# Patient Record
Sex: MALE | Race: WHITE | NOT HISPANIC OR LATINO | Employment: UNEMPLOYED | URBAN - NONMETROPOLITAN AREA
[De-identification: names, ages, dates, MRNs, and addresses within clinical notes are randomized per-mention and may not be internally consistent; named-entity substitution may affect disease eponyms.]

---

## 2017-04-06 ENCOUNTER — APPOINTMENT (OUTPATIENT)
Dept: CT IMAGING | Facility: HOSPITAL | Age: 27
End: 2017-04-06

## 2017-04-06 ENCOUNTER — HOSPITAL ENCOUNTER (EMERGENCY)
Facility: HOSPITAL | Age: 27
Discharge: HOME | End: 2017-04-06
Admitting: EMERGENCY MEDICINE

## 2017-04-06 VITALS
TEMPERATURE: 97.8 F | SYSTOLIC BLOOD PRESSURE: 131 MMHG | HEIGHT: 69 IN | OXYGEN SATURATION: 98 % | BODY MASS INDEX: 46.65 KG/M2 | DIASTOLIC BLOOD PRESSURE: 81 MMHG | WEIGHT: 315 LBS | RESPIRATION RATE: 18 BRPM | HEART RATE: 72 BPM

## 2017-04-06 DIAGNOSIS — N20.1 URETEROLITHIASIS: Primary | ICD-10-CM

## 2017-04-06 LAB
ALBUMIN SERPL-MCNC: 4.8 G/DL (ref 3.5–5)
ALBUMIN/GLOB SERPL: 1.8 G/DL (ref 1.5–2.5)
ALP SERPL-CCNC: 91 U/L (ref 40–129)
ALT SERPL W P-5'-P-CCNC: 222 U/L (ref 10–44)
ANION GAP SERPL CALCULATED.3IONS-SCNC: 5.5 MMOL/L (ref 3.6–11.2)
AST SERPL-CCNC: 209 U/L (ref 10–34)
BACTERIA UR QL AUTO: ABNORMAL /HPF
BASOPHILS # BLD AUTO: 0.02 10*3/MM3 (ref 0–0.3)
BASOPHILS NFR BLD AUTO: 0.1 % (ref 0–2)
BILIRUB SERPL-MCNC: 0.8 MG/DL (ref 0.2–1.8)
BILIRUB UR QL STRIP: NEGATIVE
BUN BLD-MCNC: 15 MG/DL (ref 7–21)
BUN/CREAT SERPL: 12.9 (ref 7–25)
CALCIUM SPEC-SCNC: 10.2 MG/DL (ref 7.7–10)
CHLORIDE SERPL-SCNC: 107 MMOL/L (ref 99–112)
CLARITY UR: CLEAR
CO2 SERPL-SCNC: 33.5 MMOL/L (ref 24.3–31.9)
COLOR UR: YELLOW
CREAT BLD-MCNC: 1.16 MG/DL (ref 0.43–1.29)
DEPRECATED RDW RBC AUTO: 42.9 FL (ref 37–54)
EOSINOPHIL # BLD AUTO: 0.27 10*3/MM3 (ref 0–0.7)
EOSINOPHIL NFR BLD AUTO: 1.8 % (ref 0–5)
ERYTHROCYTE [DISTWIDTH] IN BLOOD BY AUTOMATED COUNT: 13.8 % (ref 11.5–14.5)
GFR SERPL CREATININE-BSD FRML MDRD: 75 ML/MIN/1.73
GLOBULIN UR ELPH-MCNC: 2.7 GM/DL
GLUCOSE BLD-MCNC: 107 MG/DL (ref 70–110)
GLUCOSE UR STRIP-MCNC: NEGATIVE MG/DL
HCT VFR BLD AUTO: 45.9 % (ref 42–52)
HGB BLD-MCNC: 14.9 G/DL (ref 14–18)
HGB UR QL STRIP.AUTO: ABNORMAL
HYALINE CASTS UR QL AUTO: ABNORMAL /LPF
IMM GRANULOCYTES # BLD: 0.04 10*3/MM3 (ref 0–0.03)
IMM GRANULOCYTES NFR BLD: 0.3 % (ref 0–0.5)
KETONES UR QL STRIP: NEGATIVE
LEUKOCYTE ESTERASE UR QL STRIP.AUTO: ABNORMAL
LYMPHOCYTES # BLD AUTO: 4.69 10*3/MM3 (ref 1–3)
LYMPHOCYTES NFR BLD AUTO: 31.7 % (ref 21–51)
MCH RBC QN AUTO: 28.1 PG (ref 27–33)
MCHC RBC AUTO-ENTMCNC: 32.5 G/DL (ref 33–37)
MCV RBC AUTO: 86.4 FL (ref 80–94)
MONOCYTES # BLD AUTO: 0.53 10*3/MM3 (ref 0.1–0.9)
MONOCYTES NFR BLD AUTO: 3.6 % (ref 0–10)
NEUTROPHILS # BLD AUTO: 9.23 10*3/MM3 (ref 1.4–6.5)
NEUTROPHILS NFR BLD AUTO: 62.5 % (ref 30–70)
NITRITE UR QL STRIP: NEGATIVE
OSMOLALITY SERPL CALC.SUM OF ELEC: 291.9 MOSM/KG (ref 273–305)
PH UR STRIP.AUTO: 5.5 [PH] (ref 5–8)
PLATELET # BLD AUTO: 337 10*3/MM3 (ref 130–400)
PMV BLD AUTO: 10.2 FL (ref 6–10)
POTASSIUM BLD-SCNC: 4.9 MMOL/L (ref 3.5–5.3)
PROT SERPL-MCNC: 7.5 G/DL (ref 6–8)
PROT UR QL STRIP: ABNORMAL
RBC # BLD AUTO: 5.31 10*6/MM3 (ref 4.7–6.1)
RBC # UR: ABNORMAL /HPF
REF LAB TEST METHOD: ABNORMAL
SODIUM BLD-SCNC: 146 MMOL/L (ref 135–153)
SP GR UR STRIP: 1.02 (ref 1–1.03)
SQUAMOUS #/AREA URNS HPF: ABNORMAL /HPF
UROBILINOGEN UR QL STRIP: ABNORMAL
WBC NRBC COR # BLD: 14.78 10*3/MM3 (ref 4.5–12.5)
WBC UR QL AUTO: ABNORMAL /HPF

## 2017-04-06 PROCEDURE — 96374 THER/PROPH/DIAG INJ IV PUSH: CPT

## 2017-04-06 PROCEDURE — 25010000002 ONDANSETRON PER 1 MG: Performed by: NURSE PRACTITIONER

## 2017-04-06 PROCEDURE — 81001 URINALYSIS AUTO W/SCOPE: CPT | Performed by: NURSE PRACTITIONER

## 2017-04-06 PROCEDURE — 85025 COMPLETE CBC W/AUTO DIFF WBC: CPT | Performed by: NURSE PRACTITIONER

## 2017-04-06 PROCEDURE — 74176 CT ABD & PELVIS W/O CONTRAST: CPT | Performed by: RADIOLOGY

## 2017-04-06 PROCEDURE — 74176 CT ABD & PELVIS W/O CONTRAST: CPT

## 2017-04-06 PROCEDURE — 99284 EMERGENCY DEPT VISIT MOD MDM: CPT

## 2017-04-06 PROCEDURE — 96361 HYDRATE IV INFUSION ADD-ON: CPT

## 2017-04-06 PROCEDURE — 25010000002 KETOROLAC TROMETHAMINE PER 15 MG: Performed by: NURSE PRACTITIONER

## 2017-04-06 PROCEDURE — 25010000002 HYDROMORPHONE PER 4 MG: Performed by: NURSE PRACTITIONER

## 2017-04-06 PROCEDURE — 80053 COMPREHEN METABOLIC PANEL: CPT | Performed by: NURSE PRACTITIONER

## 2017-04-06 PROCEDURE — 96375 TX/PRO/DX INJ NEW DRUG ADDON: CPT

## 2017-04-06 RX ORDER — KETOROLAC TROMETHAMINE 30 MG/ML
30 INJECTION, SOLUTION INTRAMUSCULAR; INTRAVENOUS ONCE
Status: COMPLETED | OUTPATIENT
Start: 2017-04-06 | End: 2017-04-06

## 2017-04-06 RX ORDER — OXYCODONE HYDROCHLORIDE AND ACETAMINOPHEN 5; 325 MG/1; MG/1
1 TABLET ORAL EVERY 4 HOURS PRN
Qty: 12 TABLET | Refills: 0 | OUTPATIENT
Start: 2017-04-06 | End: 2018-04-04

## 2017-04-06 RX ORDER — TAMSULOSIN HYDROCHLORIDE 0.4 MG/1
1 CAPSULE ORAL DAILY
Qty: 15 CAPSULE | Refills: 0 | OUTPATIENT
Start: 2017-04-06 | End: 2018-04-04

## 2017-04-06 RX ORDER — SODIUM CHLORIDE 0.9 % (FLUSH) 0.9 %
10 SYRINGE (ML) INJECTION AS NEEDED
Status: DISCONTINUED | OUTPATIENT
Start: 2017-04-06 | End: 2017-04-06 | Stop reason: HOSPADM

## 2017-04-06 RX ORDER — ONDANSETRON 4 MG/1
4 TABLET, ORALLY DISINTEGRATING ORAL EVERY 6 HOURS PRN
Qty: 12 TABLET | Refills: 0 | Status: SHIPPED | OUTPATIENT
Start: 2017-04-06 | End: 2018-03-02 | Stop reason: SDUPTHER

## 2017-04-06 RX ORDER — ONDANSETRON 2 MG/ML
4 INJECTION INTRAMUSCULAR; INTRAVENOUS ONCE
Status: COMPLETED | OUTPATIENT
Start: 2017-04-06 | End: 2017-04-06

## 2017-04-06 RX ADMIN — SODIUM CHLORIDE 1000 ML: 9 INJECTION, SOLUTION INTRAVENOUS at 12:35

## 2017-04-06 RX ADMIN — KETOROLAC TROMETHAMINE 30 MG: 30 INJECTION, SOLUTION INTRAMUSCULAR; INTRAVENOUS at 14:46

## 2017-04-06 RX ADMIN — HYDROMORPHONE HYDROCHLORIDE 1 MG: 1 INJECTION, SOLUTION INTRAMUSCULAR; INTRAVENOUS; SUBCUTANEOUS at 12:44

## 2017-04-06 RX ADMIN — ONDANSETRON 4 MG: 2 INJECTION, SOLUTION INTRAMUSCULAR; INTRAVENOUS at 12:40

## 2017-04-06 NOTE — ED NOTES
Reassessment afater pain med continues to be #5-6 very uncomfortable.     Denea Le RN  04/06/17 9828

## 2017-04-06 NOTE — ED PROVIDER NOTES
Subjective   Patient is a 26 y.o. male presenting with flank pain.   History provided by:  Patient   used: No    Flank Pain   Pain location:  R flank  Pain quality: sharp    Pain radiates to:  Does not radiate  Pain severity:  Moderate  Onset quality:  Sudden  Timing:  Constant  Progression:  Waxing and waning  Chronicity:  New  Context: not alcohol use, not awakening from sleep, not diet changes, not eating, not previous surgeries, not recent illness, not recent sexual activity, not retching and not suspicious food intake    Relieved by:  Nothing  Worsened by:  Nothing  Ineffective treatments:  None tried  Associated symptoms: nausea    Associated symptoms: no anorexia, no belching, no chest pain, no chills, no dysuria, no fever, no flatus, no hematemesis, no melena, no sore throat and no vaginal bleeding    Risk factors: no alcohol abuse, no aspirin use, not elderly, no NSAID use and not obese        Review of Systems   Constitutional: Negative.  Negative for chills and fever.   HENT: Negative.  Negative for sore throat.    Eyes: Negative.    Respiratory: Negative.    Cardiovascular: Negative.  Negative for chest pain.   Gastrointestinal: Positive for nausea. Negative for anorexia, flatus, hematemesis and melena.   Endocrine: Negative.    Genitourinary: Positive for flank pain. Negative for dysuria and vaginal bleeding.   Skin: Negative.    Allergic/Immunologic: Negative.    Neurological: Negative.    Hematological: Negative.    Psychiatric/Behavioral: Negative.        Past Medical History:   Diagnosis Date   • Arthritis    • Calculus of kidney    • Hypertension        Allergies   Allergen Reactions   • Contrast Dye    • Lisinopril        Past Surgical History:   Procedure Laterality Date   • OTHER SURGICAL HISTORY      diagnostic esophagogastroduodenoscopy   • OTHER SURGICAL HISTORY      renal lithotripsy       Family History   Problem Relation Age of Onset   • Colon cancer Other    • Heart  disease Other    • Lymphoma Other        Social History     Social History   • Marital status:      Spouse name: N/A   • Number of children: N/A   • Years of education: N/A     Social History Main Topics   • Smoking status: Light Tobacco Smoker   • Smokeless tobacco: None      Comment: smokes 1 pack of cigarettes per day   • Alcohol use Defer   • Drug use: No   • Sexual activity: Not Asked     Other Topics Concern   • None     Social History Narrative           Objective   Physical Exam   Constitutional: He is oriented to person, place, and time. He appears well-developed and well-nourished.   HENT:   Head: Normocephalic.   Right Ear: External ear normal.   Left Ear: External ear normal.   Mouth/Throat: Oropharynx is clear and moist.   Eyes: EOM are normal. Pupils are equal, round, and reactive to light.   Neck: Normal range of motion. Neck supple.   Cardiovascular: Normal rate and regular rhythm.    Pulmonary/Chest: Effort normal and breath sounds normal.   Abdominal: Soft. Bowel sounds are normal.   Musculoskeletal: Normal range of motion.   Neurological: He is alert and oriented to person, place, and time.   Skin: Skin is warm and dry.   Psychiatric: He has a normal mood and affect. His behavior is normal.   Nursing note and vitals reviewed.      Procedures         ED Course  ED Course                  MDM    Final diagnoses:   Ureterolithiasis            Paramjit Adams, APOLINAR  04/06/17 1530       APOLINAR Multani  04/06/17 1557

## 2017-07-25 ENCOUNTER — HOSPITAL ENCOUNTER (EMERGENCY)
Facility: HOSPITAL | Age: 27
Discharge: LEFT BEFORE DISCHARGE | End: 2017-07-26

## 2017-07-25 VITALS
RESPIRATION RATE: 20 BRPM | DIASTOLIC BLOOD PRESSURE: 107 MMHG | BODY MASS INDEX: 47.74 KG/M2 | HEART RATE: 118 BPM | WEIGHT: 315 LBS | TEMPERATURE: 98 F | HEIGHT: 68 IN | OXYGEN SATURATION: 98 % | SYSTOLIC BLOOD PRESSURE: 157 MMHG

## 2017-07-25 PROCEDURE — 99211 OFF/OP EST MAY X REQ PHY/QHP: CPT

## 2017-10-03 ENCOUNTER — LAB (OUTPATIENT)
Dept: LAB | Facility: HOSPITAL | Age: 27
End: 2017-10-03
Attending: INTERNAL MEDICINE

## 2017-10-03 ENCOUNTER — TRANSCRIBE ORDERS (OUTPATIENT)
Dept: ADMINISTRATIVE | Facility: HOSPITAL | Age: 27
End: 2017-10-03

## 2017-10-03 DIAGNOSIS — M15.0 PRIMARY GENERALIZED HYPERTROPHIC OSTEOARTHROSIS: Primary | ICD-10-CM

## 2017-10-03 DIAGNOSIS — M15.0 PRIMARY GENERALIZED HYPERTROPHIC OSTEOARTHROSIS: ICD-10-CM

## 2017-10-03 LAB
CK SERPL-CCNC: 74 U/L (ref 24–204)
CRP SERPL-MCNC: 1.73 MG/DL (ref 0–0.99)
ERYTHROCYTE [SEDIMENTATION RATE] IN BLOOD: 29 MM/HR (ref 0–15)

## 2017-10-03 PROCEDURE — 86200 CCP ANTIBODY: CPT | Performed by: INTERNAL MEDICINE

## 2017-10-03 PROCEDURE — 86140 C-REACTIVE PROTEIN: CPT | Performed by: INTERNAL MEDICINE

## 2017-10-03 PROCEDURE — 82550 ASSAY OF CK (CPK): CPT | Performed by: INTERNAL MEDICINE

## 2017-10-03 PROCEDURE — 85652 RBC SED RATE AUTOMATED: CPT | Performed by: INTERNAL MEDICINE

## 2017-10-03 PROCEDURE — 36415 COLL VENOUS BLD VENIPUNCTURE: CPT

## 2017-10-05 LAB — CCP IGA+IGG SERPL IA-ACNC: 5 UNITS (ref 0–19)

## 2017-11-10 ENCOUNTER — LAB (OUTPATIENT)
Dept: LAB | Facility: HOSPITAL | Age: 27
End: 2017-11-10
Attending: INTERNAL MEDICINE

## 2017-11-10 ENCOUNTER — TRANSCRIBE ORDERS (OUTPATIENT)
Dept: ADMINISTRATIVE | Facility: HOSPITAL | Age: 27
End: 2017-11-10

## 2017-11-10 DIAGNOSIS — I10 HYPERTENSION, UNSPECIFIED TYPE: ICD-10-CM

## 2017-11-10 DIAGNOSIS — I10 HYPERTENSION, UNSPECIFIED TYPE: Primary | ICD-10-CM

## 2017-11-10 LAB
BNP SERPL-MCNC: 4 PG/ML (ref 0–100)
CREAT UR-MCNC: 232.6 MG/DL
PROT UR-MCNC: 7.5 MG/DL
PROT/CREAT UR: 32.2 MG/G CREA (ref 0–200)
TSH SERPL DL<=0.05 MIU/L-ACNC: 4.06 MIU/ML (ref 0.55–4.78)

## 2017-11-10 PROCEDURE — 84156 ASSAY OF PROTEIN URINE: CPT | Performed by: INTERNAL MEDICINE

## 2017-11-10 PROCEDURE — 36415 COLL VENOUS BLD VENIPUNCTURE: CPT

## 2017-11-10 PROCEDURE — 84443 ASSAY THYROID STIM HORMONE: CPT | Performed by: INTERNAL MEDICINE

## 2017-11-10 PROCEDURE — 83880 ASSAY OF NATRIURETIC PEPTIDE: CPT | Performed by: INTERNAL MEDICINE

## 2017-11-10 PROCEDURE — 82570 ASSAY OF URINE CREATININE: CPT | Performed by: INTERNAL MEDICINE

## 2018-02-14 ENCOUNTER — HOSPITAL ENCOUNTER (EMERGENCY)
Facility: HOSPITAL | Age: 28
Discharge: HOME | End: 2018-02-14
Attending: EMERGENCY MEDICINE | Admitting: EMERGENCY MEDICINE

## 2018-02-14 ENCOUNTER — APPOINTMENT (OUTPATIENT)
Dept: CT IMAGING | Facility: HOSPITAL | Age: 28
End: 2018-02-14

## 2018-02-14 VITALS
SYSTOLIC BLOOD PRESSURE: 122 MMHG | RESPIRATION RATE: 18 BRPM | HEIGHT: 69 IN | OXYGEN SATURATION: 98 % | WEIGHT: 315 LBS | DIASTOLIC BLOOD PRESSURE: 82 MMHG | BODY MASS INDEX: 46.65 KG/M2 | TEMPERATURE: 98.3 F | HEART RATE: 85 BPM

## 2018-02-14 DIAGNOSIS — N20.0 NEPHROLITHIASIS: Primary | ICD-10-CM

## 2018-02-14 DIAGNOSIS — N13.30 HYDRONEPHROSIS, UNSPECIFIED HYDRONEPHROSIS TYPE: ICD-10-CM

## 2018-02-14 LAB
ALBUMIN SERPL-MCNC: 4.6 G/DL (ref 3.5–5)
ALBUMIN/GLOB SERPL: 1.6 G/DL (ref 1.5–2.5)
ALP SERPL-CCNC: 84 U/L (ref 40–129)
ALT SERPL W P-5'-P-CCNC: 28 U/L (ref 10–44)
ANION GAP SERPL CALCULATED.3IONS-SCNC: 9 MMOL/L (ref 3.6–11.2)
AST SERPL-CCNC: 20 U/L (ref 10–34)
BACTERIA UR QL AUTO: ABNORMAL /HPF
BASOPHILS # BLD AUTO: 0.02 10*3/MM3 (ref 0–0.3)
BASOPHILS NFR BLD AUTO: 0.1 % (ref 0–2)
BILIRUB SERPL-MCNC: 0.4 MG/DL (ref 0.2–1.8)
BILIRUB UR QL STRIP: NEGATIVE
BUN BLD-MCNC: 11 MG/DL (ref 7–21)
BUN/CREAT SERPL: 9.6 (ref 7–25)
CALCIUM SPEC-SCNC: 9.9 MG/DL (ref 7.7–10)
CHLORIDE SERPL-SCNC: 103 MMOL/L (ref 99–112)
CLARITY UR: CLEAR
CO2 SERPL-SCNC: 27 MMOL/L (ref 24.3–31.9)
COLOR UR: YELLOW
CREAT BLD-MCNC: 1.15 MG/DL (ref 0.43–1.29)
DEPRECATED RDW RBC AUTO: 43.9 FL (ref 37–54)
EOSINOPHIL # BLD AUTO: 0.24 10*3/MM3 (ref 0–0.7)
EOSINOPHIL NFR BLD AUTO: 1.7 % (ref 0–5)
ERYTHROCYTE [DISTWIDTH] IN BLOOD BY AUTOMATED COUNT: 14.3 % (ref 11.5–14.5)
GFR SERPL CREATININE-BSD FRML MDRD: 75 ML/MIN/1.73
GLOBULIN UR ELPH-MCNC: 2.9 GM/DL
GLUCOSE BLD-MCNC: 101 MG/DL (ref 70–110)
GLUCOSE UR STRIP-MCNC: NEGATIVE MG/DL
HCT VFR BLD AUTO: 45.4 % (ref 42–52)
HGB BLD-MCNC: 14.8 G/DL (ref 14–18)
HGB UR QL STRIP.AUTO: ABNORMAL
HYALINE CASTS UR QL AUTO: ABNORMAL /LPF
IMM GRANULOCYTES # BLD: 0.07 10*3/MM3 (ref 0–0.03)
IMM GRANULOCYTES NFR BLD: 0.5 % (ref 0–0.5)
KETONES UR QL STRIP: ABNORMAL
LEUKOCYTE ESTERASE UR QL STRIP.AUTO: ABNORMAL
LIPASE SERPL-CCNC: 29 U/L (ref 13–60)
LYMPHOCYTES # BLD AUTO: 4.43 10*3/MM3 (ref 1–3)
LYMPHOCYTES NFR BLD AUTO: 32.2 % (ref 21–51)
MCH RBC QN AUTO: 27.7 PG (ref 27–33)
MCHC RBC AUTO-ENTMCNC: 32.6 G/DL (ref 33–37)
MCV RBC AUTO: 84.9 FL (ref 80–94)
MONOCYTES # BLD AUTO: 0.82 10*3/MM3 (ref 0.1–0.9)
MONOCYTES NFR BLD AUTO: 6 % (ref 0–10)
NEUTROPHILS # BLD AUTO: 8.17 10*3/MM3 (ref 1.4–6.5)
NEUTROPHILS NFR BLD AUTO: 59.5 % (ref 30–70)
NITRITE UR QL STRIP: NEGATIVE
OSMOLALITY SERPL CALC.SUM OF ELEC: 277.1 MOSM/KG (ref 273–305)
PH UR STRIP.AUTO: <=5 [PH] (ref 5–8)
PLATELET # BLD AUTO: 376 10*3/MM3 (ref 130–400)
PMV BLD AUTO: 10.3 FL (ref 6–10)
POTASSIUM BLD-SCNC: 4.2 MMOL/L (ref 3.5–5.3)
PROT SERPL-MCNC: 7.5 G/DL (ref 6–8)
PROT UR QL STRIP: ABNORMAL
RBC # BLD AUTO: 5.35 10*6/MM3 (ref 4.7–6.1)
RBC # UR: ABNORMAL /HPF
REF LAB TEST METHOD: ABNORMAL
SODIUM BLD-SCNC: 139 MMOL/L (ref 135–153)
SP GR UR STRIP: 1.02 (ref 1–1.03)
SQUAMOUS #/AREA URNS HPF: ABNORMAL /HPF
UROBILINOGEN UR QL STRIP: ABNORMAL
WBC NRBC COR # BLD: 13.75 10*3/MM3 (ref 4.5–12.5)
WBC UR QL AUTO: ABNORMAL /HPF

## 2018-02-14 PROCEDURE — 85025 COMPLETE CBC W/AUTO DIFF WBC: CPT | Performed by: PHYSICIAN ASSISTANT

## 2018-02-14 PROCEDURE — 74176 CT ABD & PELVIS W/O CONTRAST: CPT

## 2018-02-14 PROCEDURE — 80053 COMPREHEN METABOLIC PANEL: CPT | Performed by: PHYSICIAN ASSISTANT

## 2018-02-14 PROCEDURE — 96374 THER/PROPH/DIAG INJ IV PUSH: CPT

## 2018-02-14 PROCEDURE — 96361 HYDRATE IV INFUSION ADD-ON: CPT

## 2018-02-14 PROCEDURE — 25010000002 HYDROMORPHONE PER 4 MG

## 2018-02-14 PROCEDURE — 25010000002 KETOROLAC TROMETHAMINE PER 15 MG: Performed by: EMERGENCY MEDICINE

## 2018-02-14 PROCEDURE — 74176 CT ABD & PELVIS W/O CONTRAST: CPT | Performed by: RADIOLOGY

## 2018-02-14 PROCEDURE — 25010000002 ONDANSETRON PER 1 MG: Performed by: PHYSICIAN ASSISTANT

## 2018-02-14 PROCEDURE — 96375 TX/PRO/DX INJ NEW DRUG ADDON: CPT

## 2018-02-14 PROCEDURE — 83690 ASSAY OF LIPASE: CPT | Performed by: PHYSICIAN ASSISTANT

## 2018-02-14 PROCEDURE — 99283 EMERGENCY DEPT VISIT LOW MDM: CPT

## 2018-02-14 PROCEDURE — 81001 URINALYSIS AUTO W/SCOPE: CPT | Performed by: PHYSICIAN ASSISTANT

## 2018-02-14 RX ORDER — KETOROLAC TROMETHAMINE 30 MG/ML
30 INJECTION, SOLUTION INTRAMUSCULAR; INTRAVENOUS ONCE
Status: COMPLETED | OUTPATIENT
Start: 2018-02-14 | End: 2018-02-14

## 2018-02-14 RX ORDER — LOSARTAN POTASSIUM 50 MG/1
50 TABLET ORAL DAILY
COMMUNITY
End: 2018-04-11

## 2018-02-14 RX ORDER — GABAPENTIN 600 MG/1
600 TABLET ORAL
COMMUNITY

## 2018-02-14 RX ORDER — ONDANSETRON 2 MG/ML
4 INJECTION INTRAMUSCULAR; INTRAVENOUS ONCE
Status: COMPLETED | OUTPATIENT
Start: 2018-02-14 | End: 2018-02-14

## 2018-02-14 RX ORDER — OXYCODONE AND ACETAMINOPHEN 10; 325 MG/1; MG/1
1 TABLET ORAL EVERY 6 HOURS PRN
Qty: 12 TABLET | Refills: 0 | Status: SHIPPED | OUTPATIENT
Start: 2018-02-14 | End: 2018-07-11

## 2018-02-14 RX ORDER — IBUPROFEN 800 MG/1
800 TABLET ORAL EVERY 8 HOURS PRN
Qty: 15 TABLET | Refills: 0 | Status: ON HOLD | OUTPATIENT
Start: 2018-02-14 | End: 2019-11-11

## 2018-02-14 RX ORDER — QUETIAPINE FUMARATE 100 MG/1
600 TABLET, FILM COATED ORAL NIGHTLY
COMMUNITY
End: 2018-09-19

## 2018-02-14 RX ORDER — SODIUM CHLORIDE 0.9 % (FLUSH) 0.9 %
10 SYRINGE (ML) INJECTION AS NEEDED
Status: DISCONTINUED | OUTPATIENT
Start: 2018-02-14 | End: 2018-02-14 | Stop reason: HOSPADM

## 2018-02-14 RX ORDER — HYDROMORPHONE HCL 110MG/55ML
PATIENT CONTROLLED ANALGESIA SYRINGE INTRAVENOUS
Status: COMPLETED
Start: 2018-02-14 | End: 2018-02-14

## 2018-02-14 RX ORDER — TAMSULOSIN HYDROCHLORIDE 0.4 MG/1
1 CAPSULE ORAL NIGHTLY
Qty: 7 CAPSULE | Refills: 0 | OUTPATIENT
Start: 2018-02-14 | End: 2019-03-17

## 2018-02-14 RX ORDER — ONDANSETRON 4 MG/1
4 TABLET, FILM COATED ORAL EVERY 8 HOURS PRN
Qty: 15 TABLET | Refills: 0 | Status: SHIPPED | OUTPATIENT
Start: 2018-02-14 | End: 2018-09-12

## 2018-02-14 RX ADMIN — HYDROMORPHONE HYDROCHLORIDE 1 MG: 2 INJECTION INTRAMUSCULAR; INTRAVENOUS; SUBCUTANEOUS at 10:47

## 2018-02-14 RX ADMIN — ONDANSETRON 4 MG: 2 INJECTION, SOLUTION INTRAMUSCULAR; INTRAVENOUS at 10:49

## 2018-02-14 RX ADMIN — SODIUM CHLORIDE 1000 ML: 9 INJECTION, SOLUTION INTRAVENOUS at 10:51

## 2018-02-14 RX ADMIN — KETOROLAC TROMETHAMINE 30 MG: 30 INJECTION, SOLUTION INTRAMUSCULAR; INTRAVENOUS at 10:45

## 2018-02-14 NOTE — ED PROVIDER NOTES
Subjective   Patient is a 27 y.o. male presenting with flank pain.   History provided by:  Patient   used: No    Flank Pain   Pain location:  R flank  Pain quality: sharp    Pain radiates to:  Suprapubic region and groin  Pain severity:  Severe  Duration:  3 hours  Timing:  Constant  Progression:  Worsening  Chronicity:  New  Context: awakening from sleep    Context: not sick contacts    Relieved by:  Nothing  Worsened by:  Urination and movement  Ineffective treatments:  None tried  Associated symptoms: nausea    Associated symptoms: no chest pain, no dysuria, no fever, no hematuria and no vomiting    Nausea:     Severity:  Moderate    Timing:  Constant  Risk factors: obesity    Risk factors: not elderly        Review of Systems   Constitutional: Negative.  Negative for fever.   HENT: Negative.    Respiratory: Negative.    Cardiovascular: Negative.  Negative for chest pain.   Gastrointestinal: Positive for abdominal pain and nausea. Negative for vomiting.   Endocrine: Negative.    Genitourinary: Positive for flank pain and urgency. Negative for dysuria and hematuria.   Skin: Negative.    Neurological: Negative.    Psychiatric/Behavioral: Negative.    All other systems reviewed and are negative.      Past Medical History:   Diagnosis Date   • Arthritis    • Calculus of kidney    • Hypertension        Allergies   Allergen Reactions   • Lisinopril Cough   • Contrast Dye Rash       Past Surgical History:   Procedure Laterality Date   • OTHER SURGICAL HISTORY      diagnostic esophagogastroduodenoscopy   • OTHER SURGICAL HISTORY      renal lithotripsy       Family History   Problem Relation Age of Onset   • Colon cancer Other    • Heart disease Other    • Lymphoma Other        Social History     Social History   • Marital status:      Spouse name: N/A   • Number of children: N/A   • Years of education: N/A     Social History Main Topics   • Smoking status: Light Tobacco Smoker   • Smokeless  tobacco: Never Used      Comment: smokes 1 pack of cigarettes per day   • Alcohol use Defer   • Drug use: No   • Sexual activity: Defer     Other Topics Concern   • None     Social History Narrative   • None           Objective   Physical Exam   Constitutional: He is oriented to person, place, and time. He appears well-developed and well-nourished. No distress.   HENT:   Head: Normocephalic and atraumatic.   Right Ear: External ear normal.   Left Ear: External ear normal.   Nose: Nose normal.   Eyes: Conjunctivae and EOM are normal. Pupils are equal, round, and reactive to light.   Neck: Normal range of motion. Neck supple. No JVD present. No tracheal deviation present.   Cardiovascular: Normal rate, regular rhythm and normal heart sounds.    No murmur heard.  Pulmonary/Chest: Effort normal and breath sounds normal. No respiratory distress. He has no wheezes.   Abdominal: Soft. Bowel sounds are normal. There is tenderness in the suprapubic area. There is CVA tenderness.   Musculoskeletal: Normal range of motion. He exhibits no edema or deformity.   Neurological: He is alert and oriented to person, place, and time. No cranial nerve deficit.   Skin: Skin is warm and dry. No rash noted. He is not diaphoretic. No erythema. No pallor.   Psychiatric: He has a normal mood and affect. His behavior is normal. Thought content normal.   Nursing note and vitals reviewed.      Procedures         ED Course  ED Course   Value Comment By Time   CT Abdomen Pelvis Stone Protocol Reviewed. 3.5mm stone present in proximal right ureter. Hydronephrosis. No obstruction present. Erasmo Contreras PA-C 02/14 1115                  MDM  Number of Diagnoses or Management Options  Hydronephrosis, unspecified hydronephrosis type: new and requires workup  Nephrolithiasis: new and requires workup     Amount and/or Complexity of Data Reviewed  Clinical lab tests: ordered and reviewed  Tests in the radiology section of CPT®: reviewed and  ordered  Discuss the patient with other providers: yes    Risk of Complications, Morbidity, and/or Mortality  Presenting problems: moderate  Diagnostic procedures: moderate  Management options: moderate    Patient Progress  Patient progress: stable      Final diagnoses:   Nephrolithiasis   Hydronephrosis, unspecified hydronephrosis type            Erasmo Contreras PA-C  02/14/18 1149

## 2018-03-02 ENCOUNTER — OFFICE VISIT (OUTPATIENT)
Dept: UROLOGY | Facility: CLINIC | Age: 28
End: 2018-03-02

## 2018-03-02 VITALS
HEIGHT: 69 IN | HEART RATE: 85 BPM | BODY MASS INDEX: 46.65 KG/M2 | DIASTOLIC BLOOD PRESSURE: 82 MMHG | WEIGHT: 315 LBS | SYSTOLIC BLOOD PRESSURE: 122 MMHG

## 2018-03-02 DIAGNOSIS — N32.81 DETRUSOR INSTABILITY: ICD-10-CM

## 2018-03-02 DIAGNOSIS — N20.0 KIDNEY STONES: Primary | ICD-10-CM

## 2018-03-02 DIAGNOSIS — N20.1 URETERAL CALCULUS, RIGHT: ICD-10-CM

## 2018-03-02 LAB
BILIRUB BLD-MCNC: NEGATIVE MG/DL
CLARITY, POC: CLEAR
COLOR UR: YELLOW
GLUCOSE UR STRIP-MCNC: NEGATIVE MG/DL
KETONES UR QL: NEGATIVE
LEUKOCYTE EST, POC: NEGATIVE
NITRITE UR-MCNC: NEGATIVE MG/ML
PH UR: 6 [PH] (ref 5–8)
PROT UR STRIP-MCNC: NEGATIVE MG/DL
RBC # UR STRIP: NEGATIVE /UL
SP GR UR: 1.01 (ref 1–1.03)
UROBILINOGEN UR QL: NORMAL

## 2018-03-02 PROCEDURE — 51798 US URINE CAPACITY MEASURE: CPT | Performed by: UROLOGY

## 2018-03-02 PROCEDURE — 99204 OFFICE O/P NEW MOD 45 MIN: CPT | Performed by: UROLOGY

## 2018-03-02 RX ORDER — ONDANSETRON 4 MG/1
4 TABLET, ORALLY DISINTEGRATING ORAL EVERY 6 HOURS PRN
Qty: 12 TABLET | Refills: 0 | Status: SHIPPED | OUTPATIENT
Start: 2018-03-02 | End: 2018-04-17 | Stop reason: SDUPTHER

## 2018-03-02 NOTE — PROGRESS NOTES
Chief Complaint:          Chief Complaint   Patient presents with   • Nephrolithiasis     ER FOLLOW UP       HPI:   27 y.o. male.  27-year-old morbidly obese white male here with a right 3.5 mm distal stone seen in the emergency room on February 14 he should he passed a stone now is having urge and urge related incontinence.  Had a gastric sleeve and 2015.  He is gaining 30 pounds.  He's had recurrent stones since the age of 13.  He is a negative urine a negative residual he has occasional nausea and requested Zofran he's been sick for the last 2 weeks.  I'm going to initiate a lower tract investigation and I'm going to empirically start him on Toviaz for this detrusor instability and a workup based on this.Ureteral calculus-patient has been diagnosed with a ureteral calculus.  We have discussed the various parameters regarding spontaneous passage including the notion that a 2 mm stone has a high likelihood of spontaneous passage versus a larger stone being caught up in the upper areas of the urinary tract.  We also discussed the medical management of stone disease and the use of medical expulsive therapy in the form of Flomax.  This is used in an off label setting.  We discussed the indicators for intervention including  absolute indicators such as sepsis and uncontrollable severe pain as well as  the relative indicators of moderate pain that is well-controlled with various analgesia.  I also talked about nonoperative management including ambulation and increasing fluids and hot tub as being an effective adjuncts in the treatment of a ureteral stone.    Past Medical History:        Past Medical History:   Diagnosis Date   • Arthritis    • Calculus of kidney    • Hypertension          Current Meds:     Current Outpatient Prescriptions   Medication Sig Dispense Refill   • amitriptyline (ELAVIL) 50 MG tablet Take  by mouth.     • atorvastatin (LIPITOR) 20 MG tablet Take  by mouth.     • DULoxetine (CYMBALTA) 30 MG  capsule Take  by mouth.     • gabapentin (NEURONTIN) 600 MG tablet Take 600 mg by mouth 6 (Six) Times a Day.     • ibuprofen (ADVIL,MOTRIN) 800 MG tablet Take 1 tablet by mouth Every 8 (Eight) Hours As Needed for Moderate Pain . 15 tablet 0   • losartan (COZAAR) 50 MG tablet Take 50 mg by mouth Daily.     • lubiprostone (AMITIZA) 24 MCG capsule Take 1 capsule by mouth 2 (two) times a day. With food     • meloxicam (MOBIC) 15 MG tablet Take  by mouth.     • metoprolol tartrate (LOPRESSOR) 100 MG tablet Take 100 mg by mouth Every 12 (Twelve) Hours.     • ondansetron (ZOFRAN) 4 MG tablet Take 1 tablet by mouth Every 8 (Eight) Hours As Needed for Nausea. 15 tablet 0   • ondansetron ODT (ZOFRAN-ODT) 4 MG disintegrating tablet Take 1 tablet by mouth Every 6 (Six) Hours As Needed for Nausea or Vomiting. 12 tablet 0   • oxyCODONE-acetaminophen (PERCOCET)  MG per tablet Take 1 tablet by mouth Every 6 (Six) Hours As Needed for Moderate Pain . 12 tablet 0   • oxyCODONE-acetaminophen (PERCOCET) 5-325 MG per tablet Take 1 tablet by mouth Every 4 (Four) Hours As Needed for moderate pain (4-6). 15 tablet 0   • oxyCODONE-acetaminophen (PERCOCET) 5-325 MG per tablet Take 1 tablet by mouth Every 4 (Four) Hours As Needed for Severe Pain (7-10). 12 tablet 0   • polyethylene glycol (COLYTE) 240 G solution Take  by mouth.     • QUEtiapine (SEROquel) 100 MG tablet Take 100 mg by mouth Every Night.     • tamsulosin (FLOMAX) 0.4 MG capsule 24 hr capsule Take 1 capsule by mouth Daily. 30 capsule 0   • tamsulosin (FLOMAX) 0.4 MG capsule 24 hr capsule Take 1 capsule by mouth Daily. 15 capsule 0   • tamsulosin (FLOMAX) 0.4 MG capsule 24 hr capsule Take 1 capsule by mouth Every Night. 7 capsule 0     No current facility-administered medications for this visit.         Allergies:      Allergies   Allergen Reactions   • Lisinopril Cough   • Contrast Dye Rash        Past Surgical History:     Past Surgical History:   Procedure Laterality Date    • OTHER SURGICAL HISTORY      diagnostic esophagogastroduodenoscopy   • OTHER SURGICAL HISTORY      renal lithotripsy         Social History:     Social History     Social History   • Marital status:      Spouse name: N/A   • Number of children: N/A   • Years of education: N/A     Occupational History   • Not on file.     Social History Main Topics   • Smoking status: Light Tobacco Smoker   • Smokeless tobacco: Never Used      Comment: smokes 1 pack of cigarettes per day   • Alcohol use Defer   • Drug use: No   • Sexual activity: Defer     Other Topics Concern   • Not on file     Social History Narrative       Family History:     Family History   Problem Relation Age of Onset   • Colon cancer Other    • Heart disease Other    • Lymphoma Other        Review of Systems:     Review of Systems   Constitutional: Negative.    HENT: Negative.    Eyes: Negative.    Respiratory: Negative.    Cardiovascular: Negative.    Gastrointestinal: Positive for nausea.   Endocrine: Negative.    Genitourinary: Positive for frequency and urgency.   Musculoskeletal: Negative.    Allergic/Immunologic: Negative.    Neurological: Negative.    Hematological: Negative.    Psychiatric/Behavioral: Negative.        Physical Exam:     Physical Exam   Constitutional: He is oriented to person, place, and time. He appears well-developed and well-nourished.   HENT:   Head: Normocephalic and atraumatic.   Eyes: Conjunctivae and EOM are normal. Pupils are equal, round, and reactive to light.   Neck: Normal range of motion.   Cardiovascular: Normal rate, regular rhythm, normal heart sounds and intact distal pulses.    Pulmonary/Chest: Effort normal and breath sounds normal.   Abdominal: Soft. Bowel sounds are normal.   Genitourinary:   Genitourinary Comments: Super morbid obesity   Musculoskeletal: Normal range of motion.   Neurological: He is alert and oriented to person, place, and time. He has normal reflexes.   Skin: Skin is warm and dry.    Psychiatric: He has a normal mood and affect. His behavior is normal. Judgment and thought content normal.   Nursing note and vitals reviewed.      I have reviewed the following portions of the patient's history: allergies, current medications, past family history, past medical history, past social history, past surgical history, problem list and ROS and confirm it's accurate.    Procedure:       Assessment/Plan:   Ureteral calculus-patient has been diagnosed with a ureteral calculus.  We have discussed the various parameters regarding spontaneous passage including the notion that a 2 mm stone has a high likelihood of spontaneous passage versus a larger stone being caught up in the upper areas of the urinary tract.  We also discussed the medical management of stone disease and the use of medical expulsive therapy in the form of Flomax.  This is used in an off label setting.  We discussed the indicators for intervention including  absolute indicators such as sepsis and uncontrollable severe pain as well as  the relative indicators of moderate pain that is well-controlled with various analgesia.  I also talked about nonoperative management including ambulation and increasing fluids and hot tub as being an effective adjuncts in the treatment of a ureteral stone.  Going to initiate a workup including giving him Zofran for recurrent nausea.  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 Follow-up plan includes:  educational material.          This document has been electronically signed by JUNIOR VINSON MD March 2, 2018 10:19 AM

## 2018-03-23 ENCOUNTER — OFFICE VISIT (OUTPATIENT)
Dept: UROLOGY | Facility: CLINIC | Age: 28
End: 2018-03-23

## 2018-03-23 DIAGNOSIS — N20.1 URETERAL CALCULUS: Primary | ICD-10-CM

## 2018-03-23 DIAGNOSIS — N32.81 DETRUSOR INSTABILITY: ICD-10-CM

## 2018-03-23 PROCEDURE — 99213 OFFICE O/P EST LOW 20 MIN: CPT | Performed by: UROLOGY

## 2018-03-23 NOTE — PROGRESS NOTES
Chief Complaint:          Chief Complaint   Patient presents with   • Nephrolithiasis       HPI:   27 y.o. male.  27 year old super morbidly obese white male who had detrusor instability and states that the Vesicare only lasted for 3-4 days please having significant pain I believe he hasn't passed a stone because he never sought or had an x-ray x-rayed.  I'm a negative repeat CT scan and see him back based on this he has 4-5 stones at home he skin to bring when will have it analyzed.  This is a probable distal ureteral calculus of symptomatology of frequency other than flank pain    Past Medical History:        Past Medical History:   Diagnosis Date   • Arthritis    • Calculus of kidney    • Hypertension          Current Meds:     Current Outpatient Prescriptions   Medication Sig Dispense Refill   • amitriptyline (ELAVIL) 50 MG tablet Take  by mouth.     • atorvastatin (LIPITOR) 20 MG tablet Take  by mouth.     • DULoxetine (CYMBALTA) 30 MG capsule Take  by mouth.     • gabapentin (NEURONTIN) 600 MG tablet Take 600 mg by mouth 6 (Six) Times a Day.     • ibuprofen (ADVIL,MOTRIN) 800 MG tablet Take 1 tablet by mouth Every 8 (Eight) Hours As Needed for Moderate Pain . 15 tablet 0   • losartan (COZAAR) 50 MG tablet Take 50 mg by mouth Daily.     • lubiprostone (AMITIZA) 24 MCG capsule Take 1 capsule by mouth 2 (two) times a day. With food     • meloxicam (MOBIC) 15 MG tablet Take  by mouth.     • metoprolol tartrate (LOPRESSOR) 100 MG tablet Take 100 mg by mouth Every 12 (Twelve) Hours.     • ondansetron (ZOFRAN) 4 MG tablet Take 1 tablet by mouth Every 8 (Eight) Hours As Needed for Nausea. 15 tablet 0   • ondansetron ODT (ZOFRAN-ODT) 4 MG disintegrating tablet Take 1 tablet by mouth Every 6 (Six) Hours As Needed for Nausea or Vomiting. 12 tablet 0   • oxyCODONE-acetaminophen (PERCOCET)  MG per tablet Take 1 tablet by mouth Every 6 (Six) Hours As Needed for Moderate Pain . 12 tablet 0   • oxyCODONE-acetaminophen  (PERCOCET) 5-325 MG per tablet Take 1 tablet by mouth Every 4 (Four) Hours As Needed for moderate pain (4-6). 15 tablet 0   • oxyCODONE-acetaminophen (PERCOCET) 5-325 MG per tablet Take 1 tablet by mouth Every 4 (Four) Hours As Needed for Severe Pain (7-10). 12 tablet 0   • polyethylene glycol (COLYTE) 240 G solution Take  by mouth.     • QUEtiapine (SEROquel) 100 MG tablet Take 100 mg by mouth Every Night.     • tamsulosin (FLOMAX) 0.4 MG capsule 24 hr capsule Take 1 capsule by mouth Daily. 30 capsule 0   • tamsulosin (FLOMAX) 0.4 MG capsule 24 hr capsule Take 1 capsule by mouth Daily. 15 capsule 0   • tamsulosin (FLOMAX) 0.4 MG capsule 24 hr capsule Take 1 capsule by mouth Every Night. 7 capsule 0     No current facility-administered medications for this visit.         Allergies:      Allergies   Allergen Reactions   • Lisinopril Cough   • Contrast Dye Rash        Past Surgical History:     Past Surgical History:   Procedure Laterality Date   • OTHER SURGICAL HISTORY      diagnostic esophagogastroduodenoscopy   • OTHER SURGICAL HISTORY      renal lithotripsy         Social History:     Social History     Social History   • Marital status:      Spouse name: N/A   • Number of children: N/A   • Years of education: N/A     Occupational History   • Not on file.     Social History Main Topics   • Smoking status: Light Tobacco Smoker   • Smokeless tobacco: Never Used      Comment: smokes 1 pack of cigarettes per day   • Alcohol use Defer   • Drug use: No   • Sexual activity: Defer     Other Topics Concern   • Not on file     Social History Narrative   • No narrative on file       Family History:     Family History   Problem Relation Age of Onset   • Colon cancer Other    • Heart disease Other    • Lymphoma Other        Review of Systems:     Review of Systems   Constitutional: Negative.    HENT: Negative.    Eyes: Negative.    Respiratory: Negative.    Cardiovascular: Negative.    Gastrointestinal: Negative.     Endocrine: Negative.    Genitourinary: Positive for flank pain, frequency, testicular pain and urgency.   Allergic/Immunologic: Negative.    Neurological: Negative.    Hematological: Negative.    Psychiatric/Behavioral: Negative.        Physical Exam:     Physical Exam   Constitutional: He is oriented to person, place, and time. He appears well-developed and well-nourished.   HENT:   Head: Normocephalic and atraumatic.   Eyes: Conjunctivae and EOM are normal. Pupils are equal, round, and reactive to light.   Neck: Normal range of motion.   Cardiovascular: Normal rate, regular rhythm, normal heart sounds and intact distal pulses.    Pulmonary/Chest: Effort normal and breath sounds normal.   Abdominal: Soft. Bowel sounds are normal.   Musculoskeletal: Normal range of motion.   Neurological: He is alert and oriented to person, place, and time. He has normal reflexes.   Skin: Skin is warm and dry.   Psychiatric: He has a normal mood and affect. His behavior is normal. Judgment and thought content normal.   Nursing note and vitals reviewed.      I have reviewed the following portions of the patient's history: allergies, current medications, past family history, past medical history, past social history, past surgical history, problem list and ROS and confirm it's accurate.      Procedure:       Assessment/Plan:   Detrusor instability- Clearly failed anticholinergic and I'm strongly wondering whether this is a retained residual distal stone on the recommend rescreening him with a CT.  He will bring  a stone  for analysis as well           This document has been electronically signed by JUNIOR VINSON MD March 23, 2018 8:39 AM

## 2018-04-03 ENCOUNTER — HOSPITAL ENCOUNTER (OUTPATIENT)
Dept: CT IMAGING | Facility: HOSPITAL | Age: 28
Discharge: HOME | End: 2018-04-03
Attending: UROLOGY | Admitting: UROLOGY

## 2018-04-03 DIAGNOSIS — N20.1 URETERAL CALCULUS: ICD-10-CM

## 2018-04-03 PROCEDURE — 74176 CT ABD & PELVIS W/O CONTRAST: CPT | Performed by: RADIOLOGY

## 2018-04-03 PROCEDURE — 74176 CT ABD & PELVIS W/O CONTRAST: CPT

## 2018-04-06 ENCOUNTER — OFFICE VISIT (OUTPATIENT)
Dept: UROLOGY | Facility: CLINIC | Age: 28
End: 2018-04-06

## 2018-04-06 VITALS — WEIGHT: 315 LBS | HEIGHT: 69 IN | BODY MASS INDEX: 46.65 KG/M2

## 2018-04-06 DIAGNOSIS — N20.0 RENAL CALCULUS: Primary | ICD-10-CM

## 2018-04-06 PROCEDURE — 99213 OFFICE O/P EST LOW 20 MIN: CPT | Performed by: UROLOGY

## 2018-04-06 NOTE — PROGRESS NOTES
Chief Complaint:          Chief Complaint   Patient presents with   • Nephrolithiasis     review ct scan       HPI:   27 y.o. male.27-year-old super morbidly obese white male with recurrent urolithiasis.  Upper tract study is personally reviewed he has tiny punctate bilateral stones nothing of significance.  He also likes using the Toviaz for urinary frequency and I gave him samples of see him back on an as-needed basis he was instructed to push fluids and avoid cola products  Past Medical History:        Past Medical History:   Diagnosis Date   • Arthritis    • Calculus of kidney    • Hypertension          Current Meds:     Current Outpatient Prescriptions   Medication Sig Dispense Refill   • amitriptyline (ELAVIL) 50 MG tablet Take  by mouth.     • atorvastatin (LIPITOR) 20 MG tablet Take  by mouth.     • DULoxetine (CYMBALTA) 30 MG capsule Take  by mouth.     • gabapentin (NEURONTIN) 600 MG tablet Take 600 mg by mouth 6 (Six) Times a Day.     • ibuprofen (ADVIL,MOTRIN) 800 MG tablet Take 1 tablet by mouth Every 8 (Eight) Hours As Needed for Moderate Pain . 15 tablet 0   • losartan (COZAAR) 50 MG tablet Take 50 mg by mouth Daily.     • lubiprostone (AMITIZA) 24 MCG capsule Take 1 capsule by mouth 2 (two) times a day. With food     • meloxicam (MOBIC) 15 MG tablet Take  by mouth.     • metoprolol tartrate (LOPRESSOR) 100 MG tablet Take 100 mg by mouth Every 12 (Twelve) Hours.     • ondansetron (ZOFRAN) 4 MG tablet Take 1 tablet by mouth Every 8 (Eight) Hours As Needed for Nausea. 15 tablet 0   • ondansetron ODT (ZOFRAN-ODT) 4 MG disintegrating tablet Take 1 tablet by mouth Every 6 (Six) Hours As Needed for Nausea or Vomiting. 12 tablet 0   • oxyCODONE-acetaminophen (PERCOCET)  MG per tablet Take 1 tablet by mouth Every 6 (Six) Hours As Needed for Moderate Pain . 12 tablet 0   • polyethylene glycol (COLYTE) 240 G solution Take  by mouth.     • QUEtiapine (SEROquel) 100 MG tablet Take 100 mg by mouth Every  Night.     • tamsulosin (FLOMAX) 0.4 MG capsule 24 hr capsule Take 1 capsule by mouth Every Night. 7 capsule 0     No current facility-administered medications for this visit.         Allergies:      Allergies   Allergen Reactions   • Lisinopril Cough   • Contrast Dye Rash        Past Surgical History:     Past Surgical History:   Procedure Laterality Date   • OTHER SURGICAL HISTORY      diagnostic esophagogastroduodenoscopy   • OTHER SURGICAL HISTORY      renal lithotripsy         Social History:     Social History     Social History   • Marital status:      Spouse name: N/A   • Number of children: N/A   • Years of education: N/A     Occupational History   • Not on file.     Social History Main Topics   • Smoking status: Light Tobacco Smoker   • Smokeless tobacco: Never Used      Comment: smokes 1 pack of cigarettes per day   • Alcohol use Defer   • Drug use: No   • Sexual activity: Defer     Other Topics Concern   • Not on file     Social History Narrative   • No narrative on file       Family History:     Family History   Problem Relation Age of Onset   • Colon cancer Other    • Heart disease Other    • Lymphoma Other        Review of Systems:     Review of Systems   Constitutional: Negative.    HENT: Negative.    Eyes: Negative.    Respiratory: Negative.    Cardiovascular: Negative.    Gastrointestinal: Negative.    Endocrine: Negative.    Musculoskeletal: Negative.    Allergic/Immunologic: Negative.    Neurological: Negative.    Hematological: Negative.    Psychiatric/Behavioral: Negative.        Physical Exam:     Physical Exam   Constitutional: He is oriented to person, place, and time. He appears well-developed and well-nourished.   HENT:   Head: Normocephalic and atraumatic.   Eyes: Conjunctivae and EOM are normal. Pupils are equal, round, and reactive to light.   Neck: Normal range of motion.   Cardiovascular: Normal rate, regular rhythm, normal heart sounds and intact distal pulses.     Pulmonary/Chest: Effort normal and breath sounds normal.   Abdominal: Soft. Bowel sounds are normal.   Genitourinary:   Genitourinary Comments: Super morbidly obese   Musculoskeletal: Normal range of motion.   Neurological: He is alert and oriented to person, place, and time. He has normal reflexes.   Skin: Skin is warm and dry.   Psychiatric: He has a normal mood and affect. His behavior is normal. Judgment and thought content normal.   Nursing note and vitals reviewed.      I have reviewed the following portions of the patient's history: allergies, current medications, past family history, past medical history, past social history, past surgical history, problem list and ROS and confirm it's accurate.      Procedure:       Assessment/Plan:   Renal calculus-we discussed the presence of the stone we discussed the various therapeutic options available including percutaneous nephrostolithotomy, lithotripsy.  We discussed the risks of lithotripsy including the passage of stones the development of a large string of stones in the distal ureter known as Steinstrasse.  In the 3% incidence of that we will need to proceed with a ureteroscopy for obstructing fragments.  Extremely rare incidence of renal hematoma.  And the significance of this.  We discussed the absolute relative indicators for intervention including the presence of sepsis, and pain we cannot control is the primary need for urgent intervention.  We discussed placement of a stent if indicated and the management of the stent as well.     Discussed the patient's BMI with him. BMI is above normal parameters. Follow-up plan includes:  educational material.    I advised the patient of the risks in continuing to use tobacco, and I provided this patient with smoking cessation educational materials.    During this visit, I spent > 3-10 minutes counseling the patient regarding smoking cessation.        This document has been electronically signed by JUNIOR  MD ALISSON April 6, 2018 9:05 AM

## 2018-04-11 ENCOUNTER — OFFICE VISIT (OUTPATIENT)
Dept: FAMILY MEDICINE CLINIC | Facility: CLINIC | Age: 28
End: 2018-04-11

## 2018-04-11 VITALS
DIASTOLIC BLOOD PRESSURE: 80 MMHG | WEIGHT: 315 LBS | OXYGEN SATURATION: 98 % | HEART RATE: 129 BPM | BODY MASS INDEX: 46.65 KG/M2 | SYSTOLIC BLOOD PRESSURE: 138 MMHG | HEIGHT: 69 IN

## 2018-04-11 DIAGNOSIS — E55.9 VITAMIN D DEFICIENCY: ICD-10-CM

## 2018-04-11 DIAGNOSIS — M54.41 CHRONIC MIDLINE LOW BACK PAIN WITH BILATERAL SCIATICA: ICD-10-CM

## 2018-04-11 DIAGNOSIS — F41.9 ANXIETY AND DEPRESSION: ICD-10-CM

## 2018-04-11 DIAGNOSIS — Z23 IMMUNIZATION DUE: ICD-10-CM

## 2018-04-11 DIAGNOSIS — M54.42 CHRONIC MIDLINE LOW BACK PAIN WITH BILATERAL SCIATICA: ICD-10-CM

## 2018-04-11 DIAGNOSIS — R07.9 CHEST PAIN, UNSPECIFIED TYPE: Primary | ICD-10-CM

## 2018-04-11 DIAGNOSIS — R00.2 PALPITATIONS: ICD-10-CM

## 2018-04-11 DIAGNOSIS — G89.29 CHRONIC MIDLINE LOW BACK PAIN WITH BILATERAL SCIATICA: ICD-10-CM

## 2018-04-11 DIAGNOSIS — I10 ESSENTIAL HYPERTENSION: ICD-10-CM

## 2018-04-11 DIAGNOSIS — E78.5 HYPERLIPIDEMIA, UNSPECIFIED HYPERLIPIDEMIA TYPE: ICD-10-CM

## 2018-04-11 DIAGNOSIS — IMO0001 CLASS 3 OBESITY DUE TO EXCESS CALORIES WITH SERIOUS COMORBIDITY AND BODY MASS INDEX (BMI) OF 50.0 TO 59.9 IN ADULT: ICD-10-CM

## 2018-04-11 DIAGNOSIS — F32.A ANXIETY AND DEPRESSION: ICD-10-CM

## 2018-04-11 DIAGNOSIS — G47.33 OSA (OBSTRUCTIVE SLEEP APNEA): ICD-10-CM

## 2018-04-11 LAB
25(OH)D3 SERPL-MCNC: 14 NG/ML
ALBUMIN SERPL-MCNC: 4.6 G/DL (ref 3.5–5)
ALBUMIN/GLOB SERPL: 1.4 G/DL (ref 1.5–2.5)
ALP SERPL-CCNC: 83 U/L (ref 40–129)
ALT SERPL W P-5'-P-CCNC: 33 U/L (ref 10–44)
ANION GAP SERPL CALCULATED.3IONS-SCNC: 8.3 MMOL/L (ref 3.6–11.2)
AST SERPL-CCNC: 25 U/L (ref 10–34)
BASOPHILS # BLD AUTO: 0.02 10*3/MM3 (ref 0–0.3)
BASOPHILS NFR BLD AUTO: 0.2 % (ref 0–2)
BILIRUB SERPL-MCNC: 0.6 MG/DL (ref 0.2–1.8)
BUN BLD-MCNC: 16 MG/DL (ref 7–21)
BUN/CREAT SERPL: 14 (ref 7–25)
CALCIUM SPEC-SCNC: 10.7 MG/DL (ref 7.7–10)
CHLORIDE SERPL-SCNC: 102 MMOL/L (ref 99–112)
CHOLEST SERPL-MCNC: 335 MG/DL (ref 0–200)
CO2 SERPL-SCNC: 27.7 MMOL/L (ref 24.3–31.9)
CREAT BLD-MCNC: 1.14 MG/DL (ref 0.43–1.29)
DEPRECATED RDW RBC AUTO: 45 FL (ref 37–54)
EOSINOPHIL # BLD AUTO: 0.25 10*3/MM3 (ref 0–0.7)
EOSINOPHIL NFR BLD AUTO: 2 % (ref 0–5)
ERYTHROCYTE [DISTWIDTH] IN BLOOD BY AUTOMATED COUNT: 14.6 % (ref 11.5–14.5)
GFR SERPL CREATININE-BSD FRML MDRD: 76 ML/MIN/1.73
GLOBULIN UR ELPH-MCNC: 3.2 GM/DL
GLUCOSE BLD-MCNC: 102 MG/DL (ref 70–110)
HCT VFR BLD AUTO: 45.2 % (ref 42–52)
HDLC SERPL-MCNC: 41 MG/DL (ref 60–100)
HGB BLD-MCNC: 14.7 G/DL (ref 14–18)
IMM GRANULOCYTES # BLD: 0.06 10*3/MM3 (ref 0–0.03)
IMM GRANULOCYTES NFR BLD: 0.5 % (ref 0–0.5)
LDLC SERPL CALC-MCNC: 218 MG/DL (ref 0–100)
LDLC/HDLC SERPL: 5.33 {RATIO}
LYMPHOCYTES # BLD AUTO: 3.87 10*3/MM3 (ref 1–3)
LYMPHOCYTES NFR BLD AUTO: 30.4 % (ref 21–51)
MAGNESIUM SERPL-MCNC: 2.3 MG/DL (ref 1.7–2.6)
MCH RBC QN AUTO: 27.8 PG (ref 27–33)
MCHC RBC AUTO-ENTMCNC: 32.5 G/DL (ref 33–37)
MCV RBC AUTO: 85.4 FL (ref 80–94)
MONOCYTES # BLD AUTO: 0.78 10*3/MM3 (ref 0.1–0.9)
MONOCYTES NFR BLD AUTO: 6.1 % (ref 0–10)
NEUTROPHILS # BLD AUTO: 7.74 10*3/MM3 (ref 1.4–6.5)
NEUTROPHILS NFR BLD AUTO: 60.8 % (ref 30–70)
OSMOLALITY SERPL CALC.SUM OF ELEC: 277.1 MOSM/KG (ref 273–305)
PLATELET # BLD AUTO: 368 10*3/MM3 (ref 130–400)
PMV BLD AUTO: 10.2 FL (ref 6–10)
POTASSIUM BLD-SCNC: 4.3 MMOL/L (ref 3.5–5.3)
PROT SERPL-MCNC: 7.8 G/DL (ref 6–8)
RBC # BLD AUTO: 5.29 10*6/MM3 (ref 4.7–6.1)
SODIUM BLD-SCNC: 138 MMOL/L (ref 135–153)
T4 FREE SERPL-MCNC: 1.46 NG/DL (ref 0.89–1.76)
TRIGL SERPL-MCNC: 378 MG/DL (ref 0–150)
TSH SERPL DL<=0.05 MIU/L-ACNC: 3.96 MIU/ML (ref 0.55–4.78)
VIT B12 BLD-MCNC: 205 PG/ML (ref 211–911)
VLDLC SERPL-MCNC: 75.6 MG/DL
WBC NRBC COR # BLD: 12.72 10*3/MM3 (ref 4.5–12.5)

## 2018-04-11 PROCEDURE — 99214 OFFICE O/P EST MOD 30 MIN: CPT | Performed by: NURSE PRACTITIONER

## 2018-04-11 PROCEDURE — 93000 ELECTROCARDIOGRAM COMPLETE: CPT | Performed by: NURSE PRACTITIONER

## 2018-04-11 PROCEDURE — 82306 VITAMIN D 25 HYDROXY: CPT | Performed by: NURSE PRACTITIONER

## 2018-04-11 PROCEDURE — 84439 ASSAY OF FREE THYROXINE: CPT | Performed by: NURSE PRACTITIONER

## 2018-04-11 PROCEDURE — 90471 IMMUNIZATION ADMIN: CPT | Performed by: NURSE PRACTITIONER

## 2018-04-11 PROCEDURE — 83735 ASSAY OF MAGNESIUM: CPT | Performed by: NURSE PRACTITIONER

## 2018-04-11 PROCEDURE — 90715 TDAP VACCINE 7 YRS/> IM: CPT | Performed by: NURSE PRACTITIONER

## 2018-04-11 PROCEDURE — 80050 GENERAL HEALTH PANEL: CPT | Performed by: NURSE PRACTITIONER

## 2018-04-11 PROCEDURE — 80061 LIPID PANEL: CPT | Performed by: NURSE PRACTITIONER

## 2018-04-11 PROCEDURE — 82607 VITAMIN B-12: CPT | Performed by: NURSE PRACTITIONER

## 2018-04-11 PROCEDURE — 36415 COLL VENOUS BLD VENIPUNCTURE: CPT | Performed by: NURSE PRACTITIONER

## 2018-04-11 RX ORDER — LOSARTAN POTASSIUM AND HYDROCHLOROTHIAZIDE 12.5; 5 MG/1; MG/1
1 TABLET ORAL DAILY
COMMUNITY
Start: 2018-02-17 | End: 2019-01-08 | Stop reason: SDUPTHER

## 2018-04-11 RX ORDER — FLUOXETINE HYDROCHLORIDE 40 MG/1
60 CAPSULE ORAL DAILY
COMMUNITY
End: 2018-09-19 | Stop reason: SDUPTHER

## 2018-04-11 NOTE — PATIENT INSTRUCTIONS

## 2018-04-11 NOTE — PROGRESS NOTES
B12 level is low at 205.  I would suggest Vit B12 injections weekly x8 weeks if he is agreeable.   Vit D is low at 14.  I would suggest Vit D 50,000iu weekly x12 weeks if he is agreeable.   His cholesterol and triglycerides are elevated and I do suggest he be on a statin.  Atorvastatin 40mg daily if he is agreeable.   Please let him know.   Thank you
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
## 2018-04-12 ENCOUNTER — TELEPHONE (OUTPATIENT)
Dept: FAMILY MEDICINE CLINIC | Facility: CLINIC | Age: 28
End: 2018-04-12

## 2018-04-12 RX ORDER — ERGOCALCIFEROL 1.25 MG/1
50000 CAPSULE ORAL WEEKLY
Qty: 12 CAPSULE | Refills: 0 | Status: SHIPPED | OUTPATIENT
Start: 2018-04-12 | End: 2018-07-12

## 2018-04-12 RX ORDER — ATORVASTATIN CALCIUM 40 MG/1
40 TABLET, FILM COATED ORAL DAILY
Qty: 30 TABLET | Refills: 3 | Status: SHIPPED | OUTPATIENT
Start: 2018-04-12 | End: 2018-04-17 | Stop reason: SDUPTHER

## 2018-04-12 NOTE — TELEPHONE ENCOUNTER
----- Message from APOLINAR Gonzalez sent at 4/11/2018  3:30 PM EDT -----  B12 level is low at 205.  I would suggest Vit B12 injections weekly x8 weeks if he is agreeable.   Vit D is low at 14.  I would suggest Vit D 50,000iu weekly x12 weeks if he is agreeable.   His cholesterol and triglycerides are elevated and I do suggest he be on a statin.  Atorvastatin 40mg daily if he is agreeable.   Please let him know.   Thank you        Left a message to return call.      Patient returned call,agreeable to meds,sent to pharmacy.

## 2018-04-13 ENCOUNTER — CLINICAL SUPPORT (OUTPATIENT)
Dept: FAMILY MEDICINE CLINIC | Facility: CLINIC | Age: 28
End: 2018-04-13

## 2018-04-13 DIAGNOSIS — E53.8 VITAMIN B12 DEFICIENCY: Primary | ICD-10-CM

## 2018-04-13 PROCEDURE — 96372 THER/PROPH/DIAG INJ SC/IM: CPT | Performed by: NURSE PRACTITIONER

## 2018-04-13 RX ORDER — CYANOCOBALAMIN 1000 UG/ML
1000 INJECTION, SOLUTION INTRAMUSCULAR; SUBCUTANEOUS
Status: DISCONTINUED | OUTPATIENT
Start: 2018-04-13 | End: 2018-07-11

## 2018-04-13 RX ADMIN — CYANOCOBALAMIN 1000 MCG: 1000 INJECTION, SOLUTION INTRAMUSCULAR; SUBCUTANEOUS at 12:09

## 2018-04-14 ENCOUNTER — HOSPITAL ENCOUNTER (EMERGENCY)
Facility: HOSPITAL | Age: 28
Discharge: LEFT BEFORE DISCHARGE | End: 2018-04-14
Attending: EMERGENCY MEDICINE

## 2018-04-14 VITALS
DIASTOLIC BLOOD PRESSURE: 101 MMHG | HEIGHT: 69 IN | BODY MASS INDEX: 46.65 KG/M2 | TEMPERATURE: 98 F | RESPIRATION RATE: 20 BRPM | SYSTOLIC BLOOD PRESSURE: 162 MMHG | HEART RATE: 112 BPM | WEIGHT: 315 LBS | OXYGEN SATURATION: 97 %

## 2018-04-14 LAB
ALBUMIN SERPL-MCNC: 4.2 G/DL (ref 3.5–5)
ALBUMIN/GLOB SERPL: 1.4 G/DL (ref 1.5–2.5)
ALP SERPL-CCNC: 81 U/L (ref 40–129)
ALT SERPL W P-5'-P-CCNC: 41 U/L (ref 10–44)
ANION GAP SERPL CALCULATED.3IONS-SCNC: 3.6 MMOL/L (ref 3.6–11.2)
AST SERPL-CCNC: 27 U/L (ref 10–34)
BASOPHILS # BLD AUTO: 0.01 10*3/MM3 (ref 0–0.3)
BASOPHILS NFR BLD AUTO: 0.1 % (ref 0–2)
BILIRUB SERPL-MCNC: 0.5 MG/DL (ref 0.2–1.8)
BUN BLD-MCNC: 12 MG/DL (ref 7–21)
BUN/CREAT SERPL: 10.3 (ref 7–25)
CALCIUM SPEC-SCNC: 10 MG/DL (ref 7.7–10)
CHLORIDE SERPL-SCNC: 108 MMOL/L (ref 99–112)
CO2 SERPL-SCNC: 27.4 MMOL/L (ref 24.3–31.9)
CREAT BLD-MCNC: 1.17 MG/DL (ref 0.43–1.29)
DEPRECATED RDW RBC AUTO: 45.4 FL (ref 37–54)
EOSINOPHIL # BLD AUTO: 0.21 10*3/MM3 (ref 0–0.7)
EOSINOPHIL NFR BLD AUTO: 1.9 % (ref 0–5)
ERYTHROCYTE [DISTWIDTH] IN BLOOD BY AUTOMATED COUNT: 14.5 % (ref 11.5–14.5)
GFR SERPL CREATININE-BSD FRML MDRD: 74 ML/MIN/1.73
GLOBULIN UR ELPH-MCNC: 3.1 GM/DL
GLUCOSE BLD-MCNC: 106 MG/DL (ref 70–110)
HCT VFR BLD AUTO: 41.3 % (ref 42–52)
HGB BLD-MCNC: 13.3 G/DL (ref 14–18)
HOLD SPECIMEN: NORMAL
HOLD SPECIMEN: NORMAL
IMM GRANULOCYTES # BLD: 0.05 10*3/MM3 (ref 0–0.03)
IMM GRANULOCYTES NFR BLD: 0.4 % (ref 0–0.5)
LIPASE SERPL-CCNC: 34 U/L (ref 13–60)
LYMPHOCYTES # BLD AUTO: 2.91 10*3/MM3 (ref 1–3)
LYMPHOCYTES NFR BLD AUTO: 26.1 % (ref 21–51)
MCH RBC QN AUTO: 27.5 PG (ref 27–33)
MCHC RBC AUTO-ENTMCNC: 32.2 G/DL (ref 33–37)
MCV RBC AUTO: 85.5 FL (ref 80–94)
MONOCYTES # BLD AUTO: 0.8 10*3/MM3 (ref 0.1–0.9)
MONOCYTES NFR BLD AUTO: 7.2 % (ref 0–10)
NEUTROPHILS # BLD AUTO: 7.18 10*3/MM3 (ref 1.4–6.5)
NEUTROPHILS NFR BLD AUTO: 64.3 % (ref 30–70)
OSMOLALITY SERPL CALC.SUM OF ELEC: 277.7 MOSM/KG (ref 273–305)
PLATELET # BLD AUTO: 317 10*3/MM3 (ref 130–400)
PMV BLD AUTO: 10.3 FL (ref 6–10)
POTASSIUM BLD-SCNC: 4.4 MMOL/L (ref 3.5–5.3)
PROT SERPL-MCNC: 7.3 G/DL (ref 6–8)
RBC # BLD AUTO: 4.83 10*6/MM3 (ref 4.7–6.1)
SODIUM BLD-SCNC: 139 MMOL/L (ref 135–153)
WBC NRBC COR # BLD: 11.16 10*3/MM3 (ref 4.5–12.5)
WHOLE BLOOD HOLD SPECIMEN: NORMAL
WHOLE BLOOD HOLD SPECIMEN: NORMAL

## 2018-04-14 PROCEDURE — 85025 COMPLETE CBC W/AUTO DIFF WBC: CPT | Performed by: EMERGENCY MEDICINE

## 2018-04-14 PROCEDURE — 99211 OFF/OP EST MAY X REQ PHY/QHP: CPT

## 2018-04-14 PROCEDURE — 80053 COMPREHEN METABOLIC PANEL: CPT | Performed by: EMERGENCY MEDICINE

## 2018-04-14 PROCEDURE — 83690 ASSAY OF LIPASE: CPT | Performed by: EMERGENCY MEDICINE

## 2018-04-14 RX ORDER — SODIUM CHLORIDE 0.9 % (FLUSH) 0.9 %
10 SYRINGE (ML) INJECTION AS NEEDED
Status: DISCONTINUED | OUTPATIENT
Start: 2018-04-14 | End: 2018-04-14 | Stop reason: HOSPADM

## 2018-04-17 ENCOUNTER — TELEPHONE (OUTPATIENT)
Dept: CARDIOLOGY | Facility: CLINIC | Age: 28
End: 2018-04-17

## 2018-04-17 ENCOUNTER — OFFICE VISIT (OUTPATIENT)
Dept: CARDIOLOGY | Facility: CLINIC | Age: 28
End: 2018-04-17

## 2018-04-17 VITALS
DIASTOLIC BLOOD PRESSURE: 98 MMHG | SYSTOLIC BLOOD PRESSURE: 132 MMHG | BODY MASS INDEX: 46.65 KG/M2 | HEIGHT: 69 IN | OXYGEN SATURATION: 95 % | WEIGHT: 315 LBS | HEART RATE: 102 BPM

## 2018-04-17 DIAGNOSIS — E78.5 HYPERLIPIDEMIA, UNSPECIFIED HYPERLIPIDEMIA TYPE: ICD-10-CM

## 2018-04-17 DIAGNOSIS — R00.2 PALPITATIONS: ICD-10-CM

## 2018-04-17 DIAGNOSIS — IMO0001 CLASS 3 OBESITY DUE TO EXCESS CALORIES WITH SERIOUS COMORBIDITY AND BODY MASS INDEX (BMI) OF 50.0 TO 59.9 IN ADULT: ICD-10-CM

## 2018-04-17 DIAGNOSIS — I10 ESSENTIAL HYPERTENSION: Primary | ICD-10-CM

## 2018-04-17 DIAGNOSIS — R07.9 CHEST PAIN, UNSPECIFIED TYPE: ICD-10-CM

## 2018-04-17 PROCEDURE — 93000 ELECTROCARDIOGRAM COMPLETE: CPT | Performed by: INTERNAL MEDICINE

## 2018-04-17 PROCEDURE — 99204 OFFICE O/P NEW MOD 45 MIN: CPT | Performed by: INTERNAL MEDICINE

## 2018-04-17 RX ORDER — ATORVASTATIN CALCIUM 80 MG/1
80 TABLET, FILM COATED ORAL DAILY
Qty: 90 TABLET | Refills: 3 | Status: SHIPPED | OUTPATIENT
Start: 2018-04-17 | End: 2018-09-13 | Stop reason: SDDI

## 2018-04-17 RX ORDER — DILTIAZEM HYDROCHLORIDE 180 MG/1
180 CAPSULE, COATED, EXTENDED RELEASE ORAL DAILY
COMMUNITY
End: 2018-04-17

## 2018-04-17 NOTE — PROGRESS NOTES
Subjective   Chief Complaint   Patient presents with   • Chest Pain   • Dizziness   • Shortness of Breath       History of Present Illness  Patient is 27 years old white male who is morbidly obese.  Patient states that he has been having chest pain for last 1 year or so.  Chest pain is located in the left anterior chest above the left breast.  In is quite severe it is described as burning and sharp pain.  There is some numbness in the left arm.  There is no substernal pain.  Pain is around 7 out of 10.  It is not related to exertion.    Patient also complains of palpitations.  Heartbeats quite fast and gets very fast when he moves a little.  Little bit of activity makes the heart flutter and patient gets short of breath which gets worse with walking.    He also has history of hypertension for last 2 years and has tried multiple different medications.  He does not like Cardizem which she stopped it.  Blood pressure is still high.    Patient also has hyperlipidemia which was diagnosed 2 years ago.  He is taking Lipitor 40 mg daily without any major side effects.  He does complain of muscle aches.    There is no history of rheumatic fever or heart murmur.  Patient has a lot of anxiety issues.  He is taking Prozac 40 mg daily, Neurontin 600 mg Percocet 10/325 and Seroquel 100 mg.      Past Surgical History:   Procedure Laterality Date   • KIDNEY SURGERY      Stents placed and removed.    • LAPAROSCOPIC GASTRIC BANDING     • OTHER SURGICAL HISTORY      diagnostic esophagogastroduodenoscopy   • OTHER SURGICAL HISTORY      renal lithotripsy     Family History   Problem Relation Age of Onset   • Colon cancer Other    • Heart disease Other    • Lymphoma Other    • Heart disease Mother    • Hypertension Mother    • Lupus Mother    • Skin cancer Father    • Kidney disease Father      Past Medical History:   Diagnosis Date   • Anxiety    • Arthritis    • Calculus of kidney    • Hypertension    • Insomnia    • Neuropathy    •  Panic disorder    • Sciatica    • Tachycardia        Patient Active Problem List   Diagnosis   • Lipoma of right lower extremity   • Detrusor instability   • Ureteral calculus, right   • Renal calculus   • Class 3 obesity due to excess calories with serious comorbidity and body mass index (BMI) of 50.0 to 59.9 in adult   • Essential hypertension   • Anxiety and depression   • Hyperlipidemia   • Palpitations   • Chest pain   • GRUPO (obstructive sleep apnea)         Social History   Substance Use Topics   • Smoking status: Former Smoker     Types: Cigarettes   • Smokeless tobacco: Never Used      Comment: smokes 1 pack of cigarettes per day   • Alcohol use No         The following portions of the patient's history were reviewed and updated as appropriate: allergies, current medications, past family history, past medical history, past social history, past surgical history and problem list.    Allergies   Allergen Reactions   • Lisinopril Cough   • Contrast Dye Rash         Current Outpatient Prescriptions:   •  atorvastatin (LIPITOR) 40 MG tablet, Take 1 tablet by mouth Daily., Disp: 30 tablet, Rfl: 3  •  Fesoterodine Fumarate (TOVIAZ PO), Take  by mouth., Disp: , Rfl:   •  FLUoxetine (PROzac) 40 MG capsule, Take 40 mg by mouth Daily., Disp: , Rfl:   •  gabapentin (NEURONTIN) 600 MG tablet, Take 600 mg by mouth 6 (Six) Times a Day., Disp: , Rfl:   •  ibuprofen (ADVIL,MOTRIN) 800 MG tablet, Take 1 tablet by mouth Every 8 (Eight) Hours As Needed for Moderate Pain ., Disp: 15 tablet, Rfl: 0  •  losartan-hydrochlorothiazide (HYZAAR) 50-12.5 MG per tablet, , Disp: , Rfl:   •  metoprolol tartrate (LOPRESSOR) 100 MG tablet, Take 100 mg by mouth Every 12 (Twelve) Hours., Disp: , Rfl:   •  ondansetron (ZOFRAN) 4 MG tablet, Take 1 tablet by mouth Every 8 (Eight) Hours As Needed for Nausea., Disp: 15 tablet, Rfl: 0  •  QUEtiapine (SEROquel) 100 MG tablet, Take 300 mg by mouth Every Night., Disp: , Rfl:   •  tamsulosin (FLOMAX) 0.4  "MG capsule 24 hr capsule, Take 1 capsule by mouth Every Night., Disp: 7 capsule, Rfl: 0  •  vitamin D (ERGOCALCIFEROL) 55969 units capsule capsule, Take 1 capsule by mouth 1 (One) Time Per Week., Disp: 12 capsule, Rfl: 0  •  diltiaZEM CD (CARDIZEM CD) 180 MG 24 hr capsule, Take 180 mg by mouth Daily., Disp: , Rfl:   •  meloxicam (MOBIC) 15 MG tablet, Take  by mouth., Disp: , Rfl:   •  oxyCODONE-acetaminophen (PERCOCET)  MG per tablet, Take 1 tablet by mouth Every 6 (Six) Hours As Needed for Moderate Pain ., Disp: 12 tablet, Rfl: 0    Current Facility-Administered Medications:   •  cyanocobalamin injection 1,000 mcg, 1,000 mcg, Intramuscular, Q28 Days, Sherley Martha Link, APOLINAR, 1,000 mcg at 04/13/18 1209    Review of Systems   Constitution: Positive for malaise/fatigue and weight gain.   HENT: Negative.  Negative for congestion.    Eyes: Negative.    Cardiovascular: Positive for chest pain and palpitations. Negative for cyanosis, dyspnea on exertion, irregular heartbeat, leg swelling, near-syncope, orthopnea, paroxysmal nocturnal dyspnea and syncope.   Respiratory: Positive for shortness of breath. Negative for cough and sputum production.    Endocrine: Negative.    Hematologic/Lymphatic: Negative.    Skin: Negative.    Musculoskeletal: Positive for arthritis, back pain, myalgias and stiffness.   Gastrointestinal: Negative.    Neurological: Negative.  Negative for headaches.   Psychiatric/Behavioral: The patient is nervous/anxious.    All other systems reviewed and are negative.       Objective      /98 (BP Location: Left arm, Patient Position: Sitting)   Pulse 102   Ht 175.3 cm (69.02\")   Wt (!) 172 kg (378 lb 6.4 oz)   SpO2 95%   BMI 55.85 kg/m²     Physical Exam   Constitutional: He appears well-developed and well-nourished. No distress.   Morbid obesity   HENT:   Head: Normocephalic and atraumatic.   Mouth/Throat: Oropharynx is clear and moist. No oropharyngeal exudate.   Eyes: Conjunctivae and " EOM are normal. Pupils are equal, round, and reactive to light. No scleral icterus.   Neck: Normal range of motion. Neck supple. No JVD present. No tracheal deviation present. No thyromegaly present.   Cardiovascular: Normal rate, regular rhythm, normal heart sounds and intact distal pulses.  PMI is not displaced.  Exam reveals no gallop, no friction rub and no decreased pulses.    No murmur heard.  Pulses:       Carotid pulses are 3+ on the right side, and 3+ on the left side.       Radial pulses are 3+ on the right side, and 3+ on the left side.   Pulmonary/Chest: Effort normal and breath sounds normal. No respiratory distress. He has no wheezes. He has no rales. He exhibits no tenderness.   Abdominal: Soft. Bowel sounds are normal. He exhibits no distension, no abdominal bruit and no mass. There is no splenomegaly or hepatomegaly. There is no tenderness. There is no rebound and no guarding.   Musculoskeletal: Normal range of motion. He exhibits no edema, tenderness or deformity.   Lymphadenopathy:     He has no cervical adenopathy.   Neurological: He is alert. He has normal reflexes. No cranial nerve deficit. He exhibits normal muscle tone. Coordination normal.   Skin: Skin is warm and dry. No rash noted. He is not diaphoretic. No erythema.   Psychiatric: He has a normal mood and affect. His behavior is normal. Judgment and thought content normal.       Lab Review:    Lab Results   Component Value Date     04/14/2018    K 4.4 04/14/2018     04/14/2018    BUN 12 04/14/2018    CREATININE 1.17 04/14/2018    GLUCOSE 106 04/14/2018    CALCIUM 10.0 04/14/2018    ALT 41 04/14/2018    ALKPHOS 81 04/14/2018    LABIL2 1.4 (L) 04/14/2018     Lab Results   Component Value Date    CKTOTAL 74 10/03/2017     Lab Results   Component Value Date    WBC 11.16 04/14/2018    HGB 13.3 (L) 04/14/2018    HCT 41.3 (L) 04/14/2018     04/14/2018     No results found for: INR  Lab Results   Component Value Date    MG 2.3  04/11/2018     Lab Results   Component Value Date    CHOL 335 (H) 04/11/2018    TRIG 378 (H) 04/11/2018    HDL 41 (L) 04/11/2018    VLDL 75.6 04/11/2018    LDLHDL 5.33 04/11/2018     Lab Results   Component Value Date    BNP 4.0 11/10/2017         ECG 12 Lead  Date/Time: 4/17/2018 3:33 PM  Performed by: PAULETTE FAIRBANKS  Authorized by: PAULETTE FAIRBANKS   Comparison: compared with previous ECG from 4/11/2018  Similar to previous ECG  Comparison to previous ECG: Heart rate is slower now otherwise no change  Rhythm: sinus rhythm  Rate: normal  BPM: 85  Conduction: conduction normal  ST Segments: ST segments normal  T Waves: T waves normal  QRS axis: normal  Other: no other findings  Clinical impression: normal ECG            I reviewed the patient's new clinical results.  I personally viewed and interpreted the patient's EKG/lab data        Assessment:   Diagnosis Plan   1. Essential hypertension     2. Hyperlipidemia, unspecified hyperlipidemia type     3. Palpitations     4. Chest pain, unspecified type     5. Class 3 obesity due to excess calories with serious comorbidity and body mass index (BMI) of 50.0 to 59.9 in adult            Plan:  Patient complains of chest pain which is atypical and typical features.  It is probably not due to structural coronary artery disease.  It may be secondary to rapid heart beat.  Patient is morbidly obese and needs to lose weight.    Lipids are also markedly abnormal with total cholesterol is 335 with LDL of 218, HDL is 41 and triglyceride 378.  Patient needs to increase his Lipitor to 80 mg daily.  Weight loss and aggressive risk factor modification was emphasized.    Patient also continues to smoke cessation of smoking was strongly urged.    A regular treadmill stress test was scheduled.  Patient will also have echocardiogram and Holter monitor.    Patient was advised to increase Lipitor 80 mg daily.  Patient is not taking Cardizem restarted on his own because he does  not like the feeling.  He is taking only 50 twice a day.  He will increase metoprolol tartrate 100 mg in the morning and 50 mg in the evening  Losartan HCT was continued.  Patient will be reevaluated after cardiac workup is completed    Thank you for giving me the oppertunity to participate in your patient's cardiac care.    Sincerely,    TIEN Oviedo M.D. [facility]     No Follow-up on file.

## 2018-04-17 NOTE — TELEPHONE ENCOUNTER
Spoke to patients wife explained the increase in Lipitor to 80MG daily. She voiced understanding.

## 2018-04-23 ENCOUNTER — APPOINTMENT (OUTPATIENT)
Dept: CARDIOLOGY | Facility: HOSPITAL | Age: 28
End: 2018-04-23
Attending: INTERNAL MEDICINE

## 2018-04-27 ENCOUNTER — CLINICAL SUPPORT (OUTPATIENT)
Dept: FAMILY MEDICINE CLINIC | Facility: CLINIC | Age: 28
End: 2018-04-27

## 2018-04-27 DIAGNOSIS — E53.8 B12 DEFICIENCY: Primary | ICD-10-CM

## 2018-04-27 PROCEDURE — 96372 THER/PROPH/DIAG INJ SC/IM: CPT | Performed by: NURSE PRACTITIONER

## 2018-04-27 RX ORDER — CYANOCOBALAMIN 1000 UG/ML
1000 INJECTION, SOLUTION INTRAMUSCULAR; SUBCUTANEOUS
Status: DISCONTINUED | OUTPATIENT
Start: 2018-04-27 | End: 2018-07-11

## 2018-04-27 RX ADMIN — CYANOCOBALAMIN 1000 MCG: 1000 INJECTION, SOLUTION INTRAMUSCULAR; SUBCUTANEOUS at 13:41

## 2018-05-02 ENCOUNTER — HOSPITAL ENCOUNTER (OUTPATIENT)
Dept: CARDIOLOGY | Facility: HOSPITAL | Age: 28
Discharge: HOME | End: 2018-05-02
Attending: INTERNAL MEDICINE | Admitting: INTERNAL MEDICINE

## 2018-05-02 ENCOUNTER — HOSPITAL ENCOUNTER (OUTPATIENT)
Dept: RESPIRATORY THERAPY | Facility: HOSPITAL | Age: 28
Discharge: HOME | End: 2018-05-02
Attending: INTERNAL MEDICINE

## 2018-05-02 ENCOUNTER — HOSPITAL ENCOUNTER (OUTPATIENT)
Dept: CARDIOLOGY | Facility: HOSPITAL | Age: 28
Discharge: HOME | End: 2018-05-02
Attending: INTERNAL MEDICINE

## 2018-05-02 DIAGNOSIS — R07.9 CHEST PAIN, UNSPECIFIED TYPE: ICD-10-CM

## 2018-05-02 DIAGNOSIS — R00.2 PALPITATIONS: ICD-10-CM

## 2018-05-02 PROCEDURE — 93018 CV STRESS TEST I&R ONLY: CPT | Performed by: INTERNAL MEDICINE

## 2018-05-02 PROCEDURE — 93225 XTRNL ECG REC<48 HRS REC: CPT

## 2018-05-02 PROCEDURE — 93017 CV STRESS TEST TRACING ONLY: CPT

## 2018-05-02 PROCEDURE — 93226 XTRNL ECG REC<48 HR SCAN A/R: CPT

## 2018-05-02 PROCEDURE — 93306 TTE W/DOPPLER COMPLETE: CPT

## 2018-05-02 PROCEDURE — 93306 TTE W/DOPPLER COMPLETE: CPT | Performed by: INTERNAL MEDICINE

## 2018-05-03 LAB
BH CV ECHO MEAS - ACS: 2.4 CM
BH CV ECHO MEAS - AO ROOT AREA (BSA CORRECTED): 1.1
BH CV ECHO MEAS - AO ROOT AREA: 7.4 CM^2
BH CV ECHO MEAS - AO ROOT DIAM: 3.1 CM
BH CV ECHO MEAS - BSA(HAYCOCK): 3 M^2
BH CV ECHO MEAS - BSA: 2.7 M^2
BH CV ECHO MEAS - BZI_BMI: 55.8 KILOGRAMS/M^2
BH CV ECHO MEAS - BZI_METRIC_HEIGHT: 175.3 CM
BH CV ECHO MEAS - BZI_METRIC_WEIGHT: 171.5 KG
BH CV ECHO MEAS - LA DIMENSION: 4.6 CM
BH CV ECHO MEAS - LA/AO: 1.5
BH CV ECHO MEAS - LVOT AREA (M): 2.8 CM^2
BH CV ECHO MEAS - LVOT AREA: 2.8 CM^2
BH CV ECHO MEAS - LVOT DIAM: 1.9 CM
BH CV ECHO MEAS - MV A MAX VEL: 69.1 CM/SEC
BH CV ECHO MEAS - MV E MAX VEL: 86.9 CM/SEC
BH CV ECHO MEAS - MV E/A: 1.3
BH CV ECHO MEAS - PA ACC SLOPE: 1595 CM/SEC^2
BH CV ECHO MEAS - PA ACC TIME: 0.08 SEC
BH CV ECHO MEAS - PA PR(ACCEL): 44.1 MMHG
BH CV STRESS BP STAGE 1: NORMAL
BH CV STRESS BP STAGE 2: NORMAL
BH CV STRESS DURATION MIN STAGE 1: 3
BH CV STRESS DURATION MIN STAGE 2: 3
BH CV STRESS DURATION SEC STAGE 1: 0
BH CV STRESS DURATION SEC STAGE 2: 0
BH CV STRESS GRADE STAGE 1: 10
BH CV STRESS GRADE STAGE 2: 12
BH CV STRESS HR STAGE 1: 153
BH CV STRESS HR STAGE 2: 167
BH CV STRESS METS STAGE 1: 5
BH CV STRESS METS STAGE 2: 7.5
BH CV STRESS PROTOCOL 1: NORMAL
BH CV STRESS RECOVERY BP: NORMAL MMHG
BH CV STRESS RECOVERY HR: 113 BPM
BH CV STRESS SPEED STAGE 1: 1.7
BH CV STRESS SPEED STAGE 2: 2.5
BH CV STRESS STAGE 1: 1
BH CV STRESS STAGE 2: 2
MAXIMAL PREDICTED HEART RATE: 191 BPM
MAXIMAL PREDICTED HEART RATE: 191 BPM
PERCENT MAX PREDICTED HR: 87.43 %
STRESS BASELINE BP: NORMAL MMHG
STRESS BASELINE HR: 115 BPM
STRESS PERCENT HR: 103 %
STRESS POST ESTIMATED WORKLOAD: 7 METS
STRESS POST EXERCISE DUR MIN: 4 MIN
STRESS POST EXERCISE DUR SEC: 0 SEC
STRESS POST PEAK BP: NORMAL MMHG
STRESS POST PEAK HR: 167 BPM
STRESS TARGET HR: 162 BPM
STRESS TARGET HR: 162 BPM

## 2018-05-04 ENCOUNTER — CLINICAL SUPPORT (OUTPATIENT)
Dept: FAMILY MEDICINE CLINIC | Facility: CLINIC | Age: 28
End: 2018-05-04

## 2018-05-04 DIAGNOSIS — E53.8 B12 DEFICIENCY: Primary | ICD-10-CM

## 2018-05-04 PROCEDURE — 96372 THER/PROPH/DIAG INJ SC/IM: CPT | Performed by: NURSE PRACTITIONER

## 2018-05-04 RX ORDER — CYANOCOBALAMIN 1000 UG/ML
1000 INJECTION, SOLUTION INTRAMUSCULAR; SUBCUTANEOUS
Status: DISCONTINUED | OUTPATIENT
Start: 2018-05-04 | End: 2018-07-11

## 2018-05-04 RX ADMIN — CYANOCOBALAMIN 1000 MCG: 1000 INJECTION, SOLUTION INTRAMUSCULAR; SUBCUTANEOUS at 14:45

## 2018-05-07 PROCEDURE — 93227 XTRNL ECG REC<48 HR R&I: CPT | Performed by: INTERNAL MEDICINE

## 2018-05-14 ENCOUNTER — OFFICE VISIT (OUTPATIENT)
Dept: CARDIOLOGY | Facility: CLINIC | Age: 28
End: 2018-05-14

## 2018-05-14 VITALS
BODY MASS INDEX: 46.65 KG/M2 | WEIGHT: 315 LBS | DIASTOLIC BLOOD PRESSURE: 82 MMHG | RESPIRATION RATE: 24 BRPM | OXYGEN SATURATION: 92 % | SYSTOLIC BLOOD PRESSURE: 116 MMHG | HEART RATE: 98 BPM | HEIGHT: 69 IN

## 2018-05-14 DIAGNOSIS — R00.2 PALPITATIONS: Primary | ICD-10-CM

## 2018-05-14 DIAGNOSIS — I10 ESSENTIAL HYPERTENSION: ICD-10-CM

## 2018-05-14 DIAGNOSIS — IMO0001 CLASS 3 OBESITY DUE TO EXCESS CALORIES WITH SERIOUS COMORBIDITY AND BODY MASS INDEX (BMI) OF 50.0 TO 59.9 IN ADULT: ICD-10-CM

## 2018-05-14 DIAGNOSIS — E78.5 HYPERLIPIDEMIA, UNSPECIFIED HYPERLIPIDEMIA TYPE: ICD-10-CM

## 2018-05-14 PROCEDURE — 99213 OFFICE O/P EST LOW 20 MIN: CPT | Performed by: INTERNAL MEDICINE

## 2018-05-14 PROCEDURE — 93000 ELECTROCARDIOGRAM COMPLETE: CPT | Performed by: INTERNAL MEDICINE

## 2018-05-14 NOTE — PROGRESS NOTES
subjective     Chief Complaint   Patient presents with   • Results     Holter Monitor, Stress test, Echo   • Chest Pain   • Hypertension     History of Present Illness    Patient is 27 years old white male who was evaluated by me because of chest pain and palpitations.  Patient had Holter monitor and echo and stress test done.  Stress test was negative.  Patient is here for test results.  He says that he still having chest pain it is worse when he is resting and when he is doing something or physical exertion chest pain gets better.  Heartbeats quite fast.  Patient is taking metoprolol 50 mg twice a day.  Heartbeat still runs over 90 most of the times.  And goes quite frustrated.  Blood pressure is very well controlled with Hyzaar and Lopressor.  Patient also has hyperlipidemia and is taking Lipitor 80 mg daily.  No drug side effects lab work planned for next visit.    Past Surgical History:   Procedure Laterality Date   • KIDNEY SURGERY      Stents placed and removed.    • LAPAROSCOPIC GASTRIC BANDING     • OTHER SURGICAL HISTORY      diagnostic esophagogastroduodenoscopy   • OTHER SURGICAL HISTORY      renal lithotripsy     Family History   Problem Relation Age of Onset   • Colon cancer Other    • Heart disease Other    • Lymphoma Other    • Heart disease Mother    • Hypertension Mother    • Lupus Mother    • Skin cancer Father    • Kidney disease Father      Past Medical History:   Diagnosis Date   • Anxiety    • Arthritis    • Calculus of kidney    • Hypertension    • Insomnia    • Neuropathy    • Panic disorder    • Sciatica    • Tachycardia      Patient Active Problem List   Diagnosis   • Lipoma of right lower extremity   • Detrusor instability   • Ureteral calculus, right   • Renal calculus   • Class 3 obesity due to excess calories with serious comorbidity and body mass index (BMI) of 50.0 to 59.9 in adult   • Essential hypertension   • Anxiety and depression   • Hyperlipidemia   • Palpitations   • Chest  pain   • GRUPO (obstructive sleep apnea)       Social History   Substance Use Topics   • Smoking status: Former Smoker     Types: Cigarettes   • Smokeless tobacco: Never Used      Comment: smokes 1 pack of cigarettes per day   • Alcohol use No       Allergies   Allergen Reactions   • Lisinopril Cough   • Contrast Dye Rash       Current Outpatient Prescriptions on File Prior to Visit   Medication Sig   • atorvastatin (LIPITOR) 80 MG tablet Take 1 tablet by mouth Daily.   • Fesoterodine Fumarate (TOVIAZ PO) Take  by mouth.   • FLUoxetine (PROzac) 40 MG capsule Take 40 mg by mouth Daily.   • gabapentin (NEURONTIN) 600 MG tablet Take 600 mg by mouth 6 (Six) Times a Day.   • ibuprofen (ADVIL,MOTRIN) 800 MG tablet Take 1 tablet by mouth Every 8 (Eight) Hours As Needed for Moderate Pain . (Patient taking differently: Take 600 mg by mouth Every 8 (Eight) Hours As Needed for Moderate Pain .)   • losartan-hydrochlorothiazide (HYZAAR) 50-12.5 MG per tablet Take 1 tablet by mouth Daily.   • metoprolol tartrate (LOPRESSOR) 100 MG tablet Take 100 mg by mouth Every 12 (Twelve) Hours.   • ondansetron (ZOFRAN) 4 MG tablet Take 1 tablet by mouth Every 8 (Eight) Hours As Needed for Nausea.   • oxyCODONE-acetaminophen (PERCOCET)  MG per tablet Take 1 tablet by mouth Every 6 (Six) Hours As Needed for Moderate Pain .   • QUEtiapine (SEROquel) 100 MG tablet Take 400 mg by mouth Every Night.   • tamsulosin (FLOMAX) 0.4 MG capsule 24 hr capsule Take 1 capsule by mouth Every Night.   • vitamin D (ERGOCALCIFEROL) 48238 units capsule capsule Take 1 capsule by mouth 1 (One) Time Per Week.   • [DISCONTINUED] meloxicam (MOBIC) 15 MG tablet Take  by mouth.     Current Facility-Administered Medications on File Prior to Visit   Medication   • cyanocobalamin injection 1,000 mcg   • cyanocobalamin injection 1,000 mcg   • cyanocobalamin injection 1,000 mcg         The following portions of the patient's history were reviewed and updated as  "appropriate: allergies, current medications, past family history, past medical history, past social history, past surgical history and problem list.    Review of Systems   Constitution: Negative.   HENT: Negative.  Negative for congestion.    Eyes: Negative.    Cardiovascular: Positive for chest pain and palpitations. Negative for cyanosis, dyspnea on exertion, irregular heartbeat, leg swelling, near-syncope, orthopnea, paroxysmal nocturnal dyspnea and syncope.   Respiratory: Negative.  Negative for shortness of breath.    Hematologic/Lymphatic: Negative.    Musculoskeletal: Negative.    Gastrointestinal: Negative.    Neurological: Negative.  Negative for headaches.          Objective:     /82 (BP Location: Left arm, Patient Position: Sitting, Cuff Size: Large Adult)   Pulse 98   Resp 24   Ht 175.3 cm (69\")   Wt (!) 173 kg (381 lb)   SpO2 92%   BMI 56.26 kg/m²   Physical Exam   Constitutional: He appears well-developed and well-nourished. No distress.   HENT:   Head: Normocephalic and atraumatic.   Mouth/Throat: Oropharynx is clear and moist. No oropharyngeal exudate.   Eyes: Conjunctivae and EOM are normal. Pupils are equal, round, and reactive to light. No scleral icterus.   Neck: Normal range of motion. Neck supple. No JVD present. No tracheal deviation present. No thyromegaly present.   Cardiovascular: Normal rate, regular rhythm, normal heart sounds and intact distal pulses.  PMI is not displaced.  Exam reveals no gallop, no friction rub and no decreased pulses.    No murmur heard.  Pulses:       Carotid pulses are 3+ on the right side, and 3+ on the left side.       Radial pulses are 3+ on the right side, and 3+ on the left side.   Pulmonary/Chest: Effort normal and breath sounds normal. No respiratory distress. He has no wheezes. He has no rales. He exhibits no tenderness.   Abdominal: Soft. Bowel sounds are normal. He exhibits no distension, no abdominal bruit and no mass. There is no " splenomegaly or hepatomegaly. There is no tenderness. There is no rebound and no guarding.   Musculoskeletal: Normal range of motion. He exhibits no edema, tenderness or deformity.   Lymphadenopathy:     He has no cervical adenopathy.   Neurological: He is alert. He has normal reflexes. No cranial nerve deficit. He exhibits normal muscle tone. Coordination normal.   Skin: Skin is warm and dry. No rash noted. He is not diaphoretic. No erythema.   Psychiatric: He has a normal mood and affect. His behavior is normal. Judgment and thought content normal.         Lab Review  Lab Results   Component Value Date     04/14/2018    K 4.4 04/14/2018     04/14/2018    BUN 12 04/14/2018    CREATININE 1.17 04/14/2018    GLUCOSE 106 04/14/2018    CALCIUM 10.0 04/14/2018    ALT 41 04/14/2018    ALKPHOS 81 04/14/2018    LABIL2 1.4 (L) 04/14/2018     Lab Results   Component Value Date    CKTOTAL 74 10/03/2017     Lab Results   Component Value Date    WBC 11.16 04/14/2018    HGB 13.3 (L) 04/14/2018    HCT 41.3 (L) 04/14/2018     04/14/2018     No results found for: INR  Lab Results   Component Value Date    MG 2.3 04/11/2018     Lab Results   Component Value Date    TSH 3.958 04/11/2018     Lab Results   Component Value Date    BNP 4.0 11/10/2017     Lab Results   Component Value Date    CHOL 335 (H) 04/11/2018    TRIG 378 (H) 04/11/2018    HDL 41 (L) 04/11/2018    VLDL 75.6 04/11/2018    LDLHDL 5.33 04/11/2018     Lab Results   Component Value Date     (H) 04/11/2018         ECG 12 Lead  Date/Time: 5/14/2018 5:15 PM  Performed by: PAULETTE FAIRBANKS  Authorized by: PAULETTE FAIRBANKS   Comparison: compared with previous ECG from 4/17/2018  Similar to previous ECG  Rhythm: sinus rhythm  Rate: normal  BPM: 96  Conduction: conduction normal  ST Segments: ST segments normal  QRS axis: normal  Other: no other findings  Clinical impression: normal ECG  Comments: Normal EKG but heart rate is  96/m          Interpretation Summary Holter monitor    · The predominant rhythm noted during the testing period was sinus rhythm.  · Min HR: 70. Max HR: 154.  · Premature atrial contractions occured rarely.  · Sinus tachycardia 154/m at 3 PM  · No significant arrhythmia   Interpretation Summary Stress test    · Baseline ECG rhythm of sinus tachycardia noted. Normal baseline ECG noted.  · Pt walked on treadmill for 4 minutes achieving target heart rate.  · Blood pressure demonstrated a normal response to stress. Heart rate demonstrated a normal response to stress.  · No ECG evidence of myocardial ischemia.Negative clinical evidence of myocardial ischemia.  · Findings consistent with a normal ECG stress   Interpretation Summary Echocardiogram    · Poor quality echo but grossly appears to be normal  · No significant valvular heart disease although study is suboptimal  · There is no evidence of pericardial effusion.  · Normal left ventricular cavity size and wall thickness noted.  · Estimated EF appears to be in the range of 56 - 60%.              I personally viewed and interpreted the patient's LAB data         Assessment:     1. Palpitations    2. Essential hypertension    3. Hyperlipidemia, unspecified hyperlipidemia type    4. Class 3 obesity due to excess calories with serious comorbidity and body mass index (BMI) of 50.0 to 59.9 in adult          Plan:      Lipids are abnormal with cholesterol of 335 triglyceride 378 .  Patient is taking Lipitor 80 mg daily and lab work planned by his PCP next visit.  Zetia may need to be added or Lipitor may need to be switched to Crestor.  Healthy lifestyle weight loss and exercise program again emphasized.    Blood pressure is very well controlled no change in therapy    Heart rate is still fast patient was advised to increase metoprolol 150 mg daily.  He will take 100 mg the morning and 50 in the evening  Follow-up scheduled        Return in about 1 month (around  6/14/2018).

## 2018-07-05 RX ORDER — ATORVASTATIN CALCIUM 40 MG/1
TABLET, FILM COATED ORAL
Qty: 30 TABLET | Refills: 3 | Status: SHIPPED | OUTPATIENT
Start: 2018-07-05 | End: 2018-09-14 | Stop reason: SDUPTHER

## 2018-07-11 ENCOUNTER — OFFICE VISIT (OUTPATIENT)
Dept: FAMILY MEDICINE CLINIC | Facility: CLINIC | Age: 28
End: 2018-07-11

## 2018-07-11 VITALS
BODY MASS INDEX: 46.65 KG/M2 | HEART RATE: 99 BPM | DIASTOLIC BLOOD PRESSURE: 82 MMHG | SYSTOLIC BLOOD PRESSURE: 131 MMHG | WEIGHT: 315 LBS | HEIGHT: 69 IN | OXYGEN SATURATION: 95 %

## 2018-07-11 DIAGNOSIS — IMO0001 CLASS 3 OBESITY DUE TO EXCESS CALORIES WITH SERIOUS COMORBIDITY AND BODY MASS INDEX (BMI) OF 50.0 TO 59.9 IN ADULT: ICD-10-CM

## 2018-07-11 DIAGNOSIS — F32.A ANXIETY AND DEPRESSION: ICD-10-CM

## 2018-07-11 DIAGNOSIS — I10 ESSENTIAL HYPERTENSION: ICD-10-CM

## 2018-07-11 DIAGNOSIS — N52.8 OTHER MALE ERECTILE DYSFUNCTION: ICD-10-CM

## 2018-07-11 DIAGNOSIS — M51.36 DDD (DEGENERATIVE DISC DISEASE), LUMBAR: Primary | ICD-10-CM

## 2018-07-11 DIAGNOSIS — F41.9 ANXIETY AND DEPRESSION: ICD-10-CM

## 2018-07-11 DIAGNOSIS — G89.29 CHRONIC BILATERAL LOW BACK PAIN WITH LEFT-SIDED SCIATICA: ICD-10-CM

## 2018-07-11 DIAGNOSIS — E53.8 B12 DEFICIENCY: ICD-10-CM

## 2018-07-11 DIAGNOSIS — E78.5 HYPERLIPIDEMIA, UNSPECIFIED HYPERLIPIDEMIA TYPE: ICD-10-CM

## 2018-07-11 DIAGNOSIS — M54.42 CHRONIC BILATERAL LOW BACK PAIN WITH LEFT-SIDED SCIATICA: ICD-10-CM

## 2018-07-11 PROBLEM — M51.369 DDD (DEGENERATIVE DISC DISEASE), LUMBAR: Status: ACTIVE | Noted: 2018-07-11

## 2018-07-11 LAB
25(OH)D3 SERPL-MCNC: 17 NG/ML
ALBUMIN SERPL-MCNC: 4.6 G/DL (ref 3.5–5)
ALBUMIN/GLOB SERPL: 1.6 G/DL (ref 1.5–2.5)
ALP SERPL-CCNC: 91 U/L (ref 40–129)
ALT SERPL W P-5'-P-CCNC: 56 U/L (ref 10–44)
ANION GAP SERPL CALCULATED.3IONS-SCNC: 7.6 MMOL/L (ref 3.6–11.2)
AST SERPL-CCNC: 35 U/L (ref 10–34)
BASOPHILS # BLD AUTO: 0.01 10*3/MM3 (ref 0–0.3)
BASOPHILS NFR BLD AUTO: 0.1 % (ref 0–2)
BILIRUB SERPL-MCNC: 0.6 MG/DL (ref 0.2–1.8)
BUN BLD-MCNC: 9 MG/DL (ref 7–21)
BUN/CREAT SERPL: 7.4 (ref 7–25)
CALCIUM SPEC-SCNC: 10 MG/DL (ref 7.7–10)
CHLORIDE SERPL-SCNC: 103 MMOL/L (ref 99–112)
CHOLEST SERPL-MCNC: 259 MG/DL (ref 0–200)
CO2 SERPL-SCNC: 28.4 MMOL/L (ref 24.3–31.9)
CREAT BLD-MCNC: 1.21 MG/DL (ref 0.43–1.29)
DEPRECATED RDW RBC AUTO: 44 FL (ref 37–54)
EOSINOPHIL # BLD AUTO: 0.26 10*3/MM3 (ref 0–0.7)
EOSINOPHIL NFR BLD AUTO: 2.4 % (ref 0–5)
ERYTHROCYTE [DISTWIDTH] IN BLOOD BY AUTOMATED COUNT: 14.3 % (ref 11.5–14.5)
GFR SERPL CREATININE-BSD FRML MDRD: 71 ML/MIN/1.73
GLOBULIN UR ELPH-MCNC: 2.9 GM/DL
GLUCOSE BLD-MCNC: 100 MG/DL (ref 70–110)
HCT VFR BLD AUTO: 43.1 % (ref 42–52)
HDLC SERPL-MCNC: 40 MG/DL (ref 60–100)
HGB BLD-MCNC: 13.6 G/DL (ref 14–18)
IMM GRANULOCYTES # BLD: 0.04 10*3/MM3 (ref 0–0.03)
IMM GRANULOCYTES NFR BLD: 0.4 % (ref 0–0.5)
LDLC SERPL CALC-MCNC: 162 MG/DL (ref 0–100)
LDLC/HDLC SERPL: 4.05 {RATIO}
LYMPHOCYTES # BLD AUTO: 3.33 10*3/MM3 (ref 1–3)
LYMPHOCYTES NFR BLD AUTO: 30.1 % (ref 21–51)
MCH RBC QN AUTO: 27.1 PG (ref 27–33)
MCHC RBC AUTO-ENTMCNC: 31.6 G/DL (ref 33–37)
MCV RBC AUTO: 86 FL (ref 80–94)
MONOCYTES # BLD AUTO: 0.66 10*3/MM3 (ref 0.1–0.9)
MONOCYTES NFR BLD AUTO: 6 % (ref 0–10)
NEUTROPHILS # BLD AUTO: 6.75 10*3/MM3 (ref 1.4–6.5)
NEUTROPHILS NFR BLD AUTO: 61 % (ref 30–70)
OSMOLALITY SERPL CALC.SUM OF ELEC: 276.3 MOSM/KG (ref 273–305)
PLATELET # BLD AUTO: 335 10*3/MM3 (ref 130–400)
PMV BLD AUTO: 10.1 FL (ref 6–10)
POTASSIUM BLD-SCNC: 4.1 MMOL/L (ref 3.5–5.3)
PROT SERPL-MCNC: 7.5 G/DL (ref 6–8)
RBC # BLD AUTO: 5.01 10*6/MM3 (ref 4.7–6.1)
SODIUM BLD-SCNC: 139 MMOL/L (ref 135–153)
TRIGL SERPL-MCNC: 286 MG/DL (ref 0–150)
TSH SERPL DL<=0.05 MIU/L-ACNC: 5.88 MIU/ML (ref 0.55–4.78)
VLDLC SERPL-MCNC: 57.2 MG/DL
WBC NRBC COR # BLD: 11.05 10*3/MM3 (ref 4.5–12.5)

## 2018-07-11 PROCEDURE — 80061 LIPID PANEL: CPT | Performed by: NURSE PRACTITIONER

## 2018-07-11 PROCEDURE — 84402 ASSAY OF FREE TESTOSTERONE: CPT | Performed by: NURSE PRACTITIONER

## 2018-07-11 PROCEDURE — 84403 ASSAY OF TOTAL TESTOSTERONE: CPT | Performed by: NURSE PRACTITIONER

## 2018-07-11 PROCEDURE — 36415 COLL VENOUS BLD VENIPUNCTURE: CPT | Performed by: NURSE PRACTITIONER

## 2018-07-11 PROCEDURE — 80050 GENERAL HEALTH PANEL: CPT | Performed by: NURSE PRACTITIONER

## 2018-07-11 PROCEDURE — 99214 OFFICE O/P EST MOD 30 MIN: CPT | Performed by: NURSE PRACTITIONER

## 2018-07-11 PROCEDURE — 82306 VITAMIN D 25 HYDROXY: CPT | Performed by: NURSE PRACTITIONER

## 2018-07-11 PROCEDURE — 96372 THER/PROPH/DIAG INJ SC/IM: CPT | Performed by: NURSE PRACTITIONER

## 2018-07-11 RX ORDER — METOPROLOL TARTRATE 100 MG/1
100 TABLET ORAL DAILY
Qty: 30 TABLET | Refills: 2 | Status: SHIPPED | OUTPATIENT
Start: 2018-07-11 | End: 2019-01-09 | Stop reason: SDUPTHER

## 2018-07-11 RX ORDER — METOPROLOL TARTRATE 100 MG/1
100 TABLET ORAL DAILY
Qty: 30 TABLET | Refills: 2 | Status: SHIPPED | OUTPATIENT
Start: 2018-07-11 | End: 2018-07-11 | Stop reason: SDUPTHER

## 2018-07-11 RX ORDER — METOPROLOL TARTRATE 50 MG/1
50 TABLET, FILM COATED ORAL DAILY
Qty: 30 TABLET | Refills: 2 | Status: SHIPPED | OUTPATIENT
Start: 2018-07-11 | End: 2019-01-09 | Stop reason: DRUGHIGH

## 2018-07-11 RX ORDER — CYANOCOBALAMIN 1000 UG/ML
1000 INJECTION, SOLUTION INTRAMUSCULAR; SUBCUTANEOUS
Status: DISCONTINUED | OUTPATIENT
Start: 2018-07-11 | End: 2018-10-12

## 2018-07-11 RX ORDER — TRAMADOL HYDROCHLORIDE 50 MG/1
50 TABLET ORAL EVERY 8 HOURS PRN
Qty: 45 TABLET | Refills: 0 | Status: ON HOLD | OUTPATIENT
Start: 2018-07-11 | End: 2019-04-15

## 2018-07-11 RX ORDER — KETOROLAC TROMETHAMINE 30 MG/ML
60 INJECTION, SOLUTION INTRAMUSCULAR; INTRAVENOUS ONCE
Status: COMPLETED | OUTPATIENT
Start: 2018-07-11 | End: 2018-07-11

## 2018-07-11 RX ADMIN — KETOROLAC TROMETHAMINE 60 MG: 30 INJECTION, SOLUTION INTRAMUSCULAR; INTRAVENOUS at 11:24

## 2018-07-11 RX ADMIN — CYANOCOBALAMIN 1000 MCG: 1000 INJECTION, SOLUTION INTRAMUSCULAR; SUBCUTANEOUS at 11:24

## 2018-07-11 NOTE — PATIENT INSTRUCTIONS
Back Exercises  The following exercises strengthen the muscles that help to support the back. They also help to keep the lower back flexible. Doing these exercises can help to prevent back pain or lessen existing pain.  If you have back pain or discomfort, try doing these exercises 2-3 times each day or as told by your health care provider. When the pain goes away, do them once each day, but increase the number of times that you repeat the steps for each exercise (do more repetitions). If you do not have back pain or discomfort, do these exercises once each day or as told by your health care provider.  Exercises  Single Knee to Chest    Repeat these steps 3-5 times for each le. Lie on your back on a firm bed or the floor with your legs extended.  2. Bring one knee to your chest. Your other leg should stay extended and in contact with the floor.  3. Hold your knee in place by grabbing your knee or thigh.  4. Pull on your knee until you feel a gentle stretch in your lower back.  5. Hold the stretch for 10-30 seconds.  6. Slowly release and straighten your leg.    Pelvic Tilt    Repeat these steps 5-10 times:  1. Lie on your back on a firm bed or the floor with your legs extended.  2. Bend your knees so they are pointing toward the ceiling and your feet are flat on the floor.  3. Tighten your lower abdominal muscles to press your lower back against the floor. This motion will tilt your pelvis so your tailbone points up toward the ceiling instead of pointing to your feet or the floor.  4. With gentle tension and even breathing, hold this position for 5-10 seconds.    Cat-Cow    Repeat these steps until your lower back becomes more flexible:  1. Get into a hands-and-knees position on a firm surface. Keep your hands under your shoulders, and keep your knees under your hips. You may place padding under your knees for comfort.  2. Let your head hang down, and point your tailbone toward the floor so your lower back  becomes rounded like the back of a cat.  3. Hold this position for 5 seconds.  4. Slowly lift your head and point your tailbone up toward the ceiling so your back forms a sagging arch like the back of a cow.  5. Hold this position for 5 seconds.    Press-Ups    Repeat these steps 5-10 times:  1. Lie on your abdomen (face-down) on the floor.  2. Place your palms near your head, about shoulder-width apart.  3. While you keep your back as relaxed as possible and keep your hips on the floor, slowly straighten your arms to raise the top half of your body and lift your shoulders. Do not use your back muscles to raise your upper torso. You may adjust the placement of your hands to make yourself more comfortable.  4. Hold this position for 5 seconds while you keep your back relaxed.  5. Slowly return to lying flat on the floor.    Bridges    Repeat these steps 10 times:  1. Lie on your back on a firm surface.  2. Bend your knees so they are pointing toward the ceiling and your feet are flat on the floor.  3. Tighten your buttocks muscles and lift your buttocks off of the floor until your waist is at almost the same height as your knees. You should feel the muscles working in your buttocks and the back of your thighs. If you do not feel these muscles, slide your feet 1-2 inches farther away from your buttocks.  4. Hold this position for 3-5 seconds.  5. Slowly lower your hips to the starting position, and allow your buttocks muscles to relax completely.    If this exercise is too easy, try doing it with your arms crossed over your chest.  Abdominal Crunches    Repeat these steps 5-10 times:  1. Lie on your back on a firm bed or the floor with your legs extended.  2. Bend your knees so they are pointing toward the ceiling and your feet are flat on the floor.  3. Cross your arms over your chest.  4. Tip your chin slightly toward your chest without bending your neck.  5. Tighten your abdominal muscles and slowly raise your  trunk (torso) high enough to lift your shoulder blades a tiny bit off of the floor. Avoid raising your torso higher than that, because it can put too much stress on your low back and it does not help to strengthen your abdominal muscles.  6. Slowly return to your starting position.    Back Lifts  Repeat these steps 5-10 times:  1. Lie on your abdomen (face-down) with your arms at your sides, and rest your forehead on the floor.  2. Tighten the muscles in your legs and your buttocks.  3. Slowly lift your chest off of the floor while you keep your hips pressed to the floor. Keep the back of your head in line with the curve in your back. Your eyes should be looking at the floor.  4. Hold this position for 3-5 seconds.  5. Slowly return to your starting position.    Contact a health care provider if:  · Your back pain or discomfort gets much worse when you do an exercise.  · Your back pain or discomfort does not lessen within 2 hours after you exercise.  If you have any of these problems, stop doing these exercises right away. Do not do them again unless your health care provider says that you can.  Get help right away if:  · You develop sudden, severe back pain. If this happens, stop doing the exercises right away. Do not do them again unless your health care provider says that you can.  This information is not intended to replace advice given to you by your health care provider. Make sure you discuss any questions you have with your health care provider.  Document Released: 01/25/2006 Document Revised: 04/26/2017 Document Reviewed: 02/11/2016  [employer] Interactive Patient Education © 2017 [employer]  Calorie Counting for Weight Loss  Calories are units of energy. Your body needs a certain amount of calories from food to keep you going throughout the day. When you eat more calories than your body needs, your body stores the extra calories as fat. When you eat fewer calories than your body needs, your body burns fat  to get the energy it needs.  Calorie counting means keeping track of how many calories you eat and drink each day. Calorie counting can be helpful if you need to lose weight. If you make sure to eat fewer calories than your body needs, you should lose weight. Ask your health care provider what a healthy weight is for you.  For calorie counting to work, you will need to eat the right number of calories in a day in order to lose a healthy amount of weight per week. A dietitian can help you determine how many calories you need in a day and will give you suggestions on how to reach your calorie goal.  · A healthy amount of weight to lose per week is usually 1-2 lb (0.5-0.9 kg). This usually means that your daily calorie intake should be reduced by 500-750 calories.  · Eating 1,200 - 1,500 calories per day can help most women lose weight.  · Eating 1,500 - 1,800 calories per day can help most men lose weight.    What is my plan?  My goal is to have __________ calories per day.  If I have this many calories per day, I should lose around __________ pounds per week.  What do I need to know about calorie counting?  In order to meet your daily calorie goal, you will need to:  · Find out how many calories are in each food you would like to eat. Try to do this before you eat.  · Decide how much of the food you plan to eat.  · Write down what you ate and how many calories it had. Doing this is called keeping a food log.    To successfully lose weight, it is important to balance calorie counting with a healthy lifestyle that includes regular activity. Aim for 150 minutes of moderate exercise (such as walking) or 75 minutes of vigorous exercise (such as running) each week.  Where do I find calorie information?    The number of calories in a food can be found on a Nutrition Facts label. If a food does not have a Nutrition Facts label, try to look up the calories online or ask your dietitian for help.  Remember that calories are  listed per serving. If you choose to have more than one serving of a food, you will have to multiply the calories per serving by the amount of servings you plan to eat. For example, the label on a package of bread might say that a serving size is 1 slice and that there are 90 calories in a serving. If you eat 1 slice, you will have eaten 90 calories. If you eat 2 slices, you will have eaten 180 calories.  How do I keep a food log?  Immediately after each meal, record the following information in your food log:  · What you ate. Don't forget to include toppings, sauces, and other extras on the food.  · How much you ate. This can be measured in cups, ounces, or number of items.  · How many calories each food and drink had.  · The total number of calories in the meal.    Keep your food log near you, such as in a small notebook in your pocket, or use a mobile gallo or website. Some programs will calculate calories for you and show you how many calories you have left for the day to meet your goal.  What are some calorie counting tips?  · Use your calories on foods and drinks that will fill you up and not leave you hungry:  ? Some examples of foods that fill you up are nuts and nut butters, vegetables, lean proteins, and high-fiber foods like whole grains. High-fiber foods are foods with more than 5 g fiber per serving.  ? Drinks such as sodas, specialty coffee drinks, alcohol, and juices have a lot of calories, yet do not fill you up.  · Eat nutritious foods and avoid empty calories. Empty calories are calories you get from foods or beverages that do not have many vitamins or protein, such as candy, sweets, and soda. It is better to have a nutritious high-calorie food (such as an avocado) than a food with few nutrients (such as a bag of chips).  · Know how many calories are in the foods you eat most often. This will help you calculate calorie counts faster.  · Pay attention to calories in drinks. Low-calorie drinks include  "water and unsweetened drinks.  · Pay attention to nutrition labels for \"low fat\" or \"fat free\" foods. These foods sometimes have the same amount of calories or more calories than the full fat versions. They also often have added sugar, starch, or salt, to make up for flavor that was removed with the fat.  · Find a way of tracking calories that works for you. Get creative. Try different apps or programs if writing down calories does not work for you.  What are some portion control tips?  · Know how many calories are in a serving. This will help you know how many servings of a certain food you can have.  · Use a measuring cup to measure serving sizes. You could also try weighing out portions on a kitchen scale. With time, you will be able to estimate serving sizes for some foods.  · Take some time to put servings of different foods on your favorite plates, bowls, and cups so you know what a serving looks like.  · Try not to eat straight from a bag or box. Doing this can lead to overeating. Put the amount you would like to eat in a cup or on a plate to make sure you are eating the right portion.  · Use smaller plates, glasses, and bowls to prevent overeating.  · Try not to multitask (for example, watch TV or use your computer) while eating. If it is time to eat, sit down at a table and enjoy your food. This will help you to know when you are full. It will also help you to be aware of what you are eating and how much you are eating.  What are tips for following this plan?  Reading food labels  · Check the calorie count compared to the serving size. The serving size may be smaller than what you are used to eating.  · Check the source of the calories. Make sure the food you are eating is high in vitamins and protein and low in saturated and trans fats.  Shopping  · Read nutrition labels while you shop. This will help you make healthy decisions before you decide to purchase your food.  · Make a grocery list and stick to " it.  Cooking  · Try to cook your favorite foods in a healthier way. For example, try baking instead of frying.  · Use low-fat dairy products.  Meal planning  · Use more fruits and vegetables. Half of your plate should be fruits and vegetables.  · Include lean proteins like poultry and fish.  How do I count calories when eating out?  · Ask for smaller portion sizes.  · Consider sharing an entree and sides instead of getting your own entree.  · If you get your own entree, eat only half. Ask for a box at the beginning of your meal and put the rest of your entree in it so you are not tempted to eat it.  · If calories are listed on the menu, choose the lower calorie options.  · Choose dishes that include vegetables, fruits, whole grains, low-fat dairy products, and lean protein.  · Choose items that are boiled, broiled, grilled, or steamed. Stay away from items that are buttered, battered, fried, or served with cream sauce. Items labeled “crispy” are usually fried, unless stated otherwise.  · Choose water, low-fat milk, unsweetened iced tea, or other drinks without added sugar. If you want an alcoholic beverage, choose a lower calorie option such as a glass of wine or light beer.  · Ask for dressings, sauces, and syrups on the side. These are usually high in calories, so you should limit the amount you eat.  · If you want a salad, choose a garden salad and ask for grilled meats. Avoid extra toppings like cotto, cheese, or fried items. Ask for the dressing on the side, or ask for olive oil and vinegar or lemon to use as dressing.  · Estimate how many servings of a food you are given. For example, a serving of cooked rice is ½ cup or about the size of half a baseball. Knowing serving sizes will help you be aware of how much food you are eating at restaurants. The list below tells you how big or small some common portion sizes are based on everyday objects:  ? 1 oz--4 stacked dice.  ? 3 oz--1 deck of cards.  ? 1 tsp--1  die.  ? 1 Tbsp--½ a ping-pong ball.  ? 2 Tbsp--1 ping-pong ball.  ? ½ cup--½ baseball.  ? 1 cup--1 baseball.  Summary  · Calorie counting means keeping track of how many calories you eat and drink each day. If you eat fewer calories than your body needs, you should lose weight.  · A healthy amount of weight to lose per week is usually 1-2 lb (0.5-0.9 kg). This usually means reducing your daily calorie intake by 500-750 calories.  · The number of calories in a food can be found on a Nutrition Facts label. If a food does not have a Nutrition Facts label, try to look up the calories online or ask your dietitian for help.  · Use your calories on foods and drinks that will fill you up, and not on foods and drinks that will leave you hungry.  · Use smaller plates, glasses, and bowls to prevent overeating.  This information is not intended to replace advice given to you by your health care provider. Make sure you discuss any questions you have with your health care provider.  Document Released: 12/18/2006 Document Revised: 11/17/2017 Document Reviewed: 11/17/2017  [employer] Interactive Patient Education © 2018 [employer]

## 2018-07-11 NOTE — PROGRESS NOTES
Subjective   Andrew Narayan is a 27 y.o. male.     Chief Complaint: Follow-up and Hypertension    Hypertension   This is a chronic problem. The current episode started more than 1 year ago. The problem is controlled. Pertinent negatives include no palpitations or peripheral edema. Current antihypertension treatment includes beta blockers, angiotensin blockers and diuretics. The current treatment provides significant improvement. Compliance problems include diet and exercise.    Back Pain   This is a chronic problem. The current episode started more than 1 year ago. The problem occurs constantly. The problem has been rapidly worsening since onset. The pain is present in the lumbar spine. The quality of the pain is described as aching. The pain radiates to the left foot (radiates to both hips). The pain is severe. The pain is the same all the time. The symptoms are aggravated by standing. Stiffness is present in the morning. Associated symptoms include numbness and tingling. Pertinent negatives include no bladder incontinence or bowel incontinence. Risk factors include obesity. He has tried NSAIDs, analgesics and muscle relaxant for the symptoms. The treatment provided mild relief.   Erectile Dysfunction   This is a chronic problem. The current episode started more than 1 month ago. The problem is unchanged. The nature of his difficulty is achieving erection and maintaining erection. Non-physiologic factors contributing to erectile dysfunction are a decreased libido. He reports no performance anxiety. The symptoms are aggravated by medications. Past treatments include nothing. Risk factors include hypertension.   He has tried muscle relaxants, mobic, ibuprofen and gabapentin.  No relief at all from lumbar back pain.     Eugenio # [id number]  Reviewed and is consistent.  UDS 4/11/2018 reviewed and is consistent.  Patient comfort assessment guide reviewed and updated today.    As part of the patient's treatment plan they  are being prescribed a controlled substance/ substances with potential for abuse.  This patient has been made aware of the appropriate use of such medications, including potential risk of somnolence, limited ability to drive and/or work safely, and potential for overdose.  It has also been made clear these medications are for use by the patient only, without concomitant use of alcohol or other substances unless prescribed/advised by medical provider.    Patient has completed prescribing agreement detailing terms of continued prescribing of controlled substances including monitoring STEFFEN reports, urine drug screens, and pill counts.  The patient is aware STEFFEN reports are reviewed on a regular basis and scanned into the chart.    History and physical exam exhibit continued safe and appropriate use of controlled substances.    Patient's Body mass index is 56.85 kg/m². BMI is above normal parameters. Recommendations include: educational material.  Family History   Problem Relation Age of Onset   • Colon cancer Other    • Heart disease Other    • Lymphoma Other    • Heart disease Mother    • Hypertension Mother    • Lupus Mother    • Skin cancer Father    • Kidney disease Father        Social History     Social History   • Marital status:      Spouse name: N/A   • Number of children: N/A   • Years of education: N/A     Occupational History   • Not on file.     Social History Main Topics   • Smoking status: Former Smoker     Types: Cigarettes   • Smokeless tobacco: Never Used      Comment: smokes 1 pack of cigarettes per day   • Alcohol use No   • Drug use: No   • Sexual activity: Defer     Other Topics Concern   • Not on file     Social History Narrative   • No narrative on file       Past Medical History:   Diagnosis Date   • Anxiety    • Arthritis    • Calculus of kidney    • Hypertension    • Insomnia    • Neuropathy    • Panic disorder    • Sciatica    • Tachycardia        Review of Systems  "  Constitutional: Positive for fatigue.   HENT: Negative.    Respiratory: Negative.    Cardiovascular: Negative.  Negative for palpitations.   Gastrointestinal: Negative.  Negative for bowel incontinence.   Genitourinary: Positive for decreased libido. Negative for bladder incontinence.   Musculoskeletal: Positive for back pain.   Skin: Negative.    Neurological: Positive for tingling and numbness.   Psychiatric/Behavioral: Negative.        Objective   Physical Exam   Constitutional: He is oriented to person, place, and time. He appears well-developed and well-nourished.   obesity   Neck: Normal range of motion. Neck supple.   Cardiovascular: Normal rate, regular rhythm and normal heart sounds.    Pulmonary/Chest: Effort normal and breath sounds normal.   Musculoskeletal: Normal range of motion. He exhibits no edema, tenderness or deformity.   Neurological: He is alert and oriented to person, place, and time.   Skin: Skin is warm and dry.   Psychiatric: He has a normal mood and affect. His behavior is normal. Judgment and thought content normal.   Nursing note and vitals reviewed.      Procedures    Vitals: Blood pressure 131/82, pulse 99, height 175.3 cm (69\"), weight (!) 175 kg (385 lb), SpO2 95 %.    Allergies:   Allergies   Allergen Reactions   • Lisinopril Cough   • Contrast Dye Rash        During this visit the following were done:  Labs Reviewed []    Labs Ordered []    Radiology Reports Reviewed []    Radiology Ordered []    PCP Records Reviewed []    Referring Provider Records Reviewed []    ER Records Reviewed []    Hospital Records Reviewed []    History Obtained From Family []    Radiology Images Reviewed []    Other Reviewed []    Records Requested []      Assessment/Plan   Andrew was seen today for follow-up and hypertension.    Diagnoses and all orders for this visit:    DDD (degenerative disc disease), lumbar  -     MRI Lumbar Spine Without Contrast; Future  -     CBC & Differential  -     " Comprehensive Metabolic Panel  -     Lipid Panel  -     TSH  -     Vitamin D 25 Hydroxy  -     Testosterone, Free, Total  -     traMADol (ULTRAM) 50 MG tablet; Take 1 tablet by mouth Every 8 (Eight) Hours As Needed for Moderate Pain  or Severe Pain .  -     ketorolac (TORADOL) injection 60 mg; Inject 60 mg into the shoulder, thigh, or buttocks 1 (One) Time.  -     CBC Auto Differential    Class 3 obesity due to excess calories with serious comorbidity and body mass index (BMI) of 50.0 to 59.9 in adult (CMS/[facility])    Essential hypertension  -     CBC & Differential  -     Comprehensive Metabolic Panel  -     Lipid Panel  -     TSH  -     Vitamin D 25 Hydroxy  -     Testosterone, Free, Total  -     Discontinue: metoprolol tartrate (LOPRESSOR) 100 MG tablet; Take 1 tablet by mouth Daily.  -     metoprolol tartrate (LOPRESSOR) 50 MG tablet; Take 1 tablet by mouth Daily.  -     metoprolol tartrate (LOPRESSOR) 100 MG tablet; Take 1 tablet by mouth Daily. Take in addition to 50mg tablet daily  -     CBC Auto Differential    Other male erectile dysfunction  -     CBC & Differential  -     Comprehensive Metabolic Panel  -     Lipid Panel  -     TSH  -     Vitamin D 25 Hydroxy  -     Testosterone, Free, Total  -     CBC Auto Differential    Hyperlipidemia, unspecified hyperlipidemia type  -     CBC & Differential  -     Comprehensive Metabolic Panel  -     Lipid Panel  -     TSH  -     Vitamin D 25 Hydroxy  -     Testosterone, Free, Total  -     CBC Auto Differential    Anxiety and depression  -     CBC & Differential  -     Comprehensive Metabolic Panel  -     Lipid Panel  -     TSH  -     Vitamin D 25 Hydroxy  -     Testosterone, Free, Total  -     CBC Auto Differential    Chronic bilateral low back pain with left-sided sciatica  -     ketorolac (TORADOL) injection 60 mg; Inject 60 mg into the shoulder, thigh, or buttocks 1 (One) Time.    B12 deficiency  -     cyanocobalamin injection 1,000 mcg; Inject 1 mL into the shoulder,  thigh, or buttocks Every 28 (Twenty-Eight) Days.      MRI lumbar spine  Pt states he has tried PT in the past without any relief of pain.  Has had MRI two years ago.  Will attempt to get records from previous provider.   Increase metoprolol to 100mg in the AM and 50mg in PM (was discussed by Dr Oviedo and patient)  RTC 2 weeks

## 2018-07-12 ENCOUNTER — TELEPHONE (OUTPATIENT)
Dept: FAMILY MEDICINE CLINIC | Facility: CLINIC | Age: 28
End: 2018-07-12

## 2018-07-12 LAB
TESTOST FREE SERPL-MCNC: 3.5 PG/ML (ref 9.3–26.5)
TESTOST SERPL-MCNC: 101 NG/DL (ref 264–916)

## 2018-07-12 RX ORDER — ERGOCALCIFEROL 1.25 MG/1
50000 CAPSULE ORAL WEEKLY
Qty: 4 CAPSULE | Refills: 2 | Status: ON HOLD | OUTPATIENT
Start: 2018-07-12 | End: 2019-04-15

## 2018-07-12 NOTE — TELEPHONE ENCOUNTER
----- Message from APOLINAR Gonzalez sent at 7/12/2018 10:42 AM EDT -----  Vit D is low and I have sent a script to his pharmacy for weekly Vit D.   His cholesterol and triglycerides have improved.  Continue increased dose of lipitor as before.  It seems to be helping.   Please let pt know.      Left a message to return call.      Patient returned call & verbalized understanding.

## 2018-07-12 NOTE — TELEPHONE ENCOUNTER
----- Message from APOLINAR Gonzalez sent at 7/12/2018 10:42 AM EDT -----  His TSH is a little elevated.  No medication at this point.  Need to recheck at follow up appt.      Left a message to return call.      Patient returned call & verbalized understanding.

## 2018-07-12 NOTE — PROGRESS NOTES
Vit D is low and I have sent a script to his pharmacy for weekly Vit D.   His cholesterol and triglycerides have improved.  Continue increased dose of lipitor as before.  It seems to be helping.   Please let pt know.

## 2018-07-13 ENCOUNTER — TELEPHONE (OUTPATIENT)
Dept: FAMILY MEDICINE CLINIC | Facility: CLINIC | Age: 28
End: 2018-07-13

## 2018-07-13 DIAGNOSIS — E34.9 HYPOTESTOSTERONEMIA: Primary | ICD-10-CM

## 2018-07-13 NOTE — PROGRESS NOTES
He does have a low testosterone level.  Would he like to be referred to urology for possible testosterone replacement?

## 2018-07-13 NOTE — TELEPHONE ENCOUNTER
----- Message from APOLINAR Gonzalez sent at 7/13/2018  9:42 AM EDT -----  He does have a low testosterone level.  Would he like to be referred to urology for possible testosterone replacement?      Patient notified & would like to see Urology.

## 2018-07-27 ENCOUNTER — OFFICE VISIT (OUTPATIENT)
Dept: UROLOGY | Facility: CLINIC | Age: 28
End: 2018-07-27

## 2018-07-27 VITALS — WEIGHT: 315 LBS | BODY MASS INDEX: 46.65 KG/M2 | HEIGHT: 69 IN

## 2018-07-27 DIAGNOSIS — N40.0 BPH WITHOUT URINARY OBSTRUCTION: ICD-10-CM

## 2018-07-27 DIAGNOSIS — R79.89 LOW TESTOSTERONE: Primary | ICD-10-CM

## 2018-07-27 LAB
DEPRECATED RDW RBC AUTO: 45.6 FL (ref 37–54)
ERYTHROCYTE [DISTWIDTH] IN BLOOD BY AUTOMATED COUNT: 14.7 % (ref 11.5–14.5)
HCT VFR BLD AUTO: 42.5 % (ref 42–52)
HGB BLD-MCNC: 13.6 G/DL (ref 14–18)
MCH RBC QN AUTO: 27.5 PG (ref 27–33)
MCHC RBC AUTO-ENTMCNC: 32 G/DL (ref 33–37)
MCV RBC AUTO: 86 FL (ref 80–94)
PLATELET # BLD AUTO: 325 10*3/MM3 (ref 130–400)
PMV BLD AUTO: 10.8 FL (ref 6–10)
PSA SERPL-MCNC: 0.53 NG/ML (ref 0–4)
RBC # BLD AUTO: 4.94 10*6/MM3 (ref 4.7–6.1)
WBC NRBC COR # BLD: 11.77 10*3/MM3 (ref 4.5–12.5)

## 2018-07-27 PROCEDURE — 84153 ASSAY OF PSA TOTAL: CPT | Performed by: UROLOGY

## 2018-07-27 PROCEDURE — 85027 COMPLETE CBC AUTOMATED: CPT | Performed by: UROLOGY

## 2018-07-27 PROCEDURE — 99214 OFFICE O/P EST MOD 30 MIN: CPT | Performed by: UROLOGY

## 2018-07-27 RX ORDER — TESTOSTERONE CYPIONATE 200 MG/ML
INJECTION, SOLUTION INTRAMUSCULAR
Qty: 10 ML | Refills: 2 | Status: SHIPPED | OUTPATIENT
Start: 2018-07-27 | End: 2018-11-20 | Stop reason: SDUPTHER

## 2018-07-27 NOTE — PROGRESS NOTES
"Chief Complaint:          Chief Complaint   Patient presents with   • Hypogonadism       HPI:   27 y.o. male.  27-year-old super morbidly obese white male well known to me with a history of stones apparently his testosterone was 101 likely the basis of his morbid obesity.  He's had no other complaints he has a positive SHREYAS-androgen deficiency in the age male questionnaire  The patient was queried regarding the androgen deficiency in the age male questionnaire.  This is a validated questionnaire that was performed on a set of 314 [place] male physicians when it was positive it correlated directly with a 94% chance of low testosterone.  Patient indicates there is a decrease in libido or sex drive, a lack of energy, Decreased  strength and endurance, a decreased \"enjoyment of life\", sad and grumpy feelings with significant difficulty maintaining erections.  He is also been a recent deterioration regarding work performance.  Additionally, he is accompanied by his young daughter is not interested gel products.  Low TestosteroneThis pleasant male patient presents today with signs and symptoms that are consistent with low testosterone he has positive Shreyas questionnaire by history this includes both the sexual and nonsexual side effects.  Sexual side effects include inability to achieve and maintain an erection, in ability to maintain his erection and decreased interest and sexual activity.  Nonsexual symptomatology includes fatigue, difficulty completing a job, tiredness.  He has a discussion of the various forms testosterone available including parenteral, topical, and the form of a patch.  We discussed the efficacy of the gels, and the injections.  As well as the cost and benefits analysis.  We discussed the the studies a talked about heart disease and its effect on prostate cancer both of which are negligible.  He gives verbal consent to proceed with treatment.  He understands the risks and benefits of length he also " completed his attempts at fertility he understands the partial effect on spermatogenesis    Past Medical History:        Past Medical History:   Diagnosis Date   • Anxiety    • Arthritis    • Calculus of kidney    • Hypertension    • Insomnia    • Neuropathy    • Panic disorder    • Sciatica    • Tachycardia          Current Meds:     Current Outpatient Prescriptions   Medication Sig Dispense Refill   • atorvastatin (LIPITOR) 40 MG tablet TAKE ONE TABLET BY MOUTH EVERY DAY FOR CHOLESTEROL 30 tablet 3   • atorvastatin (LIPITOR) 80 MG tablet Take 1 tablet by mouth Daily. 90 tablet 3   • Fesoterodine Fumarate (TOVIAZ PO) Take  by mouth.     • FLUoxetine (PROzac) 40 MG capsule Take 60 mg by mouth Daily.     • gabapentin (NEURONTIN) 600 MG tablet Take 600 mg by mouth 6 (Six) Times a Day.     • ibuprofen (ADVIL,MOTRIN) 800 MG tablet Take 1 tablet by mouth Every 8 (Eight) Hours As Needed for Moderate Pain . (Patient taking differently: Take 600 mg by mouth Every 8 (Eight) Hours As Needed for Moderate Pain .) 15 tablet 0   • losartan-hydrochlorothiazide (HYZAAR) 50-12.5 MG per tablet Take 1 tablet by mouth Daily.     • metoprolol tartrate (LOPRESSOR) 100 MG tablet Take 1 tablet by mouth Daily. Take in addition to 50mg tablet daily 30 tablet 2   • metoprolol tartrate (LOPRESSOR) 50 MG tablet Take 1 tablet by mouth Daily. 30 tablet 2   • ondansetron (ZOFRAN) 4 MG tablet Take 1 tablet by mouth Every 8 (Eight) Hours As Needed for Nausea. 15 tablet 0   • QUEtiapine (SEROquel) 100 MG tablet Take 600 mg by mouth Every Night.     • tamsulosin (FLOMAX) 0.4 MG capsule 24 hr capsule Take 1 capsule by mouth Every Night. 7 capsule 0   • traMADol (ULTRAM) 50 MG tablet Take 1 tablet by mouth Every 8 (Eight) Hours As Needed for Moderate Pain  or Severe Pain . 45 tablet 0   • vitamin D (ERGOCALCIFEROL) 59841 units capsule capsule Take 1 capsule by mouth 1 (One) Time Per Week. 4 capsule 2     Current Facility-Administered Medications    Medication Dose Route Frequency Provider Last Rate Last Dose   • cyanocobalamin injection 1,000 mcg  1,000 mcg Intramuscular Q28 Days Sherley Link, APRN   1,000 mcg at 07/11/18 1124        Allergies:      Allergies   Allergen Reactions   • Lisinopril Cough   • Contrast Dye Rash        Past Surgical History:     Past Surgical History:   Procedure Laterality Date   • KIDNEY SURGERY      Stents placed and removed.    • LAPAROSCOPIC GASTRIC BANDING     • OTHER SURGICAL HISTORY      diagnostic esophagogastroduodenoscopy   • OTHER SURGICAL HISTORY      renal lithotripsy         Social History:     Social History     Social History   • Marital status:      Spouse name: N/A   • Number of children: N/A   • Years of education: N/A     Occupational History   • Not on file.     Social History Main Topics   • Smoking status: Former Smoker     Types: Cigarettes   • Smokeless tobacco: Never Used      Comment: smokes 1 pack of cigarettes per day   • Alcohol use No   • Drug use: No   • Sexual activity: Defer     Other Topics Concern   • Not on file     Social History Narrative   • No narrative on file       Family History:     Family History   Problem Relation Age of Onset   • Colon cancer Other    • Heart disease Other    • Lymphoma Other    • Heart disease Mother    • Hypertension Mother    • Lupus Mother    • Skin cancer Father    • Kidney disease Father        Review of Systems:     Review of Systems   Constitutional: Positive for fatigue. Negative for chills and fever.   HENT: Negative.    Eyes: Negative.    Respiratory: Negative.  Negative for cough, shortness of breath and wheezing.    Cardiovascular: Negative.  Negative for leg swelling.   Gastrointestinal: Negative.  Negative for abdominal pain, nausea and vomiting.   Endocrine: Negative.    Musculoskeletal: Positive for joint swelling. Negative for back pain.   Allergic/Immunologic: Negative.    Neurological: Negative.  Negative for dizziness and  headaches.   Hematological: Negative.    Psychiatric/Behavioral: Negative.  Negative for confusion.       Physical Exam:     Physical Exam   Constitutional: He is oriented to person, place, and time. He appears well-developed and well-nourished.   HENT:   Head: Normocephalic and atraumatic.   Eyes: Pupils are equal, round, and reactive to light. Conjunctivae and EOM are normal.   Neck: Normal range of motion.   Cardiovascular: Normal rate, regular rhythm, normal heart sounds and intact distal pulses.    Pulmonary/Chest: Effort normal and breath sounds normal.   Abdominal: Soft. Bowel sounds are normal.   Musculoskeletal: Normal range of motion.   Neurological: He is alert and oriented to person, place, and time. He has normal reflexes.   Skin: Skin is warm and dry.   Psychiatric: He has a normal mood and affect. His behavior is normal. Judgment and thought content normal.   Nursing note and vitals reviewed.      I have reviewed the following portions of the patient's history: allergies, current medications, past family history, past medical history, past social history, past surgical history, problem list and ROS and confirm it's accurate.      Procedure:       Assessment/Plan:   Low TestosteroneThis pleasant male patient presents today with signs and symptoms that are consistent with low testosterone he has positive Shreyas questionnaire by history this includes both the sexual and nonsexual side effects.  Sexual side effects include inability to achieve and maintain an erection, in ability to maintain his erection and decreased interest and sexual activity.  Nonsexual symptomatology includes fatigue, difficulty completing a job, tiredness.  He has a discussion of the various forms testosterone available including parenteral, topical, and the form of a patch.  We discussed the efficacy of the gels, and the injections.  As well as the cost and benefits analysis.  We discussed the the studies a talked about heart  disease and its effect on prostate cancer both of which are negligible.  He gives verbal consent to proceed with treatment.  He understands the risks and benefits of length he also completed his attempts at fertility he understands the partial effect on spermatogenesis     Patient's Body mass index is 56.83 kg/m². BMI is above normal parameters. Recommendations include: educational material.      I advised Andrew of the risks of continuing to use tobacco, and I provided him with tobacco cessation educational materials in the After Visit Summary.         This document has been electronically signed by JUNIOR VINSON MD July 27, 2018 9:20 AM

## 2018-08-02 ENCOUNTER — TELEPHONE (OUTPATIENT)
Dept: FAMILY MEDICINE CLINIC | Facility: CLINIC | Age: 28
End: 2018-08-02

## 2018-08-02 DIAGNOSIS — G89.29 CHRONIC BILATERAL LOW BACK PAIN WITH LEFT-SIDED SCIATICA: Primary | ICD-10-CM

## 2018-08-02 DIAGNOSIS — M54.42 CHRONIC BILATERAL LOW BACK PAIN WITH LEFT-SIDED SCIATICA: Primary | ICD-10-CM

## 2018-08-02 NOTE — TELEPHONE ENCOUNTER
----- Message from APOLINAR Gonzalez sent at 8/2/2018  2:52 PM EDT -----  He does appear to have some narrowing in his lumbar spine and possible congenital abnormality.  Would he like to be evaluated by neurosurgery?  If so, preference?      Left a message to return call.      Patient returned call & verbalized understanding,would like a Neurosurgery referral but has no preference.

## 2018-09-12 ENCOUNTER — OFFICE VISIT (OUTPATIENT)
Dept: FAMILY MEDICINE CLINIC | Facility: CLINIC | Age: 28
End: 2018-09-12

## 2018-09-12 VITALS
WEIGHT: 315 LBS | HEIGHT: 69 IN | DIASTOLIC BLOOD PRESSURE: 75 MMHG | HEART RATE: 113 BPM | OXYGEN SATURATION: 97 % | BODY MASS INDEX: 46.65 KG/M2 | SYSTOLIC BLOOD PRESSURE: 145 MMHG

## 2018-09-12 DIAGNOSIS — R11.0 NAUSEA: ICD-10-CM

## 2018-09-12 DIAGNOSIS — F32.A ANXIETY AND DEPRESSION: ICD-10-CM

## 2018-09-12 DIAGNOSIS — I10 ESSENTIAL HYPERTENSION: Primary | ICD-10-CM

## 2018-09-12 DIAGNOSIS — E78.5 HYPERLIPIDEMIA, UNSPECIFIED HYPERLIPIDEMIA TYPE: ICD-10-CM

## 2018-09-12 DIAGNOSIS — R00.2 PALPITATIONS: ICD-10-CM

## 2018-09-12 DIAGNOSIS — IMO0001 CLASS 3 OBESITY DUE TO EXCESS CALORIES WITH SERIOUS COMORBIDITY AND BODY MASS INDEX (BMI) OF 50.0 TO 59.9 IN ADULT: ICD-10-CM

## 2018-09-12 DIAGNOSIS — F41.9 ANXIETY AND DEPRESSION: ICD-10-CM

## 2018-09-12 DIAGNOSIS — G47.00 INSOMNIA, UNSPECIFIED TYPE: ICD-10-CM

## 2018-09-12 LAB
ALBUMIN SERPL-MCNC: 4.7 G/DL (ref 3.5–5)
ALBUMIN/GLOB SERPL: 1.8 G/DL (ref 1.5–2.5)
ALP SERPL-CCNC: 71 U/L (ref 40–129)
ALT SERPL W P-5'-P-CCNC: 40 U/L (ref 10–44)
ANION GAP SERPL CALCULATED.3IONS-SCNC: 9.8 MMOL/L (ref 3.6–11.2)
AST SERPL-CCNC: 21 U/L (ref 10–34)
BASOPHILS # BLD AUTO: 0.03 10*3/MM3 (ref 0–0.3)
BASOPHILS NFR BLD AUTO: 0.2 % (ref 0–2)
BILIRUB SERPL-MCNC: 0.7 MG/DL (ref 0.2–1.8)
BUN BLD-MCNC: 12 MG/DL (ref 7–21)
BUN/CREAT SERPL: 9.6 (ref 7–25)
CALCIUM SPEC-SCNC: 9.4 MG/DL (ref 7.7–10)
CHLORIDE SERPL-SCNC: 101 MMOL/L (ref 99–112)
CHOLEST SERPL-MCNC: 239 MG/DL (ref 0–200)
CO2 SERPL-SCNC: 29.2 MMOL/L (ref 24.3–31.9)
CREAT BLD-MCNC: 1.25 MG/DL (ref 0.43–1.29)
DEPRECATED RDW RBC AUTO: 47.3 FL (ref 37–54)
EOSINOPHIL # BLD AUTO: 0.34 10*3/MM3 (ref 0–0.7)
EOSINOPHIL NFR BLD AUTO: 2.2 % (ref 0–5)
ERYTHROCYTE [DISTWIDTH] IN BLOOD BY AUTOMATED COUNT: 15.1 % (ref 11.5–14.5)
GFR SERPL CREATININE-BSD FRML MDRD: 68 ML/MIN/1.73
GLOBULIN UR ELPH-MCNC: 2.6 GM/DL
GLUCOSE BLD-MCNC: 115 MG/DL (ref 70–110)
HCT VFR BLD AUTO: 45.6 % (ref 42–52)
HDLC SERPL-MCNC: 33 MG/DL (ref 60–100)
HGB BLD-MCNC: 14.5 G/DL (ref 14–18)
IMM GRANULOCYTES # BLD: 0.12 10*3/MM3 (ref 0–0.03)
IMM GRANULOCYTES NFR BLD: 0.8 % (ref 0–0.5)
LDLC SERPL CALC-MCNC: 161 MG/DL (ref 0–100)
LDLC/HDLC SERPL: 4.88 {RATIO}
LYMPHOCYTES # BLD AUTO: 4.07 10*3/MM3 (ref 1–3)
LYMPHOCYTES NFR BLD AUTO: 25.8 % (ref 21–51)
MCH RBC QN AUTO: 27.4 PG (ref 27–33)
MCHC RBC AUTO-ENTMCNC: 31.8 G/DL (ref 33–37)
MCV RBC AUTO: 86 FL (ref 80–94)
MONOCYTES # BLD AUTO: 0.95 10*3/MM3 (ref 0.1–0.9)
MONOCYTES NFR BLD AUTO: 6 % (ref 0–10)
NEUTROPHILS # BLD AUTO: 10.28 10*3/MM3 (ref 1.4–6.5)
NEUTROPHILS NFR BLD AUTO: 65 % (ref 30–70)
OSMOLALITY SERPL CALC.SUM OF ELEC: 280.1 MOSM/KG (ref 273–305)
PLATELET # BLD AUTO: 404 10*3/MM3 (ref 130–400)
PMV BLD AUTO: 10.3 FL (ref 6–10)
POTASSIUM BLD-SCNC: 3.9 MMOL/L (ref 3.5–5.3)
PROT SERPL-MCNC: 7.3 G/DL (ref 6–8)
RBC # BLD AUTO: 5.3 10*6/MM3 (ref 4.7–6.1)
SODIUM BLD-SCNC: 140 MMOL/L (ref 135–153)
T4 FREE SERPL-MCNC: 1.19 NG/DL (ref 0.89–1.76)
TRIGL SERPL-MCNC: 225 MG/DL (ref 0–150)
TSH SERPL DL<=0.05 MIU/L-ACNC: 4.71 MIU/ML (ref 0.55–4.78)
VLDLC SERPL-MCNC: 45 MG/DL
WBC NRBC COR # BLD: 15.79 10*3/MM3 (ref 4.5–12.5)

## 2018-09-12 PROCEDURE — 36415 COLL VENOUS BLD VENIPUNCTURE: CPT | Performed by: NURSE PRACTITIONER

## 2018-09-12 PROCEDURE — 84439 ASSAY OF FREE THYROXINE: CPT | Performed by: NURSE PRACTITIONER

## 2018-09-12 PROCEDURE — 99214 OFFICE O/P EST MOD 30 MIN: CPT | Performed by: NURSE PRACTITIONER

## 2018-09-12 PROCEDURE — 80050 GENERAL HEALTH PANEL: CPT | Performed by: NURSE PRACTITIONER

## 2018-09-12 PROCEDURE — 80061 LIPID PANEL: CPT | Performed by: NURSE PRACTITIONER

## 2018-09-12 RX ORDER — ONDANSETRON 4 MG/1
4 TABLET, FILM COATED ORAL EVERY 8 HOURS PRN
Qty: 30 TABLET | Refills: 0 | Status: ON HOLD | OUTPATIENT
Start: 2018-09-12 | End: 2019-04-15

## 2018-09-12 NOTE — PROGRESS NOTES
Subjective   Andrew Narayan is a 27 y.o. male.     Chief Complaint: Follow-up and Hypertension    Hyperlipidemia   This is a chronic problem. The problem is uncontrolled. Exacerbating diseases include obesity. Factors aggravating his hyperlipidemia include beta blockers, fatty foods and thiazides. Current antihyperlipidemic treatment includes statins. The current treatment provides moderate improvement of lipids. Compliance problems include adherence to diet and adherence to exercise (Dr Oviedo has suggested he take 80mg of lipitor; patient is only willing to take 40mg).  Risk factors for coronary artery disease include dyslipidemia, hypertension, male sex, obesity and a sedentary lifestyle.   Anxiety   Presents for follow-up visit. Symptoms include depressed mood, excessive worry, insomnia, irritability and nervous/anxious behavior. Patient reports no suicidal ideas. Symptoms occur most days. The severity of symptoms is causing significant distress. The quality of sleep is fair. Nighttime awakenings: one to two.          Hypertension   This is a chronic problem. The current episode started more than 1 year ago. The problem is controlled. Pertinent negatives include no palpitations or peripheral edema. Current antihypertension treatment includes beta blockers, angiotensin blockers and diuretics. The current treatment provides significant improvement. Compliance problems include diet and exercise.    Back Pain   This is a chronic problem. The current episode started more than 1 year ago. The problem occurs constantly. The problem has been rapidly worsening since onset. The pain is present in the lumbar spine. The quality of the pain is described as aching. The pain radiates to the left foot (radiates to both hips). The pain is severe. The pain is the same all the time. The symptoms are aggravated by standing. Stiffness is present in the morning. Associated symptoms include numbness and tingling. Pertinent  negatives include no bladder incontinence or bowel incontinence. Risk factors include obesity. He has tried NSAIDs, analgesics and muscle relaxant for the symptoms. The treatment provided mild relief.   Family History   Problem Relation Age of Onset   • Colon cancer Other    • Heart disease Other    • Lymphoma Other    • Heart disease Mother    • Hypertension Mother    • Lupus Mother    • Skin cancer Father    • Kidney disease Father        Social History     Social History   • Marital status:      Spouse name: N/A   • Number of children: N/A   • Years of education: N/A     Occupational History   • Not on file.     Social History Main Topics   • Smoking status: Former Smoker     Types: Cigarettes   • Smokeless tobacco: Never Used      Comment: smokes 1 pack of cigarettes per day   • Alcohol use No   • Drug use: No   • Sexual activity: Defer     Other Topics Concern   • Not on file     Social History Narrative   • No narrative on file       Past Medical History:   Diagnosis Date   • Anxiety    • Arthritis    • Calculus of kidney    • Headache    • Hyperlipidemia    • Hypertension    • Insomnia    • Neuropathy    • Panic disorder    • Sciatica    • Tachycardia        Review of Systems   Constitutional: Positive for irritability.   HENT: Negative.    Respiratory: Negative.    Cardiovascular: Negative.    Gastrointestinal: Negative.    Musculoskeletal: Negative.    Skin: Negative.    Neurological: Negative.    Psychiatric/Behavioral: Negative for suicidal ideas. The patient is nervous/anxious and has insomnia.        Objective   Physical Exam   Constitutional: He is oriented to person, place, and time. He appears well-developed and well-nourished.   obesity   Neck: Normal range of motion. Neck supple.   Cardiovascular: Normal rate, regular rhythm and normal heart sounds.    Pulmonary/Chest: Effort normal and breath sounds normal.   Neurological: He is alert and oriented to person, place, and time.   Skin: Skin is  "warm and dry.   Psychiatric: He has a normal mood and affect. His behavior is normal. Judgment and thought content normal.   Nursing note and vitals reviewed.      Procedures    Vitals: Blood pressure 145/75, pulse 113, height 175.3 cm (69\"), weight (!) 177 kg (390 lb), SpO2 97 %.    Allergies:   Allergies   Allergen Reactions   • Lisinopril Cough   • Contrast Dye Rash        During this visit the following were done:  Labs Reviewed []    Labs Ordered []    Radiology Reports Reviewed []    Radiology Ordered []    PCP Records Reviewed []    Referring Provider Records Reviewed []    ER Records Reviewed []    Hospital Records Reviewed []    History Obtained From Family []    Radiology Images Reviewed []    Other Reviewed []    Records Requested []      Assessment/Plan   Andrew was seen today for follow-up and hypertension.    Diagnoses and all orders for this visit:    Essential hypertension  -     CBC & Differential  -     Comprehensive Metabolic Panel  -     TSH  -     T4, Free  -     Lipid Panel  -     CBC Auto Differential  -     Osmolality, Calculated; Future  -     Osmolality, Calculated    Hyperlipidemia, unspecified hyperlipidemia type  -     CBC & Differential  -     Comprehensive Metabolic Panel  -     TSH  -     T4, Free  -     Lipid Panel  -     CBC Auto Differential  -     Osmolality, Calculated; Future  -     Osmolality, Calculated    Class 3 obesity due to excess calories with serious comorbidity and body mass index (BMI) of 50.0 to 59.9 in adult (CMS/[facility])  -     CBC & Differential  -     Comprehensive Metabolic Panel  -     TSH  -     T4, Free  -     Lipid Panel  -     CBC Auto Differential  -     Osmolality, Calculated; Future  -     Osmolality, Calculated    Anxiety and depression  -     CBC & Differential  -     Comprehensive Metabolic Panel  -     TSH  -     T4, Free  -     Lipid Panel  -     CBC Auto Differential  -     Ambulatory Referral to Psychiatry  -     Osmolality, Calculated; Future  -    "  Osmolality, Calculated    Insomnia, unspecified type  -     CBC & Differential  -     Comprehensive Metabolic Panel  -     TSH  -     T4, Free  -     Lipid Panel  -     CBC Auto Differential  -     Ambulatory Referral to Psychiatry  -     Osmolality, Calculated; Future  -     Osmolality, Calculated    Palpitations    Nausea  -     ondansetron (ZOFRAN) 4 MG tablet; Take 1 tablet by mouth Every 8 (Eight) Hours As Needed for Nausea or Vomiting.

## 2018-09-13 ENCOUNTER — OFFICE VISIT (OUTPATIENT)
Dept: NEUROSURGERY | Facility: CLINIC | Age: 28
End: 2018-09-13

## 2018-09-13 VITALS
RESPIRATION RATE: 18 BRPM | HEIGHT: 69 IN | WEIGHT: 315 LBS | TEMPERATURE: 98.1 F | BODY MASS INDEX: 46.65 KG/M2 | HEART RATE: 86 BPM | DIASTOLIC BLOOD PRESSURE: 80 MMHG | OXYGEN SATURATION: 96 % | SYSTOLIC BLOOD PRESSURE: 132 MMHG

## 2018-09-13 DIAGNOSIS — M48.061 SPINAL STENOSIS OF LUMBAR REGION WITHOUT NEUROGENIC CLAUDICATION: ICD-10-CM

## 2018-09-13 DIAGNOSIS — M51.36 DEGENERATIVE DISC DISEASE, LUMBAR: Primary | ICD-10-CM

## 2018-09-13 PROCEDURE — 99243 OFF/OP CNSLTJ NEW/EST LOW 30: CPT | Performed by: NEUROLOGICAL SURGERY

## 2018-09-13 NOTE — PROGRESS NOTES
"Andrew Narayan  1990  [id number]      Chief Complaint   Patient presents with   • Back Pain   • Leg Pain       HISTORY OF PRESENT ILLNESS:  This is a 27-year-old male referred with a chief complaint of chronic back and bilateral leg pain.  He is been to pain management, has been to physical therapy on a variety of medicines to no avail.  The pain is located lumbosacral area rating to his hips.  The symptoms that he has are not those usually associated with claudication.  He is morbidly obese and has been treated with a post lap band\" which has not been successful.  Lumbar MRI data set has been obtained and is referred for neurosurgical consultation.     Past Medical History:   Diagnosis Date   • Anxiety    • Arthritis    • Calculus of kidney    • Headache    • Hyperlipidemia    • Hypertension    • Insomnia    • Neuropathy    • Panic disorder    • Sciatica    • Tachycardia        Past Surgical History:   Procedure Laterality Date   • KIDNEY SURGERY      Stents placed and removed.    • LAPAROSCOPIC GASTRIC BANDING     • OTHER SURGICAL HISTORY      diagnostic esophagogastroduodenoscopy   • OTHER SURGICAL HISTORY      renal lithotripsy       Family History   Problem Relation Age of Onset   • Colon cancer Other    • Heart disease Other    • Lymphoma Other    • Heart disease Mother    • Hypertension Mother    • Lupus Mother    • Skin cancer Father    • Kidney disease Father        Social History     Social History   • Marital status:      Spouse name: N/A   • Number of children: N/A   • Years of education: N/A     Occupational History   • Not on file.     Social History Main Topics   • Smoking status: Former Smoker     Types: Cigarettes   • Smokeless tobacco: Never Used      Comment: smokes 1 pack of cigarettes per day   • Alcohol use No   • Drug use: No   • Sexual activity: Defer     Other Topics Concern   • Not on file     Social History Narrative   • No narrative on file       Allergies   Allergen " Reactions   • Lisinopril Cough   • Contrast Dye Rash         Current Outpatient Prescriptions:   •  atorvastatin (LIPITOR) 40 MG tablet, TAKE ONE TABLET BY MOUTH EVERY DAY FOR CHOLESTEROL, Disp: 30 tablet, Rfl: 3  •  atorvastatin (LIPITOR) 80 MG tablet, Take 1 tablet by mouth Daily., Disp: 90 tablet, Rfl: 3  •  gabapentin (NEURONTIN) 600 MG tablet, Take 600 mg by mouth 6 (Six) Times a Day., Disp: , Rfl:   •  ibuprofen (ADVIL,MOTRIN) 800 MG tablet, Take 1 tablet by mouth Every 8 (Eight) Hours As Needed for Moderate Pain . (Patient taking differently: Take 600 mg by mouth Every 8 (Eight) Hours As Needed for Moderate Pain .), Disp: 15 tablet, Rfl: 0  •  losartan-hydrochlorothiazide (HYZAAR) 50-12.5 MG per tablet, Take 1 tablet by mouth Daily., Disp: , Rfl:   •  metoprolol tartrate (LOPRESSOR) 50 MG tablet, Take 1 tablet by mouth Daily., Disp: 30 tablet, Rfl: 2  •  QUEtiapine (SEROquel) 100 MG tablet, Take 600 mg by mouth Every Night., Disp: , Rfl:   •  Syringe, Disposable, 3 ML misc, Use 3 ml syringe with a 25 gauge  5/8 inch needle, Disp: 24 each, Rfl: 6  •  tamsulosin (FLOMAX) 0.4 MG capsule 24 hr capsule, Take 1 capsule by mouth Every Night., Disp: 7 capsule, Rfl: 0  •  Testosterone Cypionate (DEPO-TESTOSTERONE) 200 MG/ML injection, He is to use 1/2 cc every Monday and Thursday SQ, Disp: 10 mL, Rfl: 2  •  traMADol (ULTRAM) 50 MG tablet, Take 1 tablet by mouth Every 8 (Eight) Hours As Needed for Moderate Pain  or Severe Pain ., Disp: 45 tablet, Rfl: 0  •  vitamin D (ERGOCALCIFEROL) 64754 units capsule capsule, Take 1 capsule by mouth 1 (One) Time Per Week., Disp: 4 capsule, Rfl: 2  •  Fesoterodine Fumarate (TOVIAZ PO), Take  by mouth., Disp: , Rfl:   •  FLUoxetine (PROzac) 40 MG capsule, Take 60 mg by mouth Daily., Disp: , Rfl:   •  metoprolol tartrate (LOPRESSOR) 100 MG tablet, Take 1 tablet by mouth Daily. Take in addition to 50mg tablet daily, Disp: 30 tablet, Rfl: 2  •  ondansetron (ZOFRAN) 4 MG tablet, Take 1  "tablet by mouth Every 8 (Eight) Hours As Needed for Nausea or Vomiting., Disp: 30 tablet, Rfl: 0    Current Facility-Administered Medications:   •  cyanocobalamin injection 1,000 mcg, 1,000 mcg, Intramuscular, Q28 Days, Sherley Link APRN, 1,000 mcg at 07/11/18 1124    Review of Systems   Constitutional: Positive for diaphoresis and unexpected weight change.   Respiratory: Positive for apnea.    Cardiovascular: Positive for palpitations and leg swelling.   Musculoskeletal: Positive for arthralgias, back pain and myalgias.   Neurological: Positive for weakness, light-headedness and numbness.   Psychiatric/Behavioral: The patient is nervous/anxious.    All other systems reviewed and are negative.      Vitals:    09/13/18 0813   BP: 132/80   BP Location: Left arm   Patient Position: Sitting   Pulse: 86   Resp: 18   Temp: 98.1 °F (36.7 °C)   SpO2: 96%   Weight: (!) 177 kg (391 lb)   Height: 175.3 cm (69\")       Neurological Examination: Mental status/speech: The patient is alert and oriented.  Speech is clear without aphysia or dysarthria.  No overt cognitive deficits.    Cranial nerve examination:    Olfaction: Smell is intact.  Vision: Vision is intact without visual field abnormalities.  Funduscopic examination is normal.  No pupillary irregularity.  Ocular motor examination: The extraocular muscles are intact.  There is no diplopia.  The pupil is round and reactive to both light and accommodation.  There is no nystagmus.  Facial movement/sensation: There is no facial weakness.  Sensation is intact in the first, second, and third divisions of the trigeminal nerve.  The corneal reflex is intact.  Auditory: Hearing is intact to finger rub bilaterally.  Cranial nerves IX, X, XI, XII: Phonation is normal.  No dysphagia.  Tongue is protruded in the midline without atrophy.  The gag reflex is intact.  Shoulder shrug is normal.    Musculoligamentous ligamentous examination: He is morbidly obese with limitation " range of motion.  He has marked edema in both lower extremities.  He is hyporeflexic.  Straight leg raising, [place] and flip test are negative, however.  Furthermore there is no evidence of weakness sensory loss or reflex asymmetry.            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      Assessment:  Chronic back pain secondary to obesity          Recommendations:  Neurosurgical intervention is not indicated.  I would recommend referral to Dr. Ezequiel Corrales in [place] for pain management.  In addition I would recommend referral for bariatric surgery.  I believe that a sleeve will be efficacious.  Otherwise there is little to offer him.  Obviously weight reduction will be critical in the context of hypertension and sleep apnea.        I greatly appreciate the opportunity to see and evaluate this individual.  If you have questions or concerns regarding issues that I may have overlooked please call me at any time: [phone].  Reji Jay M.D.  [facility]  [address] [place] [address].  [facility]  [id number]

## 2018-09-13 NOTE — PROGRESS NOTES
Cholesterol is a little better but still continues to be really elevated.  Dr. Oviedo had recommended 80mg of lipitor.  I agree seeing that his numbers are so elevated.   Please let pt know.

## 2018-09-14 ENCOUNTER — TELEPHONE (OUTPATIENT)
Dept: FAMILY MEDICINE CLINIC | Facility: CLINIC | Age: 28
End: 2018-09-14

## 2018-09-14 RX ORDER — ATORVASTATIN CALCIUM 80 MG/1
80 TABLET, FILM COATED ORAL DAILY
Qty: 30 TABLET | Refills: 3 | Status: ON HOLD | OUTPATIENT
Start: 2018-09-14 | End: 2019-04-15

## 2018-09-14 NOTE — TELEPHONE ENCOUNTER
----- Message from APOLINAR Gonzalez sent at 9/13/2018 10:16 AM EDT -----  Cholesterol is a little better but still continues to be really elevated.  Dr. Oviedo had recommended 80mg of lipitor.  I agree seeing that his numbers are so elevated.   Please let pt know.

## 2018-09-14 NOTE — TELEPHONE ENCOUNTER
SPOKE WITH PATIENT, HE IS AGREEABLE TO INCREASE LIPITOR TO 80MG. PLEASE CALL TO [facility]

## 2018-09-19 ENCOUNTER — OFFICE VISIT (OUTPATIENT)
Dept: PSYCHIATRY | Facility: CLINIC | Age: 28
End: 2018-09-19

## 2018-09-19 ENCOUNTER — PRIOR AUTHORIZATION (OUTPATIENT)
Dept: PSYCHIATRY | Facility: CLINIC | Age: 28
End: 2018-09-19

## 2018-09-19 ENCOUNTER — TELEPHONE (OUTPATIENT)
Dept: PSYCHIATRY | Facility: CLINIC | Age: 28
End: 2018-09-19

## 2018-09-19 VITALS
WEIGHT: 315 LBS | HEART RATE: 96 BPM | SYSTOLIC BLOOD PRESSURE: 133 MMHG | BODY MASS INDEX: 46.65 KG/M2 | HEIGHT: 69 IN | DIASTOLIC BLOOD PRESSURE: 74 MMHG

## 2018-09-19 DIAGNOSIS — F42.2 MIXED OBSESSIONAL THOUGHTS AND ACTS: ICD-10-CM

## 2018-09-19 DIAGNOSIS — F41.1 GENERALIZED ANXIETY DISORDER: ICD-10-CM

## 2018-09-19 DIAGNOSIS — F43.10 POST TRAUMATIC STRESS DISORDER (PTSD): Primary | ICD-10-CM

## 2018-09-19 DIAGNOSIS — Z79.899 MEDICATION MANAGEMENT: ICD-10-CM

## 2018-09-19 PROCEDURE — 90792 PSYCH DIAG EVAL W/MED SRVCS: CPT | Performed by: NURSE PRACTITIONER

## 2018-09-19 RX ORDER — PRAZOSIN HYDROCHLORIDE 1 MG/1
1 CAPSULE ORAL NIGHTLY
Qty: 30 CAPSULE | Refills: 0 | Status: SHIPPED | OUTPATIENT
Start: 2018-09-19 | End: 2018-10-09 | Stop reason: SDUPTHER

## 2018-09-19 RX ORDER — QUETIAPINE FUMARATE 300 MG/1
300 TABLET, FILM COATED ORAL NIGHTLY
Qty: 30 TABLET | Refills: 0 | Status: SHIPPED | OUTPATIENT
Start: 2018-09-19 | End: 2018-10-09 | Stop reason: SDUPTHER

## 2018-09-19 RX ORDER — LURASIDONE HYDROCHLORIDE 20 MG/1
20 TABLET, FILM COATED ORAL DAILY
Qty: 30 TABLET | Refills: 0 | Status: SHIPPED | OUTPATIENT
Start: 2018-09-19 | End: 2018-10-09 | Stop reason: SDUPTHER

## 2018-09-19 RX ORDER — FLUOXETINE HYDROCHLORIDE 40 MG/1
80 CAPSULE ORAL DAILY
Qty: 60 CAPSULE | Refills: 0 | Status: SHIPPED | OUTPATIENT
Start: 2018-09-19 | End: 2018-10-09 | Stop reason: SDUPTHER
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
## 2018-09-20 ENCOUNTER — TELEPHONE (OUTPATIENT)
Dept: PSYCHIATRY | Facility: CLINIC | Age: 28
End: 2018-09-20

## 2018-10-09 ENCOUNTER — OFFICE VISIT (OUTPATIENT)
Dept: PSYCHIATRY | Facility: CLINIC | Age: 28
End: 2018-10-09

## 2018-10-09 VITALS
SYSTOLIC BLOOD PRESSURE: 150 MMHG | BODY MASS INDEX: 46.65 KG/M2 | WEIGHT: 315 LBS | DIASTOLIC BLOOD PRESSURE: 90 MMHG | HEIGHT: 69 IN | HEART RATE: 99 BPM

## 2018-10-09 DIAGNOSIS — F43.10 POST TRAUMATIC STRESS DISORDER (PTSD): Primary | ICD-10-CM

## 2018-10-09 DIAGNOSIS — F42.2 MIXED OBSESSIONAL THOUGHTS AND ACTS: ICD-10-CM

## 2018-10-09 DIAGNOSIS — F41.1 GENERALIZED ANXIETY DISORDER: ICD-10-CM

## 2018-10-09 DIAGNOSIS — Z79.899 MEDICATION MANAGEMENT: ICD-10-CM

## 2018-10-09 PROCEDURE — 99214 OFFICE O/P EST MOD 30 MIN: CPT | Performed by: NURSE PRACTITIONER

## 2018-10-09 RX ORDER — LURASIDONE HYDROCHLORIDE 20 MG/1
20 TABLET, FILM COATED ORAL DAILY
Qty: 30 TABLET | Refills: 0 | Status: SHIPPED | OUTPATIENT
Start: 2018-10-09 | End: 2018-10-31 | Stop reason: SDUPTHER

## 2018-10-09 RX ORDER — QUETIAPINE FUMARATE 300 MG/1
150 TABLET, FILM COATED ORAL NIGHTLY
Qty: 15 TABLET | Refills: 0 | Status: SHIPPED | OUTPATIENT
Start: 2018-10-09 | End: 2018-10-31 | Stop reason: SDUPTHER

## 2018-10-09 RX ORDER — FLUOXETINE HYDROCHLORIDE 40 MG/1
80 CAPSULE ORAL DAILY
Qty: 60 CAPSULE | Refills: 0 | Status: SHIPPED | OUTPATIENT
Start: 2018-10-09 | End: 2018-10-31 | Stop reason: SDUPTHER

## 2018-10-09 RX ORDER — PRAZOSIN HYDROCHLORIDE 2 MG/1
2 CAPSULE ORAL NIGHTLY
Qty: 30 CAPSULE | Refills: 0 | Status: SHIPPED | OUTPATIENT
Start: 2018-10-09 | End: 2018-10-31 | Stop reason: SDUPTHER

## 2018-10-09 RX ORDER — GUANFACINE 1 MG/1
TABLET ORAL
Qty: 60 TABLET | Refills: 0 | Status: SHIPPED | OUTPATIENT
Start: 2018-10-09 | End: 2018-10-31

## 2018-10-09 NOTE — PROGRESS NOTES
Subjective   Andrew Narayan is a 27 y.o. male is here today for medication management follow-up.    Chief Complaint:  Recheck on anxiety and hallucinations    History of Present Illness:  Patient presents safe [facility]  He states that he has nearly had a panic attack, waiting room.  He does not know why he just cannot stand being out in public and around other people.  He rates his anxiety a TN at a 10 with 10 being worse.  He states depression is approximately a 5.  He denies any suicidal thoughts, homicidal thoughts,.  He states that the medication has helped with hearing the noises.  He is paranoid though and feels like when he looks at the holes in the wall where pictures have been hung that they move around and so he has to care for them up.  He denies any negative side effects to the medication.  He is sleeping well at least 8 hours a night without difficulty.Body mass index is 56.91 kg/m².  He has noticed a decrease in appetite and he has lost 10 pounds since last office visit.  He has come down on the Seroquel 300 mg and has tolerated this without affecting any sleep.  States his main problem is just anxiety in the mind racing all the time and cannot relax.    The following portions of the patient's history were reviewed and updated as appropriate: allergies, current medications, past family history, past medical history, past social history, past surgical history and problem list.    Review of Systems   Constitutional: Positive for appetite change. Negative for activity change and fatigue.   HENT: Negative.    Eyes: Negative for visual disturbance.   Respiratory: Negative.    Cardiovascular: Negative.    Gastrointestinal: Negative for nausea.   Endocrine: Negative.    Genitourinary: Negative.    Musculoskeletal: Positive for arthralgias.   Skin: Negative.    Allergic/Immunologic: Negative.    Neurological: Negative for dizziness, seizures and  "headaches.   Hematological: Negative.    Psychiatric/Behavioral: Positive for decreased concentration. Negative for agitation, behavioral problems, confusion, dysphoric mood, hallucinations, self-injury, sleep disturbance and suicidal ideas. The patient is nervous/anxious. The patient is not hyperactive.        Objective   Physical Exam   Constitutional: He is oriented to person, place, and time. He appears well-developed and well-nourished.   Neurological: He is alert and oriented to person, place, and time.   Psychiatric: His speech is normal and behavior is normal. Thought content normal. His mood appears anxious. Cognition and memory are normal. He expresses impulsivity.   Vitals reviewed.    Blood pressure 150/90, pulse 99, height 175.3 cm (69\"), weight (!) 175 kg (385 lb 6.4 oz).    Medication List:   Current Outpatient Prescriptions   Medication Sig Dispense Refill   • atorvastatin (LIPITOR) 80 MG tablet Take 1 tablet by mouth Daily. for cholesterol 30 tablet 3   • Fesoterodine Fumarate (TOVIAZ PO) Take  by mouth.     • FLUoxetine (PROzac) 40 MG capsule Take 2 capsules by mouth Daily. 60 capsule 0   • gabapentin (NEURONTIN) 600 MG tablet Take 600 mg by mouth 6 (Six) Times a Day.     • guanFACINE (TENEX) 1 MG tablet Take 1 tablet daily for 7 days then increase to BID if tolerated 60 tablet 0   • ibuprofen (ADVIL,MOTRIN) 800 MG tablet Take 1 tablet by mouth Every 8 (Eight) Hours As Needed for Moderate Pain . (Patient taking differently: Take 600 mg by mouth Every 8 (Eight) Hours As Needed for Moderate Pain .) 15 tablet 0   • losartan-hydrochlorothiazide (HYZAAR) 50-12.5 MG per tablet Take 1 tablet by mouth Daily.     • Lurasidone HCl (LATUDA) 20 MG tablet tablet Take 1 tablet by mouth Daily. 30 tablet 0   • metoprolol tartrate (LOPRESSOR) 100 MG tablet Take 1 tablet by mouth Daily. Take in addition to 50mg tablet daily 30 tablet 2   • metoprolol tartrate (LOPRESSOR) 50 MG tablet Take 1 tablet by mouth Daily. 30 " tablet 2   • ondansetron (ZOFRAN) 4 MG tablet Take 1 tablet by mouth Every 8 (Eight) Hours As Needed for Nausea or Vomiting. 30 tablet 0   • prazosin (MINIPRESS) 2 MG capsule Take 1 capsule by mouth Every Night. 30 capsule 0   • QUEtiapine (SEROquel) 300 MG tablet Take 0.5 tablets by mouth Every Night. 15 tablet 0   • Syringe, Disposable, 3 ML misc Use 3 ml syringe with a 25 gauge  5/8 inch needle 24 each 6   • tamsulosin (FLOMAX) 0.4 MG capsule 24 hr capsule Take 1 capsule by mouth Every Night. 7 capsule 0   • Testosterone Cypionate (DEPO-TESTOSTERONE) 200 MG/ML injection He is to use 1/2 cc every Monday and Thursday SQ 10 mL 2   • traMADol (ULTRAM) 50 MG tablet Take 1 tablet by mouth Every 8 (Eight) Hours As Needed for Moderate Pain  or Severe Pain . 45 tablet 0   • vitamin D (ERGOCALCIFEROL) 21892 units capsule capsule Take 1 capsule by mouth 1 (One) Time Per Week. 4 capsule 2     Current Facility-Administered Medications   Medication Dose Route Frequency Provider Last Rate Last Dose   • cyanocobalamin injection 1,000 mcg  1,000 mcg Intramuscular Q28 Days hSerley Link APRN   1,000 mcg at 07/11/18 1124       Mental Status Exam:   Hygiene:   good  Cooperation:  Cooperative  Eye Contact:  Good  Psychomotor Behavior:  Restless  Affect:  Full range  Hopelessness: Denies  Speech:  Normal  Thought Process:  Goal directed  Thought Content:  Normal  Suicidal:  None  Homicidal:  None  Hallucinations:  Auditory  Delusion:  None  Memory:  Intact  Orientation:  Person, Place, Time and Situation  Reliability:  fair  Insight:  Fair  Judgement:  Fair  Impulse Control:  Poor  Physical/Medical Issues:  Yes obesity, HTN, pain    Assessment/Plan   Problems Addressed this Visit     None      Visit Diagnoses     Post traumatic stress disorder (PTSD)    -  Primary    Relevant Medications    FLUoxetine (PROzac) 40 MG capsule    Lurasidone HCl (LATUDA) 20 MG tablet tablet    prazosin (MINIPRESS) 2 MG capsule    QUEtiapine  (SEROquel) 300 MG tablet    Mixed obsessional thoughts and acts        Relevant Medications    Lurasidone HCl (LATUDA) 20 MG tablet tablet    QUEtiapine (SEROquel) 300 MG tablet    Generalized anxiety disorder        Relevant Medications    FLUoxetine (PROzac) 40 MG capsule    Lurasidone HCl (LATUDA) 20 MG tablet tablet    QUEtiapine (SEROquel) 300 MG tablet    Medication management            Functionality: pt having significant impairment in important areas of daily functioning.  Prognosis: Guarded dependent on medication/follow up and treatment plan compliance.    CPT testing shows high probability of ADHD.  Results discussed with patient  Discussed medication options.  At this time I'm going to add Tenex for the ADHD.  Lengthy discussion with patient.  I'm trying to avoid Wellbutrin, stimulants and Strattera due to his elevated blood pressure and heart rate at times.  He is going to keep the blood pressure log and continue follow-up with his primary care and his cardiologist.  I will continue him on the same dose of Latuda and continue the Seroquel taper.  Taking the Seroquel down to 150 today.  He is to continue the Prozac.  Reviewed the risks, benefits, and side effects of the medications; patient acknowledged and verbally consented.  Patient is agreeable to call the [facility].  Patient is aware to call 911 or go to the nearest ER should begin having SI/HI.  Turn to clinic in 3 weeks for recheck.

## 2018-10-12 ENCOUNTER — OFFICE VISIT (OUTPATIENT)
Dept: FAMILY MEDICINE CLINIC | Facility: CLINIC | Age: 28
End: 2018-10-12

## 2018-10-12 VITALS
SYSTOLIC BLOOD PRESSURE: 131 MMHG | HEIGHT: 69 IN | DIASTOLIC BLOOD PRESSURE: 70 MMHG | WEIGHT: 315 LBS | OXYGEN SATURATION: 97 % | BODY MASS INDEX: 46.65 KG/M2 | HEART RATE: 86 BPM

## 2018-10-12 DIAGNOSIS — Z23 IMMUNIZATION DUE: ICD-10-CM

## 2018-10-12 DIAGNOSIS — M51.36 DDD (DEGENERATIVE DISC DISEASE), LUMBAR: ICD-10-CM

## 2018-10-12 DIAGNOSIS — I10 ESSENTIAL HYPERTENSION: ICD-10-CM

## 2018-10-12 DIAGNOSIS — M54.42 CHRONIC BILATERAL LOW BACK PAIN WITH LEFT-SIDED SCIATICA: Primary | ICD-10-CM

## 2018-10-12 DIAGNOSIS — G89.29 CHRONIC BILATERAL LOW BACK PAIN WITH LEFT-SIDED SCIATICA: Primary | ICD-10-CM

## 2018-10-12 PROCEDURE — 90471 IMMUNIZATION ADMIN: CPT | Performed by: NURSE PRACTITIONER

## 2018-10-12 PROCEDURE — 90674 CCIIV4 VAC NO PRSV 0.5 ML IM: CPT | Performed by: NURSE PRACTITIONER

## 2018-10-12 PROCEDURE — 99214 OFFICE O/P EST MOD 30 MIN: CPT | Performed by: NURSE PRACTITIONER

## 2018-10-12 NOTE — PROGRESS NOTES
Subjective   Andrew Narayan is a 27 y.o. male.     Chief Complaint: Follow-up and Hypertension    History of Present Illness   Hypertension   This is a chronic problem. The current episode started more than 1 year ago. The problem is controlled. Pertinent negatives include no palpitations or peripheral edema. Current antihypertension treatment includes beta blockers, angiotensin blockers and diuretics. The current treatment provides significant improvement. Compliance problems include diet and exercise.    Back Pain   This is a chronic problem. The current episode started more than 1 year ago. The problem occurs constantly. The problem has been rapidly worsening since onset. The pain is present in the lumbar spine. The quality of the pain is described as aching. The pain radiates to the left foot (radiates to both hips). The pain is severe. The pain is the same all the time. The symptoms are aggravated by standing. Stiffness is present in the morning. Associated symptoms include numbness and tingling. Pertinent negatives include no bladder incontinence or bowel incontinence. Risk factors include obesity. He has tried NSAIDs, analgesics and muscle relaxant for the symptoms. The treatment provided mild relief.   Pt followed up with Dr Bernal, neurosurgeon, and he has suggested pt follow with Dr. Antoni Nieves, pain management.  Will refer pt to him today.     Family History   Problem Relation Age of Onset   • Colon cancer Other    • Heart disease Other    • Lymphoma Other    • Heart disease Mother    • Hypertension Mother    • Lupus Mother    • Skin cancer Father    • Kidney disease Father        Social History     Social History   • Marital status:      Spouse name: N/A   • Number of children: N/A   • Years of education: N/A     Occupational History   • Not on file.     Social History Main Topics   • Smoking status: Former Smoker     Types: Cigarettes   • Smokeless tobacco: Never Used      Comment: smokes 1  "pack of cigarettes per day   • Alcohol use No   • Drug use: No   • Sexual activity: Defer     Other Topics Concern   • Not on file     Social History Narrative   • No narrative on file       Past Medical History:   Diagnosis Date   • Anxiety    • Arthritis    • Calculus of kidney    • Headache    • Hyperlipidemia    • Hypertension    • Insomnia    • Neuropathy    • Panic disorder    • Sciatica    • Tachycardia        Review of Systems   Constitutional: Negative.    HENT: Negative.    Respiratory: Negative.    Cardiovascular: Negative.    Gastrointestinal: Negative.    Musculoskeletal: Positive for back pain.   Skin: Negative.    Neurological: Negative.    Psychiatric/Behavioral: Negative.        Objective   Physical Exam   Constitutional: He is oriented to person, place, and time. He appears well-developed and well-nourished.   Morbid obesity   Neck: Normal range of motion. Neck supple.   Cardiovascular: Normal rate, regular rhythm and normal heart sounds.    Pulmonary/Chest: Effort normal and breath sounds normal.   Neurological: He is alert and oriented to person, place, and time.   Skin: Skin is warm and dry.   Psychiatric: He has a normal mood and affect. His behavior is normal. Judgment and thought content normal.   Nursing note and vitals reviewed.      Procedures    Vitals: Blood pressure 131/70, pulse 86, height 175.3 cm (69\"), weight (!) 177 kg (390 lb), SpO2 97 %.    Allergies:   Allergies   Allergen Reactions   • Lisinopril Cough   • Contrast Dye Rash        During this visit the following were done:  Labs Reviewed []    Labs Ordered []    Radiology Reports Reviewed []    Radiology Ordered []    PCP Records Reviewed []    Referring Provider Records Reviewed []    ER Records Reviewed []    Hospital Records Reviewed []    History Obtained From Family []    Radiology Images Reviewed []    Other Reviewed []    Records Requested []      Assessment/Plan   Andrew was seen today for follow-up and " hypertension.    Diagnoses and all orders for this visit:    Chronic bilateral low back pain with left-sided sciatica  -     Ambulatory Referral to Pain Management    DDD (degenerative disc disease), lumbar  -     Ambulatory Referral to Pain Management    Immunization due    Essential hypertension    Other orders  -     Cancel: Fluarix/Fluzone/Afluria/FluLaval ([id number])  -     Flucelvax Quad=>4Years ([id number])    Continue medications and plan of care

## 2018-10-18 ENCOUNTER — OFFICE VISIT (OUTPATIENT)
Dept: CARDIOLOGY | Facility: CLINIC | Age: 28
End: 2018-10-18

## 2018-10-18 VITALS
OXYGEN SATURATION: 98 % | HEIGHT: 69 IN | DIASTOLIC BLOOD PRESSURE: 72 MMHG | WEIGHT: 315 LBS | SYSTOLIC BLOOD PRESSURE: 130 MMHG | HEART RATE: 74 BPM | BODY MASS INDEX: 46.65 KG/M2

## 2018-10-18 DIAGNOSIS — R00.2 PALPITATIONS: Primary | ICD-10-CM

## 2018-10-18 DIAGNOSIS — I10 ESSENTIAL HYPERTENSION: ICD-10-CM

## 2018-10-18 DIAGNOSIS — E78.5 HYPERLIPIDEMIA, UNSPECIFIED HYPERLIPIDEMIA TYPE: ICD-10-CM

## 2018-10-18 DIAGNOSIS — E66.01 CLASS 3 SEVERE OBESITY DUE TO EXCESS CALORIES WITHOUT SERIOUS COMORBIDITY WITH BODY MASS INDEX (BMI) OF 50.0 TO 59.9 IN ADULT (HCC): ICD-10-CM

## 2018-10-18 PROBLEM — E66.813 CLASS 3 SEVERE OBESITY DUE TO EXCESS CALORIES WITHOUT SERIOUS COMORBIDITY WITH BODY MASS INDEX (BMI) OF 50.0 TO 59.9 IN ADULT: Status: ACTIVE | Noted: 2018-10-18

## 2018-10-18 PROCEDURE — 99214 OFFICE O/P EST MOD 30 MIN: CPT | Performed by: INTERNAL MEDICINE

## 2018-10-18 RX ORDER — FUROSEMIDE 20 MG/1
20 TABLET ORAL DAILY
Qty: 90 TABLET | Refills: 0 | Status: SHIPPED | OUTPATIENT
Start: 2018-10-18 | End: 2019-07-24

## 2018-10-18 RX ORDER — EZETIMIBE 10 MG/1
10 TABLET ORAL DAILY
Qty: 30 TABLET | Refills: 11 | Status: SHIPPED | OUTPATIENT
Start: 2018-10-18 | End: 2019-07-24

## 2018-10-18 NOTE — PROGRESS NOTES
subjective     Chief Complaint   Patient presents with   • Hypertension   • Follow-up     History of Present Illness  Patient is 27 years old white male who has history of hypertension and tachycardia.  Patient is taking Lopressor 100 mg in the morning and 50 mg in the evening.  Heart rate is very well controlled.  Blood pressure is also controlled with the Hyzaar and metoprolol.  Patient has lower extremity edema 2+.  Patient wants some diuretic therapy.  It was explained to him that his edema is noncardiac and is probably related to obesity.  Patient is planning to have gastric sleeve surgery.  He also has a sleep apnea and is taking BiPAP.    Past Surgical History:   Procedure Laterality Date   • KIDNEY SURGERY      Stents placed and removed.    • LAPAROSCOPIC GASTRIC BANDING     • OTHER SURGICAL HISTORY      diagnostic esophagogastroduodenoscopy   • OTHER SURGICAL HISTORY      renal lithotripsy     Family History   Problem Relation Age of Onset   • Colon cancer Other    • Heart disease Other    • Lymphoma Other    • Heart disease Mother    • Hypertension Mother    • Lupus Mother    • Skin cancer Father    • Kidney disease Father      Past Medical History:   Diagnosis Date   • Anxiety    • Arthritis    • Calculus of kidney    • Headache    • Hyperlipidemia    • Hypertension    • Insomnia    • Neuropathy    • Panic disorder    • Sciatica    • Tachycardia      Patient Active Problem List   Diagnosis   • Lipoma of right lower extremity   • Detrusor instability   • Ureteral calculus, right   • Renal calculus   • Essential hypertension   • Anxiety and depression   • Hyperlipidemia   • Palpitations   • Chest pain   • GRUPO (obstructive sleep apnea)   • DDD (degenerative disc disease), lumbar   • Chronic bilateral low back pain with left-sided sciatica   • B12 deficiency   • Low testosterone   • Class 3 severe obesity due to excess calories without serious comorbidity with body mass index (BMI) of 50.0 to 59.9 in adult  (CMS/[facility])       Social History   Substance Use Topics   • Smoking status: Former Smoker     Types: Cigarettes   • Smokeless tobacco: Never Used      Comment: smokes 1 pack of cigarettes per day   • Alcohol use No       Allergies   Allergen Reactions   • Lisinopril Cough   • Contrast Dye Rash       Current Outpatient Prescriptions on File Prior to Visit   Medication Sig   • atorvastatin (LIPITOR) 80 MG tablet Take 1 tablet by mouth Daily. for cholesterol   • Fesoterodine Fumarate (TOVIAZ PO) Take  by mouth.   • FLUoxetine (PROzac) 40 MG capsule Take 2 capsules by mouth Daily.   • gabapentin (NEURONTIN) 600 MG tablet Take 600 mg by mouth 6 (Six) Times a Day.   • guanFACINE (TENEX) 1 MG tablet Take 1 tablet daily for 7 days then increase to BID if tolerated   • ibuprofen (ADVIL,MOTRIN) 800 MG tablet Take 1 tablet by mouth Every 8 (Eight) Hours As Needed for Moderate Pain . (Patient taking differently: Take 600 mg by mouth Every 8 (Eight) Hours As Needed for Moderate Pain .)   • losartan-hydrochlorothiazide (HYZAAR) 50-12.5 MG per tablet Take 1 tablet by mouth Daily.   • Lurasidone HCl (LATUDA) 20 MG tablet tablet Take 1 tablet by mouth Daily.   • metoprolol tartrate (LOPRESSOR) 100 MG tablet Take 1 tablet by mouth Daily. Take in addition to 50mg tablet daily   • metoprolol tartrate (LOPRESSOR) 50 MG tablet Take 1 tablet by mouth Daily.   • ondansetron (ZOFRAN) 4 MG tablet Take 1 tablet by mouth Every 8 (Eight) Hours As Needed for Nausea or Vomiting.   • prazosin (MINIPRESS) 2 MG capsule Take 1 capsule by mouth Every Night.   • QUEtiapine (SEROquel) 300 MG tablet Take 0.5 tablets by mouth Every Night.   • Syringe, Disposable, 3 ML misc Use 3 ml syringe with a 25 gauge  5/8 inch needle   • tamsulosin (FLOMAX) 0.4 MG capsule 24 hr capsule Take 1 capsule by mouth Every Night.   • Testosterone Cypionate (DEPO-TESTOSTERONE) 200 MG/ML injection He is to use 1/2 cc every Monday and Thursday SQ   • traMADol (ULTRAM) 50 MG  "tablet Take 1 tablet by mouth Every 8 (Eight) Hours As Needed for Moderate Pain  or Severe Pain .   • vitamin D (ERGOCALCIFEROL) 34551 units capsule capsule Take 1 capsule by mouth 1 (One) Time Per Week.     No current facility-administered medications on file prior to visit.          The following portions of the patient's history were reviewed and updated as appropriate: allergies, current medications, past family history, past medical history, past social history, past surgical history and problem list.    Review of Systems   Constitution: Positive for weakness, malaise/fatigue and night sweats.   HENT: Negative.  Negative for congestion.    Eyes: Negative.    Cardiovascular: Positive for dyspnea on exertion and leg swelling. Negative for chest pain, cyanosis, irregular heartbeat, near-syncope, orthopnea, palpitations, paroxysmal nocturnal dyspnea and syncope.   Respiratory: Positive for shortness of breath.    Hematologic/Lymphatic: Negative.    Musculoskeletal: Negative.    Gastrointestinal: Negative.    Neurological: Negative for headaches.          Objective:     /72 (BP Location: Left arm, Patient Position: Sitting)   Pulse 74   Ht 175.3 cm (69\")   Wt (!) 174 kg (383 lb)   SpO2 98%   BMI 56.56 kg/m²   Physical Exam   Constitutional: He appears well-developed and well-nourished. No distress.   HENT:   Head: Normocephalic and atraumatic.   Mouth/Throat: Oropharynx is clear and moist. No oropharyngeal exudate.   Eyes: Pupils are equal, round, and reactive to light. Conjunctivae and EOM are normal. No scleral icterus.   Neck: Normal range of motion. Neck supple. No JVD present. No tracheal deviation present. No thyromegaly present.   Cardiovascular: Normal rate, regular rhythm, normal heart sounds and intact distal pulses.  PMI is not displaced.  Exam reveals no gallop, no friction rub and no decreased pulses.    No murmur heard.  Pulses:       Carotid pulses are 3+ on the right side, and 3+ on the " left side.       Radial pulses are 3+ on the right side, and 3+ on the left side.   Pulmonary/Chest: Effort normal and breath sounds normal. No respiratory distress. He has no wheezes. He has no rales. He exhibits no tenderness.   Abdominal: Soft. Bowel sounds are normal. He exhibits no distension, no abdominal bruit and no mass. There is no splenomegaly or hepatomegaly. There is no tenderness. There is no rebound and no guarding.   Musculoskeletal: Normal range of motion. He exhibits no edema, tenderness or deformity.   Lymphadenopathy:     He has no cervical adenopathy.   Neurological: He is alert. He has normal reflexes. No cranial nerve deficit. He exhibits normal muscle tone. Coordination normal.   Skin: Skin is warm and dry. No rash noted. He is not diaphoretic. No erythema.   Psychiatric: He has a normal mood and affect. His behavior is normal. Judgment and thought content normal.         Lab Review  Lab Results   Component Value Date     09/12/2018    K 3.9 09/12/2018     09/12/2018    BUN 12 09/12/2018    CREATININE 1.25 09/12/2018    GLUCOSE 115 (H) 09/12/2018    CALCIUM 9.4 09/12/2018    ALT 40 09/12/2018    ALKPHOS 71 09/12/2018    LABIL2 1.5 04/08/2016     Lab Results   Component Value Date    CKTOTAL 74 10/03/2017     Lab Results   Component Value Date    WBC 15.79 (H) 09/12/2018    HGB 14.5 09/12/2018    HCT 45.6 09/12/2018     (H) 09/12/2018     No results found for: INR  Lab Results   Component Value Date    MG 2.3 04/11/2018     Lab Results   Component Value Date    PSA 0.530 07/27/2018    TSH 4.707 09/12/2018     Lab Results   Component Value Date    BNP 4.0 11/10/2017     Lab Results   Component Value Date    CHOL 239 (H) 09/12/2018    TRIG 225 (H) 09/12/2018    HDL 33 (L) 09/12/2018    VLDL 45 09/12/2018    LDLHDL 4.88 09/12/2018     Lab Results   Component Value Date     (H) 09/12/2018     (H) 07/11/2018       Procedures  Stress test was negative for significant  exercise-induced myocardial ischemia   Echocardiogram showed normal LV ejection fraction  Holter monitor did not show any significant arrhythmia except for sinus tachycardia      I personally viewed and interpreted the patient's LAB data         Assessment:     1. Palpitations    2. Hyperlipidemia, unspecified hyperlipidemia type    3. Essential hypertension    4. Class 3 severe obesity due to excess calories without serious comorbidity with body mass index (BMI) of 50.0 to 59.9 in adult (CMS/[facility])          Plan:      Patient has palpitations and sinus tachycardia.  He is taking Lopressor) 50 mg daily with that her heart rate is better is around 74/m.  He will continue current medications.  Blood pressure is very well controlled he will continue Hyzaar and metoprolol.  Weight loss was emphasized.  Patient planning to have gastric sleeve surgery.  Healthy lifestyle was emphasized.  Patient has mild lower extremity edema which is probably related to his obesity and hypoventilation syndrome patient is using BiPAP now.  He wishes to try some diuretic therapy which was discouraged however he was given Lasix 20 mg daily to take only on when necessary basis.  Side effects including renal failure explained.    Hyperlipidemia is controlled with Lipitor.  Follow-up scheduled        No Follow-up on file.

## 2018-10-31 ENCOUNTER — TELEPHONE (OUTPATIENT)
Dept: PSYCHIATRY | Facility: CLINIC | Age: 28
End: 2018-10-31

## 2018-10-31 ENCOUNTER — OFFICE VISIT (OUTPATIENT)
Dept: PSYCHIATRY | Facility: CLINIC | Age: 28
End: 2018-10-31

## 2018-10-31 VITALS
HEIGHT: 69 IN | BODY MASS INDEX: 46.65 KG/M2 | HEART RATE: 67 BPM | WEIGHT: 315 LBS | SYSTOLIC BLOOD PRESSURE: 133 MMHG | DIASTOLIC BLOOD PRESSURE: 80 MMHG

## 2018-10-31 DIAGNOSIS — F42.2 MIXED OBSESSIONAL THOUGHTS AND ACTS: ICD-10-CM

## 2018-10-31 DIAGNOSIS — F41.1 GENERALIZED ANXIETY DISORDER: ICD-10-CM

## 2018-10-31 DIAGNOSIS — F90.2 ATTENTION DEFICIT HYPERACTIVITY DISORDER, COMBINED TYPE: ICD-10-CM

## 2018-10-31 DIAGNOSIS — F43.10 POST TRAUMATIC STRESS DISORDER (PTSD): Primary | ICD-10-CM

## 2018-10-31 PROCEDURE — 99214 OFFICE O/P EST MOD 30 MIN: CPT | Performed by: NURSE PRACTITIONER

## 2018-10-31 RX ORDER — PRAZOSIN HYDROCHLORIDE 1 MG/1
CAPSULE ORAL
Qty: 90 CAPSULE | Refills: 0 | Status: SHIPPED | OUTPATIENT
Start: 2018-10-31 | End: 2018-11-20 | Stop reason: SDUPTHER

## 2018-10-31 RX ORDER — METHYLPHENIDATE HYDROCHLORIDE 18 MG/1
18 TABLET, EXTENDED RELEASE ORAL EVERY MORNING
Qty: 30 TABLET | Refills: 0 | Status: SHIPPED | OUTPATIENT
Start: 2018-10-31 | End: 2018-10-31

## 2018-10-31 RX ORDER — FLUOXETINE HYDROCHLORIDE 40 MG/1
80 CAPSULE ORAL DAILY
Qty: 60 CAPSULE | Refills: 0 | Status: SHIPPED | OUTPATIENT
Start: 2018-10-31 | End: 2018-11-20 | Stop reason: SDUPTHER

## 2018-10-31 RX ORDER — METHYLPHENIDATE HYDROCHLORIDE 10 MG/1
10 TABLET ORAL DAILY
Qty: 30 TABLET | Refills: 0 | Status: SHIPPED | OUTPATIENT
Start: 2018-10-31 | End: 2018-11-20 | Stop reason: SDUPTHER

## 2018-10-31 RX ORDER — LURASIDONE HYDROCHLORIDE 20 MG/1
20 TABLET, FILM COATED ORAL DAILY
Qty: 30 TABLET | Refills: 0 | Status: SHIPPED | OUTPATIENT
Start: 2018-10-31 | End: 2018-11-20 | Stop reason: SDUPTHER

## 2018-10-31 RX ORDER — QUETIAPINE FUMARATE 100 MG/1
100 TABLET, FILM COATED ORAL NIGHTLY
Qty: 30 TABLET | Refills: 0 | Status: SHIPPED | OUTPATIENT
Start: 2018-10-31 | End: 2019-01-09 | Stop reason: SDUPTHER

## 2018-10-31 NOTE — PROGRESS NOTES
Subjective   Andrew Narayan is a 27 y.o. male is here today for medication management follow-up.    Chief Complaint:  Recheck on anxiety and hallucinations    History of Present Illness:   Patient states that he is still having anxiety at a 10 out of 10 with 10 being worst.  He states he is still paranoid.  He is getting approximately 5-6 hours of sleep at night.  He is still having nightmares the prazosin has helped but has not stopped them.  He currently denies any depression.  Denies any suicidal thoughts, homicidal thoughts, or any AV hallucinations.  He states his main problem is irritability he just states he stays irritable and cannot focus or concentrate and this leads to anger and mood swings and anxiety due to this.Body mass index is 55.79 kg/m².  He shows a weight loss of 6 pounds since last office visit.  He has no new medical stressors.  His blood pressure is currently stable as well as his heart rate.  He states he does not really know if the Prozac is working.  He states he continually has to do something to occupy his mind and he sat out in the waiting room and counted all of the ceiling tiles and chairs and air vents.      The following portions of the patient's history were reviewed and updated as appropriate: allergies, current medications, past family history, past medical history, past social history, past surgical history and problem list.    Review of Systems   Constitutional: Positive for appetite change. Negative for activity change and fatigue.   HENT: Negative.    Eyes: Negative for visual disturbance.   Respiratory: Negative.    Cardiovascular: Negative.    Gastrointestinal: Negative for nausea.   Endocrine: Negative.    Genitourinary: Negative.    Musculoskeletal: Positive for arthralgias.   Skin: Negative.    Allergic/Immunologic: Negative.    Neurological: Negative for dizziness, seizures and headaches.   Hematological: Negative.    Psychiatric/Behavioral: Positive for  "decreased concentration. Negative for agitation, behavioral problems, confusion, dysphoric mood, hallucinations, self-injury, sleep disturbance and suicidal ideas. The patient is nervous/anxious. The patient is not hyperactive.        Objective   Physical Exam   Constitutional: He is oriented to person, place, and time. He appears well-developed and well-nourished.   Neurological: He is alert and oriented to person, place, and time.   Psychiatric: His speech is normal and behavior is normal. Thought content normal. His mood appears anxious. Cognition and memory are normal. He expresses impulsivity.   Vitals reviewed.    Blood pressure 133/80, pulse 67, height 175.3 cm (69\"), weight (!) 171 kg (377 lb 12.8 oz).    Medication List:   Current Outpatient Prescriptions   Medication Sig Dispense Refill   • atorvastatin (LIPITOR) 80 MG tablet Take 1 tablet by mouth Daily. for cholesterol 30 tablet 3   • ezetimibe (ZETIA) 10 MG tablet Take 1 tablet by mouth Daily. 30 tablet 11   • Fesoterodine Fumarate (TOVIAZ PO) Take  by mouth.     • FLUoxetine (PROzac) 40 MG capsule Take 2 capsules by mouth Daily. 60 capsule 0   • furosemide (LASIX) 20 MG tablet Take 1 tablet by mouth Daily. 90 tablet 0   • gabapentin (NEURONTIN) 600 MG tablet Take 600 mg by mouth 6 (Six) Times a Day.     • ibuprofen (ADVIL,MOTRIN) 800 MG tablet Take 1 tablet by mouth Every 8 (Eight) Hours As Needed for Moderate Pain . (Patient taking differently: Take 600 mg by mouth Every 8 (Eight) Hours As Needed for Moderate Pain .) 15 tablet 0   • losartan-hydrochlorothiazide (HYZAAR) 50-12.5 MG per tablet Take 1 tablet by mouth Daily.     • Lurasidone HCl (LATUDA) 20 MG tablet tablet Take 1 tablet by mouth Daily. 30 tablet 0   • Methylphenidate HCl ER (CONCERTA) 18 MG CR tablet Take 1 tablet by mouth Every Morning. 30 tablet 0   • metoprolol tartrate (LOPRESSOR) 100 MG tablet Take 1 tablet by mouth Daily. Take in addition to 50mg tablet daily 30 tablet 2   • " metoprolol tartrate (LOPRESSOR) 50 MG tablet Take 1 tablet by mouth Daily. 30 tablet 2   • ondansetron (ZOFRAN) 4 MG tablet Take 1 tablet by mouth Every 8 (Eight) Hours As Needed for Nausea or Vomiting. 30 tablet 0   • prazosin (MINIPRESS) 1 MG capsule Take 3 tablets at HS 90 capsule 0   • QUEtiapine (SEROquel) 100 MG tablet Take 1 tablet by mouth Every Night. 30 tablet 0   • Syringe, Disposable, 3 ML misc Use 3 ml syringe with a 25 gauge  5/8 inch needle 24 each 6   • tamsulosin (FLOMAX) 0.4 MG capsule 24 hr capsule Take 1 capsule by mouth Every Night. 7 capsule 0   • Testosterone Cypionate (DEPO-TESTOSTERONE) 200 MG/ML injection He is to use 1/2 cc every Monday and Thursday SQ 10 mL 2   • traMADol (ULTRAM) 50 MG tablet Take 1 tablet by mouth Every 8 (Eight) Hours As Needed for Moderate Pain  or Severe Pain . 45 tablet 0   • vitamin D (ERGOCALCIFEROL) 48120 units capsule capsule Take 1 capsule by mouth 1 (One) Time Per Week. 4 capsule 2     No current facility-administered medications for this visit.        Mental Status Exam:   Hygiene:   good  Cooperation:  Cooperative  Eye Contact:  Good  Psychomotor Behavior:  Restless  Affect:  Full range  Hopelessness: Denies  Speech:  Normal  Thought Process:  Goal directed  Thought Content:  Normal  Suicidal:  None  Homicidal:  None  Hallucinations:  Auditory  Delusion:  None  Memory:  Intact  Orientation:  Person, Place, Time and Situation  Reliability:  fair  Insight:  Fair  Judgement:  Fair  Impulse Control:  Poor  Physical/Medical Issues:  Yes obesity, HTN, pain    Assessment/Plan   Problems Addressed this Visit     None      Visit Diagnoses     Post traumatic stress disorder (PTSD)    -  Primary    Relevant Medications    FLUoxetine (PROzac) 40 MG capsule    Lurasidone HCl (LATUDA) 20 MG tablet tablet    prazosin (MINIPRESS) 1 MG capsule    QUEtiapine (SEROquel) 100 MG tablet    Methylphenidate HCl ER (CONCERTA) 18 MG CR tablet    Mixed obsessional thoughts and acts         Relevant Medications    Lurasidone HCl (LATUDA) 20 MG tablet tablet    QUEtiapine (SEROquel) 100 MG tablet    Generalized anxiety disorder        Relevant Medications    FLUoxetine (PROzac) 40 MG capsule    Lurasidone HCl (LATUDA) 20 MG tablet tablet    QUEtiapine (SEROquel) 100 MG tablet    Methylphenidate HCl ER (CONCERTA) 18 MG CR tablet    Attention deficit hyperactivity disorder, combined type        Relevant Medications    FLUoxetine (PROzac) 40 MG capsule    Lurasidone HCl (LATUDA) 20 MG tablet tablet    QUEtiapine (SEROquel) 100 MG tablet    Methylphenidate HCl ER (CONCERTA) 18 MG CR tablet      Eugenio shows neurontin and occasional hydrocodone.   Functionality: pt having significant impairment in important areas of daily functioning.  Prognosis: Guarded dependent on medication/follow up and treatment plan compliance.    CPT testing shows high probability of ADHD.  Results discussed with patient  Discussed medication options..  Lengthy discussion with patient.  Not going to continue the Tenex.  I have taken the Seroquel down to 100 mg.  I'm keeping the Latuda and the Prozac at current doses.  I'm increasing the prazosin to 3 mg at at bedtime.  I'm going to go ahead and initiate Concerta at 18 mg.  I have discussed this at length with patient and he is going to monitor his blood pressure and heart rate closely..  Narcotic agreement was signed and reviewed..  And picking that the uncontrolled ADHD is contributing to his obsessional thoughts so I'm going to see if actually treating the ADHD improves his mood as well as the OCD.  Discussed with the patient that he needs to avoid caffeine, monster drinks, decongestants while on this medication.  He is also aware that it is a narcotic and should be kept in a secure place.  Reviewed the risks, benefits, and side effects of the medications; patient acknowledged and verbally consented.  Patient is agreeable to call the [facility].  Patient is aware to call  [phone] or go to the nearest ER should begin having SI/HI.  Turn to clinic in 3 weeks for recheck.

## 2018-11-07 ENCOUNTER — TELEPHONE (OUTPATIENT)
Dept: PSYCHIATRY | Facility: CLINIC | Age: 28
End: 2018-11-07

## 2018-11-07 NOTE — TELEPHONE ENCOUNTER
Called Patient back and made him aware  , I told him if the side effects continued to give us a call back and we would schedule him to come in for an med adjustment .    Patient states he just wanted to make you aware that 2-3 hours after taking it the effects wear off and it doesn't feel like he has taking it at all

## 2018-11-07 NOTE — TELEPHONE ENCOUNTER
Called the office this morning stating that the Ritalin is making him tired and he feels like it only works for 2-3 hours . Please Advise

## 2018-11-07 NOTE — TELEPHONE ENCOUNTER
It may make him tired at first especially since it is slowing everything down.  Tell him to give the medication a little longer to see if the side effect subsides as he gets used to it

## 2018-11-20 ENCOUNTER — OFFICE VISIT (OUTPATIENT)
Dept: UROLOGY | Facility: CLINIC | Age: 28
End: 2018-11-20

## 2018-11-20 ENCOUNTER — OFFICE VISIT (OUTPATIENT)
Dept: PSYCHIATRY | Facility: CLINIC | Age: 28
End: 2018-11-20

## 2018-11-20 VITALS — BODY MASS INDEX: 46.65 KG/M2 | HEIGHT: 69 IN | WEIGHT: 315 LBS

## 2018-11-20 VITALS
SYSTOLIC BLOOD PRESSURE: 121 MMHG | DIASTOLIC BLOOD PRESSURE: 77 MMHG | HEIGHT: 69 IN | HEART RATE: 68 BPM | BODY MASS INDEX: 46.65 KG/M2 | WEIGHT: 315 LBS

## 2018-11-20 DIAGNOSIS — F22 PARANOID DISORDER (HCC): ICD-10-CM

## 2018-11-20 DIAGNOSIS — F41.1 GENERALIZED ANXIETY DISORDER: ICD-10-CM

## 2018-11-20 DIAGNOSIS — Z79.899 MEDICATION MANAGEMENT: ICD-10-CM

## 2018-11-20 DIAGNOSIS — F43.10 POST TRAUMATIC STRESS DISORDER (PTSD): Primary | ICD-10-CM

## 2018-11-20 DIAGNOSIS — R53.82 CHRONIC FATIGUE: Primary | ICD-10-CM

## 2018-11-20 DIAGNOSIS — N40.0 BPH WITHOUT URINARY OBSTRUCTION: ICD-10-CM

## 2018-11-20 DIAGNOSIS — F90.2 ATTENTION DEFICIT HYPERACTIVITY DISORDER, COMBINED TYPE: ICD-10-CM

## 2018-11-20 DIAGNOSIS — F42.2 MIXED OBSESSIONAL THOUGHTS AND ACTS: ICD-10-CM

## 2018-11-20 DIAGNOSIS — R79.89 LOW TESTOSTERONE: ICD-10-CM

## 2018-11-20 LAB
DEPRECATED RDW RBC AUTO: 43.7 FL (ref 37–54)
ERYTHROCYTE [DISTWIDTH] IN BLOOD BY AUTOMATED COUNT: 14.9 % (ref 11.5–14.5)
ESTRADIOL SERPL HS-MCNC: 18.8 PG/ML
HCT VFR BLD AUTO: 48 % (ref 42–52)
HGB BLD-MCNC: 15.1 G/DL (ref 14–18)
MCH RBC QN AUTO: 25.6 PG (ref 27–33)
MCHC RBC AUTO-ENTMCNC: 31.5 G/DL (ref 33–37)
MCV RBC AUTO: 81.4 FL (ref 80–94)
PLATELET # BLD AUTO: 346 10*3/MM3 (ref 130–400)
PMV BLD AUTO: 10.5 FL (ref 6–10)
PSA SERPL-MCNC: 0.34 NG/ML (ref 0–4)
RBC # BLD AUTO: 5.9 10*6/MM3 (ref 4.7–6.1)
TESTOST SERPL-MCNC: 86.96 NG/DL (ref 123.06–813.86)
WBC NRBC COR # BLD: 13.39 10*3/MM3 (ref 4.5–12.5)

## 2018-11-20 PROCEDURE — 82670 ASSAY OF TOTAL ESTRADIOL: CPT | Performed by: UROLOGY

## 2018-11-20 PROCEDURE — 85027 COMPLETE CBC AUTOMATED: CPT | Performed by: UROLOGY

## 2018-11-20 PROCEDURE — 99213 OFFICE O/P EST LOW 20 MIN: CPT | Performed by: UROLOGY

## 2018-11-20 PROCEDURE — 84153 ASSAY OF PSA TOTAL: CPT | Performed by: UROLOGY

## 2018-11-20 PROCEDURE — 99214 OFFICE O/P EST MOD 30 MIN: CPT | Performed by: NURSE PRACTITIONER

## 2018-11-20 PROCEDURE — 84403 ASSAY OF TOTAL TESTOSTERONE: CPT | Performed by: UROLOGY

## 2018-11-20 RX ORDER — LURASIDONE HYDROCHLORIDE 40 MG/1
40 TABLET, FILM COATED ORAL DAILY
Qty: 30 TABLET | Refills: 0 | Status: SHIPPED | OUTPATIENT
Start: 2018-11-20 | End: 2019-01-09 | Stop reason: SDUPTHER

## 2018-11-20 RX ORDER — PRAZOSIN HYDROCHLORIDE 1 MG/1
CAPSULE ORAL
Qty: 90 CAPSULE | Refills: 0 | Status: SHIPPED | OUTPATIENT
Start: 2018-11-20 | End: 2019-01-09 | Stop reason: SDUPTHER

## 2018-11-20 RX ORDER — TESTOSTERONE CYPIONATE 200 MG/ML
INJECTION, SOLUTION INTRAMUSCULAR
Qty: 10 ML | Refills: 2 | Status: SHIPPED | OUTPATIENT
Start: 2018-11-20 | End: 2019-09-20

## 2018-11-20 RX ORDER — FLUOXETINE HYDROCHLORIDE 40 MG/1
80 CAPSULE ORAL DAILY
Qty: 60 CAPSULE | Refills: 0 | Status: SHIPPED | OUTPATIENT
Start: 2018-11-20 | End: 2019-01-09 | Stop reason: SDUPTHER

## 2018-11-20 RX ORDER — METHYLPHENIDATE HYDROCHLORIDE 10 MG/1
10 TABLET ORAL 2 TIMES DAILY
Qty: 46 TABLET | Refills: 0 | Status: SHIPPED | OUTPATIENT
Start: 2018-11-20 | End: 2019-01-09 | Stop reason: SDUPTHER

## 2018-11-20 NOTE — PROGRESS NOTES
"Chief Complaint:          Chief Complaint   Patient presents with   • Hypogonadism       HPI:   28 y.o. male.  -year-old super obese white male is one from 390 pounds to 357 pounds is very desirous of being on testosterone he does great.  His mood is also stabilized as a consequence of his replacement. Patient returns today for follow-up.  He is been on testosterone replacement therapy.  He reports a dramatic improvement in his Shreyas questionnaire: -SHREYAS-androgen deficiency in the age male questionnaire  The patient was queried regarding the androgen deficiency in the age male questionnaire.  This is a validated questionnaire that was performed on a set of 314 [place] male physicians when it was positive it correlated directly with a 94% chance of low testosterone.  Patient indicates there is a decrease in libido or sex drive, a lack of energy, Decreased  strength and endurance, a decreased \"enjoyment of life\", sad and grumpy feelings with significant difficulty maintaining erections.  He is also been a recent deterioration regarding work performance.  He reports weight loss.  He has good facility and the use of subcutaneous and intramuscular injections as well as comfort level and using the medication in a sterile fashion.  He understands he should use only the prescribed dose.  He's here for appropriate lab monitoring regarding this.  He understands this is a controlled substance and therefore must be watched closely will not be refilled and the medical loss or miss calculation of the dose.  He is very happy with the treatment and therefore wants to continue it.    Past Medical History:        Past Medical History:   Diagnosis Date   • Anxiety    • Arthritis    • Calculus of kidney    • Headache    • Hyperlipidemia    • Hypertension    • Insomnia    • Neuropathy    • Panic disorder    • Sciatica    • Tachycardia          Current Meds:     Current Outpatient Medications   Medication Sig Dispense Refill   • " atorvastatin (LIPITOR) 80 MG tablet Take 1 tablet by mouth Daily. for cholesterol 30 tablet 3   • ezetimibe (ZETIA) 10 MG tablet Take 1 tablet by mouth Daily. 30 tablet 11   • Fesoterodine Fumarate (TOVIAZ PO) Take  by mouth.     • FLUoxetine (PROzac) 40 MG capsule Take 2 capsules by mouth Daily. 60 capsule 0   • furosemide (LASIX) 20 MG tablet Take 1 tablet by mouth Daily. 90 tablet 0   • gabapentin (NEURONTIN) 600 MG tablet Take 600 mg by mouth 6 (Six) Times a Day.     • ibuprofen (ADVIL,MOTRIN) 800 MG tablet Take 1 tablet by mouth Every 8 (Eight) Hours As Needed for Moderate Pain . (Patient taking differently: Take 600 mg by mouth Every 8 (Eight) Hours As Needed for Moderate Pain .) 15 tablet 0   • losartan-hydrochlorothiazide (HYZAAR) 50-12.5 MG per tablet Take 1 tablet by mouth Daily.     • Lurasidone HCl (LATUDA) 40 MG tablet tablet Take 1 tablet by mouth Daily. 30 tablet 0   • methylphenidate (RITALIN) 10 MG tablet Take 1 tablet by mouth 2 (Two) Times a Day. 46 tablet 0   • metoprolol tartrate (LOPRESSOR) 100 MG tablet Take 1 tablet by mouth Daily. Take in addition to 50mg tablet daily 30 tablet 2   • metoprolol tartrate (LOPRESSOR) 50 MG tablet Take 1 tablet by mouth Daily. 30 tablet 2   • ondansetron (ZOFRAN) 4 MG tablet Take 1 tablet by mouth Every 8 (Eight) Hours As Needed for Nausea or Vomiting. 30 tablet 0   • prazosin (MINIPRESS) 1 MG capsule Take 3 tablets at HS 90 capsule 0   • QUEtiapine (SEROquel) 100 MG tablet Take 1 tablet by mouth Every Night. 30 tablet 0   • Syringe, Disposable, 3 ML misc Use 3 ml syringe with a 25 gauge  5/8 inch needle 24 each 6   • tamsulosin (FLOMAX) 0.4 MG capsule 24 hr capsule Take 1 capsule by mouth Every Night. 7 capsule 0   • Testosterone Cypionate (DEPO-TESTOSTERONE) 200 MG/ML injection He is to use 1/2 cc every Monday and Thursday SQ 10 mL 2   • traMADol (ULTRAM) 50 MG tablet Take 1 tablet by mouth Every 8 (Eight) Hours As Needed for Moderate Pain  or Severe Pain .  45 tablet 0   • vitamin D (ERGOCALCIFEROL) 69742 units capsule capsule Take 1 capsule by mouth 1 (One) Time Per Week. 4 capsule 2     No current facility-administered medications for this visit.         Allergies:      Allergies   Allergen Reactions   • Lisinopril Cough   • Contrast Dye Rash        Past Surgical History:     Past Surgical History:   Procedure Laterality Date   • KIDNEY SURGERY      Stents placed and removed.    • LAPAROSCOPIC GASTRIC BANDING     • OTHER SURGICAL HISTORY      diagnostic esophagogastroduodenoscopy   • OTHER SURGICAL HISTORY      renal lithotripsy         Social History:     Social History     Socioeconomic History   • Marital status:      Spouse name: Not on file   • Number of children: Not on file   • Years of education: Not on file   • Highest education level: Not on file   Social Needs   • Financial resource strain: Not on file   • Food insecurity - worry: Not on file   • Food insecurity - inability: Not on file   • Transportation needs - medical: Not on file   • Transportation needs - non-medical: Not on file   Occupational History   • Not on file   Tobacco Use   • Smoking status: Former Smoker     Types: Cigarettes   • Smokeless tobacco: Never Used   • Tobacco comment: smokes 1 pack of cigarettes per day   Substance and Sexual Activity   • Alcohol use: No   • Drug use: No   • Sexual activity: Defer   Other Topics Concern   • Not on file   Social History Narrative   • Not on file       Family History:     Family History   Problem Relation Age of Onset   • Colon cancer Other    • Heart disease Other    • Lymphoma Other    • Heart disease Mother    • Hypertension Mother    • Lupus Mother    • Skin cancer Father    • Kidney disease Father        Review of Systems:     Review of Systems   Constitutional: Negative.  Negative for chills, fatigue and fever.   HENT: Negative.    Eyes: Negative.    Respiratory: Negative.  Negative for cough, shortness of breath and wheezing.     Cardiovascular: Negative.  Negative for leg swelling.   Gastrointestinal: Negative.  Negative for abdominal pain, nausea and vomiting.   Endocrine: Negative.    Musculoskeletal: Negative.  Negative for back pain and joint swelling.   Allergic/Immunologic: Negative.    Neurological: Negative.  Negative for dizziness and headaches.   Hematological: Negative.    Psychiatric/Behavioral: Negative.  Negative for confusion.       Physical Exam:     Physical Exam   Constitutional: He is oriented to person, place, and time. He appears well-developed and well-nourished.   HENT:   Head: Normocephalic and atraumatic.   Eyes: Conjunctivae and EOM are normal. Pupils are equal, round, and reactive to light.   Neck: Normal range of motion.   Cardiovascular: Normal rate, regular rhythm, normal heart sounds and intact distal pulses.   Pulmonary/Chest: Effort normal and breath sounds normal.   Abdominal: Soft. Bowel sounds are normal.   Musculoskeletal: Normal range of motion.   Neurological: He is alert and oriented to person, place, and time. He has normal reflexes.   Skin: Skin is warm and dry.   Psychiatric: He has a normal mood and affect. His behavior is normal. Judgment and thought content normal.   Nursing note and vitals reviewed.      I have reviewed the following portions of the patient's history: allergies, current medications, past family history, past medical history, past social history, past surgical history, problem list and ROS and confirm it's accurate.      Procedure:       Assessment/Plan:   -Low testosterone: patient is here for follow-up.  Since beginning the medication he's been very pleased.  He reports a dramatic improvement in his erections, ability to achieve and maintain an erection, improvement in libido, increase in frequency of morning erections, a noticeable weight loss consistent with the treatment.  No development of breast problems or abnormalities.  He's going to have appropriate safety  laboratory parameters checked.   He understands that the new data implicates testosterone with the development of prostate cancer and this is all but been disproven and the medical literature as well as the risks of cardiovascular disease which is actually also been disproven.  He understands that while he is a candidate for topical therapy if he is in contact with children this is not an option because it's been shown to accentuate genitalia development at an early age that this frequently irreversible.  He also understands this is a controlled substance and as such will not be prescribed without appropriate follow-up and appropriate laboratory investigation.  He understands effects on spermatogenesis including the fact that this is not always completely reversible and not always completely limited his ability to father a child.  He has demonstrated facility in the technique of both intramuscular and subcutaneous injection.  And has been taught sterility one drawing up the medication.     Patient's Body mass index is 56.53 kg/m². BMI is above normal parameters. Recommendations include: educational material.          This document has been electronically signed by JUNIOR VINSON MD November 20, 2018 3:42 PM

## 2018-11-20 NOTE — PROGRESS NOTES
"      Subjective   Andrew Narayan is a 28 y.o. male is here today for medication management follow-up.    Chief Complaint:  Recheck on anxiety and hallucinations and ADHD    History of Present Illness: Pt presents to [facility] for follow up. He states the ritalin did help \"slow his mind and clear his thinking\".  States it only lasts aprox 3 hours.  During this time it did decrease his irritability.  Still having daily flashbacks to previous trauma.  Different things trigger him such as smells or hearing a certain song.  Nightmares are much improved.  Denies depression.   Denies Suicidal thoughts, homicidal thoughts.  He states he is still very paranoid.  He is always very hypervigilant and will not let anyone sit behind him or even close to him.  He still thinks he hears \"knocking sounds\" and certain noises like doorknobs turning.  He is still paranoid when he looks at a nail hole in the wall he thinks it is moving at times.  He denies any voice auditory hallucinations or any visual hallucinations.  He states the noise that used to be in his ears at all times more like \"white noise\" is much better on the Latuda however he is still very paranoid.Body mass index is 52.72 kg/m².  Patient has been doing a low carb diet and has lost 20 pounds since last office visit.  He is very pleased about this.  He is not having any negative side effects to the medication.  He has not had any new medical stressors.  He states the Ritalin has not affected his sleep.  He is still only sleeping approximately 5 hours a night.  It is stable.  There are no anger outbursts.  He still states his anxiety is high.  It does improve somewhat on the Ritalin however depending upon the situation can get high at times.  He is still having anxiety when he has to go in a public place and still is avoiding public places.          The following portions of the patient's history were reviewed and updated as appropriate: allergies, " "current medications, past family history, past medical history, past social history, past surgical history and problem list.    Review of Systems   Constitutional: Positive for appetite change. Negative for activity change and fatigue.   HENT: Negative.    Eyes: Negative for visual disturbance.   Respiratory: Negative.    Cardiovascular: Negative.    Gastrointestinal: Negative for nausea.   Endocrine: Negative.    Genitourinary: Negative.    Musculoskeletal: Positive for arthralgias.   Skin: Negative.    Allergic/Immunologic: Negative.    Neurological: Negative for dizziness, seizures and headaches.   Hematological: Negative.    Psychiatric/Behavioral: Positive for decreased concentration. Negative for agitation, behavioral problems, confusion, dysphoric mood, hallucinations, self-injury, sleep disturbance and suicidal ideas. The patient is nervous/anxious. The patient is not hyperactive.        Objective   Physical Exam   Constitutional: He is oriented to person, place, and time. He appears well-developed and well-nourished.   Neurological: He is alert and oriented to person, place, and time.   Psychiatric: He has a normal mood and affect. His speech is normal and behavior is normal. Thought content normal. His mood appears not anxious. Cognition and memory are normal. He expresses impulsivity.   Engaging in conversation   Vitals reviewed.    Blood pressure 121/77, pulse 68, height 175.3 cm (69\"), weight (!) 162 kg (357 lb).    Medication List:   Current Outpatient Medications   Medication Sig Dispense Refill   • atorvastatin (LIPITOR) 80 MG tablet Take 1 tablet by mouth Daily. for cholesterol 30 tablet 3   • ezetimibe (ZETIA) 10 MG tablet Take 1 tablet by mouth Daily. 30 tablet 11   • Fesoterodine Fumarate (TOVIAZ PO) Take  by mouth.     • FLUoxetine (PROzac) 40 MG capsule Take 2 capsules by mouth Daily. 60 capsule 0   • furosemide (LASIX) 20 MG tablet Take 1 tablet by mouth Daily. 90 tablet 0   • gabapentin " (NEURONTIN) 600 MG tablet Take 600 mg by mouth 6 (Six) Times a Day.     • ibuprofen (ADVIL,MOTRIN) 800 MG tablet Take 1 tablet by mouth Every 8 (Eight) Hours As Needed for Moderate Pain . (Patient taking differently: Take 600 mg by mouth Every 8 (Eight) Hours As Needed for Moderate Pain .) 15 tablet 0   • losartan-hydrochlorothiazide (HYZAAR) 50-12.5 MG per tablet Take 1 tablet by mouth Daily.     • Lurasidone HCl (LATUDA) 40 MG tablet tablet Take 1 tablet by mouth Daily. 30 tablet 0   • methylphenidate (RITALIN) 10 MG tablet Take 1 tablet by mouth 2 (Two) Times a Day. 46 tablet 0   • metoprolol tartrate (LOPRESSOR) 100 MG tablet Take 1 tablet by mouth Daily. Take in addition to 50mg tablet daily 30 tablet 2   • metoprolol tartrate (LOPRESSOR) 50 MG tablet Take 1 tablet by mouth Daily. 30 tablet 2   • ondansetron (ZOFRAN) 4 MG tablet Take 1 tablet by mouth Every 8 (Eight) Hours As Needed for Nausea or Vomiting. 30 tablet 0   • prazosin (MINIPRESS) 1 MG capsule Take 3 tablets at HS 90 capsule 0   • QUEtiapine (SEROquel) 100 MG tablet Take 1 tablet by mouth Every Night. 30 tablet 0   • Syringe, Disposable, 3 ML misc Use 3 ml syringe with a 25 gauge  5/8 inch needle 24 each 6   • tamsulosin (FLOMAX) 0.4 MG capsule 24 hr capsule Take 1 capsule by mouth Every Night. 7 capsule 0   • Testosterone Cypionate (DEPO-TESTOSTERONE) 200 MG/ML injection He is to use 1/2 cc every Monday and Thursday SQ 10 mL 2   • traMADol (ULTRAM) 50 MG tablet Take 1 tablet by mouth Every 8 (Eight) Hours As Needed for Moderate Pain  or Severe Pain . 45 tablet 0   • vitamin D (ERGOCALCIFEROL) 42704 units capsule capsule Take 1 capsule by mouth 1 (One) Time Per Week. 4 capsule 2     No current facility-administered medications for this visit.        Mental Status Exam:   Hygiene:   good  Cooperation:  Cooperative  Eye Contact:  Good  Psychomotor Behavior:  Restless  Affect:  Full range  Hopelessness: Denies  Speech:  Normal  Thought Process:  Goal  directed  Thought Content:  Normal  Suicidal:  None  Homicidal:  None  Hallucinations:  Auditory  Delusion:  None  Memory:  Intact  Orientation:  Person, Place, Time and Situation  Reliability:  fair  Insight:  Fair  Judgement:  Fair  Impulse Control:  Poor  Physical/Medical Issues:  Yes obesity, HTN, pain    Assessment/Plan   Problems Addressed this Visit     None      Visit Diagnoses     Post traumatic stress disorder (PTSD)    -  Primary    Relevant Medications    FLUoxetine (PROzac) 40 MG capsule    Lurasidone HCl (LATUDA) 40 MG tablet tablet    prazosin (MINIPRESS) 1 MG capsule    methylphenidate (RITALIN) 10 MG tablet    Mixed obsessional thoughts and acts        Relevant Medications    Lurasidone HCl (LATUDA) 40 MG tablet tablet    Generalized anxiety disorder        Relevant Medications    FLUoxetine (PROzac) 40 MG capsule    Lurasidone HCl (LATUDA) 40 MG tablet tablet    methylphenidate (RITALIN) 10 MG tablet    Attention deficit hyperactivity disorder, combined type        Relevant Medications    FLUoxetine (PROzac) 40 MG capsule    Lurasidone HCl (LATUDA) 40 MG tablet tablet    methylphenidate (RITALIN) 10 MG tablet    Medication management        Paranoid disorder (CMS/HCC)        Relevant Medications    FLUoxetine (PROzac) 40 MG capsule    Lurasidone HCl (LATUDA) 40 MG tablet tablet    methylphenidate (RITALIN) 10 MG tablet      Eugenio shows neurontin and occasional hydrocodone.   Functionality: pt having significant impairment in important areas of daily functioning.  Prognosis: Guarded dependent on medication/follow up and treatment plan compliance.  UDS obtained today      Discussed medication options..  Lengthy discussion with patient.     I am increasing the Ritalin to BID dosing.  Increasing the latuda to 40mg.  Pt is to continue the seroquel at 150mg.  He states he has enough medication and does not need a prescription for the seroquel.  I am setting him up with a therapist for therapy regarding  the PTSD.  Genetic testing performed today to see how the patient is metabolizing the prozac.  Reviewed the risks, benefits, and side effects of the medications; patient acknowledged and verbally consented.  Patient is agreeable to call the [facility].  Patient is aware to call 911 or go to the nearest ER should begin having SI/HI.  Turn to clinic in 3 weeks for recheck.

## 2018-11-21 DIAGNOSIS — R79.89 LOW TESTOSTERONE: ICD-10-CM

## 2018-11-26 RX ORDER — TESTOSTERONE CYPIONATE 200 MG/ML
INJECTION, SOLUTION INTRAMUSCULAR
Qty: 10 ML | Refills: 0 | OUTPATIENT
Start: 2018-11-26

## 2018-11-29 ENCOUNTER — TELEPHONE (OUTPATIENT)
Dept: UROLOGY | Facility: CLINIC | Age: 28
End: 2018-11-29

## 2018-11-29 NOTE — TELEPHONE ENCOUNTER
The patient called and said he was concerned because his Mychart said that his prescription for Testosterone had been denied.  However he was able to pick it up with no problems. I explained to him that we sometimes receive workque refill requests and we have to deny the Testosterone ones because Testosterone is a Controlled Substance and can not be sent electronically we have to deny the request and call it in our give a physical prescription. He understood.

## 2018-12-05 ENCOUNTER — OFFICE VISIT (OUTPATIENT)
Dept: PSYCHIATRY | Facility: CLINIC | Age: 28
End: 2018-12-05

## 2018-12-05 DIAGNOSIS — F41.1 GENERALIZED ANXIETY DISORDER: ICD-10-CM

## 2018-12-05 DIAGNOSIS — F42.2 MIXED OBSESSIONAL THOUGHTS AND ACTS: ICD-10-CM

## 2018-12-05 DIAGNOSIS — F43.10 POST TRAUMATIC STRESS DISORDER (PTSD): Primary | ICD-10-CM

## 2018-12-05 DIAGNOSIS — F90.2 ATTENTION DEFICIT HYPERACTIVITY DISORDER, COMBINED TYPE: ICD-10-CM

## 2018-12-05 PROCEDURE — 90791 PSYCH DIAGNOSTIC EVALUATION: CPT | Performed by: SOCIAL WORKER
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
## 2018-12-10 DIAGNOSIS — I10 ESSENTIAL HYPERTENSION: ICD-10-CM

## 2018-12-10 RX ORDER — METOPROLOL TARTRATE 100 MG/1
TABLET ORAL
Qty: 30 TABLET | Refills: 2 | Status: SHIPPED | OUTPATIENT
Start: 2018-12-10 | End: 2019-07-24

## 2019-01-08 ENCOUNTER — TELEPHONE (OUTPATIENT)
Dept: FAMILY MEDICINE CLINIC | Facility: CLINIC | Age: 29
End: 2019-01-08

## 2019-01-08 RX ORDER — LOSARTAN POTASSIUM AND HYDROCHLOROTHIAZIDE 12.5; 5 MG/1; MG/1
1 TABLET ORAL DAILY
Qty: 30 TABLET | Refills: 5 | Status: SHIPPED | OUTPATIENT
Start: 2019-01-08 | End: 2019-07-24 | Stop reason: SDUPTHER

## 2019-01-08 NOTE — TELEPHONE ENCOUNTER
I refilled the losartan.  Valsartan is the one that was recalled.  Does he take 100mg + 50mg of metoprolol?

## 2019-01-08 NOTE — TELEPHONE ENCOUNTER
Patient called for refills on his Metoprolol & his Losartan (is this the Losartan that was recalled).

## 2019-01-09 ENCOUNTER — OFFICE VISIT (OUTPATIENT)
Dept: PSYCHIATRY | Facility: CLINIC | Age: 29
End: 2019-01-09

## 2019-01-09 VITALS
SYSTOLIC BLOOD PRESSURE: 118 MMHG | HEIGHT: 69 IN | WEIGHT: 315 LBS | BODY MASS INDEX: 46.65 KG/M2 | HEART RATE: 69 BPM | DIASTOLIC BLOOD PRESSURE: 80 MMHG

## 2019-01-09 DIAGNOSIS — F22 PARANOID DISORDER (HCC): ICD-10-CM

## 2019-01-09 DIAGNOSIS — F41.1 GENERALIZED ANXIETY DISORDER: ICD-10-CM

## 2019-01-09 DIAGNOSIS — F43.10 POST TRAUMATIC STRESS DISORDER (PTSD): Primary | ICD-10-CM

## 2019-01-09 DIAGNOSIS — F42.2 MIXED OBSESSIONAL THOUGHTS AND ACTS: ICD-10-CM

## 2019-01-09 DIAGNOSIS — F90.2 ATTENTION DEFICIT HYPERACTIVITY DISORDER, COMBINED TYPE: ICD-10-CM

## 2019-01-09 DIAGNOSIS — I10 ESSENTIAL HYPERTENSION: ICD-10-CM

## 2019-01-09 PROCEDURE — 99214 OFFICE O/P EST MOD 30 MIN: CPT | Performed by: NURSE PRACTITIONER

## 2019-01-09 RX ORDER — QUETIAPINE FUMARATE 100 MG/1
100 TABLET, FILM COATED ORAL NIGHTLY
Qty: 30 TABLET | Refills: 0 | Status: SHIPPED | OUTPATIENT
Start: 2019-01-09 | End: 2019-03-06 | Stop reason: SDUPTHER

## 2019-01-09 RX ORDER — FLUOXETINE HYDROCHLORIDE 40 MG/1
80 CAPSULE ORAL DAILY
Qty: 60 CAPSULE | Refills: 0 | Status: SHIPPED | OUTPATIENT
Start: 2019-01-09 | End: 2019-02-06 | Stop reason: SDUPTHER

## 2019-01-09 RX ORDER — HYDROXYZINE HYDROCHLORIDE 25 MG/1
25 TABLET, FILM COATED ORAL 3 TIMES DAILY PRN
Qty: 90 TABLET | Refills: 0 | Status: SHIPPED | OUTPATIENT
Start: 2019-01-09 | End: 2019-02-06 | Stop reason: SDUPTHER

## 2019-01-09 RX ORDER — METHYLPHENIDATE HYDROCHLORIDE 10 MG/1
10 TABLET ORAL 2 TIMES DAILY
Qty: 46 TABLET | Refills: 0 | Status: SHIPPED | OUTPATIENT
Start: 2019-01-09 | End: 2019-02-06 | Stop reason: SDUPTHER

## 2019-01-09 RX ORDER — LURASIDONE HYDROCHLORIDE 60 MG/1
60 TABLET, FILM COATED ORAL DAILY
Qty: 30 TABLET | Refills: 0 | Status: SHIPPED | OUTPATIENT
Start: 2019-01-09 | End: 2019-02-06 | Stop reason: SDUPTHER

## 2019-01-09 RX ORDER — PRAZOSIN HYDROCHLORIDE 1 MG/1
4 CAPSULE ORAL NIGHTLY
Qty: 120 CAPSULE | Refills: 0 | Status: SHIPPED | OUTPATIENT
Start: 2019-01-09 | End: 2019-02-06 | Stop reason: SDUPTHER

## 2019-01-09 RX ORDER — METOPROLOL TARTRATE 100 MG/1
100 TABLET ORAL DAILY
Qty: 30 TABLET | Refills: 5 | Status: SHIPPED | OUTPATIENT
Start: 2019-01-09 | End: 2019-07-24 | Stop reason: SDUPTHER

## 2019-01-09 NOTE — TELEPHONE ENCOUNTER
I refilled the losartan.  Valsartan is the one that was recalled.  Does he take 100mg + 50mg of metoprolol?    Spoke with patient,he only takes the 100mg metoprolol.

## 2019-02-06 ENCOUNTER — OFFICE VISIT (OUTPATIENT)
Dept: PSYCHIATRY | Facility: CLINIC | Age: 29
End: 2019-02-06

## 2019-02-06 VITALS
DIASTOLIC BLOOD PRESSURE: 75 MMHG | HEIGHT: 69 IN | SYSTOLIC BLOOD PRESSURE: 130 MMHG | HEART RATE: 66 BPM | BODY MASS INDEX: 46.65 KG/M2 | WEIGHT: 315 LBS

## 2019-02-06 DIAGNOSIS — F22 PARANOID DISORDER (HCC): ICD-10-CM

## 2019-02-06 DIAGNOSIS — F42.2 MIXED OBSESSIONAL THOUGHTS AND ACTS: ICD-10-CM

## 2019-02-06 DIAGNOSIS — F90.2 ATTENTION DEFICIT HYPERACTIVITY DISORDER, COMBINED TYPE: ICD-10-CM

## 2019-02-06 DIAGNOSIS — F43.10 POST TRAUMATIC STRESS DISORDER (PTSD): Primary | ICD-10-CM

## 2019-02-06 DIAGNOSIS — F41.1 GENERALIZED ANXIETY DISORDER: ICD-10-CM

## 2019-02-06 PROCEDURE — 99214 OFFICE O/P EST MOD 30 MIN: CPT | Performed by: NURSE PRACTITIONER

## 2019-02-06 RX ORDER — FLUOXETINE HYDROCHLORIDE 40 MG/1
80 CAPSULE ORAL DAILY
Qty: 60 CAPSULE | Refills: 0 | Status: SHIPPED | OUTPATIENT
Start: 2019-02-06 | End: 2019-03-06 | Stop reason: SDUPTHER

## 2019-02-06 RX ORDER — METHYLPHENIDATE HYDROCHLORIDE 10 MG/1
15 TABLET ORAL 2 TIMES DAILY
Qty: 90 TABLET | Refills: 0 | Status: SHIPPED | OUTPATIENT
Start: 2019-02-06 | End: 2019-03-06 | Stop reason: SDUPTHER

## 2019-02-06 RX ORDER — HYDROXYZINE HYDROCHLORIDE 25 MG/1
25 TABLET, FILM COATED ORAL 3 TIMES DAILY PRN
Qty: 90 TABLET | Refills: 0 | Status: SHIPPED | OUTPATIENT
Start: 2019-02-06 | End: 2019-03-06

## 2019-02-06 RX ORDER — PRAZOSIN HYDROCHLORIDE 5 MG/1
5 CAPSULE ORAL NIGHTLY
Qty: 30 CAPSULE | Refills: 0 | Status: SHIPPED | OUTPATIENT
Start: 2019-02-06 | End: 2019-03-06 | Stop reason: SDUPTHER

## 2019-02-06 RX ORDER — LURASIDONE HYDROCHLORIDE 60 MG/1
60 TABLET, FILM COATED ORAL DAILY
Qty: 30 TABLET | Refills: 0 | Status: SHIPPED | OUTPATIENT
Start: 2019-02-06 | End: 2019-03-06 | Stop reason: SDUPTHER

## 2019-02-06 NOTE — PROGRESS NOTES
Subjective   Andrew Narayan is a 28 y.o. male is here today for medication management follow-up.Presents by himself    Chief Complaint:  Recheck on anxiety and hallucinations and ADHD    History of Present Illness: Pt presents for follow up at the [place] [facility].  He states he can tell a slight improvement in his overall mood.  States he still has problems focusing and concentrating in the morning especially.  The afternoon dose of ritalin does help with this.  The seroquel helps with the mind racing at night.  Still obsesses on things and counts things all the time.  Still paranoid.  Pt currently rates depression a 4/10 with 10 being worse.  Rates anxiety a 5/10.  Denies any suicidal thoughts, homicidal thoughts, or any a/V hallucinations. Body mass index is 51.01 kg/m². continuing to do low carb diet and is now working out at the gym weight lifting. Sleep is on and off.  The prazosin was initially working but now he is having nightmares again.  No panic attacks.  No negative side effects to the meds.  No tremor.  Mood is stable.  No anger outbursts.           The following portions of the patient's history were reviewed and updated as appropriate: allergies, current medications, past family history, past medical history, past social history, past surgical history and problem list.    Review of Systems   Constitutional: Positive for appetite change. Negative for activity change and fatigue.   HENT: Negative.    Eyes: Negative for visual disturbance.   Respiratory: Negative.    Cardiovascular: Negative.    Gastrointestinal: Negative for nausea.   Endocrine: Negative.    Genitourinary: Negative.    Musculoskeletal: Positive for arthralgias.   Skin: Negative.    Allergic/Immunologic: Negative.    Neurological: Negative for dizziness, seizures and headaches.   Hematological: Negative.    Psychiatric/Behavioral: Positive for decreased concentration. Negative for agitation, behavioral problems,  "confusion, dysphoric mood, hallucinations, self-injury, sleep disturbance and suicidal ideas. The patient is nervous/anxious. The patient is not hyperactive.        Objective   Physical Exam   Constitutional: He is oriented to person, place, and time. He appears well-developed and well-nourished.   Neurological: He is alert and oriented to person, place, and time.   Psychiatric: He has a normal mood and affect. His speech is normal and behavior is normal. Thought content normal. His mood appears not anxious. Cognition and memory are normal. He expresses impulsivity.   Engaging in conversation   Vitals reviewed.    Blood pressure 130/75, pulse 66, height 175.3 cm (69\"), weight (!) 157 kg (345 lb 6.4 oz).    Medication List:   Current Outpatient Medications   Medication Sig Dispense Refill   • atorvastatin (LIPITOR) 80 MG tablet Take 1 tablet by mouth Daily. for cholesterol 30 tablet 3   • ezetimibe (ZETIA) 10 MG tablet Take 1 tablet by mouth Daily. 30 tablet 11   • Fesoterodine Fumarate (TOVIAZ PO) Take  by mouth.     • FLUoxetine (PROzac) 40 MG capsule Take 2 capsules by mouth Daily. 60 capsule 0   • furosemide (LASIX) 20 MG tablet Take 1 tablet by mouth Daily. 90 tablet 0   • gabapentin (NEURONTIN) 600 MG tablet Take 600 mg by mouth 6 (Six) Times a Day.     • hydrOXYzine (ATARAX) 25 MG tablet Take 1 tablet by mouth 3 (Three) Times a Day As Needed for Anxiety. 90 tablet 0   • ibuprofen (ADVIL,MOTRIN) 800 MG tablet Take 1 tablet by mouth Every 8 (Eight) Hours As Needed for Moderate Pain . (Patient taking differently: Take 600 mg by mouth Every 8 (Eight) Hours As Needed for Moderate Pain .) 15 tablet 0   • losartan-hydrochlorothiazide (HYZAAR) 50-12.5 MG per tablet Take 1 tablet by mouth Daily. 30 tablet 5   • lurasidone HCl (LATUDA) 60 MG tablet tablet Take 1 tablet by mouth Daily. 30 tablet 0   • methylphenidate (RITALIN) 10 MG tablet Take 1.5 tablets by mouth 2 (Two) Times a Day. 90 tablet 0   • metoprolol tartrate " (LOPRESSOR) 100 MG tablet TAKE ONE TABLET BY MOUTH EVERY DAY FOR BLOOD PRESSURE 30 tablet 2   • metoprolol tartrate (LOPRESSOR) 100 MG tablet Take 1 tablet by mouth Daily. Take in addition to 50mg tablet daily 30 tablet 5   • ondansetron (ZOFRAN) 4 MG tablet Take 1 tablet by mouth Every 8 (Eight) Hours As Needed for Nausea or Vomiting. 30 tablet 0   • prazosin (MINIPRESS) 5 MG capsule Take 1 capsule by mouth Every Night. 30 capsule 0   • QUEtiapine (SEROquel) 100 MG tablet Take 1 tablet by mouth Every Night. 30 tablet 0   • Syringe, Disposable, 3 ML misc Use 3 ml syringe with a 25 gauge  5/8 inch needle 24 each 6   • tamsulosin (FLOMAX) 0.4 MG capsule 24 hr capsule Take 1 capsule by mouth Every Night. 7 capsule 0   • Testosterone Cypionate (DEPO-TESTOSTERONE) 200 MG/ML injection He is to use 1/2 cc every Monday and Thursday SQ 10 mL 2   • traMADol (ULTRAM) 50 MG tablet Take 1 tablet by mouth Every 8 (Eight) Hours As Needed for Moderate Pain  or Severe Pain . 45 tablet 0   • vitamin D (ERGOCALCIFEROL) 59396 units capsule capsule Take 1 capsule by mouth 1 (One) Time Per Week. 4 capsule 2     No current facility-administered medications for this visit.        Mental Status Exam:   Hygiene:   good  Cooperation:  Cooperative  Eye Contact:  Good  Psychomotor Behavior:  Restless  Affect:  Full range  Hopelessness: Denies  Speech:  Normal  Thought Process:  Goal directed  Thought Content:  Normal  Suicidal:  None  Homicidal:  None  Hallucinations:  Auditory  Delusion:  None  Memory:  Intact  Orientation:  Person, Place, Time and Situation  Reliability:  fair  Insight:  Fair  Judgement:  Fair  Impulse Control:  Poor  Physical/Medical Issues:  Yes obesity, HTN, pain    Assessment/Plan   Problems Addressed this Visit     None      Visit Diagnoses     Post traumatic stress disorder (PTSD)    -  Primary    Relevant Medications    prazosin (MINIPRESS) 5 MG capsule    methylphenidate (RITALIN) 10 MG tablet    lurasidone HCl  (LATUDA) 60 MG tablet tablet    hydrOXYzine (ATARAX) 25 MG tablet    FLUoxetine (PROzac) 40 MG capsule    Mixed obsessional thoughts and acts        Relevant Medications    lurasidone HCl (LATUDA) 60 MG tablet tablet    Generalized anxiety disorder        Relevant Medications    methylphenidate (RITALIN) 10 MG tablet    lurasidone HCl (LATUDA) 60 MG tablet tablet    hydrOXYzine (ATARAX) 25 MG tablet    FLUoxetine (PROzac) 40 MG capsule    Attention deficit hyperactivity disorder, combined type        Relevant Medications    methylphenidate (RITALIN) 10 MG tablet    lurasidone HCl (LATUDA) 60 MG tablet tablet    hydrOXYzine (ATARAX) 25 MG tablet    FLUoxetine (PROzac) 40 MG capsule    Paranoid disorder (CMS/HCC)        Relevant Medications    methylphenidate (RITALIN) 10 MG tablet    lurasidone HCl (LATUDA) 60 MG tablet tablet    hydrOXYzine (ATARAX) 25 MG tablet    FLUoxetine (PROzac) 40 MG capsule      Eugenio shows neurontin and occasional hydrocodone.   Functionality: pt having significant impairment in important areas of daily functioning.  Prognosis: Guarded dependent on medication/follow up and treatment plan compliance.  UDS results reviewed and appropriate.Eugenio reviewed. Discussed medication options..I am increasing th prazosin to 5 mg.  Increasing the ritalin to 15mg BID.  Keeping the latuda, prozac, and atarax at present dosage.   Reviewed the risks, benefits, and side effects of the medications; patient acknowledged and verbally consented.  Patient is agreeable to call the [facility]. He is anxious to get back into therapy. Continuing efforts to promote the therapeutic alliance, address the patient's issues, and strengthen self awareness, insights, and coping skills. Patient is aware to call 911 or go to the nearest ER should begin having SI/HI.  Turn to clinic in 4 weeks for recheck.

## 2019-03-04 ENCOUNTER — OFFICE VISIT (OUTPATIENT)
Dept: PSYCHIATRY | Facility: CLINIC | Age: 29
End: 2019-03-04

## 2019-03-04 DIAGNOSIS — F43.10 POST TRAUMATIC STRESS DISORDER (PTSD): Primary | ICD-10-CM

## 2019-03-04 DIAGNOSIS — F42.2 MIXED OBSESSIONAL THOUGHTS AND ACTS: ICD-10-CM

## 2019-03-04 DIAGNOSIS — F90.2 ATTENTION DEFICIT HYPERACTIVITY DISORDER, COMBINED TYPE: ICD-10-CM

## 2019-03-04 DIAGNOSIS — F22 PARANOID DISORDER (HCC): ICD-10-CM

## 2019-03-04 DIAGNOSIS — F41.1 GENERALIZED ANXIETY DISORDER: ICD-10-CM

## 2019-03-04 PROCEDURE — 90791 PSYCH DIAGNOSTIC EVALUATION: CPT | Performed by: SOCIAL WORKER

## 2019-03-04 NOTE — PROGRESS NOTES
"    PROGRESS NOTE  Data:  Andrew Narayan came in 3/4/2019 for his regularly scheduled therapy session, with Kassi Kapoor, LCSWLCAD from 3:14 pm to 415.  Pt. Reports depression has improved in some ways and worsened in others.He is sleeping 3 to 4 hours of sleep a night with several getting up and down.  He reports that he is panicking and feels paranoid- feels like someone is out to get him.  He believes something bad will happen all the time with \"tingling in his gut\", feels tightness in his throat and when he thinks about it is when he start hearing \"someone running up beside him\". He shares that he started cutting himself age 9 and stopped about 15 when he started to pull the hair out on his beard which he continues to do.  He also shows of rough patches of skin where he picks skin on his hands and bites his nails to the quick.  He reports that the nightmares are bad.  He reports that he has stopped the keto diet because it is bad for his kidneys.      He plans to discuss wanting a letter stating that his condition will continue for 12 months so that he can try for disability- but he is hoping that his mental health condition will improve and then he can then work on his physical health.      Clinical Maneuvering/Intervention:  Assisted patient in processing above session content; acknowledged and normalized patient’s thoughts, feelings, and concerns. Allowed patient to freely discuss issues without interruption or judgment. Provided safe, confidential environment to facilitate the development of positive therapeutic relationship and encourage open, honest communication. Discussed sensitive yoga as part of a mindfulness activity.  Educated about CBT but it is questionable if he was kennedy to comprehend this.  Continues to watch horrific snuff videos and he was encouraged to decrease watching those.  He was encouraged to exercise as he has been directed by his doctor.  Encouraged pt the importance of " keeping all appointments and taking medications as prescribed if prescribed  and calling with any questions or concerns.    Assisted patient in identifying risk factors which would indicate the need for higher level of care including thoughts to harm self or others and/or self-harming behavior and encouraged patient to contact this office, call 911, or present to the nearest emergency room should any of these events occur. Discussed crisis intervention services and means to access.  Patient adamantly and convincingly denies current suicidal or homicidal ideation or perceptual disturbance.    Assessment     Diagnoses and all orders for this visit:    Post traumatic stress disorder (PTSD)    Generalized anxiety disorder    Attention deficit hyperactivity disorder, combined type    Paranoid disorder (CMS/HCC)    Mixed obsessional thoughts and acts        Patient presents for session on time, clean and casually dressed with depressed/anxious mood and congruent affect. No evidence of intoxication, withdrawal, or perceptual disturbance. Association’s intact, abstraction intact. Thought process is linear and logical. Speech is clear and coherent. Patient is oriented to person, place, and time. Attention and concentration fair. Insight and judgment fair. Patient reports no current suicidal or homicidal ideation. Patient appears cooperative and agreeable to treatment and appears to begin to develop rapport. Patient does not appear to be malingering.          Mental Status Exam  Hygiene:  fair  Dress:  casual  Attitude:  Guarded  Motor Activity:  Restless  Speech:  Normal  Mood:  anxious and depressed  Affect:  depressed and anxious  Thought Processes:  Linear  Thought Content:  paranoid ideation  Suicidal Thoughts:  denies  Homicidal Thoughts:  denies  Crisis Safety Plan: yes, to come to the emergency room.  Hallucinations:  has        Patient's Support Network Includes:  wife and extended family    Progress toward goal: Not  at goal    Functional Status: Severe impairment    Prognosis: Guarded with Ongoing Treatment         No Follow-up on file.    Patient will have at least monthly outpatient psychotherapy sessions and pharmacotherapy as scheduled.Patient will adhere to medication regimen as prescribed and report any side effects. Patient will contact this office, call 911 or present to the nearest emergency room should suicidal or homicidal ideations occur. Provide Cognitive Behavioral Therapy and Solution Focused Therapy to improve functioning, maintain stability, and avoid decompensation and the need for higher level of care.     Kassi Kapoor LCSW [facility], [unfilled] There are other unrelated non-urgent complaints, but due to the busy schedule and the amount of time I've already spent with him, time does not permit me to address these routine issues at today's visit. I've requested another appointment to review these additional issues.

## 2019-03-06 ENCOUNTER — OFFICE VISIT (OUTPATIENT)
Dept: PSYCHIATRY | Facility: CLINIC | Age: 29
End: 2019-03-06

## 2019-03-06 VITALS
HEART RATE: 84 BPM | BODY MASS INDEX: 46.65 KG/M2 | SYSTOLIC BLOOD PRESSURE: 153 MMHG | WEIGHT: 315 LBS | HEIGHT: 69 IN | DIASTOLIC BLOOD PRESSURE: 85 MMHG

## 2019-03-06 DIAGNOSIS — F90.2 ATTENTION DEFICIT HYPERACTIVITY DISORDER, COMBINED TYPE: ICD-10-CM

## 2019-03-06 DIAGNOSIS — F42.2 MIXED OBSESSIONAL THOUGHTS AND ACTS: ICD-10-CM

## 2019-03-06 DIAGNOSIS — F43.10 POST TRAUMATIC STRESS DISORDER (PTSD): Primary | ICD-10-CM

## 2019-03-06 DIAGNOSIS — F41.1 GENERALIZED ANXIETY DISORDER: ICD-10-CM

## 2019-03-06 DIAGNOSIS — F22 PARANOID DISORDER (HCC): ICD-10-CM

## 2019-03-06 PROCEDURE — 99214 OFFICE O/P EST MOD 30 MIN: CPT | Performed by: NURSE PRACTITIONER

## 2019-03-06 RX ORDER — FLUOXETINE HYDROCHLORIDE 40 MG/1
80 CAPSULE ORAL DAILY
Qty: 60 CAPSULE | Refills: 0 | Status: SHIPPED | OUTPATIENT
Start: 2019-03-06 | End: 2019-07-24

## 2019-03-06 RX ORDER — METHYLPHENIDATE HYDROCHLORIDE 10 MG/1
15 TABLET ORAL 2 TIMES DAILY
Qty: 90 TABLET | Refills: 0 | Status: SHIPPED | OUTPATIENT
Start: 2019-03-06 | End: 2019-06-27 | Stop reason: SDUPTHER

## 2019-03-06 RX ORDER — QUETIAPINE FUMARATE 100 MG/1
100 TABLET, FILM COATED ORAL NIGHTLY
Qty: 30 TABLET | Refills: 0 | Status: SHIPPED | OUTPATIENT
Start: 2019-03-06 | End: 2019-06-18 | Stop reason: SDUPTHER

## 2019-03-06 RX ORDER — LURASIDONE HYDROCHLORIDE 20 MG/1
20 TABLET, FILM COATED ORAL DAILY
Qty: 30 TABLET | Refills: 0 | Status: SHIPPED | OUTPATIENT
Start: 2019-03-06 | End: 2019-07-24

## 2019-03-06 RX ORDER — DOXEPIN HYDROCHLORIDE 10 MG/1
10 CAPSULE ORAL 3 TIMES DAILY
Qty: 90 CAPSULE | Refills: 0 | Status: SHIPPED | OUTPATIENT
Start: 2019-03-06 | End: 2019-07-24

## 2019-03-06 RX ORDER — PRAZOSIN HYDROCHLORIDE 5 MG/1
10 CAPSULE ORAL NIGHTLY
Qty: 60 CAPSULE | Refills: 0 | Status: SHIPPED | OUTPATIENT
Start: 2019-03-06 | End: 2019-07-24

## 2019-03-06 NOTE — PROGRESS NOTES
Subjective   Anderw Narayan is a 28 y.o. male is here today for medication management follow-up.Presents by himself    Chief Complaint:  Recheck on anxiety and hallucinations and ADHD    History of Present Illness: Pt presents for follow up at the [facility]. Mood has been depressed.  Rates anxiety a 10/10.  Denies any suicidal thoughts, homicidal thoughts, or any a/V hallucinations.  States he has a feeling of restlessness in that he always has to be moving.  Body mass index is 52.05 kg/m². Has gained weight since last visit.  States he has been more down.  Wanting to lay in bed more.  Decreased motivation.  No anger outbursts.  No certain trigger has brought this on.  No acute stressor. Sleeping at least 6 hours a night but continuing to have nightmares.  Will have dreams of his perpetrator. No other negative side effects to the meds.  No medical stressors.  He has good support at home through his wife.            The following portions of the patient's history were reviewed and updated as appropriate: allergies, current medications, past family history, past medical history, past social history, past surgical history and problem list.    Review of Systems   Constitutional: Positive for appetite change. Negative for activity change and fatigue.   HENT: Negative.    Eyes: Negative for visual disturbance.   Respiratory: Negative.    Cardiovascular: Negative.    Gastrointestinal: Negative for nausea.   Endocrine: Negative.    Genitourinary: Negative.    Musculoskeletal: Positive for arthralgias.   Skin: Negative.    Allergic/Immunologic: Negative.    Neurological: Negative for dizziness, seizures and headaches.   Hematological: Negative.    Psychiatric/Behavioral: Positive for decreased concentration. Negative for agitation, behavioral problems, confusion, dysphoric mood, hallucinations, self-injury, sleep disturbance and suicidal ideas. The patient is nervous/anxious. The patient is not  "hyperactive.        Objective   Physical Exam   Constitutional: He is oriented to person, place, and time. He appears well-developed and well-nourished.   Neurological: He is alert and oriented to person, place, and time.   Psychiatric: He has a normal mood and affect. His speech is normal and behavior is normal. Thought content normal. His mood appears not anxious. Cognition and memory are normal. He expresses impulsivity.   Engaging in conversation   Vitals reviewed.    Blood pressure 153/85, pulse 84, height 175.3 cm (69.02\"), weight (!) 160 kg (352 lb 9.6 oz).    Medication List:   Current Outpatient Medications   Medication Sig Dispense Refill   • atorvastatin (LIPITOR) 80 MG tablet Take 1 tablet by mouth Daily. for cholesterol 30 tablet 3   • doxepin (SINEquan) 10 MG capsule Take 1 capsule by mouth 3 (Three) Times a Day. 90 capsule 0   • ezetimibe (ZETIA) 10 MG tablet Take 1 tablet by mouth Daily. 30 tablet 11   • Fesoterodine Fumarate (TOVIAZ PO) Take  by mouth.     • FLUoxetine (PROzac) 40 MG capsule Take 2 capsules by mouth Daily. 60 capsule 0   • furosemide (LASIX) 20 MG tablet Take 1 tablet by mouth Daily. 90 tablet 0   • gabapentin (NEURONTIN) 600 MG tablet Take 600 mg by mouth 6 (Six) Times a Day.     • ibuprofen (ADVIL,MOTRIN) 800 MG tablet Take 1 tablet by mouth Every 8 (Eight) Hours As Needed for Moderate Pain . (Patient taking differently: Take 600 mg by mouth Every 8 (Eight) Hours As Needed for Moderate Pain .) 15 tablet 0   • losartan-hydrochlorothiazide (HYZAAR) 50-12.5 MG per tablet Take 1 tablet by mouth Daily. 30 tablet 5   • lurasidone HCl (LATUDA) 20 MG tablet tablet Take 1 tablet by mouth Daily. 30 tablet 0   • methylphenidate (RITALIN) 10 MG tablet Take 1.5 tablets by mouth 2 (Two) Times a Day. 90 tablet 0   • metoprolol tartrate (LOPRESSOR) 100 MG tablet TAKE ONE TABLET BY MOUTH EVERY DAY FOR BLOOD PRESSURE 30 tablet 2   • metoprolol tartrate (LOPRESSOR) 100 MG tablet Take 1 tablet by " mouth Daily. Take in addition to 50mg tablet daily 30 tablet 5   • ondansetron (ZOFRAN) 4 MG tablet Take 1 tablet by mouth Every 8 (Eight) Hours As Needed for Nausea or Vomiting. 30 tablet 0   • prazosin (MINIPRESS) 5 MG capsule Take 2 capsules by mouth Every Night. 60 capsule 0   • QUEtiapine (SEROquel) 100 MG tablet Take 1 tablet by mouth Every Night. 30 tablet 0   • Syringe, Disposable, 3 ML misc Use 3 ml syringe with a 25 gauge  5/8 inch needle 24 each 6   • tamsulosin (FLOMAX) 0.4 MG capsule 24 hr capsule Take 1 capsule by mouth Every Night. 7 capsule 0   • Testosterone Cypionate (DEPO-TESTOSTERONE) 200 MG/ML injection He is to use 1/2 cc every Monday and Thursday SQ 10 mL 2   • traMADol (ULTRAM) 50 MG tablet Take 1 tablet by mouth Every 8 (Eight) Hours As Needed for Moderate Pain  or Severe Pain . 45 tablet 0   • vitamin D (ERGOCALCIFEROL) 54026 units capsule capsule Take 1 capsule by mouth 1 (One) Time Per Week. 4 capsule 2     No current facility-administered medications for this visit.        Mental Status Exam:   Hygiene:   good  Cooperation:  Cooperative  Eye Contact:  Good  Psychomotor Behavior:  Restless  Affect:  Full range  Hopelessness: Denies  Speech:  Normal  Thought Process:  Goal directed  Thought Content:  Normal  Suicidal:  None  Homicidal:  None  Hallucinations:  Auditory  Delusion:  None  Memory:  Intact  Orientation:  Person, Place, Time and Situation  Reliability:  fair  Insight:  Fair  Judgement:  Fair  Impulse Control:  Poor  Physical/Medical Issues:  Yes obesity, HTN, pain    Assessment/Plan   Problems Addressed this Visit     None      Visit Diagnoses     Post traumatic stress disorder (PTSD)    -  Primary    Relevant Medications    lurasidone HCl (LATUDA) 20 MG tablet tablet    doxepin (SINEquan) 10 MG capsule    prazosin (MINIPRESS) 5 MG capsule    QUEtiapine (SEROquel) 100 MG tablet    methylphenidate (RITALIN) 10 MG tablet    FLUoxetine (PROzac) 40 MG capsule    Generalized anxiety  disorder        Relevant Medications    lurasidone HCl (LATUDA) 20 MG tablet tablet    doxepin (SINEquan) 10 MG capsule    QUEtiapine (SEROquel) 100 MG tablet    methylphenidate (RITALIN) 10 MG tablet    FLUoxetine (PROzac) 40 MG capsule    Attention deficit hyperactivity disorder, combined type        Relevant Medications    lurasidone HCl (LATUDA) 20 MG tablet tablet    doxepin (SINEquan) 10 MG capsule    QUEtiapine (SEROquel) 100 MG tablet    methylphenidate (RITALIN) 10 MG tablet    FLUoxetine (PROzac) 40 MG capsule    Paranoid disorder (CMS/HCC)        Relevant Medications    lurasidone HCl (LATUDA) 20 MG tablet tablet    doxepin (SINEquan) 10 MG capsule    QUEtiapine (SEROquel) 100 MG tablet    methylphenidate (RITALIN) 10 MG tablet    FLUoxetine (PROzac) 40 MG capsule    Mixed obsessional thoughts and acts        Relevant Medications    lurasidone HCl (LATUDA) 20 MG tablet tablet    QUEtiapine (SEROquel) 100 MG tablet      Eugenio shows neurontin and occasional hydrocodone.   Functionality: pt having significant impairment in important areas of daily functioning.  Prognosis: Guarded dependent on medication/follow up and treatment plan compliance.  UDS results reviewed and appropriate.Eugenio reviewed.  I am decreasing his latuda as I feel he may be experiencing some akathisia. I am increasing his prazosin.  I have also added doxepin for the anxiety. I am increasing his prazosin to 10mg total at HS.  Reviewed the risks, benefits, and side effects of the medications; patient acknowledged and verbally consented.  Patient is agreeable to call the [facility]. Continuing efforts to promote the therapeutic alliance, address the patient's issues, and strengthen self awareness, insights, and coping skills. He has restarted with therapist.  Patient is aware to call 911 or go to the nearest ER should begin having SI/HI.  Turn to clinic in 2 weeks for recheck.

## 2019-03-17 ENCOUNTER — APPOINTMENT (OUTPATIENT)
Dept: CT IMAGING | Facility: HOSPITAL | Age: 29
End: 2019-03-17

## 2019-03-17 ENCOUNTER — HOSPITAL ENCOUNTER (EMERGENCY)
Facility: HOSPITAL | Age: 29
Discharge: HOME | End: 2019-03-17
Admitting: EMERGENCY MEDICINE

## 2019-03-17 VITALS
DIASTOLIC BLOOD PRESSURE: 69 MMHG | WEIGHT: 315 LBS | RESPIRATION RATE: 18 BRPM | HEART RATE: 72 BPM | OXYGEN SATURATION: 98 % | SYSTOLIC BLOOD PRESSURE: 131 MMHG | TEMPERATURE: 98.1 F | BODY MASS INDEX: 46.65 KG/M2 | HEIGHT: 69 IN

## 2019-03-17 DIAGNOSIS — N20.1 URETEROLITHIASIS: Primary | ICD-10-CM

## 2019-03-17 LAB
ALBUMIN SERPL-MCNC: 4.21 G/DL (ref 3.5–5.2)
ALBUMIN/GLOB SERPL: 1.2 G/DL
ALP SERPL-CCNC: 71 U/L (ref 39–117)
ALT SERPL W P-5'-P-CCNC: 13 U/L (ref 1–41)
ANION GAP SERPL CALCULATED.3IONS-SCNC: 13.9 MMOL/L
AST SERPL-CCNC: 14 U/L (ref 1–40)
BACTERIA UR QL AUTO: ABNORMAL /HPF
BASOPHILS # BLD AUTO: 0.02 10*3/MM3 (ref 0–0.2)
BASOPHILS NFR BLD AUTO: 0.2 % (ref 0–1.5)
BILIRUB SERPL-MCNC: 0.3 MG/DL (ref 0.1–1.2)
BILIRUB UR QL STRIP: NEGATIVE
BUN BLD-MCNC: 11 MG/DL (ref 6–20)
BUN/CREAT SERPL: 8 (ref 7–25)
CALCIUM SPEC-SCNC: 9.4 MG/DL (ref 8.6–10.5)
CHLORIDE SERPL-SCNC: 100 MMOL/L (ref 98–107)
CLARITY UR: CLEAR
CO2 SERPL-SCNC: 27.1 MMOL/L (ref 22–29)
COLOR UR: YELLOW
CREAT BLD-MCNC: 1.38 MG/DL (ref 0.76–1.27)
DEPRECATED RDW RBC AUTO: 49.7 FL (ref 37–54)
EOSINOPHIL # BLD AUTO: 0.26 10*3/MM3 (ref 0–0.4)
EOSINOPHIL NFR BLD AUTO: 2 % (ref 0.3–6.2)
ERYTHROCYTE [DISTWIDTH] IN BLOOD BY AUTOMATED COUNT: 15.8 % (ref 12.3–15.4)
GFR SERPL CREATININE-BSD FRML MDRD: 61 ML/MIN/1.73
GLOBULIN UR ELPH-MCNC: 3.4 GM/DL
GLUCOSE BLD-MCNC: 85 MG/DL (ref 65–99)
GLUCOSE UR STRIP-MCNC: NEGATIVE MG/DL
HCT VFR BLD AUTO: 48.1 % (ref 37.5–51)
HGB BLD-MCNC: 14.9 G/DL (ref 13–17.7)
HGB UR QL STRIP.AUTO: ABNORMAL
HYALINE CASTS UR QL AUTO: ABNORMAL /LPF
IMM GRANULOCYTES # BLD AUTO: 0.03 10*3/MM3 (ref 0–0.05)
IMM GRANULOCYTES NFR BLD AUTO: 0.2 % (ref 0–0.5)
KETONES UR QL STRIP: NEGATIVE
LEUKOCYTE ESTERASE UR QL STRIP.AUTO: NEGATIVE
LYMPHOCYTES # BLD AUTO: 3.94 10*3/MM3 (ref 0.7–3.1)
LYMPHOCYTES NFR BLD AUTO: 29.8 % (ref 19.6–45.3)
MCH RBC QN AUTO: 26.6 PG (ref 26.6–33)
MCHC RBC AUTO-ENTMCNC: 31 G/DL (ref 31.5–35.7)
MCV RBC AUTO: 85.9 FL (ref 79–97)
MONOCYTES # BLD AUTO: 0.63 10*3/MM3 (ref 0.1–0.9)
MONOCYTES NFR BLD AUTO: 4.8 % (ref 5–12)
NEUTROPHILS # BLD AUTO: 8.32 10*3/MM3 (ref 1.4–7)
NEUTROPHILS NFR BLD AUTO: 63 % (ref 42.7–76)
NITRITE UR QL STRIP: NEGATIVE
PH UR STRIP.AUTO: 5.5 [PH] (ref 5–8)
PLATELET # BLD AUTO: 363 10*3/MM3 (ref 140–450)
PMV BLD AUTO: 10 FL (ref 6–12)
POTASSIUM BLD-SCNC: 3.8 MMOL/L (ref 3.5–5.2)
PROT SERPL-MCNC: 7.6 G/DL (ref 6–8.5)
PROT UR QL STRIP: NEGATIVE
RBC # BLD AUTO: 5.6 10*6/MM3 (ref 4.14–5.8)
RBC # UR: ABNORMAL /HPF
REF LAB TEST METHOD: ABNORMAL
SODIUM BLD-SCNC: 141 MMOL/L (ref 136–145)
SP GR UR STRIP: 1.02 (ref 1–1.03)
SQUAMOUS #/AREA URNS HPF: ABNORMAL /HPF
UROBILINOGEN UR QL STRIP: ABNORMAL
WBC NRBC COR # BLD: 13.2 10*3/MM3 (ref 3.4–10.8)
WBC UR QL AUTO: ABNORMAL /HPF

## 2019-03-17 PROCEDURE — 25010000002 ONDANSETRON PER 1 MG: Performed by: NURSE PRACTITIONER

## 2019-03-17 PROCEDURE — 80053 COMPREHEN METABOLIC PANEL: CPT | Performed by: NURSE PRACTITIONER

## 2019-03-17 PROCEDURE — 96376 TX/PRO/DX INJ SAME DRUG ADON: CPT

## 2019-03-17 PROCEDURE — 81001 URINALYSIS AUTO W/SCOPE: CPT | Performed by: NURSE PRACTITIONER

## 2019-03-17 PROCEDURE — 99284 EMERGENCY DEPT VISIT MOD MDM: CPT

## 2019-03-17 PROCEDURE — 85025 COMPLETE CBC W/AUTO DIFF WBC: CPT | Performed by: NURSE PRACTITIONER

## 2019-03-17 PROCEDURE — 74176 CT ABD & PELVIS W/O CONTRAST: CPT | Performed by: RADIOLOGY

## 2019-03-17 PROCEDURE — 74176 CT ABD & PELVIS W/O CONTRAST: CPT

## 2019-03-17 PROCEDURE — 96375 TX/PRO/DX INJ NEW DRUG ADDON: CPT

## 2019-03-17 PROCEDURE — 96374 THER/PROPH/DIAG INJ IV PUSH: CPT

## 2019-03-17 PROCEDURE — 96361 HYDRATE IV INFUSION ADD-ON: CPT

## 2019-03-17 PROCEDURE — 25010000002 HYDROMORPHONE 1 MG/ML SOLUTION: Performed by: NURSE PRACTITIONER

## 2019-03-17 RX ORDER — ONDANSETRON 4 MG/1
4 TABLET, ORALLY DISINTEGRATING ORAL EVERY 6 HOURS PRN
Status: DISCONTINUED | OUTPATIENT
Start: 2019-03-17 | End: 2019-03-17 | Stop reason: HOSPADM

## 2019-03-17 RX ORDER — CIPROFLOXACIN 250 MG/1
250 TABLET, FILM COATED ORAL 2 TIMES DAILY
Qty: 10 TABLET | Refills: 0 | Status: SHIPPED | OUTPATIENT
Start: 2019-03-17 | End: 2019-03-22

## 2019-03-17 RX ORDER — SODIUM CHLORIDE 0.9 % (FLUSH) 0.9 %
10 SYRINGE (ML) INJECTION AS NEEDED
Status: DISCONTINUED | OUTPATIENT
Start: 2019-03-17 | End: 2019-03-17 | Stop reason: HOSPADM

## 2019-03-17 RX ORDER — OXYCODONE HYDROCHLORIDE AND ACETAMINOPHEN 5; 325 MG/1; MG/1
1 TABLET ORAL EVERY 4 HOURS PRN
Qty: 12 TABLET | Refills: 0 | Status: SHIPPED | OUTPATIENT
Start: 2019-03-17 | End: 2019-07-24

## 2019-03-17 RX ORDER — ONDANSETRON 4 MG/1
4 TABLET, ORALLY DISINTEGRATING ORAL EVERY 6 HOURS PRN
Qty: 12 TABLET | Refills: 0 | Status: ON HOLD | OUTPATIENT
Start: 2019-03-17 | End: 2019-04-15

## 2019-03-17 RX ORDER — ONDANSETRON 2 MG/ML
4 INJECTION INTRAMUSCULAR; INTRAVENOUS ONCE
Status: COMPLETED | OUTPATIENT
Start: 2019-03-17 | End: 2019-03-17

## 2019-03-17 RX ORDER — OXYCODONE HYDROCHLORIDE AND ACETAMINOPHEN 5; 325 MG/1; MG/1
1 TABLET ORAL EVERY 4 HOURS PRN
Status: DISCONTINUED | OUTPATIENT
Start: 2019-03-17 | End: 2019-03-17 | Stop reason: HOSPADM

## 2019-03-17 RX ORDER — TAMSULOSIN HYDROCHLORIDE 0.4 MG/1
1 CAPSULE ORAL DAILY
Qty: 15 CAPSULE | Refills: 0 | Status: ON HOLD | OUTPATIENT
Start: 2019-03-17 | End: 2019-04-15

## 2019-03-17 RX ADMIN — ONDANSETRON 4 MG: 4 TABLET, ORALLY DISINTEGRATING ORAL at 20:25

## 2019-03-17 RX ADMIN — ONDANSETRON 4 MG: 2 INJECTION, SOLUTION INTRAMUSCULAR; INTRAVENOUS at 17:51

## 2019-03-17 RX ADMIN — SODIUM CHLORIDE 1000 ML: 9 INJECTION, SOLUTION INTRAVENOUS at 17:51

## 2019-03-17 RX ADMIN — OXYCODONE HYDROCHLORIDE AND ACETAMINOPHEN 3 TABLET: 5; 325 TABLET ORAL at 20:25

## 2019-03-17 RX ADMIN — HYDROMORPHONE HYDROCHLORIDE 1 MG: 1 INJECTION, SOLUTION INTRAMUSCULAR; INTRAVENOUS; SUBCUTANEOUS at 17:52

## 2019-03-17 RX ADMIN — HYDROMORPHONE HYDROCHLORIDE 1 MG: 1 INJECTION, SOLUTION INTRAMUSCULAR; INTRAVENOUS; SUBCUTANEOUS at 19:11

## 2019-03-17 NOTE — DISCHARGE INSTRUCTIONS
Increase fluids.    Follow up with Dr. Oscar as soon as possible.    Return to the emergency room for worsening symptoms.

## 2019-03-17 NOTE — ED NOTES
Called pharmacy, waiting on percocet and zofran for pt to take home.     Linda Sylvester, RN  03/17/19 1950

## 2019-03-17 NOTE — ED PROVIDER NOTES
Subjective     History provided by:  Patient   used: No    Flank Pain   Pain location:  L flank  Pain quality: sharp and shooting    Pain radiates to:  Groin  Pain severity:  Moderate  Onset quality:  Sudden  Duration:  1 day  Timing:  Constant  Progression:  Waxing and waning  Chronicity:  Recurrent  Context: not alcohol use, not awakening from sleep, not diet changes, not laxative use, not medication withdrawal, not recent illness, not recent travel, not retching and not trauma    Context comment:  Long history of ureterolithiasis  Relieved by:  None tried  Worsened by:  Nothing  Ineffective treatments:  OTC medications  Associated symptoms: dysuria and nausea    Associated symptoms: no anorexia, no belching, no chest pain, no constipation, no cough, no diarrhea, no fatigue, no fever, no hematemesis, no hematochezia and no hematuria    Risk factors: no alcohol abuse, no aspirin use, not elderly, has not had multiple surgeries and no NSAID use        Review of Systems   Constitutional: Negative.  Negative for fatigue and fever.   HENT: Negative.    Eyes: Negative.    Respiratory: Negative.  Negative for cough.    Cardiovascular: Negative.  Negative for chest pain.   Gastrointestinal: Positive for nausea. Negative for anorexia, constipation, diarrhea, hematemesis and hematochezia.   Endocrine: Negative.    Genitourinary: Positive for dysuria and flank pain. Negative for hematuria.   Skin: Negative.    Allergic/Immunologic: Negative.    Neurological: Negative.    Hematological: Negative.    Psychiatric/Behavioral: Negative.        Past Medical History:   Diagnosis Date   • Anxiety    • Arthritis    • Calculus of kidney    • Headache    • Hyperlipidemia    • Hypertension    • Insomnia    • Neuropathy    • Panic disorder    • Sciatica    • Tachycardia        Allergies   Allergen Reactions   • Lisinopril Cough   • Contrast Dye Rash       Past Surgical History:   Procedure Laterality Date   • KIDNEY  SURGERY      Stents placed and removed.    • LAPAROSCOPIC GASTRIC BANDING     • OTHER SURGICAL HISTORY      diagnostic esophagogastroduodenoscopy   • OTHER SURGICAL HISTORY      renal lithotripsy       Family History   Problem Relation Age of Onset   • Colon cancer Other    • Heart disease Other    • Lymphoma Other    • Heart disease Mother    • Hypertension Mother    • Lupus Mother    • Skin cancer Father    • Kidney disease Father        Social History     Socioeconomic History   • Marital status:      Spouse name: Not on file   • Number of children: Not on file   • Years of education: Not on file   • Highest education level: Not on file   Tobacco Use   • Smoking status: Former Smoker     Types: Cigarettes   • Smokeless tobacco: Never Used   • Tobacco comment: smokes 1 pack of cigarettes per day   Substance and Sexual Activity   • Alcohol use: No   • Drug use: No   • Sexual activity: Defer           Objective   Physical Exam   Constitutional: He is oriented to person, place, and time. He appears well-developed and well-nourished.   HENT:   Head: Normocephalic.   Right Ear: External ear normal.   Left Ear: External ear normal.   Mouth/Throat: Oropharynx is clear and moist.   Eyes: Conjunctivae and EOM are normal. Pupils are equal, round, and reactive to light.   Neck: Normal range of motion. Neck supple.   Cardiovascular: Normal rate, regular rhythm, normal heart sounds and intact distal pulses.   Pulmonary/Chest: Effort normal and breath sounds normal.   Abdominal: Soft. Bowel sounds are normal.   Musculoskeletal: Normal range of motion.   Neurological: He is alert and oriented to person, place, and time.   Skin: Skin is warm and dry. Capillary refill takes less than 2 seconds.   Psychiatric: He has a normal mood and affect. His behavior is normal. Thought content normal.   Nursing note and vitals reviewed.      Procedures           ED Course  ED Course as of Mar 17 2002   Sun Mar 17, 2019   [id number] Shemar Becker  # [id number]  [THUY]      ED Course User Index  [THUY] Paramjit Adams, APOLINAR                  [facility]      Final diagnoses:   Ureterolithiasis            Paramjit Adams, APOLINAR  03/17/19 2002

## 2019-03-21 ENCOUNTER — OFFICE VISIT (OUTPATIENT)
Dept: UROLOGY | Facility: CLINIC | Age: 29
End: 2019-03-21

## 2019-03-21 VITALS — HEIGHT: 69 IN | BODY MASS INDEX: 46.65 KG/M2 | WEIGHT: 315 LBS

## 2019-03-21 DIAGNOSIS — N20.1 URETERAL STONE: Primary | ICD-10-CM

## 2019-03-21 PROCEDURE — 99213 OFFICE O/P EST LOW 20 MIN: CPT | Performed by: UROLOGY

## 2019-03-21 RX ORDER — OXYCODONE HYDROCHLORIDE AND ACETAMINOPHEN 5; 325 MG/1; MG/1
TABLET ORAL
Qty: 40 TABLET | Refills: 0 | Status: ON HOLD | OUTPATIENT
Start: 2019-03-21 | End: 2019-04-15

## 2019-03-21 RX ORDER — TAMSULOSIN HYDROCHLORIDE 0.4 MG/1
1 CAPSULE ORAL NIGHTLY
Qty: 30 CAPSULE | Refills: 11 | Status: SHIPPED | OUTPATIENT
Start: 2019-03-21 | End: 2019-07-24

## 2019-03-21 NOTE — PROGRESS NOTES
Chief Complaint:          Left flank pain    HPI:   28 y.o. male.  Pt is a recurrent stone former and Sunday he had severe onset of left flank pain.  He weighs 340 lbs.  He has had 4 stones this year.  Pt has had stones since he was 13 and has had about 30 to 50 stones since then.  His ct scan shows a 4 mm left proximal ureteral stone with hydronephrosis and he has non obstructive stones in the right kidney the largest stone is 6 mm.  HPI      Past Medical History:        Past Medical History:   Diagnosis Date   • Anxiety    • Arthritis    • Calculus of kidney    • Headache    • Hyperlipidemia    • Hypertension    • Insomnia    • Neuropathy    • Panic disorder    • Sciatica    • Tachycardia          Current Meds:     Current Outpatient Medications   Medication Sig Dispense Refill   • atorvastatin (LIPITOR) 80 MG tablet Take 1 tablet by mouth Daily. for cholesterol 30 tablet 3   • doxepin (SINEquan) 10 MG capsule Take 1 capsule by mouth 3 (Three) Times a Day. 90 capsule 0   • ezetimibe (ZETIA) 10 MG tablet Take 1 tablet by mouth Daily. 30 tablet 11   • Fesoterodine Fumarate (TOVIAZ PO) Take  by mouth.     • FLUoxetine (PROzac) 40 MG capsule Take 2 capsules by mouth Daily. 60 capsule 0   • furosemide (LASIX) 20 MG tablet Take 1 tablet by mouth Daily. 90 tablet 0   • gabapentin (NEURONTIN) 600 MG tablet Take 600 mg by mouth 6 (Six) Times a Day.     • ibuprofen (ADVIL,MOTRIN) 800 MG tablet Take 1 tablet by mouth Every 8 (Eight) Hours As Needed for Moderate Pain . (Patient taking differently: Take 600 mg by mouth Every 8 (Eight) Hours As Needed for Moderate Pain .) 15 tablet 0   • losartan-hydrochlorothiazide (HYZAAR) 50-12.5 MG per tablet Take 1 tablet by mouth Daily. 30 tablet 5   • lurasidone HCl (LATUDA) 20 MG tablet tablet Take 1 tablet by mouth Daily. 30 tablet 0   • methylphenidate (RITALIN) 10 MG tablet Take 1.5 tablets by mouth 2 (Two) Times a Day. 90 tablet 0   • metoprolol tartrate (LOPRESSOR) 100 MG tablet  TAKE ONE TABLET BY MOUTH EVERY DAY FOR BLOOD PRESSURE 30 tablet 2   • metoprolol tartrate (LOPRESSOR) 100 MG tablet Take 1 tablet by mouth Daily. Take in addition to 50mg tablet daily 30 tablet 5   • ondansetron (ZOFRAN) 4 MG tablet Take 1 tablet by mouth Every 8 (Eight) Hours As Needed for Nausea or Vomiting. 30 tablet 0   • ondansetron ODT (ZOFRAN-ODT) 4 MG disintegrating tablet Take 1 tablet by mouth Every 6 (Six) Hours As Needed for Nausea or Vomiting. 12 tablet 0   • oxyCODONE-acetaminophen (PERCOCET) 5-325 MG per tablet Take 1 tablet by mouth Every 4 (Four) Hours As Needed for Moderate Pain . 12 tablet 0   • prazosin (MINIPRESS) 5 MG capsule Take 2 capsules by mouth Every Night. 60 capsule 0   • QUEtiapine (SEROquel) 100 MG tablet Take 1 tablet by mouth Every Night. 30 tablet 0   • Syringe, Disposable, 3 ML misc Use 3 ml syringe with a 25 gauge  5/8 inch needle 24 each 6   • tamsulosin (FLOMAX) 0.4 MG capsule 24 hr capsule Take 1 capsule by mouth Daily. 15 capsule 0   • Testosterone Cypionate (DEPO-TESTOSTERONE) 200 MG/ML injection He is to use 1/2 cc every Monday and Thursday SQ 10 mL 2   • traMADol (ULTRAM) 50 MG tablet Take 1 tablet by mouth Every 8 (Eight) Hours As Needed for Moderate Pain  or Severe Pain . 45 tablet 0   • vitamin D (ERGOCALCIFEROL) 84769 units capsule capsule Take 1 capsule by mouth 1 (One) Time Per Week. 4 capsule 2   • ciprofloxacin (CIPRO) 250 MG tablet Take 1 tablet by mouth 2 (Two) Times a Day for 5 days. 10 tablet 0   • oxyCODONE-acetaminophen (PERCOCET) 5-325 MG per tablet 1 to 2 Tablets Every 6 Hours as needed for PAIN 40 tablet 0   • tamsulosin (FLOMAX) 0.4 MG capsule 24 hr capsule Take 1 capsule by mouth Every Night. 30 capsule 11     No current facility-administered medications for this visit.         Allergies:      Allergies   Allergen Reactions   • Lisinopril Cough   • Contrast Dye Rash        Past Surgical History:     Past Surgical History:   Procedure Laterality Date   •  KIDNEY SURGERY      Stents placed and removed.    • LAPAROSCOPIC GASTRIC BANDING     • OTHER SURGICAL HISTORY      diagnostic esophagogastroduodenoscopy   • OTHER SURGICAL HISTORY      renal lithotripsy         Social History:     Social History     Socioeconomic History   • Marital status:      Spouse name: Not on file   • Number of children: Not on file   • Years of education: Not on file   • Highest education level: Not on file   Tobacco Use   • Smoking status: Former Smoker     Types: Cigarettes   • Smokeless tobacco: Never Used   • Tobacco comment: smokes 1 pack of cigarettes per day   Substance and Sexual Activity   • Alcohol use: No   • Drug use: No   • Sexual activity: Defer       Family History:     Family History   Problem Relation Age of Onset   • Colon cancer Other    • Heart disease Other    • Lymphoma Other    • Heart disease Mother    • Hypertension Mother    • Lupus Mother    • Skin cancer Father    • Kidney disease Father        Review of Systems:   IPSS Questionnaire (AUA-7):  Over the past month…    1)  How often have you had a sensation of not emptying your bladder completely after you finish urinating?  1 - Less than 1 time in 5   2)  How often have you had to urinate again less than two hours after you finished urinating? 1 - Less than 1 time in 5   3)  How often have you found you stopped and started again several times when you urinated?  0 - Not at all   4) How difficult have you found it to postpone urination?  2 - Less than half the time   5) How often have you had a weak urinary stream?  2 - Less than half the time   6) How often have you had to push or strain to begin urination?  4 - More than half the time   7) How many times did you most typically get up to urinate from the time you went to bed until the time you got up in the morning?  5 - 5+ times   Total score:  0-7 mildly symptomatic    8-19 moderately symptomatic -------------   15    20-35 severely symptomatic     Review  "of Systems   Constitutional: Negative for chills, fatigue and fever.   HENT: Negative for sinus pressure and sinus pain.    Respiratory: Negative for cough.    Cardiovascular: Negative for leg swelling.   Gastrointestinal: Positive for abdominal pain. Negative for constipation and diarrhea.   Genitourinary: Positive for dysuria, flank pain and hematuria. Negative for urgency.   Musculoskeletal: Negative for back pain.   Neurological: Negative for headaches.   Psychiatric/Behavioral: The patient is not nervous/anxious.          Physical Exam:     Physical Exam   Constitutional: He is oriented to person, place, and time.   HENT:   Head: Normocephalic and atraumatic.   Right Ear: External ear normal.   Left Ear: External ear normal.   Nose: Nose normal.   Mouth/Throat: Oropharynx is clear and moist.   Eyes: Conjunctivae and EOM are normal. Pupils are equal, round, and reactive to light.   Neck: Normal range of motion. Neck supple. No thyromegaly present.   Cardiovascular: Normal rate, regular rhythm, normal heart sounds and intact distal pulses.   No murmur heard.  Pulmonary/Chest: Effort normal and breath sounds normal. No respiratory distress. He has no wheezes. He has no rales. He exhibits no tenderness.   Abdominal: Soft. Bowel sounds are normal. He exhibits no distension and no mass. There is no tenderness. No hernia.   Musculoskeletal: Normal range of motion. He exhibits no edema or tenderness.   Lymphadenopathy:     He has no cervical adenopathy.   Neurological: He is alert and oriented to person, place, and time. No cranial nerve deficit. He exhibits normal muscle tone. Coordination normal.   Skin: Skin is warm. No rash noted.   Psychiatric: He has a normal mood and affect. His behavior is normal. Judgment and thought content normal.   Nursing note and vitals reviewed.      Ht 175.3 cm (69\")   Wt (!) 154 kg (340 lb)   BMI 50.21 kg/m²    Procedure:         Assessment:     Encounter Diagnosis   Name Primary? "   • Ureteral stone Yes     Orders Placed This Encounter   Procedures   • XR Abdomen KUB     Standing Status:   Future     Standing Expiration Date:   3/20/2020     Order Specific Question:   Reason for Exam:     Answer:   left ureteral stone       Plan:   Pt has a 60 % chance of passing the stone.  Pt would like to try and pass it on his own.  I think that is reasonable will see him next week with kub prior.    Patient's Body mass index is 50.21 kg/m². BMI is within normal parameters. No follow-up required..      Counseling was given to patient for the following topics impressions as follows: ureteral stone. The interim medical history and current results were reviewed.  A treatment plan with follow-up was made for Ureteral stone [N20.1].  This document has been electronically signed by Tobias Oscar MD March 21, 2019 2:15 PM

## 2019-03-28 ENCOUNTER — OFFICE VISIT (OUTPATIENT)
Dept: UROLOGY | Facility: CLINIC | Age: 29
End: 2019-03-28

## 2019-03-28 ENCOUNTER — HOSPITAL ENCOUNTER (OUTPATIENT)
Dept: GENERAL RADIOLOGY | Facility: HOSPITAL | Age: 29
Discharge: HOME | End: 2019-03-28
Admitting: UROLOGY

## 2019-03-28 VITALS — HEIGHT: 69 IN | BODY MASS INDEX: 46.65 KG/M2 | WEIGHT: 315 LBS

## 2019-03-28 DIAGNOSIS — N20.1 URETERAL STONE: ICD-10-CM

## 2019-03-28 DIAGNOSIS — N20.1 URETERAL STONE: Primary | ICD-10-CM

## 2019-03-28 PROCEDURE — 74018 RADEX ABDOMEN 1 VIEW: CPT

## 2019-03-28 PROCEDURE — 99213 OFFICE O/P EST LOW 20 MIN: CPT | Performed by: UROLOGY

## 2019-03-28 PROCEDURE — 74019 RADEX ABDOMEN 2 VIEWS: CPT | Performed by: RADIOLOGY

## 2019-03-28 NOTE — PROGRESS NOTES
Chief Complaint:          Kidney Stone    HPI:   28 y.o. male.  Pt is doing ok with trying to pass his stone.  He has not had much pain.  He has been taking flomax on and off.    HPI      Past Medical History:        Past Medical History:   Diagnosis Date   • Anxiety    • Arthritis    • Calculus of kidney    • Headache    • Hyperlipidemia    • Hypertension    • Insomnia    • Neuropathy    • Panic disorder    • Sciatica    • Tachycardia          Current Meds:     Current Outpatient Medications   Medication Sig Dispense Refill   • atorvastatin (LIPITOR) 80 MG tablet Take 1 tablet by mouth Daily. for cholesterol 30 tablet 3   • doxepin (SINEquan) 10 MG capsule Take 1 capsule by mouth 3 (Three) Times a Day. 90 capsule 0   • ezetimibe (ZETIA) 10 MG tablet Take 1 tablet by mouth Daily. 30 tablet 11   • Fesoterodine Fumarate (TOVIAZ PO) Take  by mouth.     • FLUoxetine (PROzac) 40 MG capsule Take 2 capsules by mouth Daily. 60 capsule 0   • furosemide (LASIX) 20 MG tablet Take 1 tablet by mouth Daily. 90 tablet 0   • gabapentin (NEURONTIN) 600 MG tablet Take 600 mg by mouth 6 (Six) Times a Day.     • ibuprofen (ADVIL,MOTRIN) 800 MG tablet Take 1 tablet by mouth Every 8 (Eight) Hours As Needed for Moderate Pain . (Patient taking differently: Take 600 mg by mouth Every 8 (Eight) Hours As Needed for Moderate Pain .) 15 tablet 0   • losartan-hydrochlorothiazide (HYZAAR) 50-12.5 MG per tablet Take 1 tablet by mouth Daily. 30 tablet 5   • lurasidone HCl (LATUDA) 20 MG tablet tablet Take 1 tablet by mouth Daily. 30 tablet 0   • methylphenidate (RITALIN) 10 MG tablet Take 1.5 tablets by mouth 2 (Two) Times a Day. 90 tablet 0   • metoprolol tartrate (LOPRESSOR) 100 MG tablet TAKE ONE TABLET BY MOUTH EVERY DAY FOR BLOOD PRESSURE 30 tablet 2   • metoprolol tartrate (LOPRESSOR) 100 MG tablet Take 1 tablet by mouth Daily. Take in addition to 50mg tablet daily 30 tablet 5   • ondansetron (ZOFRAN) 4 MG tablet Take 1 tablet by mouth Every  8 (Eight) Hours As Needed for Nausea or Vomiting. 30 tablet 0   • ondansetron ODT (ZOFRAN-ODT) 4 MG disintegrating tablet Take 1 tablet by mouth Every 6 (Six) Hours As Needed for Nausea or Vomiting. 12 tablet 0   • oxyCODONE-acetaminophen (PERCOCET) 5-325 MG per tablet Take 1 tablet by mouth Every 4 (Four) Hours As Needed for Moderate Pain . 12 tablet 0   • oxyCODONE-acetaminophen (PERCOCET) 5-325 MG per tablet 1 to 2 Tablets Every 6 Hours as needed for PAIN 40 tablet 0   • prazosin (MINIPRESS) 5 MG capsule Take 2 capsules by mouth Every Night. 60 capsule 0   • QUEtiapine (SEROquel) 100 MG tablet Take 1 tablet by mouth Every Night. 30 tablet 0   • Syringe, Disposable, 3 ML misc Use 3 ml syringe with a 25 gauge  5/8 inch needle 24 each 6   • tamsulosin (FLOMAX) 0.4 MG capsule 24 hr capsule Take 1 capsule by mouth Daily. 15 capsule 0   • tamsulosin (FLOMAX) 0.4 MG capsule 24 hr capsule Take 1 capsule by mouth Every Night. 30 capsule 11   • Testosterone Cypionate (DEPO-TESTOSTERONE) 200 MG/ML injection He is to use 1/2 cc every Monday and Thursday SQ 10 mL 2   • traMADol (ULTRAM) 50 MG tablet Take 1 tablet by mouth Every 8 (Eight) Hours As Needed for Moderate Pain  or Severe Pain . 45 tablet 0   • vitamin D (ERGOCALCIFEROL) 98476 units capsule capsule Take 1 capsule by mouth 1 (One) Time Per Week. 4 capsule 2     No current facility-administered medications for this visit.         Allergies:      Allergies   Allergen Reactions   • Lisinopril Cough   • Contrast Dye Rash        Past Surgical History:     Past Surgical History:   Procedure Laterality Date   • KIDNEY SURGERY      Stents placed and removed.    • LAPAROSCOPIC GASTRIC BANDING     • OTHER SURGICAL HISTORY      diagnostic esophagogastroduodenoscopy   • OTHER SURGICAL HISTORY      renal lithotripsy         Social History:     Social History     Socioeconomic History   • Marital status:      Spouse name: Not on file   • Number of children: Not on file   •  "Years of education: Not on file   • Highest education level: Not on file   Tobacco Use   • Smoking status: Former Smoker     Types: Cigarettes   • Smokeless tobacco: Never Used   • Tobacco comment: smokes 1 pack of cigarettes per day   Substance and Sexual Activity   • Alcohol use: No   • Drug use: No   • Sexual activity: Defer       Family History:     Family History   Problem Relation Age of Onset   • Colon cancer Other    • Heart disease Other    • Lymphoma Other    • Heart disease Mother    • Hypertension Mother    • Lupus Mother    • Skin cancer Father    • Kidney disease Father        Review of Systems:     Review of Systems   Constitutional: Negative for activity change, appetite change, chills, diaphoresis, fatigue, fever and unexpected weight change.   HENT: Negative.    Eyes: Negative.    Respiratory: Negative.    Cardiovascular: Negative.    Gastrointestinal: Negative.    Genitourinary: Negative.    Skin: Negative.    Neurological: Negative.          Physical Exam:     Physical Exam    Ht 175.3 cm (69.02\")   Wt (!) 154 kg (340 lb)   BMI 50.19 kg/m²    Procedure:         Assessment:     Encounter Diagnosis   Name Primary?   • Ureteral stone Yes     No orders of the defined types were placed in this encounter.      Plan:   Pt continue conservative management and will see him back in 2 weeks    Patient's Body mass index is 50.19 kg/m². BMI is above normal parameters. Recommendations include: educational material.      Counseling was given to patient for the following topics instructions for management as follows: ureteral stone. The interim medical history and current results were reviewed.  A treatment plan with follow-up was made for Ureteral stone [N20.1]. I spent 16 minutes face to face with Andrew Narayan and 80 percentage was spent in counseling.    This document has been electronically signed by Tobias Oscar MD March 28, 2019 12:59 PM  "

## 2019-04-11 ENCOUNTER — OFFICE VISIT (OUTPATIENT)
Dept: UROLOGY | Facility: CLINIC | Age: 29
End: 2019-04-11

## 2019-04-11 VITALS — HEIGHT: 69 IN | BODY MASS INDEX: 46.65 KG/M2 | WEIGHT: 315 LBS

## 2019-04-11 DIAGNOSIS — N20.1 URETERAL STONE: Primary | ICD-10-CM

## 2019-04-11 PROCEDURE — 99213 OFFICE O/P EST LOW 20 MIN: CPT | Performed by: UROLOGY

## 2019-04-11 RX ORDER — OXYCODONE HYDROCHLORIDE AND ACETAMINOPHEN 5; 325 MG/1; MG/1
TABLET ORAL
Qty: 40 TABLET | Refills: 0 | Status: ON HOLD | OUTPATIENT
Start: 2019-04-11 | End: 2019-04-15

## 2019-04-11 NOTE — PROGRESS NOTES
Chief Complaint:          Ureteral stone    HPI:   28 y.o. male.  Pt has been trying to pass a left sided 4 mm stone that was seen on ct scan done 3/17 during his ER visit for severe left flank pain.  HPI      Past Medical History:        Past Medical History:   Diagnosis Date   • Anxiety    • Arthritis    • Calculus of kidney    • Headache    • Hyperlipidemia    • Hypertension    • Insomnia    • Neuropathy    • Panic disorder    • Sciatica    • Tachycardia          Current Meds:     Current Outpatient Medications   Medication Sig Dispense Refill   • atorvastatin (LIPITOR) 80 MG tablet Take 1 tablet by mouth Daily. for cholesterol 30 tablet 3   • doxepin (SINEquan) 10 MG capsule Take 1 capsule by mouth 3 (Three) Times a Day. 90 capsule 0   • ezetimibe (ZETIA) 10 MG tablet Take 1 tablet by mouth Daily. 30 tablet 11   • Fesoterodine Fumarate (TOVIAZ PO) Take  by mouth.     • FLUoxetine (PROzac) 40 MG capsule Take 2 capsules by mouth Daily. 60 capsule 0   • furosemide (LASIX) 20 MG tablet Take 1 tablet by mouth Daily. 90 tablet 0   • gabapentin (NEURONTIN) 600 MG tablet Take 600 mg by mouth 6 (Six) Times a Day.     • ibuprofen (ADVIL,MOTRIN) 800 MG tablet Take 1 tablet by mouth Every 8 (Eight) Hours As Needed for Moderate Pain . (Patient taking differently: Take 600 mg by mouth Every 8 (Eight) Hours As Needed for Moderate Pain .) 15 tablet 0   • losartan-hydrochlorothiazide (HYZAAR) 50-12.5 MG per tablet Take 1 tablet by mouth Daily. 30 tablet 5   • lurasidone HCl (LATUDA) 20 MG tablet tablet Take 1 tablet by mouth Daily. 30 tablet 0   • methylphenidate (RITALIN) 10 MG tablet Take 1.5 tablets by mouth 2 (Two) Times a Day. 90 tablet 0   • metoprolol tartrate (LOPRESSOR) 100 MG tablet TAKE ONE TABLET BY MOUTH EVERY DAY FOR BLOOD PRESSURE 30 tablet 2   • metoprolol tartrate (LOPRESSOR) 100 MG tablet Take 1 tablet by mouth Daily. Take in addition to 50mg tablet daily 30 tablet 5   • ondansetron (ZOFRAN) 4 MG tablet Take 1  tablet by mouth Every 8 (Eight) Hours As Needed for Nausea or Vomiting. 30 tablet 0   • ondansetron ODT (ZOFRAN-ODT) 4 MG disintegrating tablet Take 1 tablet by mouth Every 6 (Six) Hours As Needed for Nausea or Vomiting. 12 tablet 0   • oxyCODONE-acetaminophen (PERCOCET) 5-325 MG per tablet Take 1 tablet by mouth Every 4 (Four) Hours As Needed for Moderate Pain . 12 tablet 0   • oxyCODONE-acetaminophen (PERCOCET) 5-325 MG per tablet 1 to 2 Tablets Every 6 Hours as needed for PAIN 40 tablet 0   • prazosin (MINIPRESS) 5 MG capsule Take 2 capsules by mouth Every Night. 60 capsule 0   • QUEtiapine (SEROquel) 100 MG tablet Take 1 tablet by mouth Every Night. 30 tablet 0   • Syringe, Disposable, 3 ML misc Use 3 ml syringe with a 25 gauge  5/8 inch needle 24 each 6   • tamsulosin (FLOMAX) 0.4 MG capsule 24 hr capsule Take 1 capsule by mouth Daily. 15 capsule 0   • tamsulosin (FLOMAX) 0.4 MG capsule 24 hr capsule Take 1 capsule by mouth Every Night. 30 capsule 11   • Testosterone Cypionate (DEPO-TESTOSTERONE) 200 MG/ML injection He is to use 1/2 cc every Monday and Thursday SQ 10 mL 2   • traMADol (ULTRAM) 50 MG tablet Take 1 tablet by mouth Every 8 (Eight) Hours As Needed for Moderate Pain  or Severe Pain . 45 tablet 0   • vitamin D (ERGOCALCIFEROL) 66009 units capsule capsule Take 1 capsule by mouth 1 (One) Time Per Week. 4 capsule 2     No current facility-administered medications for this visit.         Allergies:      Allergies   Allergen Reactions   • Lisinopril Cough   • Contrast Dye Rash        Past Surgical History:     Past Surgical History:   Procedure Laterality Date   • KIDNEY SURGERY      Stents placed and removed.    • LAPAROSCOPIC GASTRIC BANDING     • OTHER SURGICAL HISTORY      diagnostic esophagogastroduodenoscopy   • OTHER SURGICAL HISTORY      renal lithotripsy         Social History:     Social History     Socioeconomic History   • Marital status:      Spouse name: Not on file   • Number of  children: Not on file   • Years of education: Not on file   • Highest education level: Not on file   Tobacco Use   • Smoking status: Former Smoker     Types: Cigarettes   • Smokeless tobacco: Never Used   • Tobacco comment: smokes 1 pack of cigarettes per day   Substance and Sexual Activity   • Alcohol use: No   • Drug use: No   • Sexual activity: Defer       Family History:     Family History   Problem Relation Age of Onset   • Colon cancer Other    • Heart disease Other    • Lymphoma Other    • Heart disease Mother    • Hypertension Mother    • Lupus Mother    • Skin cancer Father    • Kidney disease Father        Review of Systems:     Review of Systems   Constitutional: Negative for fatigue.   HENT: Negative for sinus pressure and sinus pain.    Eyes: Negative for pain and redness.   Respiratory: Negative for chest tightness.    Cardiovascular: Negative for chest pain.   Gastrointestinal: Negative for abdominal pain.   Endocrine: Negative for heat intolerance.   Genitourinary: Negative for flank pain.   Musculoskeletal: Negative for back pain.   Allergic/Immunologic: Negative for food allergies.   Neurological: Negative for headaches.   Hematological: Does not bruise/bleed easily.   Psychiatric/Behavioral: The patient is not nervous/anxious.              I have reviewed the follow portions of the patient's history and confirmed they are accurate today:  allergies, current medications, past family history, past medical history, past social history, past surgical history, problem list and ROS  Physical Exam:     Physical Exam   Constitutional: He is oriented to person, place, and time.   Morbidly obese   HENT:   Head: Normocephalic and atraumatic.   Right Ear: External ear normal.   Left Ear: External ear normal.   Nose: Nose normal.   Mouth/Throat: Oropharynx is clear and moist.   Eyes: Conjunctivae and EOM are normal. Pupils are equal, round, and reactive to light.   Neck: Normal range of motion. Neck supple. No  "thyromegaly present.   Cardiovascular: Normal rate, regular rhythm, normal heart sounds and intact distal pulses.   No murmur heard.  Pulmonary/Chest: Effort normal and breath sounds normal. No respiratory distress. He has no wheezes. He has no rales. He exhibits no tenderness.   Abdominal: Soft. Bowel sounds are normal. He exhibits no distension and no mass. There is no tenderness. No hernia.   Genitourinary: Rectum normal, prostate normal and penis normal.   Musculoskeletal: Normal range of motion. He exhibits no edema or tenderness.   Lymphadenopathy:     He has no cervical adenopathy.   Neurological: He is alert and oriented to person, place, and time. No cranial nerve deficit. He exhibits normal muscle tone. Coordination normal.   Skin: Skin is warm. No rash noted.   Psychiatric: He has a normal mood and affect. His behavior is normal. Judgment and thought content normal.   Nursing note and vitals reviewed.      Ht 175.3 cm (69.02\")   Wt (!) 154 kg (340 lb)   BMI 50.18 kg/m²    Procedure:         Assessment:     Encounter Diagnosis   Name Primary?   • Ureteral stone Yes     No orders of the defined types were placed in this encounter.      Plan:   Left ureteroscopy holmium laser lithotripsy possible stent possible bilateral flexible ureteroscopy and laser.      Counseling was given to patient for the following topics risks and benefits of treatment options including: ureteral stone laser. The interim medical history and current results were reviewed.  A treatment plan with follow-up was made for Ureteral stone [N20.1].   This document has been electronically signed by Tobias Oscar MD April 11, 2019 1:55 PM  "

## 2019-04-11 NOTE — H&P (VIEW-ONLY)
Chief Complaint:          Ureteral stone    HPI:   28 y.o. male.  Pt has been trying to pass a left sided 4 mm stone that was seen on ct scan done 3/17 during his ER visit for severe left flank pain.  HPI      Past Medical History:        Past Medical History:   Diagnosis Date   • Anxiety    • Arthritis    • Calculus of kidney    • Headache    • Hyperlipidemia    • Hypertension    • Insomnia    • Neuropathy    • Panic disorder    • Sciatica    • Tachycardia          Current Meds:     Current Outpatient Medications   Medication Sig Dispense Refill   • atorvastatin (LIPITOR) 80 MG tablet Take 1 tablet by mouth Daily. for cholesterol 30 tablet 3   • doxepin (SINEquan) 10 MG capsule Take 1 capsule by mouth 3 (Three) Times a Day. 90 capsule 0   • ezetimibe (ZETIA) 10 MG tablet Take 1 tablet by mouth Daily. 30 tablet 11   • Fesoterodine Fumarate (TOVIAZ PO) Take  by mouth.     • FLUoxetine (PROzac) 40 MG capsule Take 2 capsules by mouth Daily. 60 capsule 0   • furosemide (LASIX) 20 MG tablet Take 1 tablet by mouth Daily. 90 tablet 0   • gabapentin (NEURONTIN) 600 MG tablet Take 600 mg by mouth 6 (Six) Times a Day.     • ibuprofen (ADVIL,MOTRIN) 800 MG tablet Take 1 tablet by mouth Every 8 (Eight) Hours As Needed for Moderate Pain . (Patient taking differently: Take 600 mg by mouth Every 8 (Eight) Hours As Needed for Moderate Pain .) 15 tablet 0   • losartan-hydrochlorothiazide (HYZAAR) 50-12.5 MG per tablet Take 1 tablet by mouth Daily. 30 tablet 5   • lurasidone HCl (LATUDA) 20 MG tablet tablet Take 1 tablet by mouth Daily. 30 tablet 0   • methylphenidate (RITALIN) 10 MG tablet Take 1.5 tablets by mouth 2 (Two) Times a Day. 90 tablet 0   • metoprolol tartrate (LOPRESSOR) 100 MG tablet TAKE ONE TABLET BY MOUTH EVERY DAY FOR BLOOD PRESSURE 30 tablet 2   • metoprolol tartrate (LOPRESSOR) 100 MG tablet Take 1 tablet by mouth Daily. Take in addition to 50mg tablet daily 30 tablet 5   • ondansetron (ZOFRAN) 4 MG tablet Take 1  tablet by mouth Every 8 (Eight) Hours As Needed for Nausea or Vomiting. 30 tablet 0   • ondansetron ODT (ZOFRAN-ODT) 4 MG disintegrating tablet Take 1 tablet by mouth Every 6 (Six) Hours As Needed for Nausea or Vomiting. 12 tablet 0   • oxyCODONE-acetaminophen (PERCOCET) 5-325 MG per tablet Take 1 tablet by mouth Every 4 (Four) Hours As Needed for Moderate Pain . 12 tablet 0   • oxyCODONE-acetaminophen (PERCOCET) 5-325 MG per tablet 1 to 2 Tablets Every 6 Hours as needed for PAIN 40 tablet 0   • prazosin (MINIPRESS) 5 MG capsule Take 2 capsules by mouth Every Night. 60 capsule 0   • QUEtiapine (SEROquel) 100 MG tablet Take 1 tablet by mouth Every Night. 30 tablet 0   • Syringe, Disposable, 3 ML misc Use 3 ml syringe with a 25 gauge  5/8 inch needle 24 each 6   • tamsulosin (FLOMAX) 0.4 MG capsule 24 hr capsule Take 1 capsule by mouth Daily. 15 capsule 0   • tamsulosin (FLOMAX) 0.4 MG capsule 24 hr capsule Take 1 capsule by mouth Every Night. 30 capsule 11   • Testosterone Cypionate (DEPO-TESTOSTERONE) 200 MG/ML injection He is to use 1/2 cc every Monday and Thursday SQ 10 mL 2   • traMADol (ULTRAM) 50 MG tablet Take 1 tablet by mouth Every 8 (Eight) Hours As Needed for Moderate Pain  or Severe Pain . 45 tablet 0   • vitamin D (ERGOCALCIFEROL) 45470 units capsule capsule Take 1 capsule by mouth 1 (One) Time Per Week. 4 capsule 2     No current facility-administered medications for this visit.         Allergies:      Allergies   Allergen Reactions   • Lisinopril Cough   • Contrast Dye Rash        Past Surgical History:     Past Surgical History:   Procedure Laterality Date   • KIDNEY SURGERY      Stents placed and removed.    • LAPAROSCOPIC GASTRIC BANDING     • OTHER SURGICAL HISTORY      diagnostic esophagogastroduodenoscopy   • OTHER SURGICAL HISTORY      renal lithotripsy         Social History:     Social History     Socioeconomic History   • Marital status:      Spouse name: Not on file   • Number of  children: Not on file   • Years of education: Not on file   • Highest education level: Not on file   Tobacco Use   • Smoking status: Former Smoker     Types: Cigarettes   • Smokeless tobacco: Never Used   • Tobacco comment: smokes 1 pack of cigarettes per day   Substance and Sexual Activity   • Alcohol use: No   • Drug use: No   • Sexual activity: Defer       Family History:     Family History   Problem Relation Age of Onset   • Colon cancer Other    • Heart disease Other    • Lymphoma Other    • Heart disease Mother    • Hypertension Mother    • Lupus Mother    • Skin cancer Father    • Kidney disease Father        Review of Systems:     Review of Systems   Constitutional: Negative for fatigue.   HENT: Negative for sinus pressure and sinus pain.    Eyes: Negative for pain and redness.   Respiratory: Negative for chest tightness.    Cardiovascular: Negative for chest pain.   Gastrointestinal: Negative for abdominal pain.   Endocrine: Negative for heat intolerance.   Genitourinary: Negative for flank pain.   Musculoskeletal: Negative for back pain.   Allergic/Immunologic: Negative for food allergies.   Neurological: Negative for headaches.   Hematological: Does not bruise/bleed easily.   Psychiatric/Behavioral: The patient is not nervous/anxious.              I have reviewed the follow portions of the patient's history and confirmed they are accurate today:  allergies, current medications, past family history, past medical history, past social history, past surgical history, problem list and ROS  Physical Exam:     Physical Exam   Constitutional: He is oriented to person, place, and time.   Morbidly obese   HENT:   Head: Normocephalic and atraumatic.   Right Ear: External ear normal.   Left Ear: External ear normal.   Nose: Nose normal.   Mouth/Throat: Oropharynx is clear and moist.   Eyes: Conjunctivae and EOM are normal. Pupils are equal, round, and reactive to light.   Neck: Normal range of motion. Neck supple. No  "thyromegaly present.   Cardiovascular: Normal rate, regular rhythm, normal heart sounds and intact distal pulses.   No murmur heard.  Pulmonary/Chest: Effort normal and breath sounds normal. No respiratory distress. He has no wheezes. He has no rales. He exhibits no tenderness.   Abdominal: Soft. Bowel sounds are normal. He exhibits no distension and no mass. There is no tenderness. No hernia.   Genitourinary: Rectum normal, prostate normal and penis normal.   Musculoskeletal: Normal range of motion. He exhibits no edema or tenderness.   Lymphadenopathy:     He has no cervical adenopathy.   Neurological: He is alert and oriented to person, place, and time. No cranial nerve deficit. He exhibits normal muscle tone. Coordination normal.   Skin: Skin is warm. No rash noted.   Psychiatric: He has a normal mood and affect. His behavior is normal. Judgment and thought content normal.   Nursing note and vitals reviewed.      Ht 175.3 cm (69.02\")   Wt (!) 154 kg (340 lb)   BMI 50.18 kg/m²    Procedure:         Assessment:     Encounter Diagnosis   Name Primary?   • Ureteral stone Yes     No orders of the defined types were placed in this encounter.      Plan:   Left ureteroscopy holmium laser lithotripsy possible stent possible bilateral flexible ureteroscopy and laser.      Counseling was given to patient for the following topics risks and benefits of treatment options including: ureteral stone laser. The interim medical history and current results were reviewed.  A treatment plan with follow-up was made for Ureteral stone [N20.1].   This document has been electronically signed by Tobias Oscar MD April 11, 2019 1:55 PM  "

## 2019-04-12 ENCOUNTER — HOSPITAL ENCOUNTER (EMERGENCY)
Facility: HOSPITAL | Age: 29
Discharge: LEFT BEFORE DISCHARGE | End: 2019-04-12

## 2019-04-12 VITALS
DIASTOLIC BLOOD PRESSURE: 73 MMHG | HEART RATE: 83 BPM | OXYGEN SATURATION: 97 % | SYSTOLIC BLOOD PRESSURE: 139 MMHG | RESPIRATION RATE: 18 BRPM | HEIGHT: 69 IN | BODY MASS INDEX: 46.65 KG/M2 | WEIGHT: 315 LBS | TEMPERATURE: 97.7 F

## 2019-04-12 NOTE — ED NOTES
Pt reported he was hurting too bad and was leaving, unable to attempt to ask pt to stay for completion of treatment plan due to pt walking out of facility.     Jenna Mauricio, RN  04/12/19 [id number]

## 2019-04-12 NOTE — ED NOTES
Pt left with out being seen said he was hurting to bad to stay any longer     Fore, Rogers  04/12/19 [id number]

## 2019-04-15 ENCOUNTER — ANESTHESIA (OUTPATIENT)
Dept: PERIOP | Facility: HOSPITAL | Age: 29
End: 2019-04-15

## 2019-04-15 ENCOUNTER — ANESTHESIA EVENT (OUTPATIENT)
Dept: PERIOP | Facility: HOSPITAL | Age: 29
End: 2019-04-15

## 2019-04-15 ENCOUNTER — APPOINTMENT (OUTPATIENT)
Dept: GENERAL RADIOLOGY | Facility: HOSPITAL | Age: 29
End: 2019-04-15

## 2019-04-15 ENCOUNTER — HOSPITAL ENCOUNTER (OUTPATIENT)
Facility: HOSPITAL | Age: 29
Setting detail: HOSPITAL OUTPATIENT SURGERY
Discharge: HOME | End: 2019-04-15
Attending: UROLOGY | Admitting: UROLOGY

## 2019-04-15 ENCOUNTER — HOSPITAL ENCOUNTER (OUTPATIENT)
Dept: GENERAL RADIOLOGY | Facility: HOSPITAL | Age: 29
Discharge: HOME | End: 2019-04-15

## 2019-04-15 ENCOUNTER — APPOINTMENT (OUTPATIENT)
Dept: PREADMISSION TESTING | Facility: HOSPITAL | Age: 29
End: 2019-04-15

## 2019-04-15 VITALS
DIASTOLIC BLOOD PRESSURE: 86 MMHG | SYSTOLIC BLOOD PRESSURE: 137 MMHG | OXYGEN SATURATION: 97 % | HEART RATE: 64 BPM | BODY MASS INDEX: 46.65 KG/M2 | HEIGHT: 69 IN | WEIGHT: 315 LBS | RESPIRATION RATE: 18 BRPM | TEMPERATURE: 97.7 F

## 2019-04-15 DIAGNOSIS — N20.1 URETERAL STONE: ICD-10-CM

## 2019-04-15 PROCEDURE — C1894 INTRO/SHEATH, NON-LASER: HCPCS | Performed by: UROLOGY

## 2019-04-15 PROCEDURE — 93010 ELECTROCARDIOGRAM REPORT: CPT | Performed by: INTERNAL MEDICINE

## 2019-04-15 PROCEDURE — 82360 CALCULUS ASSAY QUANT: CPT | Performed by: UROLOGY

## 2019-04-15 PROCEDURE — 25010000002 FENTANYL CITRATE (PF) 100 MCG/2ML SOLUTION: Performed by: ANESTHESIOLOGY

## 2019-04-15 PROCEDURE — 93005 ELECTROCARDIOGRAM TRACING: CPT

## 2019-04-15 PROCEDURE — 25010000002 SUCCINYLCHOLINE PER 20 MG: Performed by: NURSE ANESTHETIST, CERTIFIED REGISTERED

## 2019-04-15 PROCEDURE — 25010000002 PROPOFOL 10 MG/ML EMULSION: Performed by: NURSE ANESTHETIST, CERTIFIED REGISTERED

## 2019-04-15 PROCEDURE — 25010000002 FENTANYL CITRATE (PF) 100 MCG/2ML SOLUTION: Performed by: NURSE ANESTHETIST, CERTIFIED REGISTERED

## 2019-04-15 PROCEDURE — 25010000002 ONDANSETRON PER 1 MG: Performed by: NURSE ANESTHETIST, CERTIFIED REGISTERED

## 2019-04-15 PROCEDURE — C1769 GUIDE WIRE: HCPCS | Performed by: UROLOGY

## 2019-04-15 PROCEDURE — 25010000002 MIDAZOLAM PER 1 MG: Performed by: NURSE ANESTHETIST, CERTIFIED REGISTERED

## 2019-04-15 PROCEDURE — 76000 FLUOROSCOPY <1 HR PHYS/QHP: CPT

## 2019-04-15 PROCEDURE — 52353 CYSTOURETERO W/LITHOTRIPSY: CPT | Performed by: UROLOGY

## 2019-04-15 RX ORDER — CEPHALEXIN 250 MG/1
250 CAPSULE ORAL 3 TIMES DAILY
Qty: 21 CAPSULE | Refills: 0 | Status: SHIPPED | OUTPATIENT
Start: 2019-04-15 | End: 2019-04-25

## 2019-04-15 RX ORDER — ONDANSETRON 2 MG/ML
4 INJECTION INTRAMUSCULAR; INTRAVENOUS ONCE AS NEEDED
Status: DISCONTINUED | OUTPATIENT
Start: 2019-04-15 | End: 2019-04-15 | Stop reason: HOSPADM

## 2019-04-15 RX ORDER — MAGNESIUM HYDROXIDE 1200 MG/15ML
LIQUID ORAL AS NEEDED
Status: DISCONTINUED | OUTPATIENT
Start: 2019-04-15 | End: 2019-04-15 | Stop reason: HOSPADM

## 2019-04-15 RX ORDER — SUCCINYLCHOLINE CHLORIDE 20 MG/ML
INJECTION INTRAMUSCULAR; INTRAVENOUS AS NEEDED
Status: DISCONTINUED | OUTPATIENT
Start: 2019-04-15 | End: 2019-04-15 | Stop reason: SURG

## 2019-04-15 RX ORDER — FENTANYL CITRATE 50 UG/ML
INJECTION, SOLUTION INTRAMUSCULAR; INTRAVENOUS AS NEEDED
Status: DISCONTINUED | OUTPATIENT
Start: 2019-04-15 | End: 2019-04-15 | Stop reason: SURG

## 2019-04-15 RX ORDER — OXYCODONE HYDROCHLORIDE AND ACETAMINOPHEN 5; 325 MG/1; MG/1
1 TABLET ORAL ONCE AS NEEDED
Status: DISCONTINUED | OUTPATIENT
Start: 2019-04-15 | End: 2019-04-15 | Stop reason: HOSPADM

## 2019-04-15 RX ORDER — MEPERIDINE HYDROCHLORIDE 25 MG/ML
12.5 INJECTION INTRAMUSCULAR; INTRAVENOUS; SUBCUTANEOUS
Status: DISCONTINUED | OUTPATIENT
Start: 2019-04-15 | End: 2019-04-15 | Stop reason: HOSPADM

## 2019-04-15 RX ORDER — ONDANSETRON 2 MG/ML
INJECTION INTRAMUSCULAR; INTRAVENOUS AS NEEDED
Status: DISCONTINUED | OUTPATIENT
Start: 2019-04-15 | End: 2019-04-15 | Stop reason: SURG

## 2019-04-15 RX ORDER — SODIUM CHLORIDE, SODIUM LACTATE, POTASSIUM CHLORIDE, CALCIUM CHLORIDE 600; 310; 30; 20 MG/100ML; MG/100ML; MG/100ML; MG/100ML
125 INJECTION, SOLUTION INTRAVENOUS CONTINUOUS
Status: DISCONTINUED | OUTPATIENT
Start: 2019-04-15 | End: 2019-04-15 | Stop reason: HOSPADM

## 2019-04-15 RX ORDER — PROPOFOL 10 MG/ML
VIAL (ML) INTRAVENOUS AS NEEDED
Status: DISCONTINUED | OUTPATIENT
Start: 2019-04-15 | End: 2019-04-15 | Stop reason: SURG

## 2019-04-15 RX ORDER — ROCURONIUM BROMIDE 10 MG/ML
INJECTION, SOLUTION INTRAVENOUS AS NEEDED
Status: DISCONTINUED | OUTPATIENT
Start: 2019-04-15 | End: 2019-04-15 | Stop reason: SURG

## 2019-04-15 RX ORDER — FENTANYL CITRATE 50 UG/ML
50 INJECTION, SOLUTION INTRAMUSCULAR; INTRAVENOUS
Status: COMPLETED | OUTPATIENT
Start: 2019-04-15 | End: 2019-04-15

## 2019-04-15 RX ORDER — SODIUM CHLORIDE 0.9 % (FLUSH) 0.9 %
3-10 SYRINGE (ML) INJECTION AS NEEDED
Status: DISCONTINUED | OUTPATIENT
Start: 2019-04-15 | End: 2019-04-15 | Stop reason: HOSPADM

## 2019-04-15 RX ORDER — SODIUM CHLORIDE 0.9 % (FLUSH) 0.9 %
3 SYRINGE (ML) INJECTION EVERY 12 HOURS SCHEDULED
Status: DISCONTINUED | OUTPATIENT
Start: 2019-04-15 | End: 2019-04-15 | Stop reason: HOSPADM

## 2019-04-15 RX ORDER — FAMOTIDINE 10 MG/ML
INJECTION, SOLUTION INTRAVENOUS AS NEEDED
Status: DISCONTINUED | OUTPATIENT
Start: 2019-04-15 | End: 2019-04-15 | Stop reason: SURG

## 2019-04-15 RX ORDER — LIDOCAINE HYDROCHLORIDE 20 MG/ML
INJECTION, SOLUTION INFILTRATION; PERINEURAL AS NEEDED
Status: DISCONTINUED | OUTPATIENT
Start: 2019-04-15 | End: 2019-04-15 | Stop reason: SURG

## 2019-04-15 RX ORDER — MIDAZOLAM HYDROCHLORIDE 1 MG/ML
INJECTION INTRAMUSCULAR; INTRAVENOUS AS NEEDED
Status: DISCONTINUED | OUTPATIENT
Start: 2019-04-15 | End: 2019-04-15 | Stop reason: SURG

## 2019-04-15 RX ORDER — FENTANYL CITRATE 50 UG/ML
100 INJECTION, SOLUTION INTRAMUSCULAR; INTRAVENOUS ONCE
Status: COMPLETED | OUTPATIENT
Start: 2019-04-15 | End: 2019-04-15

## 2019-04-15 RX ORDER — SODIUM CHLORIDE 9 MG/ML
INJECTION, SOLUTION INTRAVENOUS AS NEEDED
Status: DISCONTINUED | OUTPATIENT
Start: 2019-04-15 | End: 2019-04-15 | Stop reason: HOSPADM

## 2019-04-15 RX ORDER — OXYCODONE HYDROCHLORIDE AND ACETAMINOPHEN 5; 325 MG/1; MG/1
TABLET ORAL
Qty: 40 TABLET | Refills: 0 | Status: SHIPPED | OUTPATIENT
Start: 2019-04-15 | End: 2019-07-24

## 2019-04-15 RX ORDER — IPRATROPIUM BROMIDE AND ALBUTEROL SULFATE 2.5; .5 MG/3ML; MG/3ML
3 SOLUTION RESPIRATORY (INHALATION) ONCE AS NEEDED
Status: DISCONTINUED | OUTPATIENT
Start: 2019-04-15 | End: 2019-04-15 | Stop reason: HOSPADM

## 2019-04-15 RX ADMIN — FENTANYL CITRATE 50 MCG: 50 INJECTION INTRAMUSCULAR; INTRAVENOUS at 14:38

## 2019-04-15 RX ADMIN — MIDAZOLAM HYDROCHLORIDE 2 MG: 1 INJECTION, SOLUTION INTRAMUSCULAR; INTRAVENOUS at 13:29

## 2019-04-15 RX ADMIN — SUCCINYLCHOLINE CHLORIDE 160 MG: 20 INJECTION, SOLUTION INTRAMUSCULAR; INTRAVENOUS at 13:35

## 2019-04-15 RX ADMIN — FENTANYL CITRATE 100 MCG: 50 INJECTION INTRAMUSCULAR; INTRAVENOUS at 13:29

## 2019-04-15 RX ADMIN — PROPOFOL 200 MG: 10 INJECTION, EMULSION INTRAVENOUS at 13:35

## 2019-04-15 RX ADMIN — SODIUM CHLORIDE, POTASSIUM CHLORIDE, SODIUM LACTATE AND CALCIUM CHLORIDE 125 ML/HR: 600; 310; 30; 20 INJECTION, SOLUTION INTRAVENOUS at 11:38

## 2019-04-15 RX ADMIN — LIDOCAINE HYDROCHLORIDE 60 MG: 20 INJECTION, SOLUTION INFILTRATION; PERINEURAL at 13:29

## 2019-04-15 RX ADMIN — ROCURONIUM BROMIDE 5 MG: 10 INJECTION INTRAVENOUS at 13:35

## 2019-04-15 RX ADMIN — ONDANSETRON 4 MG: 2 INJECTION, SOLUTION INTRAMUSCULAR; INTRAVENOUS at 13:29

## 2019-04-15 RX ADMIN — FAMOTIDINE 20 MG: 10 INJECTION, SOLUTION INTRAVENOUS at 13:29

## 2019-04-15 RX ADMIN — FENTANYL CITRATE 50 MCG: 50 INJECTION INTRAMUSCULAR; INTRAVENOUS at 14:30

## 2019-04-15 RX ADMIN — CEFAZOLIN 1 G: 1 INJECTION, POWDER, FOR SOLUTION INTRAMUSCULAR; INTRAVENOUS; PARENTERAL at 13:38

## 2019-04-15 RX ADMIN — FENTANYL CITRATE 100 MCG: 50 INJECTION INTRAMUSCULAR; INTRAVENOUS at 11:38

## 2019-04-15 NOTE — OP NOTE
CYSTOSCOPY URETEROSCOPY LASER LITHOTRIPSY  Procedure Report    Patient Name:  Andrew Narayan  YOB: 1990    Date of Surgery:  4/15/2019     Indications: Symptomatic left ureteral stone failed conservative therapy  Pre-op Diagnosis:  Ureteral stone [N20.1]            Post-op Diagnosis:   Post-Op Diagnosis Codes:     * Ureteral stone [N20.1]      Ureteral stone broken and removed and a renal stone basketed    Procedure/CPT® Codes:      Procedure(s):  CYSTOSCOPY URETEROSCOPY LASER LITHOTRIPSY possible stent and possible bilateral flexible ureteroscopy    Staff:  Surgeon(s):  Tobias Oscar MD         Anesthesia: General    Estimated Blood Loss: None    Implants:    Nothing was implanted during the procedure    Specimen:                A: Stone fragments      Findings: The urethra was normal bladder mucosa was entirely without tumors or stones findings ureteroscopically had a distal 4 mm stone that was broken with a holmium laser 365 µm fiber set at 0.8 J and 8 Hz and then after the stones were extracted ureteroscopy up to the level of the kidney showed some very small stones that were removed with the ureteroscope and a basket.    Complications: None    Description of Procedure: Patient taken operating room and underwent a general anesthetic sterilely prepped and draped in usual fashion cystoscopy was performed with above findings being noted in the Glidewire was passed up the left ureter as a safety wire and then a Baker ureteroscope was passed up the left ureter and the stone was identified and treated with a 365 holmium laser fiber and then the ureteroscope was removed and a ureteral access sheath was placed over the safety wire and then flexible ureteroscopy was performed and the stones that were seen were basketed and the patient was taken to recovery room in satisfactory condition after his bladder was emptied      Tobias Oscar MD     Date: 4/15/2019  Time: 2:13 PM

## 2019-04-15 NOTE — ANESTHESIA PROCEDURE NOTES
Airway  Urgency: elective    Airway not difficult    General Information and Staff    Patient location during procedure: OR  CRNA: Linda Unger CRNA    Indications and Patient Condition  Indications for airway management: airway protection    Preoxygenated: yes  MILS maintained throughout  Mask difficulty assessment: 1 - vent by mask    Final Airway Details  Final airway type: endotracheal airway      Successful airway: ETT  Cuffed: yes   Successful intubation technique: direct laryngoscopy  Facilitating devices/methods: intubating stylet  Endotracheal tube insertion site: oral  Blade: Howie  Blade size: 3  ETT size (mm): 7.5  Cormack-Lehane Classification: grade I - full view of glottis  Placement verified by: chest auscultation, capnometry and palpation of cuff   Measured from: lips  ETT to lips (cm): 23  Number of attempts at approach: 1

## 2019-04-15 NOTE — ANESTHESIA PREPROCEDURE EVALUATION
Anesthesia Evaluation     Patient summary reviewed and Nursing notes reviewed   NPO Solid Status: > 8 hours  NPO Liquid Status: > 8 hours           Airway   Mallampati: III  TM distance: >3 FB  Neck ROM: full  No difficulty expected  Dental - normal exam     Pulmonary - normal exam   (+) a smoker Former, sleep apnea on CPAP,   Cardiovascular - normal exam    (+) hypertension, hyperlipidemia,       Neuro/Psych  (+) headaches, numbness, psychiatric history,     GI/Hepatic/Renal/Endo    (+) morbid obesity (super),  renal disease stones,     Musculoskeletal     Abdominal  - normal exam    Bowel sounds: normal.   Substance History - negative use     OB/GYN negative ob/gyn ROS         Other   (+) arthritis                     Anesthesia Plan    ASA 4     general     intravenous induction   Anesthetic plan, all risks, benefits, and alternatives have been provided, discussed and informed consent has been obtained with: patient.    Plan discussed with CRNA.

## 2019-04-15 NOTE — ANESTHESIA POSTPROCEDURE EVALUATION
Patient: Andrew Narayan    Procedure Summary     Date:  04/15/19 Room / Location:  [facility] OR 06 /  [facility] OR    Anesthesia Start:  1329 Anesthesia Stop:  1417    Procedure:  CYSTOSCOPY URETEROSCOPY LASER LITHOTRIPSYl flexible ureteroscopy (Left ) Diagnosis:       Ureteral stone      (Ureteral stone [N20.1])    Surgeon:  Tobias Oscar MD Provider:  Roberto Bennett MD    Anesthesia Type:  general ASA Status:  4          Anesthesia Type: general  Last vitals  BP   137/86 (04/15/19 1528)   Temp   97.7 °F (36.5 °C) (04/15/19 1528)   Pulse   64 (04/15/19 1528)   Resp   18 (04/15/19 1528)     SpO2   97 % (04/15/19 1528)     Post Anesthesia Care and Evaluation    Patient location during evaluation: PHASE II  Patient participation: complete - patient participated  Level of consciousness: awake and alert  Pain score: 1  Pain management: adequate  Airway patency: patent  Anesthetic complications: No anesthetic complications  PONV Status: controlled  Cardiovascular status: acceptable  Respiratory status: acceptable  Hydration status: acceptable

## 2019-04-18 LAB
CA PHOS CRY STONE QL IR: 5 %
COLOR STONE: NORMAL
COM CRY STONE QL IR: 95 %
COMPN STONE: NORMAL
Lab: NORMAL
Lab: NORMAL
NIDUS STONE QL: NORMAL
PATH REPORT.COMMENTS IMP SPEC: NORMAL
SIZE STONE: NORMAL MM
WT STONE: 22 MG

## 2019-06-18 ENCOUNTER — TELEPHONE (OUTPATIENT)
Dept: PSYCHIATRY | Facility: CLINIC | Age: 29
End: 2019-06-18

## 2019-06-18 DIAGNOSIS — F42.2 MIXED OBSESSIONAL THOUGHTS AND ACTS: ICD-10-CM

## 2019-06-18 RX ORDER — QUETIAPINE FUMARATE 100 MG/1
100 TABLET, FILM COATED ORAL NIGHTLY
Qty: 30 TABLET | Refills: 0 | Status: SHIPPED | OUTPATIENT
Start: 2019-06-18 | End: 2019-07-18 | Stop reason: SDUPTHER

## 2019-06-25 ENCOUNTER — TELEPHONE (OUTPATIENT)
Dept: PSYCHIATRY | Facility: CLINIC | Age: 29
End: 2019-06-25

## 2019-06-26 NOTE — TELEPHONE ENCOUNTER
Patient has not had a script for over 3 months?  And only one no ritalin shows up on [place]?  Not sure he's really supposed to be on this/  I checked chart somewhat unclear.  Can someone please call him and inquire

## 2019-06-27 DIAGNOSIS — F90.2 ATTENTION DEFICIT HYPERACTIVITY DISORDER, COMBINED TYPE: ICD-10-CM

## 2019-06-27 RX ORDER — METHYLPHENIDATE HYDROCHLORIDE 10 MG/1
15 TABLET ORAL 2 TIMES DAILY
Qty: 90 TABLET | Refills: 0 | Status: SHIPPED | OUTPATIENT
Start: 2019-06-27 | End: 2019-09-20

## 2019-07-18 ENCOUNTER — TELEPHONE (OUTPATIENT)
Dept: PSYCHIATRY | Facility: CLINIC | Age: 29
End: 2019-07-18

## 2019-07-18 DIAGNOSIS — F42.2 MIXED OBSESSIONAL THOUGHTS AND ACTS: ICD-10-CM

## 2019-07-18 RX ORDER — QUETIAPINE FUMARATE 100 MG/1
100 TABLET, FILM COATED ORAL NIGHTLY
Qty: 30 TABLET | Refills: 0 | Status: SHIPPED | OUTPATIENT
Start: 2019-07-18 | End: 2019-09-20

## 2019-07-24 ENCOUNTER — OFFICE VISIT (OUTPATIENT)
Dept: FAMILY MEDICINE CLINIC | Facility: CLINIC | Age: 29
End: 2019-07-24

## 2019-07-24 VITALS
SYSTOLIC BLOOD PRESSURE: 122 MMHG | HEART RATE: 86 BPM | DIASTOLIC BLOOD PRESSURE: 76 MMHG | BODY MASS INDEX: 46.65 KG/M2 | OXYGEN SATURATION: 97 % | HEIGHT: 69 IN | WEIGHT: 315 LBS | TEMPERATURE: 98.7 F

## 2019-07-24 DIAGNOSIS — E66.01 CLASS 3 SEVERE OBESITY DUE TO EXCESS CALORIES WITHOUT SERIOUS COMORBIDITY WITH BODY MASS INDEX (BMI) OF 45.0 TO 49.9 IN ADULT (HCC): ICD-10-CM

## 2019-07-24 DIAGNOSIS — E78.5 HYPERLIPIDEMIA, UNSPECIFIED HYPERLIPIDEMIA TYPE: ICD-10-CM

## 2019-07-24 DIAGNOSIS — M54.41 CHRONIC BILATERAL LOW BACK PAIN WITH BILATERAL SCIATICA: ICD-10-CM

## 2019-07-24 DIAGNOSIS — I10 ESSENTIAL HYPERTENSION: Primary | ICD-10-CM

## 2019-07-24 DIAGNOSIS — F41.9 ANXIETY AND DEPRESSION: ICD-10-CM

## 2019-07-24 DIAGNOSIS — G89.29 CHRONIC BILATERAL LOW BACK PAIN WITH BILATERAL SCIATICA: ICD-10-CM

## 2019-07-24 DIAGNOSIS — F32.A ANXIETY AND DEPRESSION: ICD-10-CM

## 2019-07-24 DIAGNOSIS — M54.42 CHRONIC BILATERAL LOW BACK PAIN WITH BILATERAL SCIATICA: ICD-10-CM

## 2019-07-24 RX ORDER — LOSARTAN POTASSIUM AND HYDROCHLOROTHIAZIDE 12.5; 5 MG/1; MG/1
1 TABLET ORAL DAILY
Qty: 30 TABLET | Refills: 5 | Status: SHIPPED | OUTPATIENT
Start: 2019-07-24 | End: 2019-09-20 | Stop reason: SDUPTHER

## 2019-07-24 RX ORDER — METOPROLOL TARTRATE 100 MG/1
100 TABLET ORAL DAILY
Qty: 30 TABLET | Refills: 5 | Status: SHIPPED | OUTPATIENT
Start: 2019-07-24 | End: 2019-09-20 | Stop reason: SDUPTHER

## 2019-07-24 RX ORDER — KETOROLAC TROMETHAMINE 30 MG/ML
60 INJECTION, SOLUTION INTRAMUSCULAR; INTRAVENOUS ONCE
Status: COMPLETED | OUTPATIENT
Start: 2019-07-24 | End: 2019-07-24

## 2019-07-24 RX ADMIN — KETOROLAC TROMETHAMINE 60 MG: 30 INJECTION, SOLUTION INTRAMUSCULAR; INTRAVENOUS at 15:46

## 2019-07-24 NOTE — PROGRESS NOTES
Subjective   Andrew Narayan is a 28 y.o. male.     Chief Complaint: Back Pain    History of Present Illness   Hypertension   This is a chronic problem. The current episode started more than 1 year ago. The problem is controlled. Pertinent negatives include no palpitations or peripheral edema. Current antihypertension treatment includes beta blockers, angiotensin blockers and diuretics. The current treatment provides significant improvement. Compliance problems include diet and exercise.    Back Pain   This is a chronic problem. The current episode started more than 1 year ago. The problem occurs constantly. The problem has been rapidly worsening since onset. The pain is present in the lumbar spine. The quality of the pain is described as aching. The pain radiates to the left foot (radiates to both hips). The pain is severe. The pain is the same all the time. The symptoms are aggravated by standing. Stiffness is present in the morning. Associated symptoms include numbness and tingling. Pertinent negatives include no bladder incontinence or bowel incontinence. Risk factors include obesity. He has tried NSAIDs, analgesics and muscle relaxant for the symptoms. The treatment provided mild relief.   Pt states that his back pain has gotten no better.  He would like to be referred to pain management for eval and tx.   Anxiety/depression  Patient continues to follow up with psych, Qiana Rosenbaum NP.  Has follow up appt scheduled next week.  He states he is doing well at this time. Denies any suicidal or homicidal thoughts.        Family History   Problem Relation Age of Onset   • Colon cancer Other    • Heart disease Other    • Lymphoma Other    • Heart disease Mother    • Hypertension Mother    • Lupus Mother    • Skin cancer Father    • Kidney disease Father        Social History     Socioeconomic History   • Marital status:      Spouse name: Not on file   • Number of children: Not on file   • Years of education:  "Not on file   • Highest education level: Not on file   Tobacco Use   • Smoking status: Former Smoker     Packs/day: 0.50     Years: 13.00     Pack years: 6.50     Types: Cigarettes, Electronic Cigarette   • Smokeless tobacco: Current User     Types: Snuff   • Tobacco comment: smokes 1 pack of cigarettes per day   Substance and Sexual Activity   • Alcohol use: No   • Drug use: No   • Sexual activity: Defer     Partners: Female       Past Medical History:   Diagnosis Date   • Anxiety    • Arthritis    • Calculus of kidney    • Elevated cholesterol    • Headache    • Hyperlipidemia    • Hypertension    • Insomnia    • Neuropathy    • GRUPO on CPAP    • Panic disorder    • Sciatica    • Tachycardia        Review of Systems   Constitutional: Negative.    HENT: Negative.    Respiratory: Negative.    Cardiovascular: Negative.    Gastrointestinal: Negative.    Musculoskeletal: Positive for back pain.   Skin: Negative.    Neurological: Negative.    Psychiatric/Behavioral: Negative.        Objective   Physical Exam   Constitutional: He is oriented to person, place, and time. He appears well-developed and well-nourished.   Morbid obesity   Neck: Normal range of motion. Neck supple.   Cardiovascular: Normal rate, regular rhythm and normal heart sounds.   Pulmonary/Chest: Effort normal and breath sounds normal.   Musculoskeletal: Normal range of motion.   Neurological: He is alert and oriented to person, place, and time.   Skin: Skin is warm and dry.   Psychiatric: He has a normal mood and affect. His behavior is normal. Judgment and thought content normal.   Nursing note and vitals reviewed.      Procedures    Vitals: Blood pressure 122/76, pulse 86, temperature 98.7 °F (37.1 °C), temperature source Oral, height 175.3 cm (69\"), weight (!) 149 kg (329 lb), SpO2 97 %.    Allergies:   Allergies   Allergen Reactions   • Lisinopril Cough   • Contrast Dye Rash        During this visit the following were done:  Labs Reviewed []    Labs " Ordered []    Radiology Reports Reviewed []    Radiology Ordered []    PCP Records Reviewed []    Referring Provider Records Reviewed []    ER Records Reviewed []    Hospital Records Reviewed []    History Obtained From Family []    Radiology Images Reviewed []    Other Reviewed []    Records Requested []      Assessment/Plan   Andrew was seen today for back pain.    Diagnoses and all orders for this visit:    Essential hypertension  -     Continue losartan-hydrochlorothiazide (HYZAAR) 50-12.5 MG per tablet; Take 1 tablet by mouth Daily.  -     Continue metoprolol tartrate (LOPRESSOR) 100 MG tablet; Take 1 tablet by mouth Daily. Take in addition to 50mg tablet daily    Hyperlipidemia, unspecified hyperlipidemia type   Patient has stopped taking  Medication and does not wish to take any medication at this time.  He has been on a keto diet and has lost 21 lbs.     Chronic bilateral low back pain with left-sided sciatica   Referral to pain management   Toradol 60mg IM today in office     Anxiety and depression   Continue to follow up with psychiatry and continue their plan of care

## 2019-08-12 ENCOUNTER — LAB (OUTPATIENT)
Dept: LAB | Facility: HOSPITAL | Age: 29
End: 2019-08-12

## 2019-08-12 DIAGNOSIS — F11.90 CHRONIC, CONTINUOUS USE OF OPIOIDS: ICD-10-CM

## 2019-08-12 PROCEDURE — 80307 DRUG TEST PRSMV CHEM ANLYZR: CPT

## 2019-08-13 ENCOUNTER — TRANSCRIBE ORDERS (OUTPATIENT)
Dept: ADMINISTRATIVE | Facility: HOSPITAL | Age: 29
End: 2019-08-13

## 2019-08-13 DIAGNOSIS — F11.90 CHRONIC, CONTINUOUS USE OF OPIOIDS: Primary | ICD-10-CM

## 2019-08-15 LAB
AMPHETAMINES UR QL SCN: NEGATIVE NG/ML
BARBITURATES UR QL SCN: NEGATIVE NG/ML
BENZODIAZ UR QL SCN: NEGATIVE NG/ML
BZE UR QL SCN: NEGATIVE NG/ML
CANNABINOIDS UR QL SCN: NEGATIVE NG/ML
CREAT 24H UR-MCNC: 141.1 MG/DL (ref 20–300)
FENTANYL+NORFENTANYL UR QL SCN: NEGATIVE PG/ML
Lab: NORMAL
MEPERIDINE UR CFM-MCNC: NEGATIVE NG/ML
METHADONE UR QL SCN: NEGATIVE NG/ML
OPIATES TESTED UR SCN: NEGATIVE NG/ML
OXYCODONE/OXYMORPHONE, URINE: NEGATIVE NG/ML
PCP UR QL: NEGATIVE NG/ML
PH UR STRIP.AUTO: 5.6 [PH] (ref 4.5–8.9)
PROPOXYPH UR QL SCN: NEGATIVE NG/ML
SP GR UR: 1.02
TRAMADOL UR QL SCN: NEGATIVE NG/ML

## 2019-09-09 ENCOUNTER — TELEPHONE (OUTPATIENT)
Dept: FAMILY MEDICINE CLINIC | Facility: CLINIC | Age: 29
End: 2019-09-09

## 2019-09-09 RX ORDER — ONDANSETRON 4 MG/1
4 TABLET, ORALLY DISINTEGRATING ORAL EVERY 8 HOURS PRN
Qty: 12 TABLET | Refills: 0 | Status: SHIPPED | OUTPATIENT
Start: 2019-09-09 | End: 2019-09-20 | Stop reason: SDUPTHER

## 2019-09-17 ENCOUNTER — HOSPITAL ENCOUNTER (OUTPATIENT)
Dept: GENERAL RADIOLOGY | Facility: HOSPITAL | Age: 29
Discharge: HOME | End: 2019-09-17
Admitting: ANESTHESIOLOGY

## 2019-09-17 ENCOUNTER — TRANSCRIBE ORDERS (OUTPATIENT)
Dept: ADMINISTRATIVE | Facility: HOSPITAL | Age: 29
End: 2019-09-17

## 2019-09-17 DIAGNOSIS — M54.5 LOW BACK PAIN, UNSPECIFIED BACK PAIN LATERALITY, UNSPECIFIED CHRONICITY, WITH SCIATICA PRESENCE UNSPECIFIED: Primary | ICD-10-CM

## 2019-09-17 PROCEDURE — 72100 X-RAY EXAM L-S SPINE 2/3 VWS: CPT | Performed by: RADIOLOGY

## 2019-09-17 PROCEDURE — 72110 X-RAY EXAM L-2 SPINE 4/>VWS: CPT

## 2019-09-20 ENCOUNTER — OFFICE VISIT (OUTPATIENT)
Dept: FAMILY MEDICINE CLINIC | Facility: CLINIC | Age: 29
End: 2019-09-20

## 2019-09-20 VITALS
TEMPERATURE: 99.1 F | RESPIRATION RATE: 16 BRPM | SYSTOLIC BLOOD PRESSURE: 134 MMHG | OXYGEN SATURATION: 99 % | WEIGHT: 315 LBS | DIASTOLIC BLOOD PRESSURE: 78 MMHG | HEIGHT: 69 IN | HEART RATE: 120 BPM | BODY MASS INDEX: 46.65 KG/M2

## 2019-09-20 DIAGNOSIS — I10 ESSENTIAL HYPERTENSION: ICD-10-CM

## 2019-09-20 DIAGNOSIS — Z23 IMMUNIZATION DUE: ICD-10-CM

## 2019-09-20 DIAGNOSIS — K21.9 GASTROESOPHAGEAL REFLUX DISEASE, ESOPHAGITIS PRESENCE NOT SPECIFIED: ICD-10-CM

## 2019-09-20 DIAGNOSIS — G47.00 INSOMNIA, UNSPECIFIED TYPE: ICD-10-CM

## 2019-09-20 DIAGNOSIS — E34.9 HYPOTESTOSTERONEMIA: ICD-10-CM

## 2019-09-20 DIAGNOSIS — E53.8 B12 DEFICIENCY: ICD-10-CM

## 2019-09-20 DIAGNOSIS — E66.01 MORBID OBESITY (HCC): Primary | ICD-10-CM

## 2019-09-20 DIAGNOSIS — R11.0 NAUSEA: ICD-10-CM

## 2019-09-20 DIAGNOSIS — E55.9 VITAMIN D DEFICIENCY: ICD-10-CM

## 2019-09-20 PROCEDURE — 90471 IMMUNIZATION ADMIN: CPT | Performed by: NURSE PRACTITIONER

## 2019-09-20 PROCEDURE — 82607 VITAMIN B-12: CPT | Performed by: NURSE PRACTITIONER

## 2019-09-20 PROCEDURE — 82306 VITAMIN D 25 HYDROXY: CPT | Performed by: NURSE PRACTITIONER

## 2019-09-20 PROCEDURE — 99214 OFFICE O/P EST MOD 30 MIN: CPT | Performed by: NURSE PRACTITIONER

## 2019-09-20 PROCEDURE — 93000 ELECTROCARDIOGRAM COMPLETE: CPT | Performed by: NURSE PRACTITIONER

## 2019-09-20 PROCEDURE — 80050 GENERAL HEALTH PANEL: CPT | Performed by: NURSE PRACTITIONER

## 2019-09-20 PROCEDURE — 83735 ASSAY OF MAGNESIUM: CPT | Performed by: NURSE PRACTITIONER

## 2019-09-20 PROCEDURE — 84403 ASSAY OF TOTAL TESTOSTERONE: CPT | Performed by: NURSE PRACTITIONER

## 2019-09-20 PROCEDURE — 84402 ASSAY OF FREE TESTOSTERONE: CPT | Performed by: NURSE PRACTITIONER

## 2019-09-20 PROCEDURE — 90674 CCIIV4 VAC NO PRSV 0.5 ML IM: CPT | Performed by: NURSE PRACTITIONER

## 2019-09-20 RX ORDER — OMEPRAZOLE 20 MG/1
20 CAPSULE, DELAYED RELEASE ORAL DAILY
Qty: 30 CAPSULE | Refills: 2 | Status: SHIPPED | OUTPATIENT
Start: 2019-09-20 | End: 2019-11-08

## 2019-09-20 RX ORDER — METOPROLOL TARTRATE 100 MG/1
100 TABLET ORAL DAILY
Qty: 30 TABLET | Refills: 5 | Status: SHIPPED | OUTPATIENT
Start: 2019-09-20 | End: 2020-06-03

## 2019-09-20 RX ORDER — LOSARTAN POTASSIUM AND HYDROCHLOROTHIAZIDE 12.5; 5 MG/1; MG/1
1 TABLET ORAL DAILY
Qty: 30 TABLET | Refills: 5 | Status: SHIPPED | OUTPATIENT
Start: 2019-09-20 | End: 2020-01-08

## 2019-09-20 RX ORDER — ONDANSETRON 4 MG/1
4 TABLET, ORALLY DISINTEGRATING ORAL EVERY 8 HOURS PRN
Qty: 30 TABLET | Refills: 0 | Status: ON HOLD | OUTPATIENT
Start: 2019-09-20 | End: 2019-11-11

## 2019-09-20 RX ORDER — TRAZODONE HYDROCHLORIDE 100 MG/1
100 TABLET ORAL NIGHTLY
Qty: 30 TABLET | Refills: 2 | Status: SHIPPED | OUTPATIENT
Start: 2019-09-20 | End: 2019-11-12 | Stop reason: SDUPTHER

## 2019-09-20 NOTE — PROGRESS NOTES
Subjective   Andrew Narayan is a 28 y.o. male.     Chief Complaint: Nausea and Insomnia    Nausea   This is a recurrent problem. The current episode started 1 to 4 weeks ago. The problem occurs daily. The problem has been waxing and waning. Associated symptoms include nausea. Pertinent negatives include no fever. Associated symptoms comments: States he cannot vomit due to having lapband; denies diarrhea, no abdominal pain. Nothing aggravates the symptoms. He has tried nothing for the symptoms.   Hypertension   This is a chronic problem. The current episode started more than 1 year ago. The problem is controlled. Pertinent negatives include no palpitations, peripheral edema or shortness of breath. Agents associated with hypertension include NSAIDs. Risk factors for coronary artery disease include obesity, male gender, dyslipidemia and sedentary lifestyle. Current antihypertension treatment includes alpha 1 blockers, diuretics and beta blockers. The current treatment provides significant improvement. Compliance problems include exercise and diet.    Insomnia   This is a chronic problem. The current episode started more than 1 year ago. The problem occurs daily. The problem has been unchanged. Associated symptoms include nausea. Pertinent negatives include no fever. Nothing aggravates the symptoms. Treatments tried: seroquel, amitriptyline. The treatment provided no relief.   Back Pain   This is a chronic problem. The current episode started more than 1 year ago. The problem is unchanged. The pain is present in the lumbar spine. The quality of the pain is described as burning. The pain is moderate. Pertinent negatives include no fever. Treatments tried: pt continues to follow with pain management and is taking gabapentin as prescribed per pain management provider.      History of low testosterone and has not had any supplements in several months.  He does have issues with hot sweats during the day.  He was following  with Dr. Medina, urologist, but has not followed up with him.   Pt also has a hx of PTSD, anxiety and depression.  He was taking medication that was prescribed by psychiatry but he has not followed up with psych nor is he taking any medication. He states that the  Medication made him feel bad and he didn't want to take.  He is doing well and states he is feeling fine at this time.       Family History   Problem Relation Age of Onset   • Colon cancer Other    • Heart disease Other    • Lymphoma Other    • Heart disease Mother    • Hypertension Mother    • Lupus Mother    • Skin cancer Father    • Kidney disease Father        Social History     Socioeconomic History   • Marital status:      Spouse name: Not on file   • Number of children: Not on file   • Years of education: Not on file   • Highest education level: Not on file   Tobacco Use   • Smoking status: Former Smoker     Packs/day: 0.50     Years: 13.00     Pack years: 6.50     Types: Cigarettes, Electronic Cigarette   • Smokeless tobacco: Current User     Types: Snuff   • Tobacco comment: smokes 1 pack of cigarettes per day   Substance and Sexual Activity   • Alcohol use: No   • Drug use: No   • Sexual activity: Defer     Partners: Female       Past Medical History:   Diagnosis Date   • Anxiety    • Arthritis    • Calculus of kidney    • Elevated cholesterol    • Headache    • Hyperlipidemia    • Hypertension    • Insomnia    • Neuropathy    • GRUPO on CPAP    • Panic disorder    • Sciatica    • Tachycardia        Review of Systems   Constitutional: Negative.  Negative for fever.   HENT: Negative.    Respiratory: Negative.  Negative for shortness of breath.    Cardiovascular: Negative.  Negative for palpitations.   Gastrointestinal: Positive for nausea.   Musculoskeletal: Positive for back pain.   Skin: Negative.    Neurological: Negative.    Psychiatric/Behavioral: The patient has insomnia.        Objective   Physical Exam   Constitutional: He is  "oriented to person, place, and time. He appears well-developed and well-nourished.   Morbid obesity   Neck: Normal range of motion. Neck supple.   Cardiovascular: Normal rate, regular rhythm and normal heart sounds.   Pulmonary/Chest: Effort normal and breath sounds normal.   Neurological: He is alert and oriented to person, place, and time.   Skin: Skin is warm and dry.   Psychiatric: He has a normal mood and affect. His behavior is normal. Judgment and thought content normal.   Nursing note and vitals reviewed.        ECG 12 Lead  Date/Time: 9/20/2019 4:11 PM  Performed by: Sherley Link APRN  Authorized by: Sherley Link APRN   Comparison: not compared with previous ECG   Rhythm: sinus rhythm  Rate: normal  Conduction: conduction normal  ST Segments: ST segments normal  T Waves: T waves normal  QRS axis: normal  Other: no other findings    Clinical impression: normal ECG            Vitals: Blood pressure 134/78, pulse 120, temperature 99.1 °F (37.3 °C), temperature source Oral, resp. rate 16, height 175.3 cm (69.02\"), weight (!) 148 kg (327 lb 3.2 oz), SpO2 99 %.    Allergies:   Allergies   Allergen Reactions   • Lisinopril Cough   • Contrast Dye Rash        During this visit the following were done:  Labs Reviewed []    Labs Ordered []    Radiology Reports Reviewed []    Radiology Ordered []    PCP Records Reviewed []    Referring Provider Records Reviewed []    ER Records Reviewed []    Hospital Records Reviewed []    History Obtained From Family []    Radiology Images Reviewed []    Other Reviewed []    Records Requested []      Assessment/Plan   Andrew was seen today for nausea and insomnia.    Diagnoses and all orders for this visit:    Morbid obesity (CMS/HCC)  -     CBC & Differential; Future  -     Comprehensive Metabolic Panel; Future  -     Magnesium; Future  -     TSH; Future  -     Vitamin B12; Future  -     Testosterone, Free, Total; Future  -     Vitamin D 25 Hydroxy; " Future    Essential hypertension  -     CBC & Differential; Future  -     Comprehensive Metabolic Panel; Future  -     Magnesium; Future  -     TSH; Future  -     Vitamin B12; Future  -     Testosterone, Free, Total; Future  -     Vitamin D 25 Hydroxy; Future  -     losartan-hydrochlorothiazide (HYZAAR) 50-12.5 MG per tablet; Take 1 tablet by mouth Daily.  -     metoprolol tartrate (LOPRESSOR) 100 MG tablet; Take 1 tablet by mouth Daily. Take in addition to 50mg tablet daily    B12 deficiency  -     CBC & Differential; Future  -     Comprehensive Metabolic Panel; Future  -     Magnesium; Future  -     TSH; Future  -     Vitamin B12; Future  -     Testosterone, Free, Total; Future  -     Vitamin D 25 Hydroxy; Future    Vitamin D deficiency  -     CBC & Differential; Future  -     Comprehensive Metabolic Panel; Future  -     Magnesium; Future  -     TSH; Future  -     Vitamin B12; Future  -     Testosterone, Free, Total; Future  -     Vitamin D 25 Hydroxy; Future    Hypotestosteronemia  -     CBC & Differential; Future  -     Comprehensive Metabolic Panel; Future  -     Magnesium; Future  -     TSH; Future  -     Vitamin B12; Future  -     Testosterone, Free, Total; Future  -     Vitamin D 25 Hydroxy; Future    Insomnia, unspecified type  -     traZODone (DESYREL) 100 MG tablet; Take 1 tablet by mouth Every Night.    Nausea  -     ondansetron ODT (ZOFRAN ODT) 4 MG disintegrating tablet; Take 1 tablet by mouth Every 8 (Eight) Hours As Needed for Nausea or Vomiting.    Gastroesophageal reflux disease, esophagitis presence not specified  -     omeprazole (PRILOSEC) 20 MG capsule; Take 1 capsule by mouth Daily.    Other orders  -     ECG 12 Lead

## 2019-09-21 LAB
25(OH)D3 SERPL-MCNC: 25.4 NG/ML (ref 30–100)
ALBUMIN SERPL-MCNC: 4.7 G/DL (ref 3.5–5.2)
ALBUMIN/GLOB SERPL: 1.6 G/DL
ALP SERPL-CCNC: 89 U/L (ref 39–117)
ALT SERPL W P-5'-P-CCNC: 12 U/L (ref 1–41)
ANION GAP SERPL CALCULATED.3IONS-SCNC: 12 MMOL/L (ref 5–15)
AST SERPL-CCNC: 13 U/L (ref 1–40)
BASOPHILS # BLD AUTO: 0.02 10*3/MM3 (ref 0–0.2)
BASOPHILS NFR BLD AUTO: 0.1 % (ref 0–1.5)
BILIRUB SERPL-MCNC: 0.2 MG/DL (ref 0.2–1.2)
BUN BLD-MCNC: 11 MG/DL (ref 6–20)
BUN/CREAT SERPL: 9.4 (ref 7–25)
CALCIUM SPEC-SCNC: 9.6 MG/DL (ref 8.6–10.5)
CHLORIDE SERPL-SCNC: 96 MMOL/L (ref 98–107)
CO2 SERPL-SCNC: 30 MMOL/L (ref 22–29)
CREAT BLD-MCNC: 1.17 MG/DL (ref 0.76–1.27)
DEPRECATED RDW RBC AUTO: 45.4 FL (ref 37–54)
EOSINOPHIL # BLD AUTO: 0.26 10*3/MM3 (ref 0–0.4)
EOSINOPHIL NFR BLD AUTO: 1.9 % (ref 0.3–6.2)
ERYTHROCYTE [DISTWIDTH] IN BLOOD BY AUTOMATED COUNT: 14.2 % (ref 12.3–15.4)
GFR SERPL CREATININE-BSD FRML MDRD: 73 ML/MIN/1.73
GLOBULIN UR ELPH-MCNC: 2.9 GM/DL
GLUCOSE BLD-MCNC: 89 MG/DL (ref 65–99)
HCT VFR BLD AUTO: 49.1 % (ref 37.5–51)
HGB BLD-MCNC: 16 G/DL (ref 13–17.7)
IMM GRANULOCYTES # BLD AUTO: 0.05 10*3/MM3 (ref 0–0.05)
IMM GRANULOCYTES NFR BLD AUTO: 0.4 % (ref 0–0.5)
LYMPHOCYTES # BLD AUTO: 3.21 10*3/MM3 (ref 0.7–3.1)
LYMPHOCYTES NFR BLD AUTO: 24 % (ref 19.6–45.3)
MAGNESIUM SERPL-MCNC: 2.3 MG/DL (ref 1.6–2.6)
MCH RBC QN AUTO: 28.3 PG (ref 26.6–33)
MCHC RBC AUTO-ENTMCNC: 32.6 G/DL (ref 31.5–35.7)
MCV RBC AUTO: 86.9 FL (ref 79–97)
MONOCYTES # BLD AUTO: 0.7 10*3/MM3 (ref 0.1–0.9)
MONOCYTES NFR BLD AUTO: 5.2 % (ref 5–12)
NEUTROPHILS # BLD AUTO: 9.13 10*3/MM3 (ref 1.7–7)
NEUTROPHILS NFR BLD AUTO: 68.4 % (ref 42.7–76)
NRBC BLD AUTO-RTO: 0 /100 WBC (ref 0–0.2)
PLATELET # BLD AUTO: 347 10*3/MM3 (ref 140–450)
PMV BLD AUTO: 10.9 FL (ref 6–12)
POTASSIUM BLD-SCNC: 4 MMOL/L (ref 3.5–5.2)
PROT SERPL-MCNC: 7.6 G/DL (ref 6–8.5)
RBC # BLD AUTO: 5.65 10*6/MM3 (ref 4.14–5.8)
SODIUM BLD-SCNC: 138 MMOL/L (ref 136–145)
TSH SERPL DL<=0.05 MIU/L-ACNC: 1.9 UIU/ML (ref 0.27–4.2)
VIT B12 BLD-MCNC: 199 PG/ML (ref 211–946)
WBC NRBC COR # BLD: 13.37 10*3/MM3 (ref 3.4–10.8)

## 2019-09-24 DIAGNOSIS — E34.9 HYPOTESTOSTERONEMIA: Primary | ICD-10-CM

## 2019-09-24 LAB
TESTOST FREE SERPL-MCNC: 4.9 PG/ML (ref 8.7–25.1)
TESTOST SERPL-MCNC: 188 NG/DL (ref 264–916)

## 2019-09-24 RX ORDER — ERGOCALCIFEROL 1.25 MG/1
50000 CAPSULE ORAL WEEKLY
Qty: 8 CAPSULE | Refills: 2 | Status: SHIPPED | OUTPATIENT
Start: 2019-09-24 | End: 2019-11-08

## 2019-09-24 NOTE — PROGRESS NOTES
His testosterone is low at 188.  If he would like to restart injections we can refer him back to urology.   His vit D is low and I have sent a script to his pharmacy for weekly Vit d.   His B12 is low at 199.  We can give him weekly injections x8 weeks if he is agreeable or he may try otc liquid B12.   Please let him know.

## 2019-09-24 NOTE — TELEPHONE ENCOUNTER
----- Message from APOLINAR Gonzalez sent at 9/24/2019 11:48 AM EDT -----  His testosterone is low at 188.  If he would like to restart injections we can refer him back to urology.   His vit D is low and I have sent a script to his pharmacy for weekly Vit d.   His B12 is low at 199.  We can give him weekly injections x8 weeks if he is agreeable or he may try otc liquid B12.   Please let him know.      Left a message to return call.    Patient returned call,agreeable to urology referral,no preference,i don't see where you sent the vit. D in?,will come in & start the B12 injections.        Don't forget his urology referral.

## 2019-10-21 ENCOUNTER — TRANSCRIBE ORDERS (OUTPATIENT)
Dept: ADMINISTRATIVE | Facility: HOSPITAL | Age: 29
End: 2019-10-21

## 2019-10-21 ENCOUNTER — HOSPITAL ENCOUNTER (OUTPATIENT)
Dept: GENERAL RADIOLOGY | Facility: HOSPITAL | Age: 29
Discharge: HOME | End: 2019-10-21
Admitting: ANESTHESIOLOGY

## 2019-10-21 DIAGNOSIS — M51.36 DEGENERATION OF LUMBAR INTERVERTEBRAL DISC: Primary | ICD-10-CM

## 2019-10-21 PROCEDURE — 72072 X-RAY EXAM THORAC SPINE 3VWS: CPT | Performed by: RADIOLOGY

## 2019-10-21 PROCEDURE — 72072 X-RAY EXAM THORAC SPINE 3VWS: CPT

## 2019-10-28 ENCOUNTER — HOSPITAL ENCOUNTER (EMERGENCY)
Facility: HOSPITAL | Age: 29
Discharge: LEFT BEFORE DISCHARGE | End: 2019-10-28

## 2019-10-31 ENCOUNTER — APPOINTMENT (OUTPATIENT)
Dept: GENERAL RADIOLOGY | Facility: HOSPITAL | Age: 29
End: 2019-10-31

## 2019-10-31 ENCOUNTER — OFFICE VISIT (OUTPATIENT)
Dept: UROLOGY | Facility: CLINIC | Age: 29
End: 2019-10-31

## 2019-10-31 ENCOUNTER — APPOINTMENT (OUTPATIENT)
Dept: CT IMAGING | Facility: HOSPITAL | Age: 29
End: 2019-10-31

## 2019-10-31 ENCOUNTER — HOSPITAL ENCOUNTER (EMERGENCY)
Facility: HOSPITAL | Age: 29
Discharge: HOME | End: 2019-10-31
Attending: FAMILY MEDICINE | Admitting: EMERGENCY MEDICINE

## 2019-10-31 VITALS
SYSTOLIC BLOOD PRESSURE: 136 MMHG | DIASTOLIC BLOOD PRESSURE: 72 MMHG | BODY MASS INDEX: 46.65 KG/M2 | WEIGHT: 315 LBS | HEIGHT: 69 IN

## 2019-10-31 VITALS
DIASTOLIC BLOOD PRESSURE: 97 MMHG | TEMPERATURE: 98.2 F | HEART RATE: 93 BPM | HEIGHT: 69 IN | WEIGHT: 315 LBS | BODY MASS INDEX: 46.65 KG/M2 | SYSTOLIC BLOOD PRESSURE: 120 MMHG | OXYGEN SATURATION: 99 % | RESPIRATION RATE: 16 BRPM

## 2019-10-31 DIAGNOSIS — N23 RENAL COLIC ON RIGHT SIDE: ICD-10-CM

## 2019-10-31 DIAGNOSIS — N20.1 URETEROLITHIASIS: Primary | ICD-10-CM

## 2019-10-31 DIAGNOSIS — N20.1 URETERAL STONE: Primary | ICD-10-CM

## 2019-10-31 LAB
ALBUMIN SERPL-MCNC: 4.01 G/DL (ref 3.5–5.2)
ALBUMIN/GLOB SERPL: 1.3 G/DL
ALP SERPL-CCNC: 79 U/L (ref 39–117)
ALT SERPL W P-5'-P-CCNC: 21 U/L (ref 1–41)
ANION GAP SERPL CALCULATED.3IONS-SCNC: 12.1 MMOL/L (ref 5–15)
AST SERPL-CCNC: 16 U/L (ref 1–40)
BACTERIA UR QL AUTO: ABNORMAL /HPF
BASOPHILS # BLD AUTO: 0.03 10*3/MM3 (ref 0–0.2)
BASOPHILS NFR BLD AUTO: 0.2 % (ref 0–1.5)
BILIRUB SERPL-MCNC: 0.3 MG/DL (ref 0.2–1.2)
BILIRUB UR QL STRIP: NEGATIVE
BUN BLD-MCNC: 15 MG/DL (ref 6–20)
BUN/CREAT SERPL: 11.5 (ref 7–25)
CALCIUM SPEC-SCNC: 9.9 MG/DL (ref 8.6–10.5)
CHLORIDE SERPL-SCNC: 101 MMOL/L (ref 98–107)
CLARITY UR: CLEAR
CO2 SERPL-SCNC: 27.9 MMOL/L (ref 22–29)
COLOR UR: ABNORMAL
CREAT BLD-MCNC: 1.31 MG/DL (ref 0.76–1.27)
DEPRECATED RDW RBC AUTO: 43 FL (ref 37–54)
EOSINOPHIL # BLD AUTO: 0.29 10*3/MM3 (ref 0–0.4)
EOSINOPHIL NFR BLD AUTO: 2.3 % (ref 0.3–6.2)
ERYTHROCYTE [DISTWIDTH] IN BLOOD BY AUTOMATED COUNT: 13.4 % (ref 12.3–15.4)
GFR SERPL CREATININE-BSD FRML MDRD: 64 ML/MIN/1.73
GLOBULIN UR ELPH-MCNC: 3.1 GM/DL
GLUCOSE BLD-MCNC: 96 MG/DL (ref 65–99)
GLUCOSE UR STRIP-MCNC: NEGATIVE MG/DL
HCT VFR BLD AUTO: 46.5 % (ref 37.5–51)
HGB BLD-MCNC: 14.8 G/DL (ref 13–17.7)
HGB UR QL STRIP.AUTO: ABNORMAL
HYALINE CASTS UR QL AUTO: ABNORMAL /LPF
IMM GRANULOCYTES # BLD AUTO: 0.05 10*3/MM3 (ref 0–0.05)
IMM GRANULOCYTES NFR BLD AUTO: 0.4 % (ref 0–0.5)
KETONES UR QL STRIP: NEGATIVE
LEUKOCYTE ESTERASE UR QL STRIP.AUTO: NEGATIVE
LYMPHOCYTES # BLD AUTO: 4.47 10*3/MM3 (ref 0.7–3.1)
LYMPHOCYTES NFR BLD AUTO: 35.1 % (ref 19.6–45.3)
MCH RBC QN AUTO: 27.9 PG (ref 26.6–33)
MCHC RBC AUTO-ENTMCNC: 31.8 G/DL (ref 31.5–35.7)
MCV RBC AUTO: 87.6 FL (ref 79–97)
MONOCYTES # BLD AUTO: 0.86 10*3/MM3 (ref 0.1–0.9)
MONOCYTES NFR BLD AUTO: 6.7 % (ref 5–12)
NEUTROPHILS # BLD AUTO: 7.05 10*3/MM3 (ref 1.7–7)
NEUTROPHILS NFR BLD AUTO: 55.3 % (ref 42.7–76)
NITRITE UR QL STRIP: NEGATIVE
NRBC BLD AUTO-RTO: 0 /100 WBC (ref 0–0.2)
PH UR STRIP.AUTO: <=5 [PH] (ref 5–8)
PLATELET # BLD AUTO: 276 10*3/MM3 (ref 140–450)
PMV BLD AUTO: 10 FL (ref 6–12)
POTASSIUM BLD-SCNC: 4.1 MMOL/L (ref 3.5–5.2)
PROT SERPL-MCNC: 7.1 G/DL (ref 6–8.5)
PROT UR QL STRIP: NEGATIVE
RBC # BLD AUTO: 5.31 10*6/MM3 (ref 4.14–5.8)
RBC # UR: ABNORMAL /HPF
REF LAB TEST METHOD: ABNORMAL
SODIUM BLD-SCNC: 141 MMOL/L (ref 136–145)
SP GR UR STRIP: 1.03 (ref 1–1.03)
SQUAMOUS #/AREA URNS HPF: ABNORMAL /HPF
UROBILINOGEN UR QL STRIP: ABNORMAL
WBC NRBC COR # BLD: 12.75 10*3/MM3 (ref 3.4–10.8)
WBC UR QL AUTO: ABNORMAL /HPF

## 2019-10-31 PROCEDURE — 74018 RADEX ABDOMEN 1 VIEW: CPT | Performed by: RADIOLOGY

## 2019-10-31 PROCEDURE — 74018 RADEX ABDOMEN 1 VIEW: CPT

## 2019-10-31 PROCEDURE — 80053 COMPREHEN METABOLIC PANEL: CPT | Performed by: FAMILY MEDICINE

## 2019-10-31 PROCEDURE — 25010000002 ONDANSETRON PER 1 MG: Performed by: FAMILY MEDICINE

## 2019-10-31 PROCEDURE — 74176 CT ABD & PELVIS W/O CONTRAST: CPT

## 2019-10-31 PROCEDURE — 25010000002 KETOROLAC TROMETHAMINE PER 15 MG: Performed by: FAMILY MEDICINE

## 2019-10-31 PROCEDURE — 99284 EMERGENCY DEPT VISIT MOD MDM: CPT

## 2019-10-31 PROCEDURE — 85025 COMPLETE CBC W/AUTO DIFF WBC: CPT | Performed by: FAMILY MEDICINE

## 2019-10-31 PROCEDURE — 96375 TX/PRO/DX INJ NEW DRUG ADDON: CPT

## 2019-10-31 PROCEDURE — 25010000002 MORPHINE PER 10 MG: Performed by: FAMILY MEDICINE

## 2019-10-31 PROCEDURE — 96376 TX/PRO/DX INJ SAME DRUG ADON: CPT

## 2019-10-31 PROCEDURE — 25010000002 KETOROLAC TROMETHAMINE PER 15 MG: Performed by: EMERGENCY MEDICINE

## 2019-10-31 PROCEDURE — 81001 URINALYSIS AUTO W/SCOPE: CPT | Performed by: FAMILY MEDICINE

## 2019-10-31 PROCEDURE — 99213 OFFICE O/P EST LOW 20 MIN: CPT | Performed by: UROLOGY

## 2019-10-31 PROCEDURE — 96374 THER/PROPH/DIAG INJ IV PUSH: CPT

## 2019-10-31 PROCEDURE — 96361 HYDRATE IV INFUSION ADD-ON: CPT

## 2019-10-31 RX ORDER — ONDANSETRON 4 MG/1
4 TABLET, ORALLY DISINTEGRATING ORAL EVERY 6 HOURS PRN
Qty: 12 TABLET | Refills: 0 | Status: SHIPPED | OUTPATIENT
Start: 2019-10-31 | End: 2020-01-08

## 2019-10-31 RX ORDER — OXYCODONE HYDROCHLORIDE AND ACETAMINOPHEN 5; 325 MG/1; MG/1
TABLET ORAL
Qty: 28 TABLET | Refills: 0 | Status: SHIPPED | OUTPATIENT
Start: 2019-10-31 | End: 2020-01-08

## 2019-10-31 RX ORDER — TAMSULOSIN HYDROCHLORIDE 0.4 MG/1
1 CAPSULE ORAL DAILY
Qty: 15 CAPSULE | Refills: 0 | Status: SHIPPED | OUTPATIENT
Start: 2019-10-31 | End: 2020-12-14

## 2019-10-31 RX ORDER — KETOROLAC TROMETHAMINE 30 MG/ML
30 INJECTION, SOLUTION INTRAMUSCULAR; INTRAVENOUS EVERY 6 HOURS PRN
Status: DISCONTINUED | OUTPATIENT
Start: 2019-10-31 | End: 2019-10-31 | Stop reason: HOSPADM

## 2019-10-31 RX ORDER — KETOROLAC TROMETHAMINE 30 MG/ML
30 INJECTION, SOLUTION INTRAMUSCULAR; INTRAVENOUS ONCE
Status: COMPLETED | OUTPATIENT
Start: 2019-10-31 | End: 2019-10-31

## 2019-10-31 RX ORDER — CIPROFLOXACIN 500 MG/1
500 TABLET, FILM COATED ORAL 2 TIMES DAILY
Qty: 14 TABLET | Refills: 0 | Status: SHIPPED | OUTPATIENT
Start: 2019-10-31 | End: 2019-11-08

## 2019-10-31 RX ORDER — IBUPROFEN 600 MG/1
600 TABLET ORAL EVERY 6 HOURS PRN
Qty: 30 TABLET | Refills: 0 | Status: SHIPPED | OUTPATIENT
Start: 2019-10-31 | End: 2020-09-22 | Stop reason: SDUPTHER

## 2019-10-31 RX ORDER — MORPHINE SULFATE 2 MG/ML
2 INJECTION, SOLUTION INTRAMUSCULAR; INTRAVENOUS ONCE
Status: COMPLETED | OUTPATIENT
Start: 2019-10-31 | End: 2019-10-31

## 2019-10-31 RX ORDER — HYDROCODONE BITARTRATE AND ACETAMINOPHEN 7.5; 325 MG/1; MG/1
1 TABLET ORAL EVERY 6 HOURS PRN
Qty: 12 TABLET | Refills: 0 | Status: SHIPPED | OUTPATIENT
Start: 2019-10-31 | End: 2019-11-08

## 2019-10-31 RX ORDER — SODIUM CHLORIDE 0.9 % (FLUSH) 0.9 %
10 SYRINGE (ML) INJECTION AS NEEDED
Status: DISCONTINUED | OUTPATIENT
Start: 2019-10-31 | End: 2019-10-31 | Stop reason: HOSPADM

## 2019-10-31 RX ORDER — ONDANSETRON 2 MG/ML
4 INJECTION INTRAMUSCULAR; INTRAVENOUS ONCE
Status: COMPLETED | OUTPATIENT
Start: 2019-10-31 | End: 2019-10-31

## 2019-10-31 RX ORDER — SODIUM CHLORIDE 9 MG/ML
125 INJECTION, SOLUTION INTRAVENOUS CONTINUOUS
Status: DISCONTINUED | OUTPATIENT
Start: 2019-10-31 | End: 2019-10-31 | Stop reason: HOSPADM

## 2019-10-31 RX ORDER — TAMSULOSIN HYDROCHLORIDE 0.4 MG/1
1 CAPSULE ORAL NIGHTLY
Qty: 30 CAPSULE | Refills: 11 | Status: ON HOLD | OUTPATIENT
Start: 2019-10-31 | End: 2019-11-11

## 2019-10-31 RX ADMIN — SODIUM CHLORIDE 125 ML/HR: 9 INJECTION, SOLUTION INTRAVENOUS at 07:20

## 2019-10-31 RX ADMIN — LIDOCAINE HYDROCHLORIDE 150 MG: 10 INJECTION, SOLUTION INFILTRATION; PERINEURAL at 07:00

## 2019-10-31 RX ADMIN — SODIUM CHLORIDE 1000 ML: 9 INJECTION, SOLUTION INTRAVENOUS at 06:06

## 2019-10-31 RX ADMIN — KETOROLAC TROMETHAMINE 30 MG: 30 INJECTION, SOLUTION INTRAMUSCULAR; INTRAVENOUS at 10:57

## 2019-10-31 RX ADMIN — MORPHINE SULFATE 2 MG: 2 INJECTION, SOLUTION INTRAMUSCULAR; INTRAVENOUS at 06:42

## 2019-10-31 RX ADMIN — ONDANSETRON 4 MG: 2 INJECTION, SOLUTION INTRAMUSCULAR; INTRAVENOUS at 06:06

## 2019-10-31 RX ADMIN — KETOROLAC TROMETHAMINE 30 MG: 30 INJECTION, SOLUTION INTRAMUSCULAR; INTRAVENOUS at 06:06

## 2019-11-07 ENCOUNTER — OFFICE VISIT (OUTPATIENT)
Dept: UROLOGY | Facility: CLINIC | Age: 29
End: 2019-11-07

## 2019-11-07 VITALS — WEIGHT: 315 LBS | HEIGHT: 69 IN | BODY MASS INDEX: 46.65 KG/M2

## 2019-11-07 DIAGNOSIS — N20.1 URETERAL STONE: Primary | ICD-10-CM

## 2019-11-07 PROCEDURE — 99213 OFFICE O/P EST LOW 20 MIN: CPT | Performed by: UROLOGY

## 2019-11-07 RX ORDER — OXYCODONE HYDROCHLORIDE AND ACETAMINOPHEN 5; 325 MG/1; MG/1
TABLET ORAL
Qty: 28 TABLET | Refills: 0 | Status: ON HOLD | OUTPATIENT
Start: 2019-11-07 | End: 2019-11-11

## 2019-11-07 RX ORDER — CEFAZOLIN SODIUM 2 G/50ML
2 SOLUTION INTRAVENOUS ONCE
Status: CANCELLED | OUTPATIENT
Start: 2019-11-11 | End: 2019-11-07

## 2019-11-07 NOTE — H&P (VIEW-ONLY)
Chief Complaint:          Ureteral stone    HPI:   28 y.o. male.    HPI  Pt has not passed his right proximal ureteral stone 7 mm    Past Medical History:        Past Medical History:   Diagnosis Date   • Anxiety    • Arthritis    • Calculus of kidney    • Elevated cholesterol    • Headache    • Hyperlipidemia    • Hypertension    • Insomnia    • Neuropathy    • GRUPO on CPAP    • Panic disorder    • Sciatica    • Tachycardia          Current Meds:     Current Outpatient Medications   Medication Sig Dispense Refill   • ciprofloxacin (CIPRO) 500 MG tablet Take 1 tablet by mouth 2 (Two) Times a Day. 14 tablet 0   • gabapentin (NEURONTIN) 600 MG tablet Take 600 mg by mouth 6 (Six) Times a Day.     • HYDROcodone-acetaminophen (NORCO) 7.5-325 MG per tablet Take 1 tablet by mouth Every 6 (Six) Hours As Needed for Moderate Pain . 12 tablet 0   • ibuprofen (ADVIL,MOTRIN) 600 MG tablet Take 1 tablet by mouth Every 6 (Six) Hours As Needed for Moderate Pain . 30 tablet 0   • ibuprofen (ADVIL,MOTRIN) 800 MG tablet Take 1 tablet by mouth Every 8 (Eight) Hours As Needed for Moderate Pain . (Patient taking differently: Take 600 mg by mouth Every 8 (Eight) Hours As Needed for Moderate Pain .) 15 tablet 0   • losartan-hydrochlorothiazide (HYZAAR) 50-12.5 MG per tablet Take 1 tablet by mouth Daily. 30 tablet 5   • metoprolol tartrate (LOPRESSOR) 100 MG tablet Take 1 tablet by mouth Daily. Take in addition to 50mg tablet daily 30 tablet 5   • omeprazole (PRILOSEC) 20 MG capsule Take 1 capsule by mouth Daily. 30 capsule 2   • ondansetron ODT (ZOFRAN ODT) 4 MG disintegrating tablet Take 1 tablet by mouth Every 8 (Eight) Hours As Needed for Nausea or Vomiting. 30 tablet 0   • ondansetron ODT (ZOFRAN-ODT) 4 MG disintegrating tablet Take 1 tablet by mouth Every 6 (Six) Hours As Needed for Nausea or Vomiting. 12 tablet 0   • oxyCODONE-acetaminophen (PERCOCET) 5-325 MG per tablet 1 to 2 Tablets Every 6 Hours as needed for PAIN 28 tablet 0   •  tamsulosin (FLOMAX) 0.4 MG capsule 24 hr capsule Take 1 capsule by mouth Daily. 15 capsule 0   • tamsulosin (FLOMAX) 0.4 MG capsule 24 hr capsule Take 1 capsule by mouth Every Night. 30 capsule 11   • traZODone (DESYREL) 100 MG tablet Take 1 tablet by mouth Every Night. 30 tablet 2   • vitamin D (ERGOCALCIFEROL) 93802 units capsule capsule Take 1 capsule by mouth 1 (One) Time Per Week. 8 capsule 2   • oxyCODONE-acetaminophen (PERCOCET) 5-325 MG per tablet 1 to 2 Tablets Every 6 Hours as needed for PAIN 28 tablet 0     No current facility-administered medications for this visit.         Allergies:      Allergies   Allergen Reactions   • Lisinopril Cough   • Contrast Dye Rash        Past Surgical History:     Past Surgical History:   Procedure Laterality Date   • CYSTOSCOPY URETEROSCOPY LASER LITHOTRIPSY Left 4/15/2019    Procedure: CYSTOSCOPY URETEROSCOPY LASER LITHOTRIPSYl flexible ureteroscopy;  Surgeon: Tobias Oscar MD;  Location: [facility];  Service: Urology   • ENDOSCOPY     • KIDNEY SURGERY      Stents placed and removed.    • LAPAROSCOPIC GASTRIC BANDING     • OTHER SURGICAL HISTORY      diagnostic esophagogastroduodenoscopy   • OTHER SURGICAL HISTORY      renal lithotripsy         Social History:     Social History     Socioeconomic History   • Marital status:      Spouse name: Not on file   • Number of children: Not on file   • Years of education: Not on file   • Highest education level: Not on file   Tobacco Use   • Smoking status: Former Smoker     Packs/day: 0.50     Years: 13.00     Pack years: 6.50     Types: Cigarettes, Electronic Cigarette   • Smokeless tobacco: Current User     Types: Snuff   • Tobacco comment: smokes 1 pack of cigarettes per day   Substance and Sexual Activity   • Alcohol use: No   • Drug use: No   • Sexual activity: Defer     Partners: Female       Family History:     Family History   Problem Relation Age of Onset   • Colon cancer Other    • Heart disease Other     "  • Lymphoma Other    • Heart disease Mother    • Hypertension Mother    • Lupus Mother    • Skin cancer Father    • Kidney disease Father        Review of Systems:     Review of Systems   Genitourinary: Positive for flank pain. Negative for dysuria and hematuria.   Musculoskeletal: Positive for back pain.         Physical Exam:     Physical Exam   Constitutional: He is oriented to person, place, and time.   HENT:   Head: Normocephalic and atraumatic.   Right Ear: External ear normal.   Left Ear: External ear normal.   Nose: Nose normal.   Mouth/Throat: Oropharynx is clear and moist.   Eyes: Conjunctivae and EOM are normal. Pupils are equal, round, and reactive to light.   Neck: Normal range of motion. Neck supple. No thyromegaly present.   Cardiovascular: Normal rate, regular rhythm, normal heart sounds and intact distal pulses.   No murmur heard.  Pulmonary/Chest: Effort normal and breath sounds normal. No respiratory distress. He has no wheezes. He has no rales. He exhibits no tenderness.   Abdominal: Soft. Bowel sounds are normal. He exhibits no distension and no mass. There is no tenderness. No hernia.   Genitourinary: Rectum normal, prostate normal and penis normal.   Musculoskeletal: Normal range of motion. He exhibits no edema or tenderness.   Lymphadenopathy:     He has no cervical adenopathy.   Neurological: He is alert and oriented to person, place, and time. No cranial nerve deficit. He exhibits normal muscle tone. Coordination normal.   Skin: Skin is warm. No rash noted.   Psychiatric: He has a normal mood and affect. His behavior is normal. Judgment and thought content normal.   Nursing note and vitals reviewed.      Ht 175.3 cm (69\")   Wt (!) 153 kg (337 lb)   BMI 49.77 kg/m²    Procedure:         Assessment:     Encounter Diagnosis   Name Primary?   • Ureteral stone Yes       No orders of the defined types were placed in this encounter.      Patient reports that he is not currently experiencing any " symptoms of urinary incontinence.      Plan:   Right ureteral stone that has not responded to medical therapy  Recommend right ureteroscopy laser lithotripsy and possible stent placement.  Counseling was given to patient for the following topics impressions as follows: ureteral stone unlikely to pass. The interim medical history and current results were reviewed.  A treatment plan with follow-up was made for Ureteral stone [N20.1].      This document has been electronically signed by Tobias Oscar MD November 7, 2019 1:51 PM

## 2019-11-07 NOTE — PROGRESS NOTES
Chief Complaint:          Ureteral stone    HPI:   28 y.o. male.    HPI  Pt has not passed his right proximal ureteral stone 7 mm    Past Medical History:        Past Medical History:   Diagnosis Date   • Anxiety    • Arthritis    • Calculus of kidney    • Elevated cholesterol    • Headache    • Hyperlipidemia    • Hypertension    • Insomnia    • Neuropathy    • GRUPO on CPAP    • Panic disorder    • Sciatica    • Tachycardia          Current Meds:     Current Outpatient Medications   Medication Sig Dispense Refill   • ciprofloxacin (CIPRO) 500 MG tablet Take 1 tablet by mouth 2 (Two) Times a Day. 14 tablet 0   • gabapentin (NEURONTIN) 600 MG tablet Take 600 mg by mouth 6 (Six) Times a Day.     • HYDROcodone-acetaminophen (NORCO) 7.5-325 MG per tablet Take 1 tablet by mouth Every 6 (Six) Hours As Needed for Moderate Pain . 12 tablet 0   • ibuprofen (ADVIL,MOTRIN) 600 MG tablet Take 1 tablet by mouth Every 6 (Six) Hours As Needed for Moderate Pain . 30 tablet 0   • ibuprofen (ADVIL,MOTRIN) 800 MG tablet Take 1 tablet by mouth Every 8 (Eight) Hours As Needed for Moderate Pain . (Patient taking differently: Take 600 mg by mouth Every 8 (Eight) Hours As Needed for Moderate Pain .) 15 tablet 0   • losartan-hydrochlorothiazide (HYZAAR) 50-12.5 MG per tablet Take 1 tablet by mouth Daily. 30 tablet 5   • metoprolol tartrate (LOPRESSOR) 100 MG tablet Take 1 tablet by mouth Daily. Take in addition to 50mg tablet daily 30 tablet 5   • omeprazole (PRILOSEC) 20 MG capsule Take 1 capsule by mouth Daily. 30 capsule 2   • ondansetron ODT (ZOFRAN ODT) 4 MG disintegrating tablet Take 1 tablet by mouth Every 8 (Eight) Hours As Needed for Nausea or Vomiting. 30 tablet 0   • ondansetron ODT (ZOFRAN-ODT) 4 MG disintegrating tablet Take 1 tablet by mouth Every 6 (Six) Hours As Needed for Nausea or Vomiting. 12 tablet 0   • oxyCODONE-acetaminophen (PERCOCET) 5-325 MG per tablet 1 to 2 Tablets Every 6 Hours as needed for PAIN 28 tablet 0   •  tamsulosin (FLOMAX) 0.4 MG capsule 24 hr capsule Take 1 capsule by mouth Daily. 15 capsule 0   • tamsulosin (FLOMAX) 0.4 MG capsule 24 hr capsule Take 1 capsule by mouth Every Night. 30 capsule 11   • traZODone (DESYREL) 100 MG tablet Take 1 tablet by mouth Every Night. 30 tablet 2   • vitamin D (ERGOCALCIFEROL) 93281 units capsule capsule Take 1 capsule by mouth 1 (One) Time Per Week. 8 capsule 2   • oxyCODONE-acetaminophen (PERCOCET) 5-325 MG per tablet 1 to 2 Tablets Every 6 Hours as needed for PAIN 28 tablet 0     No current facility-administered medications for this visit.         Allergies:      Allergies   Allergen Reactions   • Lisinopril Cough   • Contrast Dye Rash        Past Surgical History:     Past Surgical History:   Procedure Laterality Date   • CYSTOSCOPY URETEROSCOPY LASER LITHOTRIPSY Left 4/15/2019    Procedure: CYSTOSCOPY URETEROSCOPY LASER LITHOTRIPSYl flexible ureteroscopy;  Surgeon: Tobias Oscar MD;  Location: [facility];  Service: Urology   • ENDOSCOPY     • KIDNEY SURGERY      Stents placed and removed.    • LAPAROSCOPIC GASTRIC BANDING     • OTHER SURGICAL HISTORY      diagnostic esophagogastroduodenoscopy   • OTHER SURGICAL HISTORY      renal lithotripsy         Social History:     Social History     Socioeconomic History   • Marital status:      Spouse name: Not on file   • Number of children: Not on file   • Years of education: Not on file   • Highest education level: Not on file   Tobacco Use   • Smoking status: Former Smoker     Packs/day: 0.50     Years: 13.00     Pack years: 6.50     Types: Cigarettes, Electronic Cigarette   • Smokeless tobacco: Current User     Types: Snuff   • Tobacco comment: smokes 1 pack of cigarettes per day   Substance and Sexual Activity   • Alcohol use: No   • Drug use: No   • Sexual activity: Defer     Partners: Female       Family History:     Family History   Problem Relation Age of Onset   • Colon cancer Other    • Heart disease Other   "  • Lymphoma Other    • Heart disease Mother    • Hypertension Mother    • Lupus Mother    • Skin cancer Father    • Kidney disease Father        Review of Systems:     Review of Systems   Genitourinary: Positive for flank pain. Negative for dysuria and hematuria.   Musculoskeletal: Positive for back pain.         Physical Exam:     Physical Exam   Constitutional: He is oriented to person, place, and time.   HENT:   Head: Normocephalic and atraumatic.   Right Ear: External ear normal.   Left Ear: External ear normal.   Nose: Nose normal.   Mouth/Throat: Oropharynx is clear and moist.   Eyes: Conjunctivae and EOM are normal. Pupils are equal, round, and reactive to light.   Neck: Normal range of motion. Neck supple. No thyromegaly present.   Cardiovascular: Normal rate, regular rhythm, normal heart sounds and intact distal pulses.   No murmur heard.  Pulmonary/Chest: Effort normal and breath sounds normal. No respiratory distress. He has no wheezes. He has no rales. He exhibits no tenderness.   Abdominal: Soft. Bowel sounds are normal. He exhibits no distension and no mass. There is no tenderness. No hernia.   Genitourinary: Rectum normal, prostate normal and penis normal.   Musculoskeletal: Normal range of motion. He exhibits no edema or tenderness.   Lymphadenopathy:     He has no cervical adenopathy.   Neurological: He is alert and oriented to person, place, and time. No cranial nerve deficit. He exhibits normal muscle tone. Coordination normal.   Skin: Skin is warm. No rash noted.   Psychiatric: He has a normal mood and affect. His behavior is normal. Judgment and thought content normal.   Nursing note and vitals reviewed.      Ht 175.3 cm (69\")   Wt (!) 153 kg (337 lb)   BMI 49.77 kg/m²    Procedure:         Assessment:     Encounter Diagnosis   Name Primary?   • Ureteral stone Yes       No orders of the defined types were placed in this encounter.      Patient reports that he is not currently experiencing any " symptoms of urinary incontinence.      Plan:   Right ureteral stone that has not responded to medical therapy  Recommend right ureteroscopy laser lithotripsy and possible stent placement.  Counseling was given to patient for the following topics impressions as follows: ureteral stone unlikely to pass. The interim medical history and current results were reviewed.  A treatment plan with follow-up was made for Ureteral stone [N20.1].      This document has been electronically signed by Tobias Oscar MD November 7, 2019 1:51 PM

## 2019-11-08 ENCOUNTER — APPOINTMENT (OUTPATIENT)
Dept: PREADMISSION TESTING | Facility: HOSPITAL | Age: 29
End: 2019-11-08

## 2019-11-11 ENCOUNTER — APPOINTMENT (OUTPATIENT)
Dept: GENERAL RADIOLOGY | Facility: HOSPITAL | Age: 29
End: 2019-11-11

## 2019-11-11 ENCOUNTER — ANESTHESIA (OUTPATIENT)
Dept: PERIOP | Facility: HOSPITAL | Age: 29
End: 2019-11-11

## 2019-11-11 ENCOUNTER — ANESTHESIA EVENT (OUTPATIENT)
Dept: PERIOP | Facility: HOSPITAL | Age: 29
End: 2019-11-11

## 2019-11-11 ENCOUNTER — HOSPITAL ENCOUNTER (OUTPATIENT)
Facility: HOSPITAL | Age: 29
Setting detail: HOSPITAL OUTPATIENT SURGERY
Discharge: HOME | End: 2019-11-11
Attending: UROLOGY | Admitting: UROLOGY

## 2019-11-11 VITALS
HEART RATE: 61 BPM | BODY MASS INDEX: 46.65 KG/M2 | RESPIRATION RATE: 18 BRPM | HEIGHT: 69 IN | SYSTOLIC BLOOD PRESSURE: 132 MMHG | DIASTOLIC BLOOD PRESSURE: 91 MMHG | OXYGEN SATURATION: 99 % | WEIGHT: 315 LBS | TEMPERATURE: 97.9 F

## 2019-11-11 DIAGNOSIS — N20.1 URETERAL STONE: ICD-10-CM

## 2019-11-11 PROCEDURE — 25010000003 CEFAZOLIN SODIUM-DEXTROSE 2-3 GM-%(50ML) RECONSTITUTED SOLUTION: Performed by: UROLOGY

## 2019-11-11 PROCEDURE — 52353 CYSTOURETERO W/LITHOTRIPSY: CPT | Performed by: UROLOGY

## 2019-11-11 PROCEDURE — 76000 FLUOROSCOPY <1 HR PHYS/QHP: CPT

## 2019-11-11 PROCEDURE — 25010000002 ONDANSETRON PER 1 MG: Performed by: NURSE ANESTHETIST, CERTIFIED REGISTERED

## 2019-11-11 PROCEDURE — 25010000002 NEOSTIGMINE 10 MG/10ML SOLUTION: Performed by: NURSE ANESTHETIST, CERTIFIED REGISTERED

## 2019-11-11 PROCEDURE — 82360 CALCULUS ASSAY QUANT: CPT | Performed by: UROLOGY

## 2019-11-11 PROCEDURE — 76000 FLUOROSCOPY <1 HR PHYS/QHP: CPT | Performed by: RADIOLOGY

## 2019-11-11 PROCEDURE — C1769 GUIDE WIRE: HCPCS | Performed by: UROLOGY

## 2019-11-11 PROCEDURE — 25010000002 PROPOFOL 10 MG/ML EMULSION: Performed by: NURSE ANESTHETIST, CERTIFIED REGISTERED

## 2019-11-11 PROCEDURE — 25010000002 FENTANYL CITRATE (PF) 100 MCG/2ML SOLUTION: Performed by: NURSE ANESTHETIST, CERTIFIED REGISTERED

## 2019-11-11 PROCEDURE — 25010000002 MIDAZOLAM PER 1 MG: Performed by: NURSE ANESTHETIST, CERTIFIED REGISTERED

## 2019-11-11 PROCEDURE — C1894 INTRO/SHEATH, NON-LASER: HCPCS | Performed by: UROLOGY

## 2019-11-11 RX ORDER — CEFAZOLIN SODIUM 2 G/50ML
2 SOLUTION INTRAVENOUS ONCE
Status: COMPLETED | OUTPATIENT
Start: 2019-11-11 | End: 2019-11-11

## 2019-11-11 RX ORDER — MAGNESIUM HYDROXIDE 1200 MG/15ML
LIQUID ORAL AS NEEDED
Status: DISCONTINUED | OUTPATIENT
Start: 2019-11-11 | End: 2019-11-11 | Stop reason: HOSPADM

## 2019-11-11 RX ORDER — FAMOTIDINE 10 MG/ML
INJECTION, SOLUTION INTRAVENOUS AS NEEDED
Status: DISCONTINUED | OUTPATIENT
Start: 2019-11-11 | End: 2019-11-11 | Stop reason: SURG

## 2019-11-11 RX ORDER — SODIUM CHLORIDE, SODIUM LACTATE, POTASSIUM CHLORIDE, CALCIUM CHLORIDE 600; 310; 30; 20 MG/100ML; MG/100ML; MG/100ML; MG/100ML
125 INJECTION, SOLUTION INTRAVENOUS CONTINUOUS
Status: DISCONTINUED | OUTPATIENT
Start: 2019-11-11 | End: 2019-11-11 | Stop reason: HOSPADM

## 2019-11-11 RX ORDER — ONDANSETRON 2 MG/ML
4 INJECTION INTRAMUSCULAR; INTRAVENOUS AS NEEDED
Status: DISCONTINUED | OUTPATIENT
Start: 2019-11-11 | End: 2019-11-11 | Stop reason: HOSPADM

## 2019-11-11 RX ORDER — NEOSTIGMINE METHYLSULFATE 1 MG/ML
INJECTION, SOLUTION INTRAVENOUS AS NEEDED
Status: DISCONTINUED | OUTPATIENT
Start: 2019-11-11 | End: 2019-11-11 | Stop reason: SURG

## 2019-11-11 RX ORDER — FENTANYL CITRATE 50 UG/ML
50 INJECTION, SOLUTION INTRAMUSCULAR; INTRAVENOUS
Status: COMPLETED | OUTPATIENT
Start: 2019-11-11 | End: 2019-11-11

## 2019-11-11 RX ORDER — FENTANYL CITRATE 50 UG/ML
INJECTION, SOLUTION INTRAMUSCULAR; INTRAVENOUS AS NEEDED
Status: DISCONTINUED | OUTPATIENT
Start: 2019-11-11 | End: 2019-11-11 | Stop reason: SURG

## 2019-11-11 RX ORDER — SULFAMETHOXAZOLE AND TRIMETHOPRIM 800; 160 MG/1; MG/1
1 TABLET ORAL 2 TIMES DAILY
Qty: 20 TABLET | Refills: 0 | Status: SHIPPED | OUTPATIENT
Start: 2019-11-11 | End: 2020-01-08

## 2019-11-11 RX ORDER — GLYCOPYRROLATE 0.2 MG/ML
INJECTION INTRAMUSCULAR; INTRAVENOUS AS NEEDED
Status: DISCONTINUED | OUTPATIENT
Start: 2019-11-11 | End: 2019-11-11 | Stop reason: SURG

## 2019-11-11 RX ORDER — OXYCODONE HYDROCHLORIDE AND ACETAMINOPHEN 5; 325 MG/1; MG/1
1-2 TABLET ORAL EVERY 6 HOURS PRN
Qty: 28 TABLET | Refills: 0 | Status: SHIPPED | OUTPATIENT
Start: 2019-11-11 | End: 2020-12-14

## 2019-11-11 RX ORDER — SODIUM CHLORIDE 9 MG/ML
INJECTION, SOLUTION INTRAVENOUS AS NEEDED
Status: DISCONTINUED | OUTPATIENT
Start: 2019-11-11 | End: 2019-11-11 | Stop reason: HOSPADM

## 2019-11-11 RX ORDER — ROCURONIUM BROMIDE 10 MG/ML
INJECTION, SOLUTION INTRAVENOUS AS NEEDED
Status: DISCONTINUED | OUTPATIENT
Start: 2019-11-11 | End: 2019-11-11 | Stop reason: SURG

## 2019-11-11 RX ORDER — IPRATROPIUM BROMIDE AND ALBUTEROL SULFATE 2.5; .5 MG/3ML; MG/3ML
3 SOLUTION RESPIRATORY (INHALATION) ONCE AS NEEDED
Status: DISCONTINUED | OUTPATIENT
Start: 2019-11-11 | End: 2019-11-11 | Stop reason: HOSPADM

## 2019-11-11 RX ORDER — ONDANSETRON 2 MG/ML
INJECTION INTRAMUSCULAR; INTRAVENOUS AS NEEDED
Status: DISCONTINUED | OUTPATIENT
Start: 2019-11-11 | End: 2019-11-11 | Stop reason: SURG

## 2019-11-11 RX ORDER — MEPERIDINE HYDROCHLORIDE 25 MG/ML
12.5 INJECTION INTRAMUSCULAR; INTRAVENOUS; SUBCUTANEOUS
Status: DISCONTINUED | OUTPATIENT
Start: 2019-11-11 | End: 2019-11-11 | Stop reason: HOSPADM

## 2019-11-11 RX ORDER — SODIUM CHLORIDE 0.9 % (FLUSH) 0.9 %
3-10 SYRINGE (ML) INJECTION AS NEEDED
Status: DISCONTINUED | OUTPATIENT
Start: 2019-11-11 | End: 2019-11-11 | Stop reason: HOSPADM

## 2019-11-11 RX ORDER — MIDAZOLAM HYDROCHLORIDE 1 MG/ML
INJECTION INTRAMUSCULAR; INTRAVENOUS AS NEEDED
Status: DISCONTINUED | OUTPATIENT
Start: 2019-11-11 | End: 2019-11-11 | Stop reason: SURG

## 2019-11-11 RX ORDER — SODIUM CHLORIDE 0.9 % (FLUSH) 0.9 %
3 SYRINGE (ML) INJECTION EVERY 12 HOURS SCHEDULED
Status: DISCONTINUED | OUTPATIENT
Start: 2019-11-11 | End: 2019-11-11 | Stop reason: HOSPADM

## 2019-11-11 RX ORDER — LIDOCAINE HYDROCHLORIDE 20 MG/ML
INJECTION, SOLUTION EPIDURAL; INFILTRATION; INTRACAUDAL; PERINEURAL AS NEEDED
Status: DISCONTINUED | OUTPATIENT
Start: 2019-11-11 | End: 2019-11-11 | Stop reason: SURG

## 2019-11-11 RX ORDER — PROPOFOL 10 MG/ML
VIAL (ML) INTRAVENOUS AS NEEDED
Status: DISCONTINUED | OUTPATIENT
Start: 2019-11-11 | End: 2019-11-11 | Stop reason: SURG

## 2019-11-11 RX ADMIN — FENTANYL CITRATE 100 MCG: 50 INJECTION INTRAMUSCULAR; INTRAVENOUS at 13:10

## 2019-11-11 RX ADMIN — FAMOTIDINE 20 MG: 10 INJECTION INTRAVENOUS at 13:19

## 2019-11-11 RX ADMIN — ROCURONIUM BROMIDE 30 MG: 10 SOLUTION INTRAVENOUS at 13:13

## 2019-11-11 RX ADMIN — GLYCOPYRROLATE 0.4 MG: 0.4 INJECTION INTRAMUSCULAR; INTRAVENOUS at 14:00

## 2019-11-11 RX ADMIN — MIDAZOLAM HYDROCHLORIDE 2 MG: 1 INJECTION, SOLUTION INTRAMUSCULAR; INTRAVENOUS at 13:10

## 2019-11-11 RX ADMIN — FENTANYL CITRATE 50 MCG: 50 INJECTION INTRAMUSCULAR; INTRAVENOUS at 14:08

## 2019-11-11 RX ADMIN — NEOSTIGMINE METHYLSULFATE 3 MG: 1 INJECTION, SOLUTION INTRAVENOUS at 14:00

## 2019-11-11 RX ADMIN — FENTANYL CITRATE 50 MCG: 50 INJECTION INTRAMUSCULAR; INTRAVENOUS at 14:30

## 2019-11-11 RX ADMIN — FENTANYL CITRATE 50 MCG: 50 INJECTION INTRAMUSCULAR; INTRAVENOUS at 14:01

## 2019-11-11 RX ADMIN — CEFAZOLIN SODIUM 2 G: 2 SOLUTION INTRAVENOUS at 13:10

## 2019-11-11 RX ADMIN — LIDOCAINE HYDROCHLORIDE 3 ML: 20 INJECTION, SOLUTION EPIDURAL; INFILTRATION; INTRACAUDAL; PERINEURAL at 13:12

## 2019-11-11 RX ADMIN — PROPOFOL 200 MG: 10 INJECTION, EMULSION INTRAVENOUS at 13:13

## 2019-11-11 RX ADMIN — FENTANYL CITRATE 50 MCG: 50 INJECTION INTRAMUSCULAR; INTRAVENOUS at 14:35

## 2019-11-11 RX ADMIN — SODIUM CHLORIDE, POTASSIUM CHLORIDE, SODIUM LACTATE AND CALCIUM CHLORIDE 125 ML/HR: 600; 310; 30; 20 INJECTION, SOLUTION INTRAVENOUS at 13:09

## 2019-11-11 RX ADMIN — ONDANSETRON 4 MG: 2 INJECTION INTRAMUSCULAR; INTRAVENOUS at 13:19

## 2019-11-11 NOTE — OP NOTE
CYSTOSCOPY URETEROSCOPY LASER LITHOTRIPSY  Procedure Report    Patient Name:  Andrew Narayan  YOB: 1990    Date of Surgery:  11/11/2019     Indications: Symptomatic millimeter proximal ureteral calculus    Pre-op Diagnosis:  Ureteral stone [N20.1]            Post-op Diagnosis:   Post-Op Diagnosis Codes:     * Ureteral stone [N20.1]      Stone broken with laser and pieces extracted    Procedure/CPT® Codes:      Procedure(s):  CYSTOSCOPY URETEROSCOPY LASER LITHOTRIPSY RIGHT    Staff:  Surgeon(s):  Tobias Oscar MD         Anesthesia: General anesthesia    Estimated Blood Loss: None    Implants:    Nothing was implanted during the procedure    Specimen:                A: Stone fragments      Findings: The urethra was normal posterior urethra was normal for age there are no bladder tumors or stones seen the findings ureteroscopically was the patient had a yellow-tan proximal ureteral calculus that was flushed back into the renal pelvis with irrigation and then treated with a 200 µm holmium laser fiber setting at 0.8 J and 8 Hz and subsequently 8.5 J and 15 Hz and the pieces were extracted with a basket and sent for chemical analysis    Complications: None    Description of Procedure: Patient underwent a general anesthetic sterilely prepped and draped in the usual fashion in dorsolithotomy position then cystoscopy was performed and the above findings were noted the bladder was still fully examined with a 30 and 70 degree lens and then a Glidewire was passed up the right ureter and then ureteroscopy was performed with a semirigid scope and I could just barely see the stone because it was so proximal in location then I placed an Amplatz interventional guidewire and used a ureteral access sheath and then did flexible ureteroscopy and the stone was found in the renal pelvis and treated there with a 200 µm holmium laser fiber set up 0.8 J and 8 Hz and then the stone was followed into the lower pole  calyx where it was more easily treated and less mobile and after was broken up into multiple pieces the pieces were extracted with a Nitinol stone basket and sent for chemical analysis the patient tolerated the procedure well and had had it the ureteroscope removed and then access sheath and safety wire patient was taken recovery room in satisfactory condition after his bladder was drained      Tobias Oscar MD     Date: 11/11/2019  Time: 2:00 PM

## 2019-11-11 NOTE — ANESTHESIA PROCEDURE NOTES
Airway  Urgency: elective    Date/Time: 11/11/2019 1:16 PM  End Time:11/11/2019 1:16 PM  Airway not difficult    General Information and Staff    Patient location during procedure: OR  CRNA: Domingo Menendez CRNA    Indications and Patient Condition  Indications for airway management: airway protection    Preoxygenated: yes  MILS maintained throughout  Mask difficulty assessment: 0 - not attempted    Final Airway Details  Final airway type: endotracheal airway      Successful airway: ETT  Cuffed: yes   Successful intubation technique: direct laryngoscopy  Endotracheal tube insertion site: oral  Blade: Howie  Blade size: 4  ETT size (mm): 7.5  Cormack-Lehane Classification: grade IIa - partial view of glottis  Placement verified by: chest auscultation, capnometry and palpation of cuff   Cuff volume (mL): 8  Measured from: lips  ETT/EBT  to lips (cm): 23  Number of attempts at approach: 1  Assessment: lips, teeth, and gum same as pre-op and atraumatic intubation

## 2019-11-11 NOTE — ANESTHESIA POSTPROCEDURE EVALUATION
Patient: Andrew Narayan    Procedure Summary     Date:  11/11/19 Room / Location:  [facility] OR 06 /  [facility] OR    Anesthesia Start:  1310 Anesthesia Stop:  1408    Procedure:  CYSTOSCOPY URETEROSCOPY LASER LITHOTRIPSY RIGHT (Right ) Diagnosis:       Ureteral stone      (Ureteral stone [N20.1])    Surgeon:  Tobias Oscar MD Provider:  Arnulfo Woods MD    Anesthesia Type:  general ASA Status:  3          Anesthesia Type: general  Last vitals  BP   135/89 (11/11/19 1409)   Temp   97.1 °F (36.2 °C) (11/11/19 1409)   Pulse   82 (11/11/19 1409)   Resp   18 (11/11/19 1409)     SpO2   100 % (11/11/19 1409)     Post Anesthesia Care and Evaluation    Patient location during evaluation: PHASE II  Patient participation: complete - patient participated  Level of consciousness: awake and alert  Pain score: 1  Pain management: adequate  Airway patency: patent  Anesthetic complications: No anesthetic complications  PONV Status: controlled  Cardiovascular status: acceptable  Respiratory status: acceptable  Hydration status: acceptable

## 2019-11-11 NOTE — ANESTHESIA PREPROCEDURE EVALUATION
Anesthesia Evaluation     Patient summary reviewed and Nursing notes reviewed   no history of anesthetic complications:  NPO Solid Status: > 8 hours  NPO Liquid Status: > 8 hours           Airway   Mallampati: III  TM distance: >3 FB  Neck ROM: full  No difficulty expected  Dental - normal exam     Pulmonary - normal exam   (+) a smoker Current Abstained day of surgery, sleep apnea on CPAP,   (-) asthma  Cardiovascular - normal exam  Exercise tolerance: good (4-7 METS)    NYHA Classification: II  Patient on routine beta blocker and Beta blocker given within 24 hours of surgery    (+) hypertension, dysrhythmias, hyperlipidemia,   (-) past MI, angina, CHF      Neuro/Psych  (+) headaches, numbness, psychiatric history Anxiety and Depression,     GI/Hepatic/Renal/Endo    (+) morbid obesity,  renal disease stones,   (-)  obesity, liver disease, diabetes    Musculoskeletal     (+) back pain, radiculopathy Left lower extremity  Abdominal  - normal exam    Bowel sounds: normal.   Substance History - negative use     OB/GYN negative ob/gyn ROS         Other   arthritis,            Anesthesia Evaluation     Patient summary reviewed and Nursing notes reviewed   NPO Solid Status: > 8 hours  NPO Liquid Status: > 8 hours           Airway   Mallampati: III  TM distance: >3 FB  Neck ROM: full  No difficulty expected  Dental - normal exam     Pulmonary - normal exam   (+) a smoker Former, sleep apnea on CPAP,   Cardiovascular - normal exam    (+) hypertension, hyperlipidemia,       Neuro/Psych  (+) headaches, numbness, psychiatric history,     GI/Hepatic/Renal/Endo    (+) morbid obesity (super),  renal disease stones,     Musculoskeletal     Abdominal  - normal exam    Bowel sounds: normal.   Substance History - negative use     OB/GYN negative ob/gyn ROS         Other   (+) arthritis                     Anesthesia Plan    ASA 4     general     intravenous induction   Anesthetic plan, all risks, benefits, and alternatives have been  provided, discussed and informed consent has been obtained with: patient.    Plan discussed with CRNA.               Anesthesia Plan    ASA 3     general     intravenous induction     Anesthetic plan, all risks, benefits, and alternatives have been provided, discussed and informed consent has been obtained with: patient and spouse/significant other.  Use of blood products discussed with patient and spouse/significant other  Consented to blood products.

## 2019-11-12 DIAGNOSIS — G47.00 INSOMNIA, UNSPECIFIED TYPE: ICD-10-CM

## 2019-11-12 RX ORDER — TRAZODONE HYDROCHLORIDE 100 MG/1
TABLET ORAL
Qty: 30 TABLET | Refills: 0 | Status: SHIPPED | OUTPATIENT
Start: 2019-11-12 | End: 2019-12-09 | Stop reason: SDUPTHER

## 2019-11-18 LAB
CA PHOS CRY STONE QL IR: 3 %
COD CRY STONE QL IR: 20 %
COLOR STONE: NORMAL
COM CRY STONE QL IR: 77 %
COMPN STONE: NORMAL
Lab: NORMAL
Lab: NORMAL
NIDUS STONE QL: NORMAL
PATH REPORT.COMMENTS IMP SPEC: NORMAL
SIZE STONE: NORMAL MM
WT STONE: 41 MG

## 2019-12-09 DIAGNOSIS — G47.00 INSOMNIA, UNSPECIFIED TYPE: ICD-10-CM

## 2019-12-09 RX ORDER — TRAZODONE HYDROCHLORIDE 100 MG/1
100 TABLET ORAL
Qty: 30 TABLET | Refills: 0 | Status: SHIPPED | OUTPATIENT
Start: 2019-12-09 | End: 2020-01-08

## 2020-01-08 ENCOUNTER — OFFICE VISIT (OUTPATIENT)
Dept: FAMILY MEDICINE CLINIC | Facility: CLINIC | Age: 30
End: 2020-01-08

## 2020-01-08 VITALS
TEMPERATURE: 99.2 F | WEIGHT: 315 LBS | BODY MASS INDEX: 46.65 KG/M2 | HEIGHT: 69 IN | SYSTOLIC BLOOD PRESSURE: 112 MMHG | OXYGEN SATURATION: 98 % | HEART RATE: 103 BPM | DIASTOLIC BLOOD PRESSURE: 82 MMHG

## 2020-01-08 DIAGNOSIS — G47.00 INSOMNIA, UNSPECIFIED TYPE: Primary | ICD-10-CM

## 2020-01-08 DIAGNOSIS — I10 ESSENTIAL HYPERTENSION: ICD-10-CM

## 2020-01-08 DIAGNOSIS — E66.01 MORBID OBESITY (HCC): ICD-10-CM

## 2020-01-08 PROCEDURE — 99213 OFFICE O/P EST LOW 20 MIN: CPT | Performed by: NURSE PRACTITIONER

## 2020-01-08 RX ORDER — AMITRIPTYLINE HYDROCHLORIDE 100 MG/1
100 TABLET, FILM COATED ORAL NIGHTLY
Qty: 30 TABLET | Refills: 2 | Status: SHIPPED | OUTPATIENT
Start: 2020-01-08 | End: 2020-03-23

## 2020-01-08 NOTE — PATIENT INSTRUCTIONS
Calorie Counting for Weight Loss  Calories are units of energy. Your body needs a certain amount of calories from food to keep you going throughout the day. When you eat more calories than your body needs, your body stores the extra calories as fat. When you eat fewer calories than your body needs, your body burns fat to get the energy it needs.  Calorie counting means keeping track of how many calories you eat and drink each day. Calorie counting can be helpful if you need to lose weight. If you make sure to eat fewer calories than your body needs, you should lose weight. Ask your health care provider what a healthy weight is for you.  For calorie counting to work, you will need to eat the right number of calories in a day in order to lose a healthy amount of weight per week. A dietitian can help you determine how many calories you need in a day and will give you suggestions on how to reach your calorie goal.  · A healthy amount of weight to lose per week is usually 1-2 lb (0.5-0.9 kg). This usually means that your daily calorie intake should be reduced by 500-750 calories.  · Eating 1,200 - 1,500 calories per day can help most women lose weight.  · Eating 1,500 - 1,800 calories per day can help most men lose weight.  What is my plan?  My goal is to have __________ calories per day.  If I have this many calories per day, I should lose around __________ pounds per week.  What do I need to know about calorie counting?  In order to meet your daily calorie goal, you will need to:  · Find out how many calories are in each food you would like to eat. Try to do this before you eat.  · Decide how much of the food you plan to eat.  · Write down what you ate and how many calories it had. Doing this is called keeping a food log.  To successfully lose weight, it is important to balance calorie counting with a healthy lifestyle that includes regular activity. Aim for 150 minutes of moderate exercise (such as walking) or 75  minutes of vigorous exercise (such as running) each week.  Where do I find calorie information?    The number of calories in a food can be found on a Nutrition Facts label. If a food does not have a Nutrition Facts label, try to look up the calories online or ask your dietitian for help.  Remember that calories are listed per serving. If you choose to have more than one serving of a food, you will have to multiply the calories per serving by the amount of servings you plan to eat. For example, the label on a package of bread might say that a serving size is 1 slice and that there are 90 calories in a serving. If you eat 1 slice, you will have eaten 90 calories. If you eat 2 slices, you will have eaten 180 calories.  How do I keep a food log?  Immediately after each meal, record the following information in your food log:  · What you ate. Don't forget to include toppings, sauces, and other extras on the food.  · How much you ate. This can be measured in cups, ounces, or number of items.  · How many calories each food and drink had.  · The total number of calories in the meal.  Keep your food log near you, such as in a small notebook in your pocket, or use a mobile gallo or website. Some programs will calculate calories for you and show you how many calories you have left for the day to meet your goal.  What are some calorie counting tips?    · Use your calories on foods and drinks that will fill you up and not leave you hungry:  ? Some examples of foods that fill you up are nuts and nut butters, vegetables, lean proteins, and high-fiber foods like whole grains. High-fiber foods are foods with more than 5 g fiber per serving.  ? Drinks such as sodas, specialty coffee drinks, alcohol, and juices have a lot of calories, yet do not fill you up.  · Eat nutritious foods and avoid empty calories. Empty calories are calories you get from foods or beverages that do not have many vitamins or protein, such as candy, sweets, and  "soda. It is better to have a nutritious high-calorie food (such as an avocado) than a food with few nutrients (such as a bag of chips).  · Know how many calories are in the foods you eat most often. This will help you calculate calorie counts faster.  · Pay attention to calories in drinks. Low-calorie drinks include water and unsweetened drinks.  · Pay attention to nutrition labels for \"low fat\" or \"fat free\" foods. These foods sometimes have the same amount of calories or more calories than the full fat versions. They also often have added sugar, starch, or salt, to make up for flavor that was removed with the fat.  · Find a way of tracking calories that works for you. Get creative. Try different apps or programs if writing down calories does not work for you.  What are some portion control tips?  · Know how many calories are in a serving. This will help you know how many servings of a certain food you can have.  · Use a measuring cup to measure serving sizes. You could also try weighing out portions on a kitchen scale. With time, you will be able to estimate serving sizes for some foods.  · Take some time to put servings of different foods on your favorite plates, bowls, and cups so you know what a serving looks like.  · Try not to eat straight from a bag or box. Doing this can lead to overeating. Put the amount you would like to eat in a cup or on a plate to make sure you are eating the right portion.  · Use smaller plates, glasses, and bowls to prevent overeating.  · Try not to multitask (for example, watch TV or use your computer) while eating. If it is time to eat, sit down at a table and enjoy your food. This will help you to know when you are full. It will also help you to be aware of what you are eating and how much you are eating.  What are tips for following this plan?  Reading food labels  · Check the calorie count compared to the serving size. The serving size may be smaller than what you are used to " "eating.  · Check the source of the calories. Make sure the food you are eating is high in vitamins and protein and low in saturated and trans fats.  Shopping  · Read nutrition labels while you shop. This will help you make healthy decisions before you decide to purchase your food.  · Make a grocery list and stick to it.  Cooking  · Try to cook your favorite foods in a healthier way. For example, try baking instead of frying.  · Use low-fat dairy products.  Meal planning  · Use more fruits and vegetables. Half of your plate should be fruits and vegetables.  · Include lean proteins like poultry and fish.  How do I count calories when eating out?  · Ask for smaller portion sizes.  · Consider sharing an entree and sides instead of getting your own entree.  · If you get your own entree, eat only half. Ask for a box at the beginning of your meal and put the rest of your entree in it so you are not tempted to eat it.  · If calories are listed on the menu, choose the lower calorie options.  · Choose dishes that include vegetables, fruits, whole grains, low-fat dairy products, and lean protein.  · Choose items that are boiled, broiled, grilled, or steamed. Stay away from items that are buttered, battered, fried, or served with cream sauce. Items labeled \"crispy\" are usually fried, unless stated otherwise.  · Choose water, low-fat milk, unsweetened iced tea, or other drinks without added sugar. If you want an alcoholic beverage, choose a lower calorie option such as a glass of wine or light beer.  · Ask for dressings, sauces, and syrups on the side. These are usually high in calories, so you should limit the amount you eat.  · If you want a salad, choose a garden salad and ask for grilled meats. Avoid extra toppings like cotto, cheese, or fried items. Ask for the dressing on the side, or ask for olive oil and vinegar or lemon to use as dressing.  · Estimate how many servings of a food you are given. For example, a serving of " cooked rice is ½ cup or about the size of half a baseball. Knowing serving sizes will help you be aware of how much food you are eating at restaurants. The list below tells you how big or small some common portion sizes are based on everyday objects:  ? 1 oz--4 stacked dice.  ? 3 oz--1 deck of cards.  ? 1 tsp--1 die.  ? 1 Tbsp--½ a ping-pong ball.  ? 2 Tbsp--1 ping-pong ball.  ? ½ cup--½ baseball.  ? 1 cup--1 baseball.  Summary  · Calorie counting means keeping track of how many calories you eat and drink each day. If you eat fewer calories than your body needs, you should lose weight.  · A healthy amount of weight to lose per week is usually 1-2 lb (0.5-0.9 kg). This usually means reducing your daily calorie intake by 500-750 calories.  · The number of calories in a food can be found on a Nutrition Facts label. If a food does not have a Nutrition Facts label, try to look up the calories online or ask your dietitian for help.  · Use your calories on foods and drinks that will fill you up, and not on foods and drinks that will leave you hungry.  · Use smaller plates, glasses, and bowls to prevent overeating.  This information is not intended to replace advice given to you by your health care provider. Make sure you discuss any questions you have with your health care provider.  Document Released: 12/18/2006 Document Revised: 09/06/2019 Document Reviewed: 11/17/2017  [employer] Interactive Patient Education © 2019 [employer]      Exercising to Lose Weight  Exercise is structured, repetitive physical activity to improve fitness and health. Getting regular exercise is important for everyone. It is especially important if you are overweight. Being overweight increases your risk of heart disease, stroke, diabetes, high blood pressure, and several types of cancer. Reducing your calorie intake and exercising can help you lose weight.  Exercise is usually categorized as moderate or vigorous intensity. To lose weight, most  people need to do a certain amount of moderate-intensity or vigorous-intensity exercise each week.  Moderate-intensity exercise    Moderate-intensity exercise is any activity that gets you moving enough to burn at least three times more energy (calories) than if you were sitting.  Examples of moderate exercise include:  · Walking a mile in 15 minutes.  · Doing light yard work.  · Biking at an easy pace.  Most people should get at least 150 minutes (2 hours and 30 minutes) a week of moderate-intensity exercise to maintain their body weight.  Vigorous-intensity exercise  Vigorous-intensity exercise is any activity that gets you moving enough to burn at least six times more calories than if you were sitting. When you exercise at this intensity, you should be working hard enough that you are not able to carry on a conversation.  Examples of vigorous exercise include:  · Running.  · Playing a team sport, such as football, basketball, and soccer.  · Jumping rope.  Most people should get at least 75 minutes (1 hour and 15 minutes) a week of vigorous-intensity exercise to maintain their body weight.  How can exercise affect me?  When you exercise enough to burn more calories than you eat, you lose weight. Exercise also reduces body fat and builds muscle. The more muscle you have, the more calories you burn. Exercise also:  · Improves mood.  · Reduces stress and tension.  · Improves your overall fitness, flexibility, and endurance.  · Increases bone strength.  The amount of exercise you need to lose weight depends on:  · Your age.  · The type of exercise.  · Any health conditions you have.  · Your overall physical ability.  Talk to your health care provider about how much exercise you need and what types of activities are safe for you.  What actions can I take to lose weight?  Nutrition    · Make changes to your diet as told by your health care provider or diet and nutrition specialist (dietitian). This may  include:  ? Eating fewer calories.  ? Eating more protein.  ? Eating less unhealthy fats.  ? Eating a diet that includes fresh fruits and vegetables, whole grains, low-fat dairy products, and lean protein.  ? Avoiding foods with added fat, salt, and sugar.  · Drink plenty of water while you exercise to prevent dehydration or heat stroke.  Activity  · Choose an activity that you enjoy and set realistic goals. Your health care provider can help you make an exercise plan that works for you.  · Exercise at a moderate or vigorous intensity most days of the week.  ? The intensity of exercise may vary from person to person. You can tell how intense a workout is for you by paying attention to your breathing and heartbeat. Most people will notice their breathing and heartbeat get faster with more intense exercise.  · Do resistance training twice each week, such as:  ? Push-ups.  ? Sit-ups.  ? Lifting weights.  ? Using resistance bands.  · Getting short amounts of exercise can be just as helpful as long structured periods of exercise. If you have trouble finding time to exercise, try to include exercise in your daily routine.  ? Get up, stretch, and walk around every 30 minutes throughout the day.  ? Go for a walk during your lunch break.  ? Park your car farther away from your destination.  ? If you take public transportation, get off one stop early and walk the rest of the way.  ? Make phone calls while standing up and walking around.  ? Take the stairs instead of elevators or escalators.  · Wear comfortable clothes and shoes with good support.  · Do not exercise so much that you hurt yourself, feel dizzy, or get very short of breath.  Where to find more information  · [place] Department of Health and Human Services: www.hhs.gov  · Centers for Disease Control and Prevention (CDC): www.cdc.gov  Contact a health care provider:  · Before starting a new exercise program.  · If you have questions or concerns about your  weight.  · If you have a medical problem that keeps you from exercising.  Get help right away if you have any of the following while exercising:  · Injury.  · Dizziness.  · Difficulty breathing or shortness of breath that does not go away when you stop exercising.  · Chest pain.  · Rapid heartbeat.  Summary  · Being overweight increases your risk of heart disease, stroke, diabetes, high blood pressure, and several types of cancer.  · Losing weight happens when you burn more calories than you eat.  · Reducing the amount of calories you eat in addition to getting regular moderate or vigorous exercise each week helps you lose weight.  This information is not intended to replace advice given to you by your health care provider. Make sure you discuss any questions you have with your health care provider.  Document Released: 01/20/2012 Document Revised: 12/31/2018 Document Reviewed: 12/31/2018  [employer] Interactive Patient Education © 2019 [employer]

## 2020-01-08 NOTE — PROGRESS NOTES
Subjective   Andrew Narayan is a 29 y.o. male.     Chief Complaint: Follow-up and Insomnia    Hypertension   This is a chronic problem. The current episode started more than 1 year ago. The problem is controlled. Pertinent negatives include no chest pain, palpitations or shortness of breath. Current antihypertension treatment includes beta blockers. The current treatment provides significant improvement. Compliance problems include exercise and diet.  Identifiable causes of hypertension include sleep apnea.   Insomnia   This is a chronic problem. The current episode started more than 1 year ago. The problem occurs daily. The problem has been unchanged. Associated symptoms include nausea. Pertinent negatives include no fever. Nothing aggravates the symptoms. Treatments tried: seroquel, amitriptyline, trazodone. The treatment provided no relief. He states that he had taken the elavil in the past and done well but stopped seeing the provider that gave it to him and he ran out of hte medication.  He feels he slept better with the use of elavil and would like to restart it at this time.  He denies any side effects from the medication.     Patient's Body mass index is 50.06 kg/m². BMI is above normal parameters. Recommendations include: educational material.    Family History   Problem Relation Age of Onset   • Colon cancer Other    • Heart disease Other    • Lymphoma Other    • Heart disease Mother    • Hypertension Mother    • Lupus Mother    • Skin cancer Father    • Kidney disease Father        Social History     Socioeconomic History   • Marital status:      Spouse name: Not on file   • Number of children: Not on file   • Years of education: Not on file   • Highest education level: Not on file   Tobacco Use   • Smoking status: Current Every Day Smoker     Packs/day: 0.50     Years: 13.00     Pack years: 6.50     Types: Cigarettes, Electronic Cigarette   • Smokeless tobacco: Current User     Types: Snuff   •  "Tobacco comment: smokes 1 pack of cigarettes per day   Substance and Sexual Activity   • Alcohol use: No   • Drug use: No   • Sexual activity: Defer     Partners: Female       Past Medical History:   Diagnosis Date   • Anxiety    • Arthritis    • Calculus of kidney    • DDD (degenerative disc disease), lumbosacral    • Elevated cholesterol    • Headache    • Hyperlipidemia    • Hypertension    • Insomnia    • Kidney stones    • Nerve damage    • Neuropathy    • GRUPO on CPAP    • Panic disorder    • Sciatica    • Tachycardia        Review of Systems   Constitutional: Negative.    HENT: Negative.    Respiratory: Negative.  Negative for shortness of breath.    Cardiovascular: Negative.  Negative for chest pain and palpitations.   Gastrointestinal: Negative.    Musculoskeletal: Negative.    Skin: Negative.    Neurological: Negative.    Psychiatric/Behavioral: Negative.        Objective   Physical Exam   Constitutional: He is oriented to person, place, and time. He appears well-developed and well-nourished.   Morbid obesity   Neck: Normal range of motion. Neck supple.   Cardiovascular: Normal rate.   Neurological: He is alert and oriented to person, place, and time.   Skin: Skin is warm and dry.   Psychiatric: He has a normal mood and affect. His behavior is normal. Judgment and thought content normal.   Nursing note and vitals reviewed.      Procedures    Vitals: Blood pressure 112/82, pulse 103, temperature 99.2 °F (37.3 °C), temperature source Oral, height 175.3 cm (69\"), weight (!) 154 kg (339 lb), SpO2 98 %.    Allergies:   Allergies   Allergen Reactions   • Lisinopril Cough   • Contrast Dye Rash        During this visit the following were done:  Labs Reviewed []    Labs Ordered []    Radiology Reports Reviewed []    Radiology Ordered []    PCP Records Reviewed []    Referring Provider Records Reviewed []    ER Records Reviewed []    Hospital Records Reviewed []    History Obtained From Family []    Radiology Images " Reviewed []    Other Reviewed []    Records Requested []      Assessment/Plan   Andrew was seen today for follow-up and insomnia.    Diagnoses and all orders for this visit:    Insomnia, unspecified type  -     Start amitriptyline (ELAVIL) 100 MG tablet; Take 1 tablet by mouth Every Night.   Stop trazodone  Morbid obesity (CMS/HCC)    Essential hypertension   Continue metoprolol

## 2020-03-22 DIAGNOSIS — G47.00 INSOMNIA, UNSPECIFIED TYPE: ICD-10-CM

## 2020-03-23 RX ORDER — AMITRIPTYLINE HYDROCHLORIDE 100 MG/1
TABLET, FILM COATED ORAL
Qty: 30 TABLET | Refills: 2 | Status: SHIPPED | OUTPATIENT
Start: 2020-03-23 | End: 2020-06-02 | Stop reason: SDUPTHER

## 2020-04-17 ENCOUNTER — TELEPHONE (OUTPATIENT)
Dept: FAMILY MEDICINE CLINIC | Facility: CLINIC | Age: 30
End: 2020-04-17

## 2020-04-17 RX ORDER — ONDANSETRON 4 MG/1
4 TABLET, FILM COATED ORAL EVERY 8 HOURS PRN
Qty: 30 TABLET | Refills: 0 | Status: SHIPPED | OUTPATIENT
Start: 2020-04-17 | End: 2020-06-03

## 2020-04-21 ENCOUNTER — TELEPHONE (OUTPATIENT)
Dept: FAMILY MEDICINE CLINIC | Facility: CLINIC | Age: 30
End: 2020-04-21

## 2020-04-21 RX ORDER — CLINDAMYCIN HYDROCHLORIDE 300 MG/1
300 CAPSULE ORAL 3 TIMES DAILY
Qty: 30 CAPSULE | Refills: 0 | Status: SHIPPED | OUTPATIENT
Start: 2020-04-21 | End: 2020-06-03

## 2020-04-21 NOTE — TELEPHONE ENCOUNTER
Patient called he has a infected tooth and his dentist is closed wants to know if you can call him something for it.

## 2020-06-02 ENCOUNTER — OFFICE VISIT (OUTPATIENT)
Dept: PSYCHIATRY | Facility: CLINIC | Age: 30
End: 2020-06-02

## 2020-06-02 VITALS
HEART RATE: 86 BPM | WEIGHT: 315 LBS | HEIGHT: 69 IN | DIASTOLIC BLOOD PRESSURE: 84 MMHG | SYSTOLIC BLOOD PRESSURE: 135 MMHG | BODY MASS INDEX: 46.65 KG/M2 | TEMPERATURE: 97.1 F

## 2020-06-02 DIAGNOSIS — F42.2 MIXED OBSESSIONAL THOUGHTS AND ACTS: Primary | ICD-10-CM

## 2020-06-02 DIAGNOSIS — F90.2 ATTENTION DEFICIT HYPERACTIVITY DISORDER, COMBINED TYPE: ICD-10-CM

## 2020-06-02 DIAGNOSIS — F43.10 POST TRAUMATIC STRESS DISORDER (PTSD): ICD-10-CM

## 2020-06-02 DIAGNOSIS — G47.00 INSOMNIA, UNSPECIFIED TYPE: ICD-10-CM

## 2020-06-02 DIAGNOSIS — F22 PARANOID DISORDER (HCC): ICD-10-CM

## 2020-06-02 DIAGNOSIS — I10 HYPERTENSION, UNSPECIFIED TYPE: ICD-10-CM

## 2020-06-02 DIAGNOSIS — F41.1 GENERALIZED ANXIETY DISORDER: ICD-10-CM

## 2020-06-02 PROCEDURE — 99214 OFFICE O/P EST MOD 30 MIN: CPT | Performed by: NURSE PRACTITIONER

## 2020-06-02 RX ORDER — AMITRIPTYLINE HYDROCHLORIDE 50 MG/1
50 TABLET, FILM COATED ORAL
Qty: 30 TABLET | Refills: 1 | Status: SHIPPED | OUTPATIENT
Start: 2020-06-02 | End: 2020-07-20 | Stop reason: SDUPTHER
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
## 2020-06-03 ENCOUNTER — OFFICE VISIT (OUTPATIENT)
Dept: CARDIOLOGY | Facility: CLINIC | Age: 30
End: 2020-06-03

## 2020-06-03 VITALS
SYSTOLIC BLOOD PRESSURE: 126 MMHG | HEART RATE: 91 BPM | DIASTOLIC BLOOD PRESSURE: 82 MMHG | TEMPERATURE: 98.1 F | BODY MASS INDEX: 46.65 KG/M2 | HEIGHT: 69 IN | WEIGHT: 315 LBS | OXYGEN SATURATION: 98 %

## 2020-06-03 DIAGNOSIS — R00.2 PALPITATIONS: ICD-10-CM

## 2020-06-03 DIAGNOSIS — E78.5 HYPERLIPIDEMIA LDL GOAL <100: Primary | ICD-10-CM

## 2020-06-03 DIAGNOSIS — I10 ESSENTIAL HYPERTENSION: ICD-10-CM

## 2020-06-03 DIAGNOSIS — G47.33 OSA (OBSTRUCTIVE SLEEP APNEA): ICD-10-CM

## 2020-06-03 DIAGNOSIS — E78.5 HYPERLIPIDEMIA, UNSPECIFIED HYPERLIPIDEMIA TYPE: ICD-10-CM

## 2020-06-03 DIAGNOSIS — E66.01 CLASS 3 SEVERE OBESITY DUE TO EXCESS CALORIES WITHOUT SERIOUS COMORBIDITY WITH BODY MASS INDEX (BMI) OF 50.0 TO 59.9 IN ADULT (HCC): ICD-10-CM

## 2020-06-03 PROCEDURE — 93000 ELECTROCARDIOGRAM COMPLETE: CPT | Performed by: INTERNAL MEDICINE

## 2020-06-03 PROCEDURE — 99214 OFFICE O/P EST MOD 30 MIN: CPT | Performed by: INTERNAL MEDICINE

## 2020-06-03 RX ORDER — METOPROLOL TARTRATE 50 MG/1
50 TABLET, FILM COATED ORAL 2 TIMES DAILY
Qty: 180 TABLET | Refills: 0 | Status: SHIPPED | OUTPATIENT
Start: 2020-06-03 | End: 2020-07-01 | Stop reason: SDUPTHER

## 2020-06-03 NOTE — PROGRESS NOTES
subjective     Chief Complaint   Patient presents with   • Hypertension     Follow up   • Palpitations     pt states no symptoms since last year     History of Present Illness  Patient is 29 years old white male who has history of hypertension and fast heartbeat which is controlled with Lopressor.  He has been doing very well from cardiac standpoint denies any chest pain palpitations or shortness of breath.  Blood pressure is also very well controlled at this time.    Patient is here because he has been PTSD, anxiety and depression.  He is being considered for initiation of clonidine versus Strattera versus try cyclic antidepressants.  Patient currently is on Zoloft and is decreasing dose of Elavil.     At this time patient denies any palpitations.  Blood pressure is normal.  He denies any chest pain dizziness syncope or presyncope    Past Surgical History:   Procedure Laterality Date   • COLONOSCOPY     • CYSTOSCOPY URETEROSCOPY LASER LITHOTRIPSY Left 4/15/2019    Procedure: CYSTOSCOPY URETEROSCOPY LASER LITHOTRIPSYl flexible ureteroscopy;  Surgeon: Tobias Oscar MD;  Location: [facility];  Service: Urology   • CYSTOSCOPY URETEROSCOPY LASER LITHOTRIPSY Right 11/11/2019    Procedure: CYSTOSCOPY URETEROSCOPY LASER LITHOTRIPSY RIGHT;  Surgeon: Tobias Oscar MD;  Location: [facility];  Service: Urology   • ENDOSCOPY     • KIDNEY SURGERY      Stents placed and removed.    • LAPAROSCOPIC GASTRIC BANDING     • OTHER SURGICAL HISTORY      diagnostic esophagogastroduodenoscopy   • OTHER SURGICAL HISTORY      renal lithotripsy     Family History   Problem Relation Age of Onset   • Colon cancer Other    • Heart disease Other    • Lymphoma Other    • Heart disease Mother    • Hypertension Mother    • Lupus Mother    • Skin cancer Father    • Kidney disease Father      Past Medical History:   Diagnosis Date   • Anxiety    • Arthritis    • Calculus of kidney    • DDD (degenerative disc disease), lumbosacral     • Elevated cholesterol    • Headache    • Hyperlipidemia    • Hypertension    • Insomnia    • Kidney stones    • Nerve damage    • Neuropathy    • GRUPO on CPAP    • Panic disorder    • Sciatica    • Tachycardia      Patient Active Problem List   Diagnosis   • Lipoma of right lower extremity   • Detrusor instability   • Ureteral calculus, right   • Renal calculus   • Essential hypertension   • Anxiety and depression   • Hyperlipidemia   • Palpitations   • Chest pain   • GRUPO (obstructive sleep apnea)   • DDD (degenerative disc disease), lumbar   • Chronic bilateral low back pain with left-sided sciatica   • B12 deficiency   • Low testosterone   • Class 3 severe obesity due to excess calories without serious comorbidity with body mass index (BMI) of 50.0 to 59.9 in adult (CMS/[facility])   • Ureteral stone       Social History     Tobacco Use   • Smoking status: Former Smoker     Packs/day: 0.50     Years: 13.00     Pack years: 6.50     Types: Cigarettes, Electronic Cigarette   • Smokeless tobacco: Former User     Types: Snuff   • Tobacco comment: uses [employer]   Substance Use Topics   • Alcohol use: No   • Drug use: No       Allergies   Allergen Reactions   • Lisinopril Cough   • Contrast Dye Rash       Current Outpatient Medications on File Prior to Visit   Medication Sig   • amitriptyline (ELAVIL) 50 MG tablet Take 1 tablet by mouth every night at bedtime.   • gabapentin (NEURONTIN) 600 MG tablet Take 600 mg by mouth 6 (Six) Times a Day.   • metoprolol tartrate (LOPRESSOR) 100 MG tablet Take 1 tablet by mouth Daily. Take in addition to 50mg tablet daily   • sertraline (ZOLOFT) 50 MG tablet 1/2 daily for 10 days then increase to 1 daily   • tamsulosin (FLOMAX) 0.4 MG capsule 24 hr capsule Take 1 capsule by mouth Daily.   • ibuprofen (ADVIL,MOTRIN) 600 MG tablet Take 1 tablet by mouth Every 6 (Six) Hours As Needed for Moderate Pain .   • oxyCODONE-acetaminophen (PERCOCET) 5-325 MG per tablet Take 1-2 tablets by mouth Every 6  "(Six) Hours As Needed for pain.   • [DISCONTINUED] clindamycin (CLEOCIN) 300 MG capsule Take 1 capsule by mouth 3 (Three) Times a Day.   • [DISCONTINUED] ondansetron (Zofran) 4 MG tablet Take 1 tablet by mouth Every 8 (Eight) Hours As Needed for Nausea or Vomiting.     No current facility-administered medications on file prior to visit.          The following portions of the patient's history were reviewed and updated as appropriate: allergies, current medications, past family history, past medical history, past social history, past surgical history and problem list.    Review of Systems   Constitution: Negative.   HENT: Negative.  Negative for congestion.    Eyes: Negative.    Cardiovascular: Negative.  Negative for chest pain, cyanosis, dyspnea on exertion, irregular heartbeat, leg swelling, near-syncope, orthopnea, palpitations, paroxysmal nocturnal dyspnea and syncope.   Respiratory: Negative.  Negative for shortness of breath.    Hematologic/Lymphatic: Negative.    Musculoskeletal: Negative.    Gastrointestinal: Negative.    Neurological: Negative.  Negative for headaches.   Psychiatric/Behavioral: The patient is nervous/anxious.           Objective:     /82   Pulse 91   Temp 98.1 °F (36.7 °C)   Ht 175.3 cm (69\")   Wt (!) 154 kg (338 lb 12.8 oz)   SpO2 98%   BMI 50.03 kg/m²   Physical Exam   Constitutional: He appears well-developed and well-nourished. No distress.   HENT:   Head: Normocephalic and atraumatic.   Mouth/Throat: Oropharynx is clear and moist. No oropharyngeal exudate.   Eyes: Pupils are equal, round, and reactive to light. Conjunctivae and EOM are normal. No scleral icterus.   Neck: Normal range of motion. Neck supple. No JVD present. No tracheal deviation present. No thyromegaly present.   Cardiovascular: Normal rate, regular rhythm, normal heart sounds and intact distal pulses. PMI is not displaced. Exam reveals no gallop, no friction rub and no decreased pulses.   No murmur " heard.  Pulses:       Carotid pulses are 3+ on the right side, and 3+ on the left side.       Radial pulses are 3+ on the right side, and 3+ on the left side.   Pulmonary/Chest: Effort normal and breath sounds normal. No respiratory distress. He has no wheezes. He has no rales. He exhibits no tenderness.   Abdominal: Soft. Bowel sounds are normal. He exhibits no distension, no abdominal bruit and no mass. There is no splenomegaly or hepatomegaly. There is no tenderness. There is no rebound and no guarding.   Musculoskeletal: Normal range of motion. He exhibits no edema, tenderness or deformity.   Lymphadenopathy:     He has no cervical adenopathy.   Neurological: He is alert. He has normal reflexes. No cranial nerve deficit. He exhibits normal muscle tone. Coordination normal.   Skin: Skin is warm and dry. No rash noted. He is not diaphoretic. No erythema.   Psychiatric: He has a normal mood and affect. His behavior is normal. Judgment and thought content normal.         Lab Review  Lab Results   Component Value Date     10/31/2019    K 4.1 10/31/2019     10/31/2019    BUN 15 10/31/2019    CREATININE 1.31 (H) 10/31/2019    GLUCOSE 96 10/31/2019    CALCIUM 9.9 10/31/2019    ALT 21 10/31/2019    ALKPHOS 79 10/31/2019    LABIL2 1.5 04/08/2016     Lab Results   Component Value Date    CKTOTAL 74 10/03/2017     Lab Results   Component Value Date    WBC 12.75 (H) 10/31/2019    HGB 14.8 10/31/2019    HCT 46.5 10/31/2019     10/31/2019     No results found for: INR  Lab Results   Component Value Date    MG 2.3 09/20/2019     Lab Results   Component Value Date    PSA 0.340 11/20/2018    TSH 1.900 09/20/2019     Lab Results   Component Value Date    BNP 4.0 11/10/2017     Lab Results   Component Value Date    CHOL 239 (H) 09/12/2018    TRIG 225 (H) 09/12/2018    HDL 33 (L) 09/12/2018    VLDL 45 09/12/2018    LDLHDL 4.88 09/12/2018     Lab Results   Component Value Date     (H) 09/12/2018      (H) 07/11/2018         ECG 12 Lead  Date/Time: 6/3/2020 3:04 PM  Performed by: Bridgett Oviedo MD  Authorized by: Bridgett Oviedo MD   Comparison: compared with previous ECG from 9/20/2019  Similar to previous ECG  Rhythm: sinus rhythm  Rate: normal  BPM: 91  Conduction: conduction normal  ST Segments: ST segments normal  T Waves: T waves normal  QRS axis: normal  Other: no other findings    Clinical impression: normal ECG  Comments: QTC is normal               I personally viewed and interpreted the patient's LAB data         Assessment:     1. Hyperlipidemia LDL goal <100    2. Palpitations    3. Hyperlipidemia, unspecified hyperlipidemia type    4. Essential hypertension    5. GRUPO (obstructive sleep apnea)    6. Class 3 severe obesity due to excess calories without serious comorbidity with body mass index (BMI) of 50.0 to 59.9 in adult (CMS/[facility])          Plan:     Patient is doing very well from cardiac standpoint on  metoprolol tartrate 100 mg daily.  He was advised to change it to 50 mg twice daily.    I do not see any problems with try cyclic antidepressants.  QTC is normal.  Patient has no cardiac arrhythmia except for sinus tachycardia.    I would prefer clonidine instead of Strattera.    Clonidine is acceptable however combination of clonidine and metoprolol could cause  bradycardia.  Patient has fast rate so it may not be a problem.  If clonidine is started I would decrease metoprolol tartrate 25 mg twice a day and start clonidine at 0.1 twice daily or 3 times daily.  Gradually dose can be increased.    After he starts clonidine I would like to see him within 2- 3 days to decrease or adjust metoprolol dose.  He may not even need to decrease his dose.    However I do not see any problem with using clonidine except for usual side effect of sedation, dry mouth constipation etc.    Thank you for giving me the opportunity to participate in your patient's cardiac care  Sincerely    POLI Oviedo MD.  FACC.      No follow-ups on file.

## 2020-06-11 ENCOUNTER — TELEPHONE (OUTPATIENT)
Dept: FAMILY MEDICINE CLINIC | Facility: CLINIC | Age: 30
End: 2020-06-11

## 2020-06-11 ENCOUNTER — HOSPITAL ENCOUNTER (EMERGENCY)
Facility: HOSPITAL | Age: 30
Discharge: HOME | End: 2020-06-11
Attending: EMERGENCY MEDICINE | Admitting: EMERGENCY MEDICINE

## 2020-06-11 VITALS
HEIGHT: 69 IN | DIASTOLIC BLOOD PRESSURE: 99 MMHG | OXYGEN SATURATION: 99 % | RESPIRATION RATE: 16 BRPM | SYSTOLIC BLOOD PRESSURE: 142 MMHG | BODY MASS INDEX: 46.65 KG/M2 | WEIGHT: 315 LBS | TEMPERATURE: 98.5 F | HEART RATE: 105 BPM

## 2020-06-11 DIAGNOSIS — K04.7 DENTAL ABSCESS: Primary | ICD-10-CM

## 2020-06-11 PROCEDURE — 99283 EMERGENCY DEPT VISIT LOW MDM: CPT

## 2020-06-11 RX ORDER — HYDROCODONE BITARTRATE AND ACETAMINOPHEN 5; 325 MG/1; MG/1
1 TABLET ORAL EVERY 6 HOURS PRN
Qty: 12 TABLET | Refills: 0 | Status: SHIPPED | OUTPATIENT
Start: 2020-06-11 | End: 2020-10-16

## 2020-06-11 RX ORDER — INDOMETHACIN 50 MG/1
50 CAPSULE ORAL ONCE
Status: COMPLETED | OUTPATIENT
Start: 2020-06-11 | End: 2020-06-11

## 2020-06-11 RX ORDER — ONDANSETRON 4 MG/1
4 TABLET, FILM COATED ORAL EVERY 8 HOURS PRN
Qty: 30 TABLET | Refills: 0 | Status: SHIPPED | OUTPATIENT
Start: 2020-06-11 | End: 2020-10-02 | Stop reason: SDUPTHER

## 2020-06-11 RX ORDER — INDOMETHACIN 25 MG/1
25 CAPSULE ORAL 3 TIMES DAILY PRN
Qty: 15 CAPSULE | Refills: 0 | Status: SHIPPED | OUTPATIENT
Start: 2020-06-11 | End: 2020-10-13

## 2020-06-11 RX ORDER — CLINDAMYCIN HYDROCHLORIDE 150 MG/1
300 CAPSULE ORAL ONCE
Status: COMPLETED | OUTPATIENT
Start: 2020-06-11 | End: 2020-06-11

## 2020-06-11 RX ORDER — CLINDAMYCIN HYDROCHLORIDE 300 MG/1
300 CAPSULE ORAL 3 TIMES DAILY
Qty: 30 CAPSULE | Refills: 0 | Status: SHIPPED | OUTPATIENT
Start: 2020-06-11 | End: 2020-06-17

## 2020-06-11 RX ADMIN — CLINDAMYCIN HYDROCHLORIDE 300 MG: 150 CAPSULE ORAL at 09:05

## 2020-06-11 RX ADMIN — INDOMETHACIN 50 MG: 50 CAPSULE ORAL at 09:05

## 2020-06-11 RX ADMIN — BENZOCAINE: 200 LIQUID DENTAL; ORAL; PERIODONTAL at 09:05

## 2020-06-11 NOTE — ED PROVIDER NOTES
Subjective   This is a 29-year-old male who comes in with chief complaint right lower jaw and dental pain for the past week.  Patient states he was seen by his dentist who referred him to a oral surgeon.  Patient states they do not take his insurance.  Patient states he is in increased swelling, tenderness and pain. Patient denies any fever, chills, body aches.       History provided by:  Patient   used: No    Dental Pain   Location:  Lower  Lower teeth location:  31/RL 2nd molar  Severity:  Moderate  Onset quality:  Gradual  Duration:  3 days  Timing:  Intermittent  Progression:  Worsening  Chronicity:  New  Context: abscess    Context: cap still on, not dental fracture, not enamel fracture, filling intact, not malocclusion, normal dentition and not recent dental surgery    Relieved by:  Nothing  Worsened by:  Nothing  Ineffective treatments:  None tried  Associated symptoms: facial swelling    Associated symptoms: no congestion, no difficulty swallowing, no drooling, no facial pain, no fever, no gum swelling, no headaches, no neck swelling, no oral bleeding and no oral lesions    Risk factors: no alcohol problem, no cancer, no diabetes, no immunosuppression, no periodontal disease and no smoking        Review of Systems   Constitutional: Negative.  Negative for activity change, appetite change, chills and fever.   HENT: Positive for dental problem and facial swelling. Negative for congestion, drooling and mouth sores.    Eyes: Negative.  Negative for photophobia, pain, discharge and itching.   Respiratory: Negative.  Negative for apnea, cough, choking and chest tightness.    Cardiovascular: Negative.  Negative for chest pain and leg swelling.   Gastrointestinal: Negative.  Negative for abdominal distention, abdominal pain, anal bleeding and diarrhea.   Genitourinary: Negative.  Negative for difficulty urinating, discharge, dysuria, enuresis, flank pain, frequency and genital sores.    Musculoskeletal: Negative.  Negative for arthralgias, back pain, gait problem, joint swelling and myalgias.   Skin: Negative.  Negative for color change, pallor and rash.   Neurological: Negative for headaches.   Hematological: Negative.  Negative for adenopathy. Does not bruise/bleed easily.   Psychiatric/Behavioral: Negative.    All other systems reviewed and are negative.      Past Medical History:   Diagnosis Date   • Anxiety    • Arthritis    • Calculus of kidney    • DDD (degenerative disc disease), lumbosacral    • Elevated cholesterol    • Headache    • Hyperlipidemia    • Hypertension    • Insomnia    • Kidney stones    • Nerve damage    • Neuropathy    • GRUPO on CPAP    • Panic disorder    • Sciatica    • Tachycardia        Allergies   Allergen Reactions   • Lisinopril Cough   • Contrast Dye Rash       Past Surgical History:   Procedure Laterality Date   • COLONOSCOPY     • CYSTOSCOPY URETEROSCOPY LASER LITHOTRIPSY Left 4/15/2019    Procedure: CYSTOSCOPY URETEROSCOPY LASER LITHOTRIPSYl flexible ureteroscopy;  Surgeon: Tobias Oscar MD;  Location: [facility];  Service: Urology   • CYSTOSCOPY URETEROSCOPY LASER LITHOTRIPSY Right 11/11/2019    Procedure: CYSTOSCOPY URETEROSCOPY LASER LITHOTRIPSY RIGHT;  Surgeon: Tobias Oscar MD;  Location: [facility];  Service: Urology   • ENDOSCOPY     • KIDNEY SURGERY      Stents placed and removed.    • LAPAROSCOPIC GASTRIC BANDING     • OTHER SURGICAL HISTORY      diagnostic esophagogastroduodenoscopy   • OTHER SURGICAL HISTORY      renal lithotripsy       Family History   Problem Relation Age of Onset   • Colon cancer Other    • Heart disease Other    • Lymphoma Other    • Heart disease Mother    • Hypertension Mother    • Lupus Mother    • Skin cancer Father    • Kidney disease Father        Social History     Socioeconomic History   • Marital status:      Spouse name: Not on file   • Number of children: Not on file   • Years of education: Not  on file   • Highest education level: Not on file   Tobacco Use   • Smoking status: Former Smoker     Packs/day: 0.50     Years: 13.00     Pack years: 6.50     Types: Cigarettes, Electronic Cigarette   • Smokeless tobacco: Former User     Types: Snuff   • Tobacco comment: uses e-cig   Substance and Sexual Activity   • Alcohol use: No   • Drug use: No   • Sexual activity: Defer     Partners: Female           Objective   Physical Exam   Constitutional: He is oriented to person, place, and time. He appears well-developed and well-nourished. No distress.   HENT:   Head: Normocephalic and atraumatic.   Right Ear: External ear normal.   Left Ear: External ear normal.   Nose: Nose normal.   Mouth/Throat: Oropharynx is clear and moist. Dental abscesses and dental caries present. No oropharyngeal exudate.   Eyes: Pupils are equal, round, and reactive to light. Conjunctivae and EOM are normal. Right eye exhibits no discharge. Left eye exhibits no discharge. No scleral icterus.   Neck: Normal range of motion. Neck supple. No JVD present. No tracheal deviation present. No thyromegaly present.   Cardiovascular: Normal rate, regular rhythm, normal heart sounds and intact distal pulses. Exam reveals no gallop and no friction rub.   No murmur heard.  Pulmonary/Chest: Effort normal and breath sounds normal. No stridor. No respiratory distress. He has no wheezes. He has no rales.   Abdominal: Soft. Bowel sounds are normal. He exhibits no distension and no mass. There is no tenderness. There is no rebound and no guarding. No hernia.   Musculoskeletal: Normal range of motion. He exhibits no edema, tenderness or deformity.   Lymphadenopathy:     He has no cervical adenopathy.   Neurological: He is alert and oriented to person, place, and time. He displays normal reflexes. No cranial nerve deficit or sensory deficit. He exhibits normal muscle tone. Coordination normal.   Skin: Skin is warm and dry. Capillary refill takes less than 2  seconds. No rash noted. He is not diaphoretic. No erythema. No pallor.   Psychiatric: He has a normal mood and affect. His behavior is normal. Judgment and thought content normal.   Nursing note and vitals reviewed.      Procedures           ED Course                                           MDM    Final diagnoses:   Dental abscess            Gil Rajan PA-C  06/11/20 0857

## 2020-06-11 NOTE — TELEPHONE ENCOUNTER
Wife called reports patient has an abscessed tooth that is making him nauseated & is requesting Zofran.

## 2020-06-17 ENCOUNTER — OFFICE VISIT (OUTPATIENT)
Dept: FAMILY MEDICINE CLINIC | Facility: CLINIC | Age: 30
End: 2020-06-17

## 2020-06-17 VITALS
WEIGHT: 315 LBS | BODY MASS INDEX: 46.65 KG/M2 | HEIGHT: 69 IN | DIASTOLIC BLOOD PRESSURE: 76 MMHG | OXYGEN SATURATION: 98 % | HEART RATE: 115 BPM | TEMPERATURE: 97.5 F | SYSTOLIC BLOOD PRESSURE: 140 MMHG

## 2020-06-17 DIAGNOSIS — F32.A ANXIETY AND DEPRESSION: ICD-10-CM

## 2020-06-17 DIAGNOSIS — I10 ESSENTIAL HYPERTENSION: ICD-10-CM

## 2020-06-17 DIAGNOSIS — E66.01 MORBID OBESITY (HCC): Primary | ICD-10-CM

## 2020-06-17 DIAGNOSIS — F41.9 ANXIETY AND DEPRESSION: ICD-10-CM

## 2020-06-17 PROCEDURE — 99213 OFFICE O/P EST LOW 20 MIN: CPT | Performed by: NURSE PRACTITIONER

## 2020-06-17 NOTE — PROGRESS NOTES
Subjective   Andrew Narayan is a 29 y.o. male.     Chief Complaint: Follow-up; Anxiety; and Hypertension    Anxiety   Presents for follow-up (He has a follow-up appointment scheduled with psychiatry in July 2020.) visit. Symptoms include nervous/anxious behavior. Patient reports no suicidal ideas. Symptoms occur most days. The severity of symptoms is causing significant distress. The quality of sleep is good. Nighttime awakenings: occasional.     Compliance with medications is %. Treatment side effects: Patient continues to follow with psychiatry; he is taking amitriptyline 50 mg daily and Zoloft 50 mg daily.   Hypertension   This is a chronic (Patient has been evaluated by cardiology per request of psychiatry; hoping to start patient on clonidine for anxiety) problem. The current episode started more than 1 year ago. The problem is controlled. Associated symptoms include anxiety. Current antihypertension treatment includes beta blockers. The current treatment provides moderate improvement. Compliance problems include exercise and diet.         Family History   Problem Relation Age of Onset   • Colon cancer Other    • Heart disease Other    • Lymphoma Other    • Heart disease Mother    • Hypertension Mother    • Lupus Mother    • Skin cancer Father    • Kidney disease Father        Social History     Socioeconomic History   • Marital status:      Spouse name: Not on file   • Number of children: Not on file   • Years of education: Not on file   • Highest education level: Not on file   Tobacco Use   • Smoking status: Former Smoker     Packs/day: 0.50     Years: 13.00     Pack years: 6.50     Types: Cigarettes, Electronic Cigarette   • Smokeless tobacco: Former User     Types: Snuff   • Tobacco comment: uses e-cig   Substance and Sexual Activity   • Alcohol use: No   • Drug use: No   • Sexual activity: Defer     Partners: Female       Past Medical History:   Diagnosis Date   • Anxiety    • Arthritis    •  "Calculus of kidney    • DDD (degenerative disc disease), lumbosacral    • Elevated cholesterol    • Headache    • Hyperlipidemia    • Hypertension    • Insomnia    • Kidney stones    • Nerve damage    • Neuropathy    • GRUPO on CPAP    • Panic disorder    • Sciatica    • Tachycardia        Review of Systems   Constitutional: Negative.    HENT: Negative.    Respiratory: Negative.    Cardiovascular: Negative.    Gastrointestinal: Negative.    Musculoskeletal: Negative.    Skin: Negative.    Neurological: Negative.    Psychiatric/Behavioral: Negative for suicidal ideas. The patient is nervous/anxious.        Objective   Physical Exam   Constitutional: He is oriented to person, place, and time. He appears well-developed and well-nourished.   Morbid obesity   Neck: Normal range of motion. Neck supple.   Cardiovascular: Normal rate.   Neurological: He is alert and oriented to person, place, and time.   Skin: Skin is warm and dry.   Psychiatric: He has a normal mood and affect. His behavior is normal. Judgment and thought content normal.   Nursing note and vitals reviewed.      Procedures    Vitals: Blood pressure 140/76, pulse 115, temperature 97.5 °F (36.4 °C), height 175.3 cm (69\"), weight (!) 155 kg (341 lb), SpO2 98 %.    Body mass index is 50.36 kg/m².     Allergies:   Allergies   Allergen Reactions   • Lisinopril Cough   • Contrast Dye Rash        During this visit the following were done:  Labs Reviewed []    Labs Ordered []    Radiology Reports Reviewed []    Radiology Ordered []    PCP Records Reviewed []    Referring Provider Records Reviewed []    ER Records Reviewed []    Hospital Records Reviewed []    History Obtained From Family []    Radiology Images Reviewed []    Other Reviewed []    Records Requested []      Assessment/Plan   Andrew was seen today for follow-up, anxiety and hypertension.    Diagnoses and all orders for this visit:    Morbid obesity (CMS/[facility])    Essential hypertension   Continue " medication as prescribed  Anxiety and depression   Continue to follow with psychiatry as scheduled

## 2020-06-17 NOTE — PATIENT INSTRUCTIONS
Calorie Counting for Weight Loss  Calories are units of energy. Your body needs a certain amount of calories from food to keep you going throughout the day. When you eat more calories than your body needs, your body stores the extra calories as fat. When you eat fewer calories than your body needs, your body burns fat to get the energy it needs.  Calorie counting means keeping track of how many calories you eat and drink each day. Calorie counting can be helpful if you need to lose weight. If you make sure to eat fewer calories than your body needs, you should lose weight. Ask your health care provider what a healthy weight is for you.  For calorie counting to work, you will need to eat the right number of calories in a day in order to lose a healthy amount of weight per week. A dietitian can help you determine how many calories you need in a day and will give you suggestions on how to reach your calorie goal.  · A healthy amount of weight to lose per week is usually 1-2 lb (0.5-0.9 kg). This usually means that your daily calorie intake should be reduced by 500-750 calories.  · Eating 1,200 - 1,500 calories per day can help most women lose weight.  · Eating 1,500 - 1,800 calories per day can help most men lose weight.  What is my plan?  My goal is to have __________ calories per day.  If I have this many calories per day, I should lose around __________ pounds per week.  What do I need to know about calorie counting?  In order to meet your daily calorie goal, you will need to:  · Find out how many calories are in each food you would like to eat. Try to do this before you eat.  · Decide how much of the food you plan to eat.  · Write down what you ate and how many calories it had. Doing this is called keeping a food log.  To successfully lose weight, it is important to balance calorie counting with a healthy lifestyle that includes regular activity. Aim for 150 minutes of moderate exercise (such as walking) or 75  minutes of vigorous exercise (such as running) each week.  Where do I find calorie information?    The number of calories in a food can be found on a Nutrition Facts label. If a food does not have a Nutrition Facts label, try to look up the calories online or ask your dietitian for help.  Remember that calories are listed per serving. If you choose to have more than one serving of a food, you will have to multiply the calories per serving by the amount of servings you plan to eat. For example, the label on a package of bread might say that a serving size is 1 slice and that there are 90 calories in a serving. If you eat 1 slice, you will have eaten 90 calories. If you eat 2 slices, you will have eaten 180 calories.  How do I keep a food log?  Immediately after each meal, record the following information in your food log:  · What you ate. Don't forget to include toppings, sauces, and other extras on the food.  · How much you ate. This can be measured in cups, ounces, or number of items.  · How many calories each food and drink had.  · The total number of calories in the meal.  Keep your food log near you, such as in a small notebook in your pocket, or use a mobile gallo or website. Some programs will calculate calories for you and show you how many calories you have left for the day to meet your goal.  What are some calorie counting tips?    · Use your calories on foods and drinks that will fill you up and not leave you hungry:  ? Some examples of foods that fill you up are nuts and nut butters, vegetables, lean proteins, and high-fiber foods like whole grains. High-fiber foods are foods with more than 5 g fiber per serving.  ? Drinks such as sodas, specialty coffee drinks, alcohol, and juices have a lot of calories, yet do not fill you up.  · Eat nutritious foods and avoid empty calories. Empty calories are calories you get from foods or beverages that do not have many vitamins or protein, such as candy, sweets, and  "soda. It is better to have a nutritious high-calorie food (such as an avocado) than a food with few nutrients (such as a bag of chips).  · Know how many calories are in the foods you eat most often. This will help you calculate calorie counts faster.  · Pay attention to calories in drinks. Low-calorie drinks include water and unsweetened drinks.  · Pay attention to nutrition labels for \"low fat\" or \"fat free\" foods. These foods sometimes have the same amount of calories or more calories than the full fat versions. They also often have added sugar, starch, or salt, to make up for flavor that was removed with the fat.  · Find a way of tracking calories that works for you. Get creative. Try different apps or programs if writing down calories does not work for you.  What are some portion control tips?  · Know how many calories are in a serving. This will help you know how many servings of a certain food you can have.  · Use a measuring cup to measure serving sizes. You could also try weighing out portions on a kitchen scale. With time, you will be able to estimate serving sizes for some foods.  · Take some time to put servings of different foods on your favorite plates, bowls, and cups so you know what a serving looks like.  · Try not to eat straight from a bag or box. Doing this can lead to overeating. Put the amount you would like to eat in a cup or on a plate to make sure you are eating the right portion.  · Use smaller plates, glasses, and bowls to prevent overeating.  · Try not to multitask (for example, watch TV or use your computer) while eating. If it is time to eat, sit down at a table and enjoy your food. This will help you to know when you are full. It will also help you to be aware of what you are eating and how much you are eating.  What are tips for following this plan?  Reading food labels  · Check the calorie count compared to the serving size. The serving size may be smaller than what you are used to " "eating.  · Check the source of the calories. Make sure the food you are eating is high in vitamins and protein and low in saturated and trans fats.  Shopping  · Read nutrition labels while you shop. This will help you make healthy decisions before you decide to purchase your food.  · Make a grocery list and stick to it.  Cooking  · Try to cook your favorite foods in a healthier way. For example, try baking instead of frying.  · Use low-fat dairy products.  Meal planning  · Use more fruits and vegetables. Half of your plate should be fruits and vegetables.  · Include lean proteins like poultry and fish.  How do I count calories when eating out?  · Ask for smaller portion sizes.  · Consider sharing an entree and sides instead of getting your own entree.  · If you get your own entree, eat only half. Ask for a box at the beginning of your meal and put the rest of your entree in it so you are not tempted to eat it.  · If calories are listed on the menu, choose the lower calorie options.  · Choose dishes that include vegetables, fruits, whole grains, low-fat dairy products, and lean protein.  · Choose items that are boiled, broiled, grilled, or steamed. Stay away from items that are buttered, battered, fried, or served with cream sauce. Items labeled \"crispy\" are usually fried, unless stated otherwise.  · Choose water, low-fat milk, unsweetened iced tea, or other drinks without added sugar. If you want an alcoholic beverage, choose a lower calorie option such as a glass of wine or light beer.  · Ask for dressings, sauces, and syrups on the side. These are usually high in calories, so you should limit the amount you eat.  · If you want a salad, choose a garden salad and ask for grilled meats. Avoid extra toppings like cotto, cheese, or fried items. Ask for the dressing on the side, or ask for olive oil and vinegar or lemon to use as dressing.  · Estimate how many servings of a food you are given. For example, a serving of " cooked rice is ½ cup or about the size of half a baseball. Knowing serving sizes will help you be aware of how much food you are eating at restaurants. The list below tells you how big or small some common portion sizes are based on everyday objects:  ? 1 oz--4 stacked dice.  ? 3 oz--1 deck of cards.  ? 1 tsp--1 die.  ? 1 Tbsp--½ a ping-pong ball.  ? 2 Tbsp--1 ping-pong ball.  ? ½ cup--½ baseball.  ? 1 cup--1 baseball.  Summary  · Calorie counting means keeping track of how many calories you eat and drink each day. If you eat fewer calories than your body needs, you should lose weight.  · A healthy amount of weight to lose per week is usually 1-2 lb (0.5-0.9 kg). This usually means reducing your daily calorie intake by 500-750 calories.  · The number of calories in a food can be found on a Nutrition Facts label. If a food does not have a Nutrition Facts label, try to look up the calories online or ask your dietitian for help.  · Use your calories on foods and drinks that will fill you up, and not on foods and drinks that will leave you hungry.  · Use smaller plates, glasses, and bowls to prevent overeating.  This information is not intended to replace advice given to you by your health care provider. Make sure you discuss any questions you have with your health care provider.  Document Released: 12/18/2006 Document Revised: 09/06/2019 Document Reviewed: 11/17/2017  [employer] Patient Education © 2020 [employer]      Exercising to Lose Weight  Exercise is structured, repetitive physical activity to improve fitness and health. Getting regular exercise is important for everyone. It is especially important if you are overweight. Being overweight increases your risk of heart disease, stroke, diabetes, high blood pressure, and several types of cancer. Reducing your calorie intake and exercising can help you lose weight.  Exercise is usually categorized as moderate or vigorous intensity. To lose weight, most people need  to do a certain amount of moderate-intensity or vigorous-intensity exercise each week.  Moderate-intensity exercise    Moderate-intensity exercise is any activity that gets you moving enough to burn at least three times more energy (calories) than if you were sitting.  Examples of moderate exercise include:  · Walking a mile in 15 minutes.  · Doing light yard work.  · Biking at an easy pace.  Most people should get at least 150 minutes (2 hours and 30 minutes) a week of moderate-intensity exercise to maintain their body weight.  Vigorous-intensity exercise  Vigorous-intensity exercise is any activity that gets you moving enough to burn at least six times more calories than if you were sitting. When you exercise at this intensity, you should be working hard enough that you are not able to carry on a conversation.  Examples of vigorous exercise include:  · Running.  · Playing a team sport, such as football, basketball, and soccer.  · Jumping rope.  Most people should get at least 75 minutes (1 hour and 15 minutes) a week of vigorous-intensity exercise to maintain their body weight.  How can exercise affect me?  When you exercise enough to burn more calories than you eat, you lose weight. Exercise also reduces body fat and builds muscle. The more muscle you have, the more calories you burn. Exercise also:  · Improves mood.  · Reduces stress and tension.  · Improves your overall fitness, flexibility, and endurance.  · Increases bone strength.  The amount of exercise you need to lose weight depends on:  · Your age.  · The type of exercise.  · Any health conditions you have.  · Your overall physical ability.  Talk to your health care provider about how much exercise you need and what types of activities are safe for you.  What actions can I take to lose weight?  Nutrition    · Make changes to your diet as told by your health care provider or diet and nutrition specialist (dietitian). This may include:  ? Eating fewer  calories.  ? Eating more protein.  ? Eating less unhealthy fats.  ? Eating a diet that includes fresh fruits and vegetables, whole grains, low-fat dairy products, and lean protein.  ? Avoiding foods with added fat, salt, and sugar.  · Drink plenty of water while you exercise to prevent dehydration or heat stroke.  Activity  · Choose an activity that you enjoy and set realistic goals. Your health care provider can help you make an exercise plan that works for you.  · Exercise at a moderate or vigorous intensity most days of the week.  ? The intensity of exercise may vary from person to person. You can tell how intense a workout is for you by paying attention to your breathing and heartbeat. Most people will notice their breathing and heartbeat get faster with more intense exercise.  · Do resistance training twice each week, such as:  ? Push-ups.  ? Sit-ups.  ? Lifting weights.  ? Using resistance bands.  · Getting short amounts of exercise can be just as helpful as long structured periods of exercise. If you have trouble finding time to exercise, try to include exercise in your daily routine.  ? Get up, stretch, and walk around every 30 minutes throughout the day.  ? Go for a walk during your lunch break.  ? Park your car farther away from your destination.  ? If you take public transportation, get off one stop early and walk the rest of the way.  ? Make phone calls while standing up and walking around.  ? Take the stairs instead of elevators or escalators.  · Wear comfortable clothes and shoes with good support.  · Do not exercise so much that you hurt yourself, feel dizzy, or get very short of breath.  Where to find more information  · [place] Department of Health and Human Services: www.hhs.gov  · Centers for Disease Control and Prevention (CDC): www.cdc.gov  Contact a health care provider:  · Before starting a new exercise program.  · If you have questions or concerns about your weight.  · If you have a medical  problem that keeps you from exercising.  Get help right away if you have any of the following while exercising:  · Injury.  · Dizziness.  · Difficulty breathing or shortness of breath that does not go away when you stop exercising.  · Chest pain.  · Rapid heartbeat.  Summary  · Being overweight increases your risk of heart disease, stroke, diabetes, high blood pressure, and several types of cancer.  · Losing weight happens when you burn more calories than you eat.  · Reducing the amount of calories you eat in addition to getting regular moderate or vigorous exercise each week helps you lose weight.  This information is not intended to replace advice given to you by your health care provider. Make sure you discuss any questions you have with your health care provider.  Document Released: 01/20/2012 Document Revised: 12/31/2018 Document Reviewed: 12/31/2018  [employer] Patient Education © 2020 [employer]

## 2020-06-25 ENCOUNTER — OFFICE VISIT (OUTPATIENT)
Dept: PSYCHIATRY | Facility: CLINIC | Age: 30
End: 2020-06-25

## 2020-06-25 VITALS — TEMPERATURE: 96.9 F

## 2020-06-25 DIAGNOSIS — F42.2 MIXED OBSESSIONAL THOUGHTS AND ACTS: Primary | ICD-10-CM

## 2020-06-25 DIAGNOSIS — F43.10 POST TRAUMATIC STRESS DISORDER (PTSD): ICD-10-CM

## 2020-06-25 DIAGNOSIS — F90.2 ATTENTION DEFICIT HYPERACTIVITY DISORDER, COMBINED TYPE: ICD-10-CM

## 2020-06-25 DIAGNOSIS — F41.1 GENERALIZED ANXIETY DISORDER: ICD-10-CM

## 2020-06-25 PROCEDURE — 90834 PSYTX W PT 45 MINUTES: CPT | Performed by: SOCIAL WORKER
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
## 2020-06-30 ENCOUNTER — TELEPHONE (OUTPATIENT)
Dept: PSYCHIATRY | Facility: CLINIC | Age: 30
End: 2020-06-30

## 2020-07-01 ENCOUNTER — OFFICE VISIT (OUTPATIENT)
Dept: PSYCHIATRY | Facility: CLINIC | Age: 30
End: 2020-07-01

## 2020-07-01 VITALS
HEART RATE: 100 BPM | DIASTOLIC BLOOD PRESSURE: 85 MMHG | SYSTOLIC BLOOD PRESSURE: 134 MMHG | BODY MASS INDEX: 46.65 KG/M2 | WEIGHT: 315 LBS | HEIGHT: 69 IN

## 2020-07-01 DIAGNOSIS — F42.2 MIXED OBSESSIONAL THOUGHTS AND ACTS: Primary | ICD-10-CM

## 2020-07-01 DIAGNOSIS — F43.10 POST TRAUMATIC STRESS DISORDER (PTSD): ICD-10-CM

## 2020-07-01 DIAGNOSIS — I10 ESSENTIAL HYPERTENSION: ICD-10-CM

## 2020-07-01 DIAGNOSIS — F90.2 ATTENTION DEFICIT HYPERACTIVITY DISORDER, COMBINED TYPE: ICD-10-CM

## 2020-07-01 DIAGNOSIS — F41.1 GENERALIZED ANXIETY DISORDER: ICD-10-CM

## 2020-07-01 PROCEDURE — 99214 OFFICE O/P EST MOD 30 MIN: CPT | Performed by: NURSE PRACTITIONER

## 2020-07-01 RX ORDER — CLONIDINE HYDROCHLORIDE 0.1 MG/1
0.1 TABLET ORAL 2 TIMES DAILY
Qty: 60 TABLET | Refills: 1 | Status: SHIPPED | OUTPATIENT
Start: 2020-07-01 | End: 2020-07-20 | Stop reason: SDUPTHER

## 2020-07-01 RX ORDER — ESCITALOPRAM OXALATE 5 MG/1
5 TABLET ORAL DAILY
Qty: 30 TABLET | Refills: 1 | Status: SHIPPED | OUTPATIENT
Start: 2020-07-01 | End: 2020-07-20 | Stop reason: SDUPTHER

## 2020-07-01 NOTE — PROGRESS NOTES
Subjective   Andrew Narayan is a 29 y.o. male is here today for medication management follow-up.Presents by himself    Chief Complaint:  Recheck on anxiety and hallucinations and ADHD    History of Present Illness: Pt presents for follow up at the [facility].  She has stopped the Zocor loft as he felt it was making him more irritable.  He continues to have the racing thoughts and repetitive thoughts.  Anxiety remains high due to the previous factors.  He denies depression.  Denies suicidal thoughts, homicidal thoughts, or any AV hallucinations.  Sleep is on and off.  States that when he went down to Elavil 50 mg it was worse so he feels the Elavil helps so he put himself back up on 100.  He has went to see the cardiologist and I have reviewed the letter he sent to me.  Patient main issue continues to be anxiety.  He will not go in public places.  States that he has the fear of something happening.  Says that anytime he has to wait on something even like waiting for an appointment his anxiety gets extremely high almost unbearable.  Zoloft did not help with that.  Prozac did not help in the past.  Cymbalta did not help. Body mass index is 51.07 kg/m².  Weight gain is 5 pounds since last office visit.  Patient is not exercising.  He states he cannot due to his back pain.  He has supposed to be taking Neurontin 600 mg he states 6 times a day from the pain clinic he is saying however he states he cannot tolerate that as that makes him feel drunk.  He is right now presently taking 3 Neurontin a day and states that this does well with him.  That it does help his leg pain but does not help his back pain nor his anxiety.  No anger outbursts.  Mood is stable.          The following portions of the patient's history were reviewed and updated as appropriate: allergies, current medications, past family history, past medical history, past social history, past surgical history and problem list.    Review of  "Systems   Constitutional: Negative for activity change, appetite change and fatigue.   HENT: Negative.    Eyes: Negative for visual disturbance.   Respiratory: Negative.    Cardiovascular: Negative.    Gastrointestinal: Negative for nausea.   Endocrine: Negative.    Genitourinary: Negative.    Musculoskeletal: Positive for arthralgias.   Skin: Negative.    Allergic/Immunologic: Negative.    Neurological: Negative for dizziness, seizures and headaches.   Hematological: Negative.    Psychiatric/Behavioral: Positive for decreased concentration. Negative for agitation, behavioral problems, confusion, dysphoric mood, hallucinations, self-injury, sleep disturbance and suicidal ideas. The patient is nervous/anxious. The patient is not hyperactive.        Objective   Physical Exam   Constitutional: He is oriented to person, place, and time. He appears well-developed and well-nourished.   Neurological: He is alert and oriented to person, place, and time.   Psychiatric: He has a normal mood and affect. His speech is normal and behavior is normal. Thought content normal. His mood appears not anxious. Cognition and memory are normal. He expresses impulsivity.   Engaging in conversation   Vitals reviewed.    Blood pressure 134/85, pulse 100, height 175.3 cm (69.02\"), weight (!) 157 kg (346 lb).    Medication List:   Current Outpatient Medications   Medication Sig Dispense Refill   • amitriptyline (ELAVIL) 50 MG tablet Take 1 tablet by mouth every night at bedtime. 30 tablet 1   • gabapentin (NEURONTIN) 600 MG tablet Take 600 mg by mouth 6 (Six) Times a Day.     • HYDROcodone-acetaminophen (NORCO) 5-325 MG per tablet Take 1 tablet by mouth Every 6 (Six) Hours As Needed for Mild Pain . 12 tablet 0   • ibuprofen (ADVIL,MOTRIN) 600 MG tablet Take 1 tablet by mouth Every 6 (Six) Hours As Needed for Moderate Pain . 30 tablet 0   • indomethacin (INDOCIN) 25 MG capsule Take 1 capsule by mouth 3 (Three) Times a Day As Needed for Moderate " Pain . 15 capsule 0   • metoprolol tartrate (LOPRESSOR) 25 MG tablet Take 1 tablet by mouth 2 (Two) Times a Day. 60 tablet 0   • ondansetron (Zofran) 4 MG tablet Take 1 tablet by mouth Every 8 (Eight) Hours As Needed for Nausea or Vomiting. 30 tablet 0   • oxyCODONE-acetaminophen (PERCOCET) 5-325 MG per tablet Take 1-2 tablets by mouth Every 6 (Six) Hours As Needed for pain. 28 tablet 0   • tamsulosin (FLOMAX) 0.4 MG capsule 24 hr capsule Take 1 capsule by mouth Daily. 15 capsule 0   • cloNIDine (CATAPRES) 0.1 MG tablet Take 1 tablet by mouth 2 (Two) Times a Day. 60 tablet 1   • escitalopram (LEXAPRO) 5 MG tablet Take 1 tablet by mouth Daily. 30 tablet 1     No current facility-administered medications for this visit.        Mental Status Exam:   Hygiene:   good  Cooperation:  Cooperative  Eye Contact:  Good  Psychomotor Behavior:  Restless  Affect:  Full range  Hopelessness: Denies  Speech:  Normal  Thought Process:  Goal directed  Thought Content:  Normal  Suicidal:  None  Homicidal:  None  Hallucinations:  Auditory  Delusion:  None  Memory:  Intact  Orientation:  Person, Place, Time and Situation  Reliability:  fair  Insight:  Fair  Judgement:  Fair  Impulse Control:  Poor  Physical/Medical Issues:  Yes obesity, HTN, pain    Assessment/Plan   Problems Addressed this Visit        Cardiovascular and Mediastinum    Essential hypertension    Relevant Medications    metoprolol tartrate (LOPRESSOR) 25 MG tablet    cloNIDine (CATAPRES) 0.1 MG tablet      Other Visit Diagnoses     Mixed obsessional thoughts and acts    -  Primary    Relevant Medications    escitalopram (LEXAPRO) 5 MG tablet    Attention deficit hyperactivity disorder, combined type        Relevant Medications    cloNIDine (CATAPRES) 0.1 MG tablet    escitalopram (LEXAPRO) 5 MG tablet    Post traumatic stress disorder (PTSD)        Relevant Medications    escitalopram (LEXAPRO) 5 MG tablet    Generalized anxiety disorder        Relevant Medications     escitalopram (LEXAPRO) 5 MG tablet      Eugenio shows neurontin and occasional hydrocodone.   Functionality: pt having significant impairment in important areas of daily functioning.  Prognosis: Guarded dependent on medication/follow up and treatment plan compliance.  UDS reviewed and appropriate.     I am going to place patient on some clonidine.  Risks, benefits, side effects of the medication including hypotension as well as dry mouth and constipation were discussed.  Patient is aware that Dr. Oviedo has recommended should he start clonidine he will need to decrease his Lopressor to 25 mg twice a day and I am sending that prescription in down.  Dr. Oviedo also wanted him to follow-up with him 2 to 3 days after starting the medication.  This weekend is a holiday weekend so the office is closed on Friday.  I got patient an appointment Monday at 10:15 AM so patient is to start the clonidine on Saturday.  Also I have recommended that he not start the Lexapro until after he sees Dr. Oviedo just because starting clonidine and Lexapro if we get a side effect I will know where it is coming from.  Patient verbalizes understanding of all this and repeated the instructions.   He is in agreement with treatment plan and recommendations.  Continuing efforts to promote the therapeutic alliance, address the patient's issues, and strengthen self awareness, insights, and coping skills. He has restarted with therapist.  Patient is aware to call 911 or go to the nearest ER should begin having SI/HI.  He has an appointment to return here on the 20th this month and I recommended he keep this.  He is aware that the onset of action of the Lexapro will not be an effect by then but I just want to see how he is doing with everything else.  Also he said he has seen Urmila Gonzalez and is due to start EMDR therapy soon and I believe this will really benefit him regarding the PTSD.  He knows to notify me should any symptoms worsen.

## 2020-07-06 ENCOUNTER — LAB (OUTPATIENT)
Dept: FAMILY MEDICINE CLINIC | Facility: CLINIC | Age: 30
End: 2020-07-06

## 2020-07-06 ENCOUNTER — OFFICE VISIT (OUTPATIENT)
Dept: CARDIOLOGY | Facility: CLINIC | Age: 30
End: 2020-07-06

## 2020-07-06 VITALS
DIASTOLIC BLOOD PRESSURE: 88 MMHG | HEIGHT: 69 IN | TEMPERATURE: 98.9 F | HEART RATE: 86 BPM | OXYGEN SATURATION: 98 % | SYSTOLIC BLOOD PRESSURE: 114 MMHG | WEIGHT: 315 LBS | BODY MASS INDEX: 46.65 KG/M2

## 2020-07-06 DIAGNOSIS — E66.01 CLASS 3 SEVERE OBESITY DUE TO EXCESS CALORIES WITHOUT SERIOUS COMORBIDITY WITH BODY MASS INDEX (BMI) OF 50.0 TO 59.9 IN ADULT (HCC): ICD-10-CM

## 2020-07-06 DIAGNOSIS — E78.5 HYPERLIPIDEMIA, UNSPECIFIED HYPERLIPIDEMIA TYPE: ICD-10-CM

## 2020-07-06 DIAGNOSIS — E78.5 HYPERLIPIDEMIA LDL GOAL <100: ICD-10-CM

## 2020-07-06 DIAGNOSIS — R00.2 PALPITATIONS: Primary | ICD-10-CM

## 2020-07-06 PROCEDURE — 80061 LIPID PANEL: CPT | Performed by: INTERNAL MEDICINE

## 2020-07-06 PROCEDURE — 99214 OFFICE O/P EST MOD 30 MIN: CPT | Performed by: INTERNAL MEDICINE

## 2020-07-06 NOTE — PROGRESS NOTES
subjective     Chief Complaint   Patient presents with   • Palpitations     Follow up,   • Hypertension     started new meds     History of Present Illness  Patient is 29 years old white male who is here for cardiology follow-up.  He was initially seen by me because of fast heartbeat.  He has been taking metoprolol 25 twice daily.  Recently he was started by psychologist on clonidine 0.1 twice daily.  He is doing very well with no specific complaints.  Blood pressure is low and heart rate also is better.  He complains of dry mouth otherwise no specific symptoms.  Patient is quite overweight and has been trying to lose weight.  He also has recurrent urinary stones and is drinking a lot of fluids.    Past Surgical History:   Procedure Laterality Date   • COLONOSCOPY     • CYSTOSCOPY URETEROSCOPY LASER LITHOTRIPSY Left 4/15/2019    Procedure: CYSTOSCOPY URETEROSCOPY LASER LITHOTRIPSYl flexible ureteroscopy;  Surgeon: Tobias Oscar MD;  Location: [facility];  Service: Urology   • CYSTOSCOPY URETEROSCOPY LASER LITHOTRIPSY Right 11/11/2019    Procedure: CYSTOSCOPY URETEROSCOPY LASER LITHOTRIPSY RIGHT;  Surgeon: Tobias Oscar MD;  Location: [facility];  Service: Urology   • ENDOSCOPY     • KIDNEY SURGERY      Stents placed and removed.    • LAPAROSCOPIC GASTRIC BANDING     • OTHER SURGICAL HISTORY      diagnostic esophagogastroduodenoscopy   • OTHER SURGICAL HISTORY      renal lithotripsy     Family History   Problem Relation Age of Onset   • Colon cancer Other    • Heart disease Other    • Lymphoma Other    • Heart disease Mother    • Hypertension Mother    • Lupus Mother    • Skin cancer Father    • Kidney disease Father      Past Medical History:   Diagnosis Date   • Anxiety    • Arthritis    • Calculus of kidney    • DDD (degenerative disc disease), lumbosacral    • Elevated cholesterol    • Headache    • Hyperlipidemia    • Hypertension    • Insomnia    • Kidney stones    • Nerve damage    •  Neuropathy    • GRUPO on CPAP    • Panic disorder    • Sciatica    • Tachycardia      Patient Active Problem List   Diagnosis   • Lipoma of right lower extremity   • Detrusor instability   • Ureteral calculus, right   • Renal calculus   • Essential hypertension   • Anxiety and depression   • Hyperlipidemia   • Palpitations   • Chest pain   • GRUPO (obstructive sleep apnea)   • DDD (degenerative disc disease), lumbar   • Chronic bilateral low back pain with left-sided sciatica   • B12 deficiency   • Low testosterone   • Class 3 severe obesity due to excess calories without serious comorbidity with body mass index (BMI) of 50.0 to 59.9 in adult (CMS/[facility])   • Ureteral stone       Social History     Tobacco Use   • Smoking status: Former Smoker     Packs/day: 0.50     Years: 13.00     Pack years: 6.50     Types: Cigarettes, Electronic Cigarette   • Smokeless tobacco: Former User     Types: Snuff   • Tobacco comment: uses [employer]   Substance Use Topics   • Alcohol use: No   • Drug use: No       Allergies   Allergen Reactions   • Lisinopril Cough   • Contrast Dye Rash       Current Outpatient Medications on File Prior to Visit   Medication Sig   • amitriptyline (ELAVIL) 50 MG tablet Take 1 tablet by mouth every night at bedtime.   • cloNIDine (CATAPRES) 0.1 MG tablet Take 1 tablet by mouth 2 (Two) Times a Day.   • escitalopram (LEXAPRO) 5 MG tablet Take 1 tablet by mouth Daily.   • gabapentin (NEURONTIN) 600 MG tablet Take 600 mg by mouth 6 (Six) Times a Day.   • HYDROcodone-acetaminophen (NORCO) 5-325 MG per tablet Take 1 tablet by mouth Every 6 (Six) Hours As Needed for Mild Pain .   • ibuprofen (ADVIL,MOTRIN) 600 MG tablet Take 1 tablet by mouth Every 6 (Six) Hours As Needed for Moderate Pain .   • metoprolol tartrate (LOPRESSOR) 25 MG tablet Take 1 tablet by mouth 2 (Two) Times a Day.   • ondansetron (Zofran) 4 MG tablet Take 1 tablet by mouth Every 8 (Eight) Hours As Needed for Nausea or Vomiting.   • tamsulosin (FLOMAX)  "0.4 MG capsule 24 hr capsule Take 1 capsule by mouth Daily.   • indomethacin (INDOCIN) 25 MG capsule Take 1 capsule by mouth 3 (Three) Times a Day As Needed for Moderate Pain .   • oxyCODONE-acetaminophen (PERCOCET) 5-325 MG per tablet Take 1-2 tablets by mouth Every 6 (Six) Hours As Needed for pain.     No current facility-administered medications on file prior to visit.          The following portions of the patient's history were reviewed and updated as appropriate: allergies, current medications, past family history, past medical history, past social history, past surgical history and problem list.    Review of Systems   Constitution: Negative.   HENT: Negative.  Negative for congestion.    Eyes: Negative.    Cardiovascular: Negative.  Negative for chest pain, cyanosis, dyspnea on exertion, irregular heartbeat, leg swelling, near-syncope, orthopnea, palpitations, paroxysmal nocturnal dyspnea and syncope.   Respiratory: Negative.  Negative for shortness of breath.    Hematologic/Lymphatic: Negative.    Musculoskeletal: Negative.    Gastrointestinal: Negative.    Neurological: Negative.  Negative for headaches.          Objective:     /88 (BP Location: Left arm, Cuff Size: Large Adult)   Pulse 86   Temp 98.9 °F (37.2 °C)   Ht 175.3 cm (69\")   Wt (!) 157 kg (346 lb)   SpO2 98%   BMI 51.10 kg/m²   Physical Exam   Constitutional: He appears well-developed and well-nourished. No distress.   HENT:   Head: Normocephalic and atraumatic.   Mouth/Throat: Oropharynx is clear and moist. No oropharyngeal exudate.   Eyes: Pupils are equal, round, and reactive to light. Conjunctivae and EOM are normal. No scleral icterus.   Neck: Normal range of motion. Neck supple. No JVD present. No tracheal deviation present. No thyromegaly present.   Cardiovascular: Normal rate, regular rhythm, normal heart sounds and intact distal pulses. PMI is not displaced. Exam reveals no gallop, no friction rub and no decreased pulses. "   No murmur heard.  Pulses:       Carotid pulses are 3+ on the right side, and 3+ on the left side.       Radial pulses are 3+ on the right side, and 3+ on the left side.   Pulmonary/Chest: Effort normal and breath sounds normal. No respiratory distress. He has no wheezes. He has no rales. He exhibits no tenderness.   Abdominal: Soft. Bowel sounds are normal. He exhibits no distension, no abdominal bruit and no mass. There is no splenomegaly or hepatomegaly. There is no tenderness. There is no rebound and no guarding.   Musculoskeletal: Normal range of motion. He exhibits no edema, tenderness or deformity.   Lymphadenopathy:     He has no cervical adenopathy.   Neurological: He is alert. He has normal reflexes. No cranial nerve deficit. He exhibits normal muscle tone. Coordination normal.   Skin: Skin is warm and dry. No rash noted. He is not diaphoretic. No erythema.   Psychiatric: He has a normal mood and affect. His behavior is normal. Judgment and thought content normal.         Lab Review  Lab Results   Component Value Date     10/31/2019    K 4.1 10/31/2019     10/31/2019    BUN 15 10/31/2019    CREATININE 1.31 (H) 10/31/2019    GLUCOSE 96 10/31/2019    CALCIUM 9.9 10/31/2019    ALT 21 10/31/2019    ALKPHOS 79 10/31/2019    LABIL2 1.5 04/08/2016     Lab Results   Component Value Date    CKTOTAL 74 10/03/2017     Lab Results   Component Value Date    WBC 12.75 (H) 10/31/2019    HGB 14.8 10/31/2019    HCT 46.5 10/31/2019     10/31/2019     No results found for: INR  Lab Results   Component Value Date    MG 2.3 09/20/2019     Lab Results   Component Value Date    PSA 0.340 11/20/2018    TSH 1.900 09/20/2019     Lab Results   Component Value Date    BNP 4.0 11/10/2017     Lab Results   Component Value Date    CHOL 239 (H) 09/12/2018    TRIG 225 (H) 09/12/2018    HDL 33 (L) 09/12/2018    VLDL 45 09/12/2018    LDLHDL 4.88 09/12/2018     Lab Results   Component Value Date     (H) 09/12/2018      (H) 07/11/2018       Procedures       I personally viewed and interpreted the patient's LAB data         Assessment:     1. Palpitations    2. Hyperlipidemia, unspecified hyperlipidemia type    3. Class 3 severe obesity due to excess calories without serious comorbidity with body mass index (BMI) of 50.0 to 59.9 in adult (CMS/[facility])          Plan:     Palpitations and fast heartbeat is much better with the Lopressor 25 twice daily and clonidine 0.1 twice daily.  He was instructed about being careful with heart rate because of combination of Lopressor and clonidine.  If heart rate is less than 60, he will hold Lopressor.  Weight loss was emphasized.  He complains of dry mouth he was advised to use chewing gum in his mouth.  Patient has hyperlipidemia but has not had lab work in quite some time we will get lab work this week.  Healthy lifestyle emphasized.  Follow-up scheduled with EKG next visit.        No follow-ups on file.

## 2020-07-07 ENCOUNTER — TELEPHONE (OUTPATIENT)
Dept: CARDIOLOGY | Facility: CLINIC | Age: 30
End: 2020-07-07

## 2020-07-07 LAB
CHOLEST SERPL-MCNC: 366 MG/DL (ref 0–200)
HDLC SERPL-MCNC: 46 MG/DL (ref 40–60)
LDLC SERPL CALC-MCNC: 276 MG/DL (ref 0–100)
LDLC/HDLC SERPL: 5.99 {RATIO}
TRIGL SERPL-MCNC: 222 MG/DL (ref 0–150)
VLDLC SERPL-MCNC: 44.4 MG/DL (ref 5–40)

## 2020-07-07 RX ORDER — ROSUVASTATIN CALCIUM 20 MG/1
20 TABLET, COATED ORAL DAILY
Qty: 30 TABLET | Refills: 11 | Status: SHIPPED | OUTPATIENT
Start: 2020-07-07 | End: 2021-01-15

## 2020-07-07 NOTE — TELEPHONE ENCOUNTER
----- Message from Bridgett Oviedo MD sent at 7/7/2020  9:21 AM EDT -----  Cholesterol is very very high, start Crestor 20 mg daily  Lab work in 1 month  Keep your appointment

## 2020-07-20 ENCOUNTER — OFFICE VISIT (OUTPATIENT)
Dept: PSYCHIATRY | Facility: CLINIC | Age: 30
End: 2020-07-20

## 2020-07-20 DIAGNOSIS — F42.2 MIXED OBSESSIONAL THOUGHTS AND ACTS: ICD-10-CM

## 2020-07-20 DIAGNOSIS — F41.1 GENERALIZED ANXIETY DISORDER: ICD-10-CM

## 2020-07-20 DIAGNOSIS — G47.00 INSOMNIA, UNSPECIFIED TYPE: ICD-10-CM

## 2020-07-20 DIAGNOSIS — F90.2 ATTENTION DEFICIT HYPERACTIVITY DISORDER, COMBINED TYPE: ICD-10-CM

## 2020-07-20 DIAGNOSIS — F43.10 POST TRAUMATIC STRESS DISORDER (PTSD): ICD-10-CM

## 2020-07-20 PROCEDURE — 99442 PR PHYS/QHP TELEPHONE EVALUATION 11-20 MIN: CPT | Performed by: NURSE PRACTITIONER

## 2020-07-20 RX ORDER — AMITRIPTYLINE HYDROCHLORIDE 75 MG/1
75 TABLET, FILM COATED ORAL
Qty: 30 TABLET | Refills: 1 | Status: SHIPPED | OUTPATIENT
Start: 2020-07-20 | End: 2020-08-11 | Stop reason: SDUPTHER

## 2020-07-20 RX ORDER — ESCITALOPRAM OXALATE 10 MG/1
10 TABLET ORAL DAILY
Qty: 30 TABLET | Refills: 1 | Status: SHIPPED | OUTPATIENT
Start: 2020-07-20 | End: 2020-08-11 | Stop reason: SDUPTHER

## 2020-07-20 RX ORDER — CLONIDINE HYDROCHLORIDE 0.1 MG/1
0.1 TABLET ORAL 2 TIMES DAILY
Qty: 60 TABLET | Refills: 1 | Status: SHIPPED | OUTPATIENT
Start: 2020-07-20 | End: 2020-08-11 | Stop reason: SDUPTHER

## 2020-07-20 NOTE — PROGRESS NOTES
This is a telephone visit due to the covid 19 pnademic.  He gives permission for visit to take place over the phone.     Cc:  Recheck on depression.    Pt says his heart rate is running in the 80s and 90s and he saw Dr. hyde 3 days after starting the clonidine and Dr. hyde did not think he needed an EKG. states that he was unable to sleep on the Elavil 50 mg.  He has been taking 100 mg.  He states that the clonidine has helped with his mind racing and the anxiety.  He is tolerating it well with no drowsiness.  The Lexapro he says he cannot tell it has helped much with anxiety.  He continues to have anxiety pretty much every day.  He denies any current depression.  Denies any suicidal thoughts, homicidal thoughts, or any AV hallucinations.  Not having any negative side effects to the medication.  Is sleeping approximately 7 hours a night without difficulty.  Mood is stable.    Plan: At this time I am going to increase his Lexapro to 10 mg.  I am going to send in Elavil 75 mg.  I discussed with patient the combination of the Lexapro and the Elavil can cause increased risk for serotonin syndrome.  I am trying to control his symptoms with the lowest effective dose of medication.  He is in agreement with this.  I am continuing the clonidine at the present dosage.  He has a follow-up appointment with me in approximately 3 to 4 weeks and we will see how this is doing.  He will notify me should he absolutely be not be able to sleep on the 50 mg of Elavil.  We discussed other possible alternatives which could be low-dose Remeron as well as low-dose Seroquel however Seroquel has caused him to gain weight in the past and he is hesitant on doing this one.     This visit has been rescheduled as a phone visit to comply with patient safety concerns in accordance with CDC recommendations. Total time of discussion was 15 minutes.

## 2020-07-24 ENCOUNTER — OFFICE VISIT (OUTPATIENT)
Dept: CARDIOLOGY | Facility: CLINIC | Age: 30
End: 2020-07-24

## 2020-07-24 ENCOUNTER — OFFICE VISIT (OUTPATIENT)
Dept: FAMILY MEDICINE CLINIC | Facility: CLINIC | Age: 30
End: 2020-07-24

## 2020-07-24 VITALS
HEIGHT: 69 IN | BODY MASS INDEX: 46.65 KG/M2 | SYSTOLIC BLOOD PRESSURE: 128 MMHG | DIASTOLIC BLOOD PRESSURE: 86 MMHG | WEIGHT: 315 LBS | OXYGEN SATURATION: 98 % | HEART RATE: 90 BPM | TEMPERATURE: 97.8 F

## 2020-07-24 VITALS
BODY MASS INDEX: 46.65 KG/M2 | SYSTOLIC BLOOD PRESSURE: 136 MMHG | HEIGHT: 69 IN | HEART RATE: 100 BPM | TEMPERATURE: 97.1 F | OXYGEN SATURATION: 98 % | WEIGHT: 315 LBS | DIASTOLIC BLOOD PRESSURE: 74 MMHG

## 2020-07-24 DIAGNOSIS — R00.2 PALPITATIONS: Primary | ICD-10-CM

## 2020-07-24 DIAGNOSIS — E66.01 CLASS 3 SEVERE OBESITY DUE TO EXCESS CALORIES WITHOUT SERIOUS COMORBIDITY WITH BODY MASS INDEX (BMI) OF 50.0 TO 59.9 IN ADULT (HCC): ICD-10-CM

## 2020-07-24 DIAGNOSIS — I10 ESSENTIAL HYPERTENSION: ICD-10-CM

## 2020-07-24 DIAGNOSIS — E78.5 HYPERLIPIDEMIA, UNSPECIFIED HYPERLIPIDEMIA TYPE: ICD-10-CM

## 2020-07-24 DIAGNOSIS — R22.1 MASS IN NECK: Primary | ICD-10-CM

## 2020-07-24 PROCEDURE — 85025 COMPLETE CBC W/AUTO DIFF WBC: CPT | Performed by: NURSE PRACTITIONER

## 2020-07-24 PROCEDURE — 36415 COLL VENOUS BLD VENIPUNCTURE: CPT | Performed by: NURSE PRACTITIONER

## 2020-07-24 PROCEDURE — 80053 COMPREHEN METABOLIC PANEL: CPT | Performed by: NURSE PRACTITIONER

## 2020-07-24 PROCEDURE — 99213 OFFICE O/P EST LOW 20 MIN: CPT | Performed by: INTERNAL MEDICINE

## 2020-07-24 PROCEDURE — 93000 ELECTROCARDIOGRAM COMPLETE: CPT | Performed by: INTERNAL MEDICINE

## 2020-07-24 PROCEDURE — 99213 OFFICE O/P EST LOW 20 MIN: CPT | Performed by: NURSE PRACTITIONER

## 2020-07-24 NOTE — PROGRESS NOTES
subjective     Chief Complaint   Patient presents with   • Results     labs, pt started on Crestor   • Hyperlipidemia     Follow up   • Palpitations     Off and on     History of Present Illness  Patient is 29 years old white male who is here for follow-up.  Patient has hyperlipidemia, recent lab work was reviewed and discussed with the patient.  Cholesterol was 366 with LDL of 276.  Patient was started on Crestor.  He just started that.  He is not having any side effects.  Lab work will be repeated.  Patient also has palpitations which is better with metoprolol.  Blood pressure is also very well controlled.  Patient has not lost any weight he is morbidly obese.    He also complains of small knot in the right submandibular area which is tender to touch    Past Surgical History:   Procedure Laterality Date   • COLONOSCOPY     • CYSTOSCOPY URETEROSCOPY LASER LITHOTRIPSY Left 4/15/2019    Procedure: CYSTOSCOPY URETEROSCOPY LASER LITHOTRIPSYl flexible ureteroscopy;  Surgeon: Tobias Oscar MD;  Location: [facility];  Service: Urology   • CYSTOSCOPY URETEROSCOPY LASER LITHOTRIPSY Right 11/11/2019    Procedure: CYSTOSCOPY URETEROSCOPY LASER LITHOTRIPSY RIGHT;  Surgeon: Tobias Oscar MD;  Location: [facility];  Service: Urology   • ENDOSCOPY     • KIDNEY SURGERY      Stents placed and removed.    • LAPAROSCOPIC GASTRIC BANDING     • OTHER SURGICAL HISTORY      diagnostic esophagogastroduodenoscopy   • OTHER SURGICAL HISTORY      renal lithotripsy     Family History   Problem Relation Age of Onset   • Colon cancer Other    • Heart disease Other    • Lymphoma Other    • Heart disease Mother    • Hypertension Mother    • Lupus Mother    • Skin cancer Father    • Kidney disease Father      Past Medical History:   Diagnosis Date   • Anxiety    • Arthritis    • Calculus of kidney    • DDD (degenerative disc disease), lumbosacral    • Elevated cholesterol    • Headache    • Hyperlipidemia    • Hypertension    •  Insomnia    • Kidney stones    • Nerve damage    • Neuropathy    • GRUPO on CPAP    • Panic disorder    • Sciatica    • Tachycardia      Patient Active Problem List   Diagnosis   • Lipoma of right lower extremity   • Detrusor instability   • Ureteral calculus, right   • Renal calculus   • Essential hypertension   • Anxiety and depression   • Hyperlipidemia   • Palpitations   • Chest pain   • GRUPO (obstructive sleep apnea)   • DDD (degenerative disc disease), lumbar   • Chronic bilateral low back pain with left-sided sciatica   • B12 deficiency   • Low testosterone   • Class 3 severe obesity due to excess calories without serious comorbidity with body mass index (BMI) of 50.0 to 59.9 in adult (CMS/[facility])   • Ureteral stone       Social History     Tobacco Use   • Smoking status: Former Smoker     Packs/day: 0.50     Years: 13.00     Pack years: 6.50     Types: Cigarettes, Electronic Cigarette   • Smokeless tobacco: Former User     Types: Snuff   • Tobacco comment: uses [employer]   Substance Use Topics   • Alcohol use: No   • Drug use: No       Allergies   Allergen Reactions   • Lisinopril Cough   • Contrast Dye Rash       Current Outpatient Medications on File Prior to Visit   Medication Sig   • amitriptyline (ELAVIL) 75 MG tablet Take 1 tablet by mouth every night at bedtime.   • cloNIDine (CATAPRES) 0.1 MG tablet Take 1 tablet by mouth 2 (Two) Times a Day.   • escitalopram (LEXAPRO) 10 MG tablet Take 1 tablet by mouth Daily.   • gabapentin (NEURONTIN) 600 MG tablet Take 600 mg by mouth 6 (Six) Times a Day.   • HYDROcodone-acetaminophen (NORCO) 5-325 MG per tablet Take 1 tablet by mouth Every 6 (Six) Hours As Needed for Mild Pain .   • ibuprofen (ADVIL,MOTRIN) 600 MG tablet Take 1 tablet by mouth Every 6 (Six) Hours As Needed for Moderate Pain .   • indomethacin (INDOCIN) 25 MG capsule Take 1 capsule by mouth 3 (Three) Times a Day As Needed for Moderate Pain .   • metoprolol tartrate (LOPRESSOR) 25 MG tablet Take 1 tablet by  "mouth 2 (Two) Times a Day.   • ondansetron (Zofran) 4 MG tablet Take 1 tablet by mouth Every 8 (Eight) Hours As Needed for Nausea or Vomiting.   • oxyCODONE-acetaminophen (PERCOCET) 5-325 MG per tablet Take 1-2 tablets by mouth Every 6 (Six) Hours As Needed for pain.   • rosuvastatin (CRESTOR) 20 MG tablet Take 1 tablet by mouth Daily.   • tamsulosin (FLOMAX) 0.4 MG capsule 24 hr capsule Take 1 capsule by mouth Daily.     No current facility-administered medications on file prior to visit.          The following portions of the patient's history were reviewed and updated as appropriate: allergies, current medications, past family history, past medical history, past social history, past surgical history and problem list.    Review of Systems   Constitution: Negative.   HENT: Negative.  Negative for congestion.         Not in the neck   Eyes: Negative.    Cardiovascular: Negative.  Negative for chest pain, cyanosis, dyspnea on exertion, irregular heartbeat, leg swelling, near-syncope, orthopnea, palpitations, paroxysmal nocturnal dyspnea and syncope.   Respiratory: Negative.  Negative for shortness of breath.    Hematologic/Lymphatic: Negative.    Musculoskeletal: Negative.    Gastrointestinal: Negative.    Neurological: Negative.  Negative for headaches.          Objective:     /86 (BP Location: Left arm, Patient Position: Sitting, Cuff Size: Adult)   Pulse 90   Temp 97.8 °F (36.6 °C)   Ht 175.3 cm (69\")   Wt (!) 158 kg (348 lb)   SpO2 98%   BMI 51.39 kg/m²   Physical Exam   Constitutional: He appears well-developed and well-nourished. No distress.   HENT:   Head: Normocephalic and atraumatic.   Mouth/Throat: Oropharynx is clear and moist. No oropharyngeal exudate.   A small tender mobile not in the right submandibular area probably a lymph node.   Eyes: Pupils are equal, round, and reactive to light. Conjunctivae and EOM are normal. No scleral icterus.   Neck: Normal range of motion. Neck supple. No JVD " present. No tracheal deviation present. No thyromegaly present.   Cardiovascular: Normal rate, regular rhythm, normal heart sounds and intact distal pulses. PMI is not displaced. Exam reveals no gallop, no friction rub and no decreased pulses.   No murmur heard.  Pulses:       Carotid pulses are 3+ on the right side, and 3+ on the left side.       Radial pulses are 3+ on the right side, and 3+ on the left side.   Pulmonary/Chest: Effort normal and breath sounds normal. No respiratory distress. He has no wheezes. He has no rales. He exhibits no tenderness.   Abdominal: Soft. Bowel sounds are normal. He exhibits no distension, no abdominal bruit and no mass. There is no splenomegaly or hepatomegaly. There is no tenderness. There is no rebound and no guarding.   Musculoskeletal: Normal range of motion. He exhibits no edema, tenderness or deformity.   Lymphadenopathy:     He has no cervical adenopathy.   Neurological: He is alert. He has normal reflexes. No cranial nerve deficit. He exhibits normal muscle tone. Coordination normal.   Skin: Skin is warm and dry. No rash noted. He is not diaphoretic. No erythema.   Psychiatric: He has a normal mood and affect. His behavior is normal. Judgment and thought content normal.         Lab Review  Lab Results   Component Value Date     10/31/2019    K 4.1 10/31/2019     10/31/2019    BUN 15 10/31/2019    CREATININE 1.31 (H) 10/31/2019    GLUCOSE 96 10/31/2019    CALCIUM 9.9 10/31/2019    ALT 21 10/31/2019    ALKPHOS 79 10/31/2019    LABIL2 1.5 04/08/2016     Lab Results   Component Value Date    CKTOTAL 74 10/03/2017     Lab Results   Component Value Date    WBC 12.75 (H) 10/31/2019    HGB 14.8 10/31/2019    HCT 46.5 10/31/2019     10/31/2019     No results found for: INR  Lab Results   Component Value Date    MG 2.3 09/20/2019     Lab Results   Component Value Date    PSA 0.340 11/20/2018    TSH 1.900 09/20/2019     Lab Results   Component Value Date    BNP  4.0 11/10/2017     Lab Results   Component Value Date    CHOL 366 (H) 07/06/2020    TRIG 222 (H) 07/06/2020    HDL 46 07/06/2020    VLDL 44.4 (H) 07/06/2020    LDLHDL 5.99 07/06/2020     Lab Results   Component Value Date     (H) 07/06/2020     (H) 09/12/2018         ECG 12 Lead  Date/Time: 7/24/2020 11:02 AM  Performed by: Bridgett Oviedo MD  Authorized by: Bridgett Oviedo MD   Comparison: compared with previous ECG from 6/3/2020  Similar to previous ECG  Rhythm: sinus rhythm  Rate: normal  BPM: 89  Conduction: conduction normal  ST Segments: ST segments normal  T Waves: T waves normal  QRS axis: normal  Other: no other findings    Clinical impression: normal ECG               I personally viewed and interpreted the patient's LAB data         Assessment:     1. Palpitations    2. Hyperlipidemia, unspecified hyperlipidemia type    3. Essential hypertension    4. Class 3 severe obesity due to excess calories without serious comorbidity with body mass index (BMI) of 50.0 to 59.9 in adult (CMS/[facility])          Plan:     Palpitations are better with Lopressor which was continued.  Patient is taking clonidine for anxiety.  Combination of clonidine and Lopressor has not affected the blood pressure very much also heart rate is around 80 bpm.    Lab work reviewed and discussed with the patient LDL is 276  Cholesterol and triglycerides are both elevated.  Patient will continue Crestor and will check lab work in 3 months.  At that time Crestor dose will be increased and TriCor may be added  Follow-up scheduled, weight loss and healthy lifestyle discussed.    Patient was advised to discuss with Sherley Link about the tender knot in the right submandibular area.        No follow-ups on file.

## 2020-07-24 NOTE — PROGRESS NOTES
Subjective   Andrew Narayan is a 29 y.o. male.     Chief Complaint: Cyst (neck )    Cyst   This is a new problem. The current episode started more than 1 month ago. The problem occurs constantly. The problem has been unchanged. Associated symptoms comments: Quarter size mass noted to right submandible area; pt states that he has been taking antibiotics (about 2 weeks ago) for teeth issues.  He states that this area has been palpable prior to that and it has not resolved.  It is tender. Nothing aggravates the symptoms.        Family History   Problem Relation Age of Onset   • Colon cancer Other    • Heart disease Other    • Lymphoma Other    • Heart disease Mother    • Hypertension Mother    • Lupus Mother    • Skin cancer Father    • Kidney disease Father        Social History     Socioeconomic History   • Marital status:      Spouse name: Not on file   • Number of children: Not on file   • Years of education: Not on file   • Highest education level: Not on file   Tobacco Use   • Smoking status: Former Smoker     Packs/day: 0.50     Years: 13.00     Pack years: 6.50     Types: Cigarettes, Electronic Cigarette   • Smokeless tobacco: Former User     Types: Snuff   • Tobacco comment: uses e-cig   Substance and Sexual Activity   • Alcohol use: No   • Drug use: No   • Sexual activity: Defer     Partners: Female       Past Medical History:   Diagnosis Date   • Anxiety    • Arthritis    • Calculus of kidney    • DDD (degenerative disc disease), lumbosacral    • Elevated cholesterol    • Headache    • Hyperlipidemia    • Hypertension    • Insomnia    • Kidney stones    • Nerve damage    • Neuropathy    • GRUPO on CPAP    • Panic disorder    • Sciatica    • Tachycardia        Review of Systems   Constitutional: Negative.    HENT: Negative.    Respiratory: Negative.    Cardiovascular: Negative.    Gastrointestinal: Negative.    Musculoskeletal: Negative.    Skin: Negative.    Neurological: Negative.   "  Psychiatric/Behavioral: Negative.        Objective   Physical Exam   Constitutional: He is oriented to person, place, and time. He appears well-developed and well-nourished.   Neck: Normal range of motion. Neck supple.   Cardiovascular: Normal rate, regular rhythm and normal heart sounds.   Pulmonary/Chest: Effort normal and breath sounds normal.   Neurological: He is alert and oriented to person, place, and time.   Skin: Skin is warm and dry.   Quarter size movable mass noted to right submandible area; tender to palpation   Psychiatric: He has a normal mood and affect. His behavior is normal. Judgment and thought content normal.   Nursing note and vitals reviewed.      Procedures    Vitals: Blood pressure 136/74, pulse 100, temperature 97.1 °F (36.2 °C), height 175.3 cm (69\"), weight (!) 158 kg (349 lb), SpO2 98 %.    Body mass index is 51.54 kg/m².     Allergies:   Allergies   Allergen Reactions   • Lisinopril Cough   • Contrast Dye Rash        During this visit the following were done:  Labs Reviewed []    Labs Ordered []    Radiology Reports Reviewed []    Radiology Ordered []    PCP Records Reviewed []    Referring Provider Records Reviewed []    ER Records Reviewed []    Hospital Records Reviewed []    History Obtained From Family []    Radiology Images Reviewed []    Other Reviewed []    Records Requested []      Assessment/Plan   Andrew was seen today for cyst.    Diagnoses and all orders for this visit:    Mass in neck  -     CBC & Differential; Future  -     Comprehensive Metabolic Panel; Future  -     CBC & Differential  -     Comprehensive Metabolic Panel  -     CBC Auto Differential               "

## 2020-07-25 LAB
ALBUMIN SERPL-MCNC: 4.2 G/DL (ref 3.5–5.2)
ALBUMIN/GLOB SERPL: 1.3 G/DL
ALP SERPL-CCNC: 73 U/L (ref 39–117)
ALT SERPL W P-5'-P-CCNC: 22 U/L (ref 1–41)
ANION GAP SERPL CALCULATED.3IONS-SCNC: 10.1 MMOL/L (ref 5–15)
AST SERPL-CCNC: 20 U/L (ref 1–40)
BASOPHILS # BLD AUTO: 0.03 10*3/MM3 (ref 0–0.2)
BASOPHILS NFR BLD AUTO: 0.2 % (ref 0–1.5)
BILIRUB SERPL-MCNC: 0.4 MG/DL (ref 0–1.2)
BUN SERPL-MCNC: 14 MG/DL (ref 6–20)
BUN/CREAT SERPL: 12.5 (ref 7–25)
CALCIUM SPEC-SCNC: 9.8 MG/DL (ref 8.6–10.5)
CHLORIDE SERPL-SCNC: 103 MMOL/L (ref 98–107)
CO2 SERPL-SCNC: 26.9 MMOL/L (ref 22–29)
CREAT SERPL-MCNC: 1.12 MG/DL (ref 0.76–1.27)
DEPRECATED RDW RBC AUTO: 43.8 FL (ref 37–54)
EOSINOPHIL # BLD AUTO: 0.21 10*3/MM3 (ref 0–0.4)
EOSINOPHIL NFR BLD AUTO: 1.7 % (ref 0.3–6.2)
ERYTHROCYTE [DISTWIDTH] IN BLOOD BY AUTOMATED COUNT: 13.8 % (ref 12.3–15.4)
GFR SERPL CREATININE-BSD FRML MDRD: 76 ML/MIN/1.73
GLOBULIN UR ELPH-MCNC: 3.2 GM/DL
GLUCOSE SERPL-MCNC: 120 MG/DL (ref 65–99)
HCT VFR BLD AUTO: 46.3 % (ref 37.5–51)
HGB BLD-MCNC: 15 G/DL (ref 13–17.7)
IMM GRANULOCYTES # BLD AUTO: 0.05 10*3/MM3 (ref 0–0.05)
IMM GRANULOCYTES NFR BLD AUTO: 0.4 % (ref 0–0.5)
LYMPHOCYTES # BLD AUTO: 3.14 10*3/MM3 (ref 0.7–3.1)
LYMPHOCYTES NFR BLD AUTO: 25 % (ref 19.6–45.3)
MCH RBC QN AUTO: 28 PG (ref 26.6–33)
MCHC RBC AUTO-ENTMCNC: 32.4 G/DL (ref 31.5–35.7)
MCV RBC AUTO: 86.4 FL (ref 79–97)
MONOCYTES # BLD AUTO: 0.66 10*3/MM3 (ref 0.1–0.9)
MONOCYTES NFR BLD AUTO: 5.3 % (ref 5–12)
NEUTROPHILS NFR BLD AUTO: 67.4 % (ref 42.7–76)
NEUTROPHILS NFR BLD AUTO: 8.47 10*3/MM3 (ref 1.7–7)
NRBC BLD AUTO-RTO: 0 /100 WBC (ref 0–0.2)
PLATELET # BLD AUTO: 344 10*3/MM3 (ref 140–450)
PMV BLD AUTO: 9.9 FL (ref 6–12)
POTASSIUM SERPL-SCNC: 4.6 MMOL/L (ref 3.5–5.2)
PROT SERPL-MCNC: 7.4 G/DL (ref 6–8.5)
RBC # BLD AUTO: 5.36 10*6/MM3 (ref 4.14–5.8)
SODIUM SERPL-SCNC: 140 MMOL/L (ref 136–145)
WBC # BLD AUTO: 12.56 10*3/MM3 (ref 3.4–10.8)

## 2020-07-30 ENCOUNTER — OFFICE VISIT (OUTPATIENT)
Dept: PSYCHIATRY | Facility: CLINIC | Age: 30
End: 2020-07-30

## 2020-07-30 DIAGNOSIS — F90.2 ATTENTION DEFICIT HYPERACTIVITY DISORDER, COMBINED TYPE: Primary | ICD-10-CM

## 2020-07-30 DIAGNOSIS — F43.10 POST TRAUMATIC STRESS DISORDER (PTSD): ICD-10-CM

## 2020-07-30 DIAGNOSIS — F42.2 MIXED OBSESSIONAL THOUGHTS AND ACTS: ICD-10-CM

## 2020-07-30 DIAGNOSIS — I10 ESSENTIAL HYPERTENSION: ICD-10-CM

## 2020-07-30 DIAGNOSIS — F41.1 GENERALIZED ANXIETY DISORDER: ICD-10-CM

## 2020-07-30 PROCEDURE — 90837 PSYTX W PT 60 MINUTES: CPT | Performed by: SOCIAL WORKER
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
## 2020-08-03 ENCOUNTER — TELEPHONE (OUTPATIENT)
Dept: FAMILY MEDICINE CLINIC | Facility: CLINIC | Age: 30
End: 2020-08-03

## 2020-08-03 NOTE — TELEPHONE ENCOUNTER
----- Message from Lyly Juarez MD sent at 8/2/2020  3:52 PM EDT -----  Please call Andrew. Labs are okay, but he continues to have a very slightly elevated WBC. Given that he also has a mass in his neck, would recommend imaging the mass. Can I send him for an ultrasound? Thanks.      CALLED PATIENT NO ANSWER, LEFT MESSAGE TO RETURN CALL.     Patient returned call & is agreeable to scheduling the U/S.

## 2020-08-03 NOTE — TELEPHONE ENCOUNTER
----- Message from Lyly Juarez MD sent at 8/2/2020  3:52 PM EDT -----  Please call Andrew. Labs are okay, but he continues to have a very slightly elevated WBC. Given that he also has a mass in his neck, would recommend imaging the mass. Can I send him for an ultrasound? Thanks.      CALLED PATIENT NO ANSWER, LEFT MESSAGE TO RETURN CALL.

## 2020-08-05 ENCOUNTER — TELEPHONE (OUTPATIENT)
Dept: FAMILY MEDICINE CLINIC | Facility: CLINIC | Age: 30
End: 2020-08-05

## 2020-08-05 DIAGNOSIS — R22.1 MASS IN NECK: Primary | ICD-10-CM

## 2020-08-05 NOTE — TELEPHONE ENCOUNTER
CAN YOU PUT IN ORDER FOR NECK ULTRASOUND. IT LOOKS LIKE DR LOPEZ SAID PATIENT NEEDED ONE IN MESSAGE ON 08/03

## 2020-08-09 DIAGNOSIS — R22.1 MASS IN NECK: Primary | ICD-10-CM

## 2020-08-09 DIAGNOSIS — R79.89 ABNORMAL CBC: ICD-10-CM

## 2020-08-09 DIAGNOSIS — F43.10 POST TRAUMATIC STRESS DISORDER (PTSD): ICD-10-CM

## 2020-08-09 DIAGNOSIS — F42.2 MIXED OBSESSIONAL THOUGHTS AND ACTS: ICD-10-CM

## 2020-08-09 DIAGNOSIS — F41.1 GENERALIZED ANXIETY DISORDER: ICD-10-CM

## 2020-08-10 RX ORDER — ESCITALOPRAM OXALATE 5 MG/1
TABLET ORAL
Qty: 30 TABLET | Refills: 1 | OUTPATIENT
Start: 2020-08-10

## 2020-08-11 ENCOUNTER — OFFICE VISIT (OUTPATIENT)
Dept: PSYCHIATRY | Facility: CLINIC | Age: 30
End: 2020-08-11

## 2020-08-11 VITALS
DIASTOLIC BLOOD PRESSURE: 82 MMHG | SYSTOLIC BLOOD PRESSURE: 134 MMHG | HEIGHT: 69 IN | WEIGHT: 315 LBS | BODY MASS INDEX: 46.65 KG/M2 | TEMPERATURE: 98.2 F | HEART RATE: 92 BPM

## 2020-08-11 DIAGNOSIS — F42.2 MIXED OBSESSIONAL THOUGHTS AND ACTS: Primary | ICD-10-CM

## 2020-08-11 DIAGNOSIS — F90.2 ATTENTION DEFICIT HYPERACTIVITY DISORDER, COMBINED TYPE: ICD-10-CM

## 2020-08-11 DIAGNOSIS — F41.1 GENERALIZED ANXIETY DISORDER: ICD-10-CM

## 2020-08-11 DIAGNOSIS — G47.00 INSOMNIA, UNSPECIFIED TYPE: ICD-10-CM

## 2020-08-11 DIAGNOSIS — F22 PARANOID DISORDER (HCC): ICD-10-CM

## 2020-08-11 DIAGNOSIS — F43.10 POST TRAUMATIC STRESS DISORDER (PTSD): ICD-10-CM

## 2020-08-11 PROCEDURE — 99214 OFFICE O/P EST MOD 30 MIN: CPT | Performed by: NURSE PRACTITIONER

## 2020-08-11 RX ORDER — CLONIDINE HYDROCHLORIDE 0.1 MG/1
0.1 TABLET ORAL 3 TIMES DAILY
Qty: 90 TABLET | Refills: 1 | Status: SHIPPED | OUTPATIENT
Start: 2020-08-11 | End: 2020-10-13 | Stop reason: SDUPTHER

## 2020-08-11 RX ORDER — AMITRIPTYLINE HYDROCHLORIDE 75 MG/1
75 TABLET, FILM COATED ORAL
Qty: 30 TABLET | Refills: 1 | Status: SHIPPED | OUTPATIENT
Start: 2020-08-11 | End: 2020-10-13 | Stop reason: SDUPTHER

## 2020-08-11 RX ORDER — ESCITALOPRAM OXALATE 20 MG/1
20 TABLET ORAL DAILY
Qty: 30 TABLET | Refills: 1 | Status: SHIPPED | OUTPATIENT
Start: 2020-08-11 | End: 2020-10-13 | Stop reason: SDUPTHER

## 2020-08-11 NOTE — PROGRESS NOTES
"      Subjective   Andrew Narayan is a 29 y.o. male is here today for medication management follow-up.Presents by himself    Chief Complaint:  Recheck on anxiety and hallucinations and ADHD    History of Present Illness: Pt presents for follow up at the [place] [facility].  He states the clonidine has definitely helped with his mind racing.  He feels it runs out in the afternoon.  Cannot tell any improvement with the lexapro.  Not having any negative side effects.  Says his blood pressure is much better as well on the clonidine.  Continues to have anxiety.  Says this is much worse if he is not at home.  Feels like \"something is about to happen\" anytime he leaves his home.  Still only sleeping aprox 3 hours a night.  Continues with flashbacks.  Is due to start EMDR this week.  He denies any SI, HI, or any AVH.  Denies any depression.  No major mood swings.  Irritability comes and goes.  No medical stressors.  Body mass index is 51.25 kg/m². weight loss 2 lbs since last visit.                 The following portions of the patient's history were reviewed and updated as appropriate: allergies, current medications, past family history, past medical history, past social history, past surgical history and problem list.    Review of Systems   Constitutional: Negative for activity change, appetite change and fatigue.   HENT: Negative.    Eyes: Negative for visual disturbance.   Respiratory: Negative.    Cardiovascular: Negative.    Gastrointestinal: Negative for nausea.   Endocrine: Negative.    Genitourinary: Negative.    Musculoskeletal: Positive for arthralgias.   Skin: Negative.    Allergic/Immunologic: Negative.    Neurological: Negative for dizziness, seizures and headaches.   Hematological: Negative.    Psychiatric/Behavioral: Positive for decreased concentration. Negative for agitation, behavioral problems, confusion, dysphoric mood, hallucinations, self-injury, sleep disturbance and suicidal ideas. The " "patient is nervous/anxious. The patient is not hyperactive.        Objective   Physical Exam   Constitutional: He is oriented to person, place, and time. He appears well-developed and well-nourished.   Neurological: He is alert and oriented to person, place, and time.   Psychiatric: He has a normal mood and affect. His speech is normal and behavior is normal. Thought content normal. His mood appears not anxious. Cognition and memory are normal. He expresses impulsivity.   Engaging in conversation   Vitals reviewed.    Blood pressure 134/82, pulse 92, temperature 98.2 °F (36.8 °C), height 175.3 cm (69.02\"), weight (!) 157 kg (347 lb 3.2 oz).    Medication List:   Current Outpatient Medications   Medication Sig Dispense Refill   • amitriptyline (ELAVIL) 75 MG tablet Take 1 tablet by mouth every night at bedtime. 30 tablet 1   • cloNIDine (CATAPRES) 0.1 MG tablet Take 1 tablet by mouth 3 (Three) Times a Day. 90 tablet 1   • escitalopram (LEXAPRO) 20 MG tablet Take 1 tablet by mouth Daily. 30 tablet 1   • gabapentin (NEURONTIN) 600 MG tablet Take 600 mg by mouth 6 (Six) Times a Day.     • HYDROcodone-acetaminophen (NORCO) 5-325 MG per tablet Take 1 tablet by mouth Every 6 (Six) Hours As Needed for Mild Pain . 12 tablet 0   • ibuprofen (ADVIL,MOTRIN) 600 MG tablet Take 1 tablet by mouth Every 6 (Six) Hours As Needed for Moderate Pain . 30 tablet 0   • indomethacin (INDOCIN) 25 MG capsule Take 1 capsule by mouth 3 (Three) Times a Day As Needed for Moderate Pain . 15 capsule 0   • metoprolol tartrate (LOPRESSOR) 25 MG tablet Take 1 tablet by mouth 2 (Two) Times a Day. 60 tablet 0   • ondansetron (Zofran) 4 MG tablet Take 1 tablet by mouth Every 8 (Eight) Hours As Needed for Nausea or Vomiting. 30 tablet 0   • oxyCODONE-acetaminophen (PERCOCET) 5-325 MG per tablet Take 1-2 tablets by mouth Every 6 (Six) Hours As Needed for pain. 28 tablet 0   • rosuvastatin (CRESTOR) 20 MG tablet Take 1 tablet by mouth Daily. 30 tablet 11 "   • tamsulosin (FLOMAX) 0.4 MG capsule 24 hr capsule Take 1 capsule by mouth Daily. 15 capsule 0     No current facility-administered medications for this visit.        Mental Status Exam:   Hygiene:   good  Cooperation:  Cooperative  Eye Contact:  Good  Psychomotor Behavior:  Restless  Affect:  Full range  Hopelessness: Denies  Speech:  Normal  Thought Process:  Goal directed  Thought Content:  Normal  Suicidal:  None  Homicidal:  None  Hallucinations:  Auditory  Delusion:  None  Memory:  Intact  Orientation:  Person, Place, Time and Situation  Reliability:  fair  Insight:  Fair  Judgement:  Fair  Impulse Control:  Poor  Physical/Medical Issues:  Yes obesity, HTN, pain    Assessment/Plan   Problems Addressed this Visit     None      Visit Diagnoses     Mixed obsessional thoughts and acts    -  Primary    Relevant Medications    escitalopram (LEXAPRO) 20 MG tablet    Post traumatic stress disorder (PTSD)        Relevant Medications    escitalopram (LEXAPRO) 20 MG tablet    amitriptyline (ELAVIL) 75 MG tablet    Generalized anxiety disorder        Relevant Medications    escitalopram (LEXAPRO) 20 MG tablet    amitriptyline (ELAVIL) 75 MG tablet    Attention deficit hyperactivity disorder, combined type        Relevant Medications    cloNIDine (CATAPRES) 0.1 MG tablet    escitalopram (LEXAPRO) 20 MG tablet    amitriptyline (ELAVIL) 75 MG tablet    Paranoid disorder (CMS/HCC)        Relevant Medications    escitalopram (LEXAPRO) 20 MG tablet    amitriptyline (ELAVIL) 75 MG tablet    Insomnia, unspecified type        Relevant Medications    amitriptyline (ELAVIL) 75 MG tablet      Eugenio shows neurontin and occasional hydrocodone.   Functionality: pt having significant impairment in important areas of daily functioning.  Prognosis: Guarded dependent on medication/follow up and treatment plan compliance.  I am increasing the clonidine to TID.  Also increasing the lexapro to 20mg.  Elavil remains the same for now.  He is  to start EMDR this week.  .        He is in agreement with treatment plan and recommendations.  Continuing efforts to promote the therapeutic alliance, address the patient's issues, and strengthen self awareness, insights, and coping skills.   Patient is aware to call 911 or go to the nearest ER should begin having SI/HI. Pt will notify me should he have any negative side effects or any worsening depression.  RTC 3 weeks.

## 2020-08-17 ENCOUNTER — HOSPITAL ENCOUNTER (OUTPATIENT)
Dept: ULTRASOUND IMAGING | Facility: HOSPITAL | Age: 30
Discharge: HOME | End: 2020-08-17
Admitting: NURSE PRACTITIONER

## 2020-08-17 DIAGNOSIS — R22.1 MASS IN NECK: ICD-10-CM

## 2020-08-17 PROCEDURE — 76536 US EXAM OF HEAD AND NECK: CPT

## 2020-08-17 PROCEDURE — 76536 US EXAM OF HEAD AND NECK: CPT | Performed by: RADIOLOGY

## 2020-08-19 ENCOUNTER — TELEPHONE (OUTPATIENT)
Dept: FAMILY MEDICINE CLINIC | Facility: CLINIC | Age: 30
End: 2020-08-19

## 2020-08-19 DIAGNOSIS — R22.1 MASS IN NECK: Primary | ICD-10-CM

## 2020-08-19 NOTE — TELEPHONE ENCOUNTER
----- Message from APOLINAR Gonzalez sent at 8/19/2020 12:27 PM EDT -----  His U/S does show a lymph node that is enlarged.  Does he still have the nodule?  If so, I would like to refer him to general surgery for possible biopsy if he is agreeable.      Left a message to return call.      Spoke with patient the nodule is still there & he is agreeable to referral,no preference.

## 2020-08-19 NOTE — PROGRESS NOTES
His U/S does show a lymph node that is enlarged.  Does he still have the nodule?  If so, I would like to refer him to general surgery for possible biopsy if he is agreeable.

## 2020-08-21 ENCOUNTER — OFFICE VISIT (OUTPATIENT)
Dept: SURGERY | Facility: CLINIC | Age: 30
End: 2020-08-21

## 2020-08-21 VITALS
WEIGHT: 315 LBS | HEIGHT: 69 IN | HEART RATE: 114 BPM | SYSTOLIC BLOOD PRESSURE: 152 MMHG | BODY MASS INDEX: 46.65 KG/M2 | TEMPERATURE: 98.5 F | DIASTOLIC BLOOD PRESSURE: 107 MMHG

## 2020-08-21 DIAGNOSIS — R59.1 LYMPHADENOPATHY OF HEAD AND NECK: Primary | ICD-10-CM

## 2020-08-21 PROCEDURE — 99202 OFFICE O/P NEW SF 15 MIN: CPT | Performed by: SURGERY

## 2020-08-21 NOTE — PROGRESS NOTES
8/21/2020    Patient Information  Andrew Narayan  [address]  [place] [address]  1990  [phone] (home)         Chief Complaint  Chief Complaint   Patient presents with   • Mass     Referred for Mass in Neck       HPI  Patient is a 29-year-old white male who reports he can feel lump in his right submandibular area.  He reports he has been hoarse a lot lately.  He had an ultrasound this which showed a 1.1 cm lymph node.  He says is not tender    Past Medical History  Past Medical History:   Diagnosis Date   • Anxiety    • Arthritis    • Calculus of kidney    • DDD (degenerative disc disease), lumbosacral    • Elevated cholesterol    • Headache    • Hyperlipidemia    • Hypertension    • Insomnia    • Kidney stones    • Nerve damage    • Neuropathy    • GRUPO on CPAP    • Panic disorder    • Sciatica    • Tachycardia        Past Surgical History  Past Surgical History:   Procedure Laterality Date   • COLONOSCOPY     • CYSTOSCOPY URETEROSCOPY LASER LITHOTRIPSY Left 4/15/2019    Procedure: CYSTOSCOPY URETEROSCOPY LASER LITHOTRIPSYl flexible ureteroscopy;  Surgeon: Tobias Oscar MD;  Location: [facility];  Service: Urology   • CYSTOSCOPY URETEROSCOPY LASER LITHOTRIPSY Right 11/11/2019    Procedure: CYSTOSCOPY URETEROSCOPY LASER LITHOTRIPSY RIGHT;  Surgeon: Tobias Oscar MD;  Location: [facility];  Service: Urology   • ENDOSCOPY     • KIDNEY SURGERY      Stents placed and removed.    • LAPAROSCOPIC GASTRIC BANDING     • OTHER SURGICAL HISTORY      diagnostic esophagogastroduodenoscopy   • OTHER SURGICAL HISTORY      renal lithotripsy       Family History  Family History   Problem Relation Age of Onset   • Colon cancer Other    • Heart disease Other    • Lymphoma Other    • Heart disease Mother    • Hypertension Mother    • Lupus Mother    • Skin cancer Father    • Kidney disease Father        Social History  Social History     Socioeconomic History   • Marital status:      Spouse  name: Not on file   • Number of children: Not on file   • Years of education: Not on file   • Highest education level: Not on file   Tobacco Use   • Smoking status: Former Smoker     Packs/day: 0.50     Years: 13.00     Pack years: 6.50     Types: Cigarettes, Electronic Cigarette   • Smokeless tobacco: Former User     Types: Snuff   • Tobacco comment: uses e-cig   Substance and Sexual Activity   • Alcohol use: No   • Drug use: No   • Sexual activity: Defer     Partners: Female       Current Medications  Current Outpatient Medications   Medication Sig Dispense Refill   • amitriptyline (ELAVIL) 75 MG tablet Take 1 tablet by mouth every night at bedtime. 30 tablet 1   • cloNIDine (CATAPRES) 0.1 MG tablet Take 1 tablet by mouth 3 (Three) Times a Day. 90 tablet 1   • escitalopram (LEXAPRO) 20 MG tablet Take 1 tablet by mouth Daily. 30 tablet 1   • gabapentin (NEURONTIN) 600 MG tablet Take 600 mg by mouth 6 (Six) Times a Day.     • HYDROcodone-acetaminophen (NORCO) 5-325 MG per tablet Take 1 tablet by mouth Every 6 (Six) Hours As Needed for Mild Pain . 12 tablet 0   • ibuprofen (ADVIL,MOTRIN) 600 MG tablet Take 1 tablet by mouth Every 6 (Six) Hours As Needed for Moderate Pain . 30 tablet 0   • indomethacin (INDOCIN) 25 MG capsule Take 1 capsule by mouth 3 (Three) Times a Day As Needed for Moderate Pain . 15 capsule 0   • metoprolol tartrate (LOPRESSOR) 25 MG tablet Take 1 tablet by mouth 2 (Two) Times a Day. 60 tablet 0   • ondansetron (Zofran) 4 MG tablet Take 1 tablet by mouth Every 8 (Eight) Hours As Needed for Nausea or Vomiting. 30 tablet 0   • oxyCODONE-acetaminophen (PERCOCET) 5-325 MG per tablet Take 1-2 tablets by mouth Every 6 (Six) Hours As Needed for pain. 28 tablet 0   • rosuvastatin (CRESTOR) 20 MG tablet Take 1 tablet by mouth Daily. 30 tablet 11   • tamsulosin (FLOMAX) 0.4 MG capsule 24 hr capsule Take 1 capsule by mouth Daily. 15 capsule 0     No current facility-administered medications for this visit.   "      Allergies  Lisinopril and Contrast dye      Review of Systems    The Past medical history, family history, social history, medication list, allergies, and Review of Systems has been reviewed and confirmed.    General: negative  Integumentary: negative  Eyes: glasses  ENT: hoarseness  Respiratory: negative  Gastrointestinal: nausea/vomiting and loss of appetite  Cardiovascular: negative  Neurological: negative  Psychiatric: negative  Hematologic/Lymphatic: swollen glands  Genitourinary: negative  Musculoskeletal: painful joints and back pain  Endocrine: negative  Breasts: negative      Vitals:   Vitals:    08/21/20 1409   BP: (!) 152/107   Pulse: 114   Temp: 98.5 °F (36.9 °C)     Body mass index is 51.22 kg/m².      08/21/20  1409   Weight: (!) 157 kg (347 lb)     175.3 cm (69.02\")    Physical Exam  Physical Exam  General 29-year-old white male no acute distress    Right submandibular area patient points to the area that he can feel something.  In spite of my very thorough palpation I am not able to delineate any type of masses at this time.  No tenderness    Assessment   Minimally enlarged lymph node by ultrasound right submandibular area    Plan  Observation.  Return in 6 months.            This document signed by Carson Parry MD August 21, 2020 14:22       "

## 2020-08-27 ENCOUNTER — OFFICE VISIT (OUTPATIENT)
Dept: PSYCHIATRY | Facility: CLINIC | Age: 30
End: 2020-08-27

## 2020-08-27 VITALS — TEMPERATURE: 98.7 F

## 2020-08-27 DIAGNOSIS — F90.2 ATTENTION DEFICIT HYPERACTIVITY DISORDER, COMBINED TYPE: ICD-10-CM

## 2020-08-27 DIAGNOSIS — F41.1 GENERALIZED ANXIETY DISORDER: ICD-10-CM

## 2020-08-27 DIAGNOSIS — I10 ESSENTIAL HYPERTENSION: ICD-10-CM

## 2020-08-27 DIAGNOSIS — F43.10 POST TRAUMATIC STRESS DISORDER (PTSD): ICD-10-CM

## 2020-08-27 DIAGNOSIS — F42.2 MIXED OBSESSIONAL THOUGHTS AND ACTS: Primary | ICD-10-CM

## 2020-08-27 PROCEDURE — 90832 PSYTX W PT 30 MINUTES: CPT | Performed by: SOCIAL WORKER

## 2020-08-27 NOTE — PROGRESS NOTES
Date of Service: August 27, 2020  Time In: 12:50 pm.  Time Out: 1:15 pm.      PROGRESS NOTE  Data:  Andrew Narayan is a 29 y.o. male who met 1:1 with Urmila Gonzalez LCSW,KWAKU for regularly scheduled individual outpatient [place]otherapy [place] [facility] [place] [address] [facility] [place]  [address]       HPI: Patient comes in reporting that he is having problems with his neck stating he has an enlarged lymph node but that it is now causing him to lose his voice.  Patient reports that his white blood count was high.  Patient reports seeing a surgeon who wanted to wait 2 months before he did surgery.  Patient reports that it is getting worse and this is scaring him.  Patient reports that he continues to have hoarseness and cannot talk for very long and difficulty swallowing.  Patient reports having ongoing bad dreams but not necessarily nightmares.  Patient reports continuing to struggle with sleep difficulties and stressing about his financial bills.  Patient reports that his anxiety level is at a 10+ but denies feeling depressed at present time.  Patient denies having any thoughts to harm himself or others at present time.      Clinical Maneuvering/Intervention:  Assisted patient in processing above session content; acknowledged and normalized patient’s thoughts, feelings, and concerns.  Discussed with patient that we would not do E MDR this session due to his increased anxiety and ongoing physical problems.  Patient was able to identify that he will be seeing his PCP tomorrow to get some questions answered about his enlarged lymph node.  Patient was encouraged not to talk as much due to his ongoing hoarseness.  Patient was reassured to apply relaxation techniques of deep breathing and positive self talk as well as doing PMR.  Patient was reassured that we would do E MDR when he is feeling better which he agreed to.  Patient was encouraged to apply positive coping skills of eating healthy, sticking to a daily  schedule, applying positive self talk, and taking his medication as prescribed to maintain his stability.  Patient was encouraged to focus on living 1 day at a time focusing on the things he can change instead of what he cannot which is only himself to decrease his anxiety.  Allowed patient to freely discuss issues without interruption or judgment. Provided safe, confidential environment to facilitate the development of positive therapeutic relationship and encourage open, honest communication. Assisted patient in identifying risk factors which would indicate the need for higher level of care including thoughts to harm self or others and/or self-harming behavior and encouraged patient to contact this office, call 911, or present to the nearest emergency room should any of these events occur. Discussed crisis intervention services and means to access.  Patient adamantly and convincingly denies current suicidal or homicidal ideation or perceptual disturbance.    Assessment Patient's diagnosis is attention deficit hyperactivity disorder, combined type, posttraumatic stress disorder, generalized anxiety disorder, mixed obsessional thoughts and acts, and essential hypertension.  Patient having increased stress dealing with physical problems causing increased anxiety.  Patient denying present suicidal or homicidal ideations.    Diagnoses and all orders for this visit:    Mixed obsessional thoughts and acts    Post traumatic stress disorder (PTSD)    Generalized anxiety disorder    Attention deficit hyperactivity disorder, combined type    Essential hypertension               Mental Status Exam  Hygiene:  good  Dress:  casual  Attitude:  Cooperative  Motor Activity:  Appropriate  Speech:  hoarse voice strained  Mood:  anxious  Affect:  anxious  Thought Processes:  Goal directed  Thought Content:  normal  Suicidal Thoughts:  denies  Homicidal Thoughts:  denies  Crisis Safety Plan: yes, to come to the emergency  room.  Hallucinations:  denies    Patient's Support Network Includes:  wife and extended family    Progress toward goal: Not at goal    Functional Status: Moderate impairment     Prognosis: Good with Ongoing Treatment     Plan   Patient will return in 1 week for positive coping skills and cognitive therapy.  Patient will continue to apply positive coping skills of eating healthy, sticking to daily schedule, applying positive self talk, and taking his medication as prescribed to maintain his stability.  Patient will focus on living 1 day at a time focusing on the things he can change instead of what he cannot which is only himself to decrease his anxiety.  Patient will apply relaxation techniques of deep breathing and positive self talk and PMR to decrease his anxiety.  Patient will do an EM DR session when he is feeling better physically.  Patient will adhere to medication regimen as prescribed and report any side effects. Patient will contact this office, call 911 or present to the nearest emergency room should suicidal or homicidal ideations occur. Provide Cognitive Behavioral Therapy and Solution Focused Therapy to improve functioning, maintain stability, and avoid decompensation and the need for higher level of care.          Return in about 1 week (around 9/3/2020).    Urmila Gonzalez LCSW,[facility]

## 2020-09-01 ENCOUNTER — OFFICE VISIT (OUTPATIENT)
Dept: FAMILY MEDICINE CLINIC | Facility: CLINIC | Age: 30
End: 2020-09-01

## 2020-09-01 VITALS
SYSTOLIC BLOOD PRESSURE: 130 MMHG | TEMPERATURE: 96.6 F | WEIGHT: 315 LBS | OXYGEN SATURATION: 98 % | HEIGHT: 69 IN | HEART RATE: 113 BPM | BODY MASS INDEX: 46.65 KG/M2 | DIASTOLIC BLOOD PRESSURE: 70 MMHG

## 2020-09-01 DIAGNOSIS — K21.9 GASTROESOPHAGEAL REFLUX DISEASE, ESOPHAGITIS PRESENCE NOT SPECIFIED: ICD-10-CM

## 2020-09-01 DIAGNOSIS — R00.2 PALPITATIONS: Primary | ICD-10-CM

## 2020-09-01 DIAGNOSIS — K59.00 CONSTIPATION, UNSPECIFIED CONSTIPATION TYPE: ICD-10-CM

## 2020-09-01 PROCEDURE — 93000 ELECTROCARDIOGRAM COMPLETE: CPT | Performed by: NURSE PRACTITIONER

## 2020-09-01 PROCEDURE — 99214 OFFICE O/P EST MOD 30 MIN: CPT | Performed by: NURSE PRACTITIONER

## 2020-09-01 RX ORDER — POLYETHYLENE GLYCOL 3350 17 G/17G
17 POWDER, FOR SOLUTION ORAL DAILY PRN
Qty: 100 EACH | Refills: 2 | Status: SHIPPED | OUTPATIENT
Start: 2020-09-01 | End: 2021-07-28

## 2020-09-01 RX ORDER — OMEPRAZOLE 40 MG/1
40 CAPSULE, DELAYED RELEASE ORAL DAILY
Qty: 30 CAPSULE | Refills: 5 | Status: SHIPPED | OUTPATIENT
Start: 2020-09-01 | End: 2020-10-16

## 2020-09-01 NOTE — PROGRESS NOTES
Subjective   Andrew Narayan is a 29 y.o. male.     Chief Complaint: Swollen Glands    Swollen Glands   This is a chronic problem. The current episode started more than 1 month ago. The problem has been gradually improving. Associated symptoms include nausea and swollen glands. Pertinent negatives include no vomiting. Nothing aggravates the symptoms. Treatments tried: evaluated by general surgeon and will continue to observe for swollen gland to shrink.   Constipation   This is a new problem. The current episode started 1 to 4 weeks ago. The problem is unchanged. His stool frequency is 1 time per week or less. The stool is described as firm. The patient is on a high fiber diet. He does not exercise regularly. There has been adequate water intake. Associated symptoms include nausea. Pertinent negatives include no vomiting. Associated symptoms comments: Unable to vomit due to lapband. Risk factors include obesity. He has tried fiber for the symptoms. The treatment provided no relief. His past medical history is significant for psychiatric history.   Heartburn   He complains of a hoarse voice and nausea. This is a new problem. The current episode started 1 to 4 weeks ago. The problem occurs frequently. The problem has been waxing and waning. Nothing aggravates the symptoms. Risk factors include caffeine use, lack of exercise and obesity. He has tried nothing for the symptoms. lapband x5 years ago.        Family History   Problem Relation Age of Onset   • Colon cancer Other    • Heart disease Other    • Lymphoma Other    • Heart disease Mother    • Hypertension Mother    • Lupus Mother    • Skin cancer Father    • Kidney disease Father        Social History     Socioeconomic History   • Marital status:      Spouse name: Not on file   • Number of children: Not on file   • Years of education: Not on file   • Highest education level: Not on file   Tobacco Use   • Smoking status: Former Smoker     Packs/day: 0.50      Years: 13.00     Pack years: 6.50     Types: Cigarettes, Electronic Cigarette   • Smokeless tobacco: Former User     Types: Snuff   • Tobacco comment: uses e-cig   Substance and Sexual Activity   • Alcohol use: No   • Drug use: No   • Sexual activity: Defer     Partners: Female       Past Medical History:   Diagnosis Date   • Anxiety    • Arthritis    • Calculus of kidney    • DDD (degenerative disc disease), lumbosacral    • Elevated cholesterol    • Headache    • Hyperlipidemia    • Hypertension    • Insomnia    • Kidney stones    • Nerve damage    • Neuropathy    • GRUPO on CPAP    • Panic disorder    • Sciatica    • Tachycardia        Review of Systems   Constitutional: Negative.    HENT: Positive for hoarse voice.    Respiratory: Negative.    Cardiovascular: Negative.    Gastrointestinal: Positive for constipation and nausea. Negative for vomiting.   Musculoskeletal: Negative.    Skin: Negative.    Neurological: Negative.    Psychiatric/Behavioral: Negative.        Objective   Physical Exam   Constitutional: He is oriented to person, place, and time. He appears well-developed and well-nourished.   Neck: Normal range of motion. Neck supple.   Cardiovascular: Normal rate, regular rhythm and normal heart sounds.   Pulmonary/Chest: Effort normal and breath sounds normal.   Neurological: He is alert and oriented to person, place, and time.   Skin: Skin is warm and dry.   Psychiatric: He has a normal mood and affect. His behavior is normal. Judgment and thought content normal.   Nursing note and vitals reviewed.        ECG 12 Lead  Date/Time: 9/1/2020 11:04 AM  Performed by: Sherley Link APRN  Authorized by: Sherley Link APRN   Comparison: compared with previous ECG from 7/24/2020  Similar to previous ECG  Rhythm: sinus tachycardia  Rate: tachycardic  Conduction: conduction normal  ST Segments: ST segments normal  T Waves: T waves normal  QRS axis: normal  Other: no other findings    Clinical  "impression: non-specific ECG            Vitals: Blood pressure 130/70, pulse 113, temperature 96.6 °F (35.9 °C), height 175.3 cm (69\"), weight (!) 152 kg (335 lb), SpO2 98 %.    Body mass index is 49.47 kg/m².     Allergies:   Allergies   Allergen Reactions   • Lisinopril Cough   • Contrast Dye Rash        During this visit the following were done:  Labs Reviewed []    Labs Ordered []    Radiology Reports Reviewed []    Radiology Ordered []    PCP Records Reviewed []    Referring Provider Records Reviewed []    ER Records Reviewed []    Hospital Records Reviewed []    History Obtained From Family []    Radiology Images Reviewed []    Other Reviewed []    Records Requested []      Assessment/Plan   Andrew was seen today for swollen glands.    Diagnoses and all orders for this visit:    Palpitations  -     ECG 12 Lead    Constipation, unspecified constipation type  -     Start polyethylene glycol (MIRALAX) 17 g packet; Take 17 g by mouth Daily As Needed (constipation).    Gastroesophageal reflux disease, esophagitis presence not specified  -     Start omeprazole (priLOSEC) 40 MG capsule; Take 1 capsule by mouth Daily.               "

## 2020-09-21 ENCOUNTER — TELEPHONE (OUTPATIENT)
Dept: PSYCHIATRY | Facility: CLINIC | Age: 30
End: 2020-09-21

## 2020-09-22 ENCOUNTER — OFFICE VISIT (OUTPATIENT)
Dept: FAMILY MEDICINE CLINIC | Facility: CLINIC | Age: 30
End: 2020-09-22

## 2020-09-22 VITALS
SYSTOLIC BLOOD PRESSURE: 124 MMHG | TEMPERATURE: 97.7 F | OXYGEN SATURATION: 98 % | HEART RATE: 101 BPM | BODY MASS INDEX: 46.65 KG/M2 | WEIGHT: 315 LBS | DIASTOLIC BLOOD PRESSURE: 68 MMHG | HEIGHT: 69 IN

## 2020-09-22 DIAGNOSIS — Z23 IMMUNIZATION DUE: ICD-10-CM

## 2020-09-22 DIAGNOSIS — R10.13 EPIGASTRIC PAIN: ICD-10-CM

## 2020-09-22 DIAGNOSIS — R51.9 NONINTRACTABLE EPISODIC HEADACHE, UNSPECIFIED HEADACHE TYPE: ICD-10-CM

## 2020-09-22 DIAGNOSIS — R00.2 PALPITATIONS: Primary | ICD-10-CM

## 2020-09-22 DIAGNOSIS — I10 ESSENTIAL HYPERTENSION: ICD-10-CM

## 2020-09-22 PROCEDURE — 90471 IMMUNIZATION ADMIN: CPT | Performed by: NURSE PRACTITIONER

## 2020-09-22 PROCEDURE — 99214 OFFICE O/P EST MOD 30 MIN: CPT | Performed by: NURSE PRACTITIONER

## 2020-09-22 PROCEDURE — 96372 THER/PROPH/DIAG INJ SC/IM: CPT | Performed by: NURSE PRACTITIONER

## 2020-09-22 PROCEDURE — 90686 IIV4 VACC NO PRSV 0.5 ML IM: CPT | Performed by: NURSE PRACTITIONER

## 2020-09-22 RX ORDER — IBUPROFEN 600 MG/1
600 TABLET ORAL EVERY 6 HOURS PRN
Qty: 60 TABLET | Refills: 1 | Status: SHIPPED | OUTPATIENT
Start: 2020-09-22 | End: 2020-12-16 | Stop reason: HOSPADM

## 2020-09-22 RX ORDER — DEXAMETHASONE SODIUM PHOSPHATE 4 MG/ML
8 INJECTION, SOLUTION INTRA-ARTICULAR; INTRALESIONAL; INTRAMUSCULAR; INTRAVENOUS; SOFT TISSUE ONCE
Status: COMPLETED | OUTPATIENT
Start: 2020-09-22 | End: 2020-09-22

## 2020-09-22 RX ORDER — METOPROLOL TARTRATE 50 MG/1
50 TABLET, FILM COATED ORAL 2 TIMES DAILY
Qty: 60 TABLET | Refills: 5 | Status: SHIPPED | OUTPATIENT
Start: 2020-09-22 | End: 2020-10-22

## 2020-09-22 RX ADMIN — DEXAMETHASONE SODIUM PHOSPHATE 8 MG: 4 INJECTION, SOLUTION INTRA-ARTICULAR; INTRALESIONAL; INTRAMUSCULAR; INTRAVENOUS; SOFT TISSUE at 14:02

## 2020-09-22 NOTE — PROGRESS NOTES
Subjective   Andrew Narayan is a 29 y.o. male.     Chief Complaint: Follow-up and Palpitations    Palpitations   This is a chronic problem. The current episode started more than 1 year ago. The problem occurs every several days. The problem has been waxing and waning. Associated symptoms include anxiety. He has tried beta blockers for the symptoms. Improvement on treatment: dose was decreased due to starting clonidine; palpitations have increased since lowering dose of medication.   Heartburn  He complains of abdominal pain and a hoarse voice. The problem has been gradually improving. Risk factors include obesity and NSAIDs. He has tried a PPI for the symptoms. The treatment provided moderate relief.   Abdominal Pain  This is a new problem. The current episode started more than 1 month ago. The problem occurs constantly. The problem has been unchanged. The pain is located in the epigastric region. The pain is moderate. Quality: excessive bloating with food intake. The abdominal pain does not radiate. Associated symptoms include headaches. Associated symptoms comments: Difficult to eat solid food due to excessive gas; patient had lapband done in 2015.  He is afraid that something has slipped.  Procedure per Dr Mcguire. Nothing aggravates the pain. The pain is relieved by nothing. His past medical history is significant for GERD.   Headache   This is a new problem. The current episode started in the past 7 days. The problem occurs constantly. The problem has been unchanged. The pain is located in the temporal region. The pain does not radiate. The pain quality is similar to prior headaches. The quality of the pain is described as aching. The pain is at a severity of 8/10. Associated symptoms include abdominal pain. Nothing aggravates the symptoms. He has tried NSAIDs for the symptoms. The treatment provided no relief.        Family History   Problem Relation Age of Onset   • Colon cancer Other    • Heart disease  Other    • Lymphoma Other    • Heart disease Mother    • Hypertension Mother    • Lupus Mother    • Skin cancer Father    • Kidney disease Father        Social History     Socioeconomic History   • Marital status:      Spouse name: Not on file   • Number of children: Not on file   • Years of education: Not on file   • Highest education level: Not on file   Tobacco Use   • Smoking status: Former Smoker     Packs/day: 0.50     Years: 13.00     Pack years: 6.50     Types: Cigarettes, Electronic Cigarette   • Smokeless tobacco: Former User     Types: Snuff   • Tobacco comment: uses e-cig   Substance and Sexual Activity   • Alcohol use: No   • Drug use: No   • Sexual activity: Defer     Partners: Female       Past Medical History:   Diagnosis Date   • Anxiety    • Arthritis    • Calculus of kidney    • DDD (degenerative disc disease), lumbosacral    • Elevated cholesterol    • Headache    • Hyperlipidemia    • Hypertension    • Insomnia    • Kidney stones    • Nerve damage    • Neuropathy    • GRUPO on CPAP    • Panic disorder    • Sciatica    • Tachycardia        Review of Systems   Constitutional: Negative.    HENT: Positive for hoarse voice.    Respiratory: Negative.    Cardiovascular: Positive for palpitations.   Gastrointestinal: Positive for abdominal pain.   Musculoskeletal: Negative.    Skin: Negative.    Neurological: Positive for headaches.   Psychiatric/Behavioral: The patient is nervous/anxious.        Objective   Physical Exam  Vitals signs and nursing note reviewed.   Constitutional:       Appearance: He is well-developed. He is obese.   Neck:      Musculoskeletal: Normal range of motion and neck supple.   Cardiovascular:      Rate and Rhythm: Normal rate and regular rhythm.      Heart sounds: Normal heart sounds.   Pulmonary:      Effort: Pulmonary effort is normal.      Breath sounds: Normal breath sounds.   Skin:     General: Skin is warm and dry.   Neurological:      Mental Status: He is alert and  "oriented to person, place, and time.   Psychiatric:         Behavior: Behavior normal.         Thought Content: Thought content normal.         Judgment: Judgment normal.         Procedures    Vitals: Blood pressure 124/68, pulse 101, temperature 97.7 °F (36.5 °C), height 175.3 cm (69\"), weight (!) 153 kg (338 lb), SpO2 98 %.    Body mass index is 49.91 kg/m².     Allergies:   Allergies   Allergen Reactions   • Lisinopril Cough   • Contrast Dye Rash        During this visit the following were done:  Labs Reviewed []    Labs Ordered []    Radiology Reports Reviewed []    Radiology Ordered []    PCP Records Reviewed []    Referring Provider Records Reviewed []    ER Records Reviewed []    Hospital Records Reviewed []    History Obtained From Family []    Radiology Images Reviewed []    Other Reviewed []    Records Requested []      Assessment/Plan   Andrew was seen today for follow-up and palpitations.    Diagnoses and all orders for this visit:    Palpitations    Essential hypertension  -     metoprolol tartrate (LOPRESSOR) 50 MG tablet; Take 1 tablet by mouth 2 (Two) Times a Day.    Epigastric pain  -     Ambulatory Referral to Bariatric Surgery    Nonintractable episodic headache, unspecified headache type  -     dexamethasone (DECADRON) injection 8 mg  -     ibuprofen (ADVIL,MOTRIN) 600 MG tablet; Take 1 tablet by mouth Every 6 (Six) Hours As Needed for Moderate Pain .    Immunization due  -     Fluarix Quad >6 Months (VFC) ([id number])               "

## 2020-09-24 RX ORDER — POLYETHYLENE GLYCOL 3350 17 G/17G
POWDER, FOR SOLUTION ORAL
COMMUNITY
Start: 2020-09-08 | End: 2020-10-13

## 2020-09-25 ENCOUNTER — OFFICE VISIT (OUTPATIENT)
Dept: SURGERY | Facility: CLINIC | Age: 30
End: 2020-09-25

## 2020-09-25 VITALS
BODY MASS INDEX: 46.65 KG/M2 | HEART RATE: 112 BPM | WEIGHT: 315 LBS | DIASTOLIC BLOOD PRESSURE: 98 MMHG | HEIGHT: 69 IN | SYSTOLIC BLOOD PRESSURE: 131 MMHG | TEMPERATURE: 98.6 F

## 2020-09-25 DIAGNOSIS — Z98.84 LAP-BAND SURGERY STATUS: Primary | ICD-10-CM

## 2020-09-25 PROCEDURE — 99214 OFFICE O/P EST MOD 30 MIN: CPT | Performed by: SURGERY

## 2020-09-25 NOTE — H&P
Date of Service: 9/25/2020    Subjective   Andrew Narayan is a 29 y.o. male is being seen for consultation for gastric pain and dysphagia today at the request of Sherley Link APRN    Chief complaint: Epigastric pain and dysphagia    Andrew Narayan is a 29 y.o. overly obese male with history of laparoscopic distal gastric band placement in 2015 with Dr. Mcguire.  He had issues with eating that caused him to eat high density foods, gaining roughly 80 pounds after surgery.  Has since been on a ketogenic diet and he has been able to go from 400 pounds down to 338.  However, when he eats he feels epigastric fullness and sharp pain with eating. This is become worse on the ketogenic diet given that it is high in meat content.  He has issues tolerating the meat.  Denies nausea or vomiting.  He had seen Dr. Mcguire several times for this and he would adjust what was in the LAP-BAND but would not fully take out the fluid or remove the band for the patient.     Patient is seeking a second opinion.    Past Medical History:   Diagnosis Date   • Anxiety    • Arthritis    • Calculus of kidney    • DDD (degenerative disc disease), lumbosacral    • Elevated cholesterol    • Headache    • Hyperlipidemia    • Hypertension    • Insomnia    • Kidney stones    • Nerve damage    • Neuropathy    • GRUPO on CPAP    • Panic disorder    • Sciatica    • Tachycardia        Past Surgical History:   Procedure Laterality Date   • COLONOSCOPY     • CYSTOSCOPY URETEROSCOPY LASER LITHOTRIPSY Left 4/15/2019    Procedure: CYSTOSCOPY URETEROSCOPY LASER LITHOTRIPSYl flexible ureteroscopy;  Surgeon: Tobias Oscar MD;  Location: [facility] OR;  Service: Urology   • CYSTOSCOPY URETEROSCOPY LASER LITHOTRIPSY Right 11/11/2019    Procedure: CYSTOSCOPY URETEROSCOPY LASER LITHOTRIPSY RIGHT;  Surgeon: Tobias Oscar MD;  Location: [facility] OR;  Service: Urology   • ENDOSCOPY     • KIDNEY SURGERY      Stents placed and removed.    •  "LAPAROSCOPIC GASTRIC BANDING     • OTHER SURGICAL HISTORY      diagnostic esophagogastroduodenoscopy   • OTHER SURGICAL HISTORY      renal lithotripsy         Family History   Problem Relation Age of Onset   • Colon cancer Other    • Heart disease Other    • Lymphoma Other    • Heart disease Mother    • Hypertension Mother    • Lupus Mother    • Skin cancer Father    • Kidney disease Father          Social History     Socioeconomic History   • Marital status:      Spouse name: Not on file   • Number of children: Not on file   • Years of education: Not on file   • Highest education level: Not on file   Tobacco Use   • Smoking status: Former Smoker     Packs/day: 0.50     Years: 13.00     Pack years: 6.50     Types: Cigarettes, Electronic Cigarette   • Smokeless tobacco: Former User     Types: Snuff   • Tobacco comment: uses e-cig   Substance and Sexual Activity   • Alcohol use: No   • Drug use: No   • Sexual activity: Defer     Partners: Female     Non-smoker           Review of Systems        Constitutional: No fevers, chills or malaise.  Intentional weight loss of approximately 60 pounds   Eyes: Denies visual changes    Cardiovascular: Denies chest pain, palpitations   Respiratory: Denies cough or shortness of breath   Abdominal/Gastrointestinal: See HPI    Genitourinary: Denies dysuria or hematuria   Musculoskeletal: Denies any but chronic joint aches, pains or deformities              Skin: No lesions or rashes   Psychiatric: No recent mood changes   Neurologic: No paresthesias or loss of function        Objective     Physical Exam:      09/25/20  1402   Weight: (!) 153 kg (338 lb)    Body mass index is 49.91 kg/m².  Constitution: /98   Pulse 112   Temp 98.6 °F (37 °C)   Ht 175.3 cm (69\")   Wt (!) 153 kg (338 lb)   BMI 49.91 kg/m²  . No acute distress.  Morbidly obese  Head: Normocephalic, atraumatic.   Eyes: Aligned without strabismus. Conjunctiva noninjected   Ears, Nose, Mouth: normal " dentition, No lesions appreciated   CV: Rhythm  and rate regular  Respiratory: Symmetric chest expansion, no respiratory distress.  Gastrointestinal:  soft, nondistended, nontender.  Surgical scars are in place  Skin:  No cyanosis, clubbing or edema bilaterally    Lymphatics: No abnormal cervical or supraclavicular adenopathy  Neurologic: No gross deficits   Psychiatric: alert and oriented x3          Assessment   Andrew Narayan is a 29 y.o. morbidly obese male with dysphasia in the setting of history of laparoscopic adjustable band surgery.    I attempted to access his LAP-BAND to remove fluid but was unable to here in the office as the Alfaro needle was too short.  I have ordered an upper GI study and we will schedule the patient for an EGD, at which time I will again attempt to remove fluid from the LAP-BAND may help the patient's symptoms.  The studies will be used to rule out lap band erosion.    Patient is very frustrated with his LAP-BAND as it has not helped him.  He ultimately desires it to be removed.  We will perform the above-mentioned studies, have him return to clinic and then we will schedule the patient for lap band removal.               Yanick Camp MD  [facility] Surgery

## 2020-09-28 ENCOUNTER — HOSPITAL ENCOUNTER (OUTPATIENT)
Facility: HOSPITAL | Age: 30
Setting detail: HOSPITAL OUTPATIENT SURGERY
End: 2020-09-28
Attending: SURGERY | Admitting: SURGERY

## 2020-09-28 ENCOUNTER — TELEPHONE (OUTPATIENT)
Dept: INTERVENTIONAL RADIOLOGY/VASCULAR | Facility: HOSPITAL | Age: 30
End: 2020-09-28

## 2020-09-28 PROBLEM — Z98.84 LAP-BAND SURGERY STATUS: Status: ACTIVE | Noted: 2020-09-28

## 2020-10-02 DIAGNOSIS — F43.10 POST TRAUMATIC STRESS DISORDER (PTSD): ICD-10-CM

## 2020-10-02 DIAGNOSIS — F41.1 GENERALIZED ANXIETY DISORDER: ICD-10-CM

## 2020-10-02 DIAGNOSIS — F42.2 MIXED OBSESSIONAL THOUGHTS AND ACTS: ICD-10-CM

## 2020-10-02 RX ORDER — ONDANSETRON 4 MG/1
4 TABLET, FILM COATED ORAL EVERY 8 HOURS PRN
Qty: 30 TABLET | Refills: 0 | Status: SHIPPED | OUTPATIENT
Start: 2020-10-02 | End: 2020-10-31 | Stop reason: SDUPTHER

## 2020-10-02 RX ORDER — ESCITALOPRAM OXALATE 20 MG/1
TABLET ORAL
Qty: 30 TABLET | Refills: 1 | OUTPATIENT
Start: 2020-10-02

## 2020-10-06 ENCOUNTER — PREP FOR SURGERY (OUTPATIENT)
Dept: OTHER | Facility: HOSPITAL | Age: 30
End: 2020-10-06

## 2020-10-06 DIAGNOSIS — Z98.84 LAP-BAND SURGERY STATUS: Primary | ICD-10-CM

## 2020-10-07 DIAGNOSIS — Z01.818 PREOP TESTING: Primary | ICD-10-CM

## 2020-10-08 ENCOUNTER — TELEPHONE (OUTPATIENT)
Dept: SURGERY | Facility: CLINIC | Age: 30
End: 2020-10-08

## 2020-10-09 ENCOUNTER — TRANSCRIBE ORDERS (OUTPATIENT)
Dept: GENERAL RADIOLOGY | Facility: HOSPITAL | Age: 30
End: 2020-10-09

## 2020-10-09 DIAGNOSIS — Z01.818 PRE-OPERATIVE CLEARANCE: Primary | ICD-10-CM

## 2020-10-12 ENCOUNTER — LAB (OUTPATIENT)
Dept: LAB | Facility: HOSPITAL | Age: 30
End: 2020-10-12

## 2020-10-12 DIAGNOSIS — Z01.818 PREOP TESTING: ICD-10-CM

## 2020-10-12 PROCEDURE — U0004 COV-19 TEST NON-CDC HGH THRU: HCPCS

## 2020-10-12 PROCEDURE — C9803 HOPD COVID-19 SPEC COLLECT: HCPCS

## 2020-10-13 ENCOUNTER — OFFICE VISIT (OUTPATIENT)
Dept: PSYCHIATRY | Facility: CLINIC | Age: 30
End: 2020-10-13

## 2020-10-13 DIAGNOSIS — F41.1 GENERALIZED ANXIETY DISORDER: ICD-10-CM

## 2020-10-13 DIAGNOSIS — F90.2 ATTENTION DEFICIT HYPERACTIVITY DISORDER, COMBINED TYPE: ICD-10-CM

## 2020-10-13 DIAGNOSIS — F42.2 MIXED OBSESSIONAL THOUGHTS AND ACTS: ICD-10-CM

## 2020-10-13 DIAGNOSIS — F43.10 POST TRAUMATIC STRESS DISORDER (PTSD): ICD-10-CM

## 2020-10-13 DIAGNOSIS — G47.00 INSOMNIA, UNSPECIFIED TYPE: ICD-10-CM

## 2020-10-13 LAB — SARS-COV-2 RNA RESP QL NAA+PROBE: NOT DETECTED

## 2020-10-13 PROCEDURE — 99442 PR PHYS/QHP TELEPHONE EVALUATION 11-20 MIN: CPT | Performed by: NURSE PRACTITIONER

## 2020-10-13 RX ORDER — AMITRIPTYLINE HYDROCHLORIDE 75 MG/1
75 TABLET, FILM COATED ORAL
Qty: 30 TABLET | Refills: 1 | Status: SHIPPED | OUTPATIENT
Start: 2020-10-13 | End: 2020-12-21 | Stop reason: SDUPTHER

## 2020-10-13 RX ORDER — CLONIDINE HYDROCHLORIDE 0.1 MG/1
0.1 TABLET ORAL 3 TIMES DAILY
Qty: 90 TABLET | Refills: 1 | Status: SHIPPED | OUTPATIENT
Start: 2020-10-13 | End: 2021-01-13 | Stop reason: SDUPTHER

## 2020-10-13 RX ORDER — ESCITALOPRAM OXALATE 20 MG/1
20 TABLET ORAL DAILY
Qty: 30 TABLET | Refills: 1 | Status: SHIPPED | OUTPATIENT
Start: 2020-10-13 | End: 2021-01-13

## 2020-10-13 NOTE — PROGRESS NOTES
"This is a telephone visit due to the covid pandemic.  Provider is quarantined.  Pt gives permission for the visit to occur over the telephone.      CC:  Recheck anxiety, depression, ptsd  Pt is having an EGD tomorrow due to issues with swallowing. Pt states that due to the worrying over this it has caused him to have more anxiety and crazy dreams about going to the Dr.  He says if this was not occurring he believes the lexapro would be working well.  Says the clonidine helps with his mind racing.  Sleeping \"about the same\".  Denies any SI or depression.  Mood is stable.  No negative side effects to the meds.      Treatment plan: he is to continue his medications at present dosing.  Will see how his anxiety is after he completes his medical workup.  He is to start EMDR in December.  RTC in 7 weeks.         This visit has been rescheduled as a phone visit to comply with patient safety concerns in accordance with CDC recommendations. Total time of discussion was 15 minutes  "

## 2020-10-14 ENCOUNTER — ANESTHESIA (OUTPATIENT)
Dept: PERIOP | Facility: HOSPITAL | Age: 30
End: 2020-10-14

## 2020-10-14 ENCOUNTER — ANESTHESIA EVENT (OUTPATIENT)
Dept: PERIOP | Facility: HOSPITAL | Age: 30
End: 2020-10-14

## 2020-10-14 ENCOUNTER — HOSPITAL ENCOUNTER (OUTPATIENT)
Facility: HOSPITAL | Age: 30
Setting detail: HOSPITAL OUTPATIENT SURGERY
Discharge: HOME | End: 2020-10-14
Attending: SURGERY | Admitting: SURGERY

## 2020-10-14 VITALS
WEIGHT: 315 LBS | RESPIRATION RATE: 20 BRPM | SYSTOLIC BLOOD PRESSURE: 120 MMHG | HEIGHT: 69 IN | OXYGEN SATURATION: 95 % | HEART RATE: 62 BPM | BODY MASS INDEX: 46.65 KG/M2 | TEMPERATURE: 97.1 F | DIASTOLIC BLOOD PRESSURE: 75 MMHG

## 2020-10-14 DIAGNOSIS — Z98.84 LAP-BAND SURGERY STATUS: ICD-10-CM

## 2020-10-14 PROCEDURE — 25010000002 FENTANYL CITRATE (PF) 100 MCG/2ML SOLUTION: Performed by: NURSE ANESTHETIST, CERTIFIED REGISTERED

## 2020-10-14 PROCEDURE — 43239 EGD BIOPSY SINGLE/MULTIPLE: CPT | Performed by: SURGERY

## 2020-10-14 PROCEDURE — 25010000002 PROPOFOL 10 MG/ML EMULSION: Performed by: NURSE ANESTHETIST, CERTIFIED REGISTERED

## 2020-10-14 PROCEDURE — 43999 UNLISTED PROCEDURE STOMACH: CPT | Performed by: SURGERY

## 2020-10-14 RX ORDER — SODIUM CHLORIDE 0.9 % (FLUSH) 0.9 %
10 SYRINGE (ML) INJECTION EVERY 12 HOURS SCHEDULED
Status: DISCONTINUED | OUTPATIENT
Start: 2020-10-14 | End: 2020-10-14 | Stop reason: HOSPADM

## 2020-10-14 RX ORDER — ONDANSETRON 2 MG/ML
4 INJECTION INTRAMUSCULAR; INTRAVENOUS AS NEEDED
Status: DISCONTINUED | OUTPATIENT
Start: 2020-10-14 | End: 2020-10-14 | Stop reason: HOSPADM

## 2020-10-14 RX ORDER — SODIUM CHLORIDE 0.9 % (FLUSH) 0.9 %
10 SYRINGE (ML) INJECTION AS NEEDED
Status: DISCONTINUED | OUTPATIENT
Start: 2020-10-14 | End: 2020-10-14 | Stop reason: HOSPADM

## 2020-10-14 RX ORDER — MEPERIDINE HYDROCHLORIDE 25 MG/ML
12.5 INJECTION INTRAMUSCULAR; INTRAVENOUS; SUBCUTANEOUS
Status: DISCONTINUED | OUTPATIENT
Start: 2020-10-14 | End: 2020-10-14 | Stop reason: HOSPADM

## 2020-10-14 RX ORDER — FENTANYL CITRATE 50 UG/ML
INJECTION, SOLUTION INTRAMUSCULAR; INTRAVENOUS AS NEEDED
Status: DISCONTINUED | OUTPATIENT
Start: 2020-10-14 | End: 2020-10-14 | Stop reason: SURG

## 2020-10-14 RX ORDER — SODIUM CHLORIDE, SODIUM LACTATE, POTASSIUM CHLORIDE, CALCIUM CHLORIDE 600; 310; 30; 20 MG/100ML; MG/100ML; MG/100ML; MG/100ML
125 INJECTION, SOLUTION INTRAVENOUS CONTINUOUS
Status: DISCONTINUED | OUTPATIENT
Start: 2020-10-14 | End: 2020-10-14 | Stop reason: HOSPADM

## 2020-10-14 RX ORDER — FENTANYL CITRATE 50 UG/ML
50 INJECTION, SOLUTION INTRAMUSCULAR; INTRAVENOUS
Status: DISCONTINUED | OUTPATIENT
Start: 2020-10-14 | End: 2020-10-14 | Stop reason: HOSPADM

## 2020-10-14 RX ORDER — OXYCODONE HYDROCHLORIDE AND ACETAMINOPHEN 5; 325 MG/1; MG/1
1 TABLET ORAL ONCE AS NEEDED
Status: DISCONTINUED | OUTPATIENT
Start: 2020-10-14 | End: 2020-10-14 | Stop reason: HOSPADM

## 2020-10-14 RX ORDER — PROPOFOL 10 MG/ML
VIAL (ML) INTRAVENOUS AS NEEDED
Status: DISCONTINUED | OUTPATIENT
Start: 2020-10-14 | End: 2020-10-14 | Stop reason: SURG

## 2020-10-14 RX ORDER — MIDAZOLAM HYDROCHLORIDE 1 MG/ML
1 INJECTION INTRAMUSCULAR; INTRAVENOUS
Status: DISCONTINUED | OUTPATIENT
Start: 2020-10-14 | End: 2020-10-14 | Stop reason: HOSPADM

## 2020-10-14 RX ORDER — IPRATROPIUM BROMIDE AND ALBUTEROL SULFATE 2.5; .5 MG/3ML; MG/3ML
3 SOLUTION RESPIRATORY (INHALATION) ONCE AS NEEDED
Status: DISCONTINUED | OUTPATIENT
Start: 2020-10-14 | End: 2020-10-14 | Stop reason: HOSPADM

## 2020-10-14 RX ORDER — LIDOCAINE HYDROCHLORIDE 20 MG/ML
INJECTION, SOLUTION INFILTRATION; PERINEURAL AS NEEDED
Status: DISCONTINUED | OUTPATIENT
Start: 2020-10-14 | End: 2020-10-14 | Stop reason: SURG

## 2020-10-14 RX ADMIN — PROPOFOL 80 MG: 10 INJECTION, EMULSION INTRAVENOUS at 08:19

## 2020-10-14 RX ADMIN — LIDOCAINE HYDROCHLORIDE 100 MG: 20 INJECTION, SOLUTION INFILTRATION; PERINEURAL at 08:10

## 2020-10-14 RX ADMIN — FENTANYL CITRATE 100 MCG: 50 INJECTION INTRAMUSCULAR; INTRAVENOUS at 08:10

## 2020-10-14 RX ADMIN — SODIUM CHLORIDE, POTASSIUM CHLORIDE, SODIUM LACTATE AND CALCIUM CHLORIDE: 600; 310; 30; 20 INJECTION, SOLUTION INTRAVENOUS at 08:10

## 2020-10-14 NOTE — ANESTHESIA PREPROCEDURE EVALUATION
Anesthesia Evaluation     Patient summary reviewed and Nursing notes reviewed   no history of anesthetic complications:  NPO Solid Status: > 8 hours  NPO Liquid Status: > 8 hours           Airway   Mallampati: III  TM distance: >3 FB  Neck ROM: full  No difficulty expected  Dental - normal exam     Pulmonary - normal exam   (+) a smoker Current Abstained day of surgery, sleep apnea on CPAP,   (-) asthma  Cardiovascular - normal exam  Exercise tolerance: good (4-7 METS)    NYHA Classification: II  Patient on routine beta blocker and Beta blocker given within 24 hours of surgery    (+) hypertension, dysrhythmias, hyperlipidemia,   (-) past MI, angina, CHF      Neuro/Psych  (+) headaches, numbness, psychiatric history Anxiety and Depression,     GI/Hepatic/Renal/Endo    (+) morbid obesity,  renal disease stones,   (-)  obesity, liver disease, diabetes    Musculoskeletal     (+) back pain, radiculopathy Left lower extremity  Abdominal  - normal exam    Bowel sounds: normal.   Substance History - negative use     OB/GYN negative ob/gyn ROS         Other   arthritis,              Anesthesia Evaluation     Patient summary reviewed and Nursing notes reviewed   NPO Solid Status: > 8 hours  NPO Liquid Status: > 8 hours           Airway   Mallampati: III  TM distance: >3 FB  Neck ROM: full  No difficulty expected  Dental - normal exam     Pulmonary - normal exam   (+) a smoker Former, sleep apnea on CPAP,   Cardiovascular - normal exam    (+) hypertension, hyperlipidemia,       Neuro/Psych  (+) headaches, numbness, psychiatric history,     GI/Hepatic/Renal/Endo    (+) morbid obesity (super),  renal disease stones,     Musculoskeletal     Abdominal  - normal exam    Bowel sounds: normal.   Substance History - negative use     OB/GYN negative ob/gyn ROS         Other   (+) arthritis                     Anesthesia Plan    ASA 4     general     intravenous induction   Anesthetic plan, all risks, benefits, and alternatives have  been provided, discussed and informed consent has been obtained with: patient.    Plan discussed with CRNA.               Anesthesia Plan    ASA 3     general     intravenous induction     Anesthetic plan, all risks, benefits, and alternatives have been provided, discussed and informed consent has been obtained with: patient.

## 2020-10-14 NOTE — ANESTHESIA POSTPROCEDURE EVALUATION
Patient: Andrew Narayan    Procedure Summary     Date: 10/14/20 Room / Location: [facility] OR [address] [place] [address] COR OR    Anesthesia Start: 0810 Anesthesia Stop: 0826    Procedure: ESOPHAGOGASTRODUODENOSCOPY WITH ANESTHESIA (N/A Esophagus) Diagnosis:       LAP-BAND surgery status      (LAP-BAND surgery status [Z98.84])    Surgeon: Rich Echavarria MD Provider: Tobias Souza MD    Anesthesia Type: general ASA Status: 3          Anesthesia Type: general    Vitals  Vitals Value Taken Time   /76 10/14/20 0841   Temp 97.1 °F (36.2 °C) 10/14/20 0826   Pulse 60 10/14/20 0841   Resp 20 10/14/20 0841   SpO2 96 % 10/14/20 0841           Post Anesthesia Care and Evaluation    Patient location during evaluation: bedside  Patient participation: complete - patient participated  Level of consciousness: awake and alert  Pain score: 1  Pain management: adequate  Airway patency: patent  Anesthetic complications: No anesthetic complications  PONV Status: none  Cardiovascular status: acceptable  Respiratory status: acceptable  Hydration status: acceptable

## 2020-10-14 NOTE — OP NOTE
ESOPHAGOGASTRODUODENOSCOPY WITH ANESTHESIA  Procedure Note    Andrew Narayan  10/14/2020    Pre-op Diagnosis:   LAP-BAND surgery status [Z98.84]    Post-op Diagnosis:   Lap band erosion    Indications: See above    Procedure(s):  ESOPHAGOGASTRODUODENOSCOPY WITH ANESTHESIA  ADJUSTMENT OF LAP BAND    Surgeon(s):  Rich Echavarria MD    Anesthesia: General    Staff:   Circulator: Ca Bedoya RN  Scrub Person: Hakeem Butler    Findings: Removal of 5 cc from patient's LAP-BAND.  EGD shows lap band erosion.      Operative Procedure: The patient was taken operating suite and placed in left lateral decubitus position.  Bilateral sequential compression devices were in place and IV anesthesia was administered.  Timeout procedure was performed.  The endoscope was inserted into the oropharynx and the esophagus was intubated.  The scope was advanced to the third portion of the duodenum.  The scope was slowly withdrawn evaluating all mucosal areas.  The duodenum was within normal limits.  The scope was withdrawn to the gastric lumen and the gastric antrum had mild gastritis.  Biopsy forceps were used to sample the gastric antrum.  Retroflexion was performed and the LAP-BAND erosion was visible with the lateral portion of the LAP-BAND approximately one third of the band visible in the fundus of the stomach.  The GE junction appeared to be normal.  The remainder of the esophageal mucosa was within normal limits and the scope was removed and the patient awakened from anesthesia and taken to recovery.    Estimated Blood Loss: minimal    Specimens:   Antral biopsy                 Drains: none    Grafts or Implants: none    Complications: none    Recommendations: Short interval follow-up with Dr. Cruz to discuss LAP-BAND removal.    Rich Echavarria MD     Date: 10/14/2020  Time: 08:24 EDT

## 2020-10-15 ENCOUNTER — TELEPHONE (OUTPATIENT)
Dept: FAMILY MEDICINE CLINIC | Facility: CLINIC | Age: 30
End: 2020-10-15

## 2020-10-15 ENCOUNTER — HOSPITAL ENCOUNTER (OUTPATIENT)
Dept: GENERAL RADIOLOGY | Facility: HOSPITAL | Age: 30
Discharge: HOME | End: 2020-10-15
Admitting: SURGERY

## 2020-10-15 DIAGNOSIS — Z98.84 LAP-BAND SURGERY STATUS: ICD-10-CM

## 2020-10-15 PROCEDURE — 74240 X-RAY XM UPR GI TRC 1CNTRST: CPT

## 2020-10-15 PROCEDURE — 74240 X-RAY XM UPR GI TRC 1CNTRST: CPT | Performed by: RADIOLOGY

## 2020-10-15 PROCEDURE — 74248 X-RAY SM INT F-THRU STD: CPT | Performed by: RADIOLOGY

## 2020-10-15 NOTE — TELEPHONE ENCOUNTER
"Pts wife called and stated pt is in horrible pain due to lap band being erode. pts wife stated surgeon told him to take tylenol and motrin and that is not helping. She stated pt is in \"horrible pain\" I advised ER. She verbalized understanding.   "

## 2020-10-16 ENCOUNTER — OFFICE VISIT (OUTPATIENT)
Dept: FAMILY MEDICINE CLINIC | Facility: CLINIC | Age: 30
End: 2020-10-16

## 2020-10-16 VITALS
BODY MASS INDEX: 46.65 KG/M2 | SYSTOLIC BLOOD PRESSURE: 120 MMHG | DIASTOLIC BLOOD PRESSURE: 64 MMHG | TEMPERATURE: 97.3 F | HEART RATE: 123 BPM | HEIGHT: 69 IN | OXYGEN SATURATION: 98 % | WEIGHT: 315 LBS

## 2020-10-16 DIAGNOSIS — R10.13 EPIGASTRIC PAIN: ICD-10-CM

## 2020-10-16 DIAGNOSIS — K21.9 GASTROESOPHAGEAL REFLUX DISEASE, UNSPECIFIED WHETHER ESOPHAGITIS PRESENT: Primary | ICD-10-CM

## 2020-10-16 LAB
LAB AP CASE REPORT: NORMAL
PATH REPORT.FINAL DX SPEC: NORMAL

## 2020-10-16 PROCEDURE — 99213 OFFICE O/P EST LOW 20 MIN: CPT | Performed by: NURSE PRACTITIONER

## 2020-10-16 RX ORDER — TRAMADOL HYDROCHLORIDE 50 MG/1
50 TABLET ORAL EVERY 6 HOURS PRN
Qty: 20 TABLET | Refills: 0 | Status: SHIPPED | OUTPATIENT
Start: 2020-10-16 | End: 2020-12-16 | Stop reason: HOSPADM

## 2020-10-16 RX ORDER — PANTOPRAZOLE SODIUM 40 MG/1
40 TABLET, DELAYED RELEASE ORAL DAILY
Qty: 30 TABLET | Refills: 2 | Status: SHIPPED | OUTPATIENT
Start: 2020-10-16 | End: 2021-06-08

## 2020-10-16 NOTE — PROGRESS NOTES
Subjective   Andrew Narayan is a 29 y.o. male.     Chief Complaint: Abdominal Pain    Abdominal Pain  This is a new problem. The current episode started more than 1 month ago. The problem occurs constantly. The problem has been unchanged. The pain is located in the epigastric region. The pain is severe. The quality of the pain is aching. The abdominal pain does not radiate. Associated symptoms comments: Pt states that he had EGD per Dr. Echavarria and was told that his lapband had eroded and he is having a lot of pain and is unable to eat due to pain.  Yesterday he ate one donut and could not tolerate any other foods due to pain.  He also was told that he has a small hiatal  Hernia and his heartburn is really bad.  He has been taking omeprazole and he has had no relief.  He is awaiting follow up with general surgery to have lap band removed. . His past medical history is significant for GERD.   Heartburn  He complains of abdominal pain and heartburn. This is a chronic problem. The current episode started more than 1 year ago. The heartburn does not wake him from sleep. The heartburn does not limit his activity. The heartburn doesn't change with position. Risk factors include obesity. He has tried a PPI for the symptoms. The treatment provided no relief.        Family History   Problem Relation Age of Onset   • Colon cancer Other    • Heart disease Other    • Lymphoma Other    • Heart disease Mother    • Hypertension Mother    • Lupus Mother    • Skin cancer Father    • Kidney disease Father        Social History     Socioeconomic History   • Marital status:      Spouse name: Not on file   • Number of children: Not on file   • Years of education: Not on file   • Highest education level: Not on file   Tobacco Use   • Smoking status: Former Smoker     Packs/day: 0.50     Years: 13.00     Pack years: 6.50     Types: Cigarettes, Electronic Cigarette   • Smokeless tobacco: Former User     Types: Snuff     Quit date:  "2018   • Tobacco comment: uses e-cig   Substance and Sexual Activity   • Alcohol use: No   • Drug use: No   • Sexual activity: Defer     Partners: Female       Past Medical History:   Diagnosis Date   • Anxiety    • Arthritis    • Calculus of kidney    • DDD (degenerative disc disease), lumbosacral    • Elevated cholesterol    • Headache    • Hyperlipidemia    • Hypertension    • Insomnia    • Kidney stones    • Nerve damage    • Neuropathy    • GRUPO on CPAP    • Panic disorder    • Sciatica    • Tachycardia        Review of Systems   Constitutional: Negative.    HENT: Negative.    Respiratory: Negative.    Cardiovascular: Negative.    Gastrointestinal: Positive for abdominal pain and heartburn.   Musculoskeletal: Negative.    Skin: Negative.    Neurological: Negative.    Psychiatric/Behavioral: Negative.        Objective   Physical Exam  Vitals signs and nursing note reviewed.   Constitutional:       Appearance: He is well-developed. He is obese.   Neck:      Musculoskeletal: Normal range of motion and neck supple.   Cardiovascular:      Rate and Rhythm: Normal rate and regular rhythm.      Heart sounds: Normal heart sounds.   Pulmonary:      Effort: Pulmonary effort is normal.      Breath sounds: Normal breath sounds.   Skin:     General: Skin is warm and dry.   Neurological:      Mental Status: He is alert and oriented to person, place, and time.   Psychiatric:         Behavior: Behavior normal.         Thought Content: Thought content normal.         Judgment: Judgment normal.         Procedures    Vitals: Blood pressure 120/64, pulse (!) 123, temperature 97.3 °F (36.3 °C), height 175.3 cm (69\"), weight (!) 152 kg (335 lb), SpO2 98 %.    Body mass index is 49.47 kg/m².     Allergies:   Allergies   Allergen Reactions   • Lisinopril Cough   • Contrast Dye Rash        During this visit the following were done:  Labs Reviewed []    Labs Ordered []    Radiology Reports Reviewed []    Radiology Ordered []    PCP " Records Reviewed []    Referring Provider Records Reviewed []    ER Records Reviewed []    Hospital Records Reviewed []    History Obtained From Family []    Radiology Images Reviewed []    Other Reviewed []    Records Requested []      Assessment/Plan   Diagnoses and all orders for this visit:    1. Gastroesophageal reflux disease, unspecified whether esophagitis present (Primary)  -     pantoprazole (Protonix) 40 MG EC tablet; Take 1 tablet by mouth Daily.  Dispense: 30 tablet; Refill: 2    2. Epigastric pain  -     traMADol (ULTRAM) 50 MG tablet; Take 1 tablet by mouth Every 6 (Six) Hours As Needed for Moderate Pain .  Dispense: 20 tablet; Refill: 0      Steffen reviewed and is consistent.  Patient comfort assessment guide reviewed and updated today.    As part of the patient's treatment plan they are being prescribed a controlled substance/ substances with potential for abuse.  This patient has been made aware of the appropriate use of such medications, including potential risk of somnolence, limited ability to drive and/or work safely, and potential for overdose.  It has also been made clear these medications are for use by the patient only, without concomitant use of alcohol or other substances unless prescribed/advised by medical provider.    Patient has completed prescribing agreement detailing terms of continued prescribing of controlled substances including monitoring STEFFEN reports, urine drug screens, and pill counts.  The patient is aware STEFFEN reports are reviewed on a regular basis and scanned into the chart.    History and physical exam exhibit continued safe and appropriate use of controlled substances.

## 2020-10-18 ENCOUNTER — HOSPITAL ENCOUNTER (EMERGENCY)
Facility: HOSPITAL | Age: 30
Discharge: HOME | End: 2020-10-19
Attending: EMERGENCY MEDICINE | Admitting: EMERGENCY MEDICINE

## 2020-10-18 DIAGNOSIS — Z98.84 HISTORY OF LAPAROSCOPIC ADJUSTABLE GASTRIC BANDING: ICD-10-CM

## 2020-10-18 DIAGNOSIS — R10.9 ABDOMINAL PAIN, UNSPECIFIED ABDOMINAL LOCATION: Primary | ICD-10-CM

## 2020-10-18 LAB
ALBUMIN SERPL-MCNC: 4.52 G/DL (ref 3.5–5.2)
ALBUMIN/GLOB SERPL: 1.4 G/DL
ALP SERPL-CCNC: 104 U/L (ref 39–117)
ALT SERPL W P-5'-P-CCNC: 22 U/L (ref 1–41)
ANION GAP SERPL CALCULATED.3IONS-SCNC: 9.7 MMOL/L (ref 5–15)
AST SERPL-CCNC: 18 U/L (ref 1–40)
BASOPHILS # BLD AUTO: 0.03 10*3/MM3 (ref 0–0.2)
BASOPHILS NFR BLD AUTO: 0.3 % (ref 0–1.5)
BILIRUB SERPL-MCNC: 0.4 MG/DL (ref 0–1.2)
BILIRUB UR QL STRIP: NEGATIVE
BUN SERPL-MCNC: 13 MG/DL (ref 6–20)
BUN/CREAT SERPL: 9.2 (ref 7–25)
CALCIUM SPEC-SCNC: 10.1 MG/DL (ref 8.6–10.5)
CHLORIDE SERPL-SCNC: 98 MMOL/L (ref 98–107)
CLARITY UR: CLEAR
CO2 SERPL-SCNC: 33.3 MMOL/L (ref 22–29)
COLOR UR: YELLOW
CREAT SERPL-MCNC: 1.41 MG/DL (ref 0.76–1.27)
D-LACTATE SERPL-SCNC: 2 MMOL/L (ref 0.5–2)
DEPRECATED RDW RBC AUTO: 43.1 FL (ref 37–54)
EOSINOPHIL # BLD AUTO: 0.3 10*3/MM3 (ref 0–0.4)
EOSINOPHIL NFR BLD AUTO: 2.5 % (ref 0.3–6.2)
ERYTHROCYTE [DISTWIDTH] IN BLOOD BY AUTOMATED COUNT: 13.2 % (ref 12.3–15.4)
GFR SERPL CREATININE-BSD FRML MDRD: 59 ML/MIN/1.73
GLOBULIN UR ELPH-MCNC: 3.2 GM/DL
GLUCOSE SERPL-MCNC: 98 MG/DL (ref 65–99)
GLUCOSE UR STRIP-MCNC: NEGATIVE MG/DL
HCT VFR BLD AUTO: 48.7 % (ref 37.5–51)
HGB BLD-MCNC: 15.2 G/DL (ref 13–17.7)
HGB UR QL STRIP.AUTO: NEGATIVE
HOLD SPECIMEN: NORMAL
HOLD SPECIMEN: NORMAL
IMM GRANULOCYTES # BLD AUTO: 0.04 10*3/MM3 (ref 0–0.05)
IMM GRANULOCYTES NFR BLD AUTO: 0.3 % (ref 0–0.5)
KETONES UR QL STRIP: NEGATIVE
LEUKOCYTE ESTERASE UR QL STRIP.AUTO: NEGATIVE
LYMPHOCYTES # BLD AUTO: 3.93 10*3/MM3 (ref 0.7–3.1)
LYMPHOCYTES NFR BLD AUTO: 32.9 % (ref 19.6–45.3)
MCH RBC QN AUTO: 27.9 PG (ref 26.6–33)
MCHC RBC AUTO-ENTMCNC: 31.2 G/DL (ref 31.5–35.7)
MCV RBC AUTO: 89.4 FL (ref 79–97)
MONOCYTES # BLD AUTO: 0.6 10*3/MM3 (ref 0.1–0.9)
MONOCYTES NFR BLD AUTO: 5 % (ref 5–12)
NEUTROPHILS NFR BLD AUTO: 59 % (ref 42.7–76)
NEUTROPHILS NFR BLD AUTO: 7.05 10*3/MM3 (ref 1.7–7)
NITRITE UR QL STRIP: NEGATIVE
NRBC BLD AUTO-RTO: 0 /100 WBC (ref 0–0.2)
PH UR STRIP.AUTO: 7 [PH] (ref 5–8)
PLATELET # BLD AUTO: 336 10*3/MM3 (ref 140–450)
PMV BLD AUTO: 9.4 FL (ref 6–12)
POTASSIUM SERPL-SCNC: 4.5 MMOL/L (ref 3.5–5.2)
PROT SERPL-MCNC: 7.7 G/DL (ref 6–8.5)
PROT UR QL STRIP: NEGATIVE
RBC # BLD AUTO: 5.45 10*6/MM3 (ref 4.14–5.8)
SODIUM SERPL-SCNC: 141 MMOL/L (ref 136–145)
SP GR UR STRIP: 1.02 (ref 1–1.03)
UROBILINOGEN UR QL STRIP: NORMAL
WBC # BLD AUTO: 11.95 10*3/MM3 (ref 3.4–10.8)
WHOLE BLOOD HOLD SPECIMEN: NORMAL
WHOLE BLOOD HOLD SPECIMEN: NORMAL

## 2020-10-18 PROCEDURE — 85025 COMPLETE CBC W/AUTO DIFF WBC: CPT | Performed by: PHYSICIAN ASSISTANT

## 2020-10-18 PROCEDURE — 81003 URINALYSIS AUTO W/O SCOPE: CPT | Performed by: PHYSICIAN ASSISTANT

## 2020-10-18 PROCEDURE — 25010000002 HYDROMORPHONE 1 MG/ML SOLUTION: Performed by: EMERGENCY MEDICINE

## 2020-10-18 PROCEDURE — 96375 TX/PRO/DX INJ NEW DRUG ADDON: CPT

## 2020-10-18 PROCEDURE — 25010000002 ONDANSETRON PER 1 MG: Performed by: EMERGENCY MEDICINE

## 2020-10-18 PROCEDURE — 99283 EMERGENCY DEPT VISIT LOW MDM: CPT

## 2020-10-18 PROCEDURE — 80053 COMPREHEN METABOLIC PANEL: CPT | Performed by: PHYSICIAN ASSISTANT

## 2020-10-18 PROCEDURE — 83605 ASSAY OF LACTIC ACID: CPT | Performed by: EMERGENCY MEDICINE

## 2020-10-18 PROCEDURE — 96374 THER/PROPH/DIAG INJ IV PUSH: CPT

## 2020-10-18 RX ORDER — ONDANSETRON 2 MG/ML
4 INJECTION INTRAMUSCULAR; INTRAVENOUS ONCE
Status: COMPLETED | OUTPATIENT
Start: 2020-10-18 | End: 2020-10-18

## 2020-10-18 RX ORDER — FAMOTIDINE 10 MG/ML
20 INJECTION, SOLUTION INTRAVENOUS ONCE
Status: COMPLETED | OUTPATIENT
Start: 2020-10-18 | End: 2020-10-18

## 2020-10-18 RX ORDER — PANTOPRAZOLE SODIUM 40 MG/10ML
40 INJECTION, POWDER, LYOPHILIZED, FOR SOLUTION INTRAVENOUS ONCE
Status: COMPLETED | OUTPATIENT
Start: 2020-10-18 | End: 2020-10-18

## 2020-10-18 RX ADMIN — PANTOPRAZOLE SODIUM 40 MG: 40 INJECTION, POWDER, FOR SOLUTION INTRAVENOUS at 23:29

## 2020-10-18 RX ADMIN — FAMOTIDINE 20 MG: 10 INJECTION INTRAVENOUS at 23:29

## 2020-10-18 RX ADMIN — SODIUM CHLORIDE 1000 ML: 9 INJECTION, SOLUTION INTRAVENOUS at 23:31

## 2020-10-18 RX ADMIN — HYDROMORPHONE HYDROCHLORIDE 1 MG: 1 INJECTION, SOLUTION INTRAMUSCULAR; INTRAVENOUS; SUBCUTANEOUS at 22:50

## 2020-10-18 RX ADMIN — ONDANSETRON 4 MG: 2 INJECTION INTRAMUSCULAR; INTRAVENOUS at 22:50

## 2020-10-19 ENCOUNTER — TELEPHONE (OUTPATIENT)
Dept: SURGERY | Facility: CLINIC | Age: 30
End: 2020-10-19

## 2020-10-19 VITALS
BODY MASS INDEX: 46.65 KG/M2 | OXYGEN SATURATION: 100 % | TEMPERATURE: 98.1 F | HEART RATE: 72 BPM | HEIGHT: 69 IN | SYSTOLIC BLOOD PRESSURE: 139 MMHG | DIASTOLIC BLOOD PRESSURE: 97 MMHG | WEIGHT: 315 LBS | RESPIRATION RATE: 18 BRPM

## 2020-10-19 NOTE — TELEPHONE ENCOUNTER
I SPOKE WITH DR. PONCE' OFFICE TO SCHEDULE APPT FOR PATIENT. THEY WILL EMAIL HIM A PACKET TO SEND BACK THEN THEY WILL SCHEDULE APPT. I CALLED TO INFORM PATIENT TO LOOK FOR EMAIL FROM THEM. HE VOICED UNDERSTANDING.

## 2020-10-22 ENCOUNTER — OFFICE VISIT (OUTPATIENT)
Dept: CARDIOLOGY | Facility: CLINIC | Age: 30
End: 2020-10-22

## 2020-10-22 VITALS
OXYGEN SATURATION: 98 % | DIASTOLIC BLOOD PRESSURE: 90 MMHG | TEMPERATURE: 97.8 F | HEIGHT: 69 IN | BODY MASS INDEX: 46.65 KG/M2 | WEIGHT: 315 LBS | SYSTOLIC BLOOD PRESSURE: 116 MMHG | HEART RATE: 95 BPM

## 2020-10-22 DIAGNOSIS — E78.5 HYPERLIPIDEMIA LDL GOAL <100: Primary | ICD-10-CM

## 2020-10-22 DIAGNOSIS — R00.2 PALPITATIONS: ICD-10-CM

## 2020-10-22 DIAGNOSIS — I10 ESSENTIAL HYPERTENSION: ICD-10-CM

## 2020-10-22 PROCEDURE — 99214 OFFICE O/P EST MOD 30 MIN: CPT | Performed by: INTERNAL MEDICINE

## 2020-10-22 PROCEDURE — 93000 ELECTROCARDIOGRAM COMPLETE: CPT | Performed by: INTERNAL MEDICINE

## 2020-10-22 RX ORDER — METOPROLOL TARTRATE 50 MG/1
25 TABLET, FILM COATED ORAL 2 TIMES DAILY
Qty: 60 TABLET | Refills: 5 | Status: SHIPPED | OUTPATIENT
Start: 2020-10-22 | End: 2021-08-31 | Stop reason: SDUPTHER

## 2020-10-22 NOTE — PROGRESS NOTES
subjective     Chief Complaint   Patient presents with   • Palpitations     Follow up, doing better since increase in metoprolol   • Rapid Heart Rate   • Hyperlipidemia     History of Present Illness  Patient is 29 years old white male who is here for follow-up of palpitations, hypertension and hyperlipidemia.  Blood pressure is running low and heart rate is around 90 bpm.  He is taking Crestor but he has not had lab work done as ordered.  There are no side effects from medications.  He is taking clonidine 0.1 3 times daily along with a low pressure 50 mg 1 a day.  He plans to have sleeve gastrectomy in the near future.    Past Surgical History:   Procedure Laterality Date   • COLONOSCOPY     • CYSTOSCOPY URETEROSCOPY LASER LITHOTRIPSY Left 4/15/2019    Procedure: CYSTOSCOPY URETEROSCOPY LASER LITHOTRIPSYl flexible ureteroscopy;  Surgeon: Tobias Oscar MD;  Location: [facility];  Service: Urology   • CYSTOSCOPY URETEROSCOPY LASER LITHOTRIPSY Right 11/11/2019    Procedure: CYSTOSCOPY URETEROSCOPY LASER LITHOTRIPSY RIGHT;  Surgeon: Tobias Oscar MD;  Location: [facility];  Service: Urology   • ENDOSCOPY     • ENDOSCOPY N/A 10/14/2020    Procedure: ESOPHAGOGASTRODUODENOSCOPY WITH ANESTHESIA;  Surgeon: Rich Echavarria MD;  Location: [facility];  Service: Gastroenterology;  Laterality: N/A;   • KIDNEY SURGERY      Stents placed and removed.    • LAPAROSCOPIC GASTRIC BANDING     • OTHER SURGICAL HISTORY      diagnostic esophagogastroduodenoscopy   • OTHER SURGICAL HISTORY      renal lithotripsy     Family History   Problem Relation Age of Onset   • Colon cancer Other    • Heart disease Other    • Lymphoma Other    • Heart disease Mother    • Hypertension Mother    • Lupus Mother    • Skin cancer Father    • Kidney disease Father      Past Medical History:   Diagnosis Date   • Anxiety    • Arthritis    • Calculus of kidney    • DDD (degenerative disc disease), lumbosacral    • Elevated cholesterol     • Headache    • Hyperlipidemia    • Hypertension    • Insomnia    • Kidney stones    • Nerve damage    • Neuropathy    • GRUPO on CPAP    • Panic disorder    • Sciatica    • Tachycardia      Patient Active Problem List   Diagnosis   • Lipoma of right lower extremity   • Detrusor instability   • Ureteral calculus, right   • Renal calculus   • Essential hypertension   • Anxiety and depression   • Hyperlipidemia   • Palpitations   • Chest pain   • GRUPO (obstructive sleep apnea)   • DDD (degenerative disc disease), lumbar   • Chronic bilateral low back pain with left-sided sciatica   • B12 deficiency   • Low testosterone   • Class 3 severe obesity due to excess calories without serious comorbidity with body mass index (BMI) of 50.0 to 59.9 in adult (CMS/[facility])   • Ureteral stone   • LAP-BAND surgery status       Social History     Tobacco Use   • Smoking status: Former Smoker     Packs/day: 0.50     Years: 13.00     Pack years: 6.50     Types: Cigarettes, Electronic Cigarette   • Smokeless tobacco: Former User     Types: Snuff     Quit date: 2018   • Tobacco comment: uses e-cig   Substance Use Topics   • Alcohol use: No   • Drug use: No       Allergies   Allergen Reactions   • Lisinopril Cough   • Contrast Dye Rash       Current Outpatient Medications on File Prior to Visit   Medication Sig   • amitriptyline (ELAVIL) 75 MG tablet Take 1 tablet by mouth every night at bedtime.   • cloNIDine (CATAPRES) 0.1 MG tablet Take 1 tablet by mouth 3 (Three) Times a Day.   • escitalopram (LEXAPRO) 20 MG tablet Take 1 tablet by mouth Daily.   • gabapentin (NEURONTIN) 600 MG tablet Take 600 mg by mouth 6 (Six) Times a Day.   • ibuprofen (ADVIL,MOTRIN) 600 MG tablet Take 1 tablet by mouth Every 6 (Six) Hours As Needed for Moderate Pain .   • ondansetron (Zofran) 4 MG tablet Take 1 tablet by mouth Every 8 (Eight) Hours As Needed for Nausea or Vomiting.   • oxyCODONE-acetaminophen (PERCOCET) 5-325 MG per tablet Take 1-2 tablets by mouth  "Every 6 (Six) Hours As Needed for pain.   • pantoprazole (Protonix) 40 MG EC tablet Take 1 tablet by mouth Daily.   • polyethylene glycol (MIRALAX) 17 g packet Take 17 g by mouth Daily As Needed (constipation).   • rosuvastatin (CRESTOR) 20 MG tablet Take 1 tablet by mouth Daily.   • tamsulosin (FLOMAX) 0.4 MG capsule 24 hr capsule Take 1 capsule by mouth Daily.   • traMADol (ULTRAM) 50 MG tablet Take 1 tablet by mouth Every 6 (Six) Hours As Needed for Moderate Pain .   • [DISCONTINUED] metoprolol tartrate (LOPRESSOR) 50 MG tablet Take 1 tablet by mouth 2 (Two) Times a Day. (Patient taking differently: Take 50 mg by mouth Daily.)     No current facility-administered medications on file prior to visit.          The following portions of the patient's history were reviewed and updated as appropriate: allergies, current medications, past family history, past medical history, past social history, past surgical history and problem list.    Review of Systems   Constitution: Negative.   HENT: Negative.  Negative for congestion.    Eyes: Negative.    Cardiovascular: Positive for palpitations. Negative for chest pain, cyanosis, dyspnea on exertion, irregular heartbeat, leg swelling, near-syncope, orthopnea, paroxysmal nocturnal dyspnea and syncope.   Respiratory: Negative.  Negative for shortness of breath.    Hematologic/Lymphatic: Negative.    Musculoskeletal: Negative.    Gastrointestinal: Negative.    Neurological: Negative.  Negative for headaches.          Objective:     /90 (BP Location: Left arm, Patient Position: Sitting, Cuff Size: Adult)   Pulse 95   Temp 97.8 °F (36.6 °C)   Ht 175.3 cm (69\")   Wt (!) 152 kg (336 lb)   SpO2 98%   BMI 49.62 kg/m²   Vitals signs and nursing note reviewed.   Constitutional:       General: Not in acute distress.     Appearance: Healthy appearance. Well-developed and not in distress. Not diaphoretic.   Eyes:      General: No scleral icterus.     Conjunctiva/sclera: " Conjunctivae normal.      Pupils: Pupils are equal, round, and reactive to light.   HENT:      Head: Normocephalic and atraumatic.   Neck:      Musculoskeletal: Normal range of motion and neck supple.      Thyroid: Thyroid normal. No thyromegaly.      Vascular: No JVD. JVD normal.      Trachea: No tracheal deviation.      Lymphadenopathy: No cervical adenopathy.   Pulmonary:      Effort: Pulmonary effort is normal. No respiratory distress.      Breath sounds: Normal breath sounds and air entry. No wheezing. No rhonchi. No rales.   Chest:      Chest wall: Not tender to palpatation.   Cardiovascular:      PMI at left midclavicular line. Normal rate. Regular rhythm.      No gallop.   Pulses:     Intact distal pulses. No decreased pulses.   Edema:     Peripheral edema absent.   Abdominal:      General: Bowel sounds are normal. There is no distension or abdominal bruit.      Palpations: Abdomen is soft. There is no hepatomegaly, splenomegaly or abdominal mass.      Tenderness: There is no abdominal tenderness. There is no guarding or rebound.   Skin:     General: Skin is warm and dry. There is no cyanosis.      Coloration: Skin is not jaundiced or pale.      Findings: No erythema or rash.   Neurological:      Mental Status: Alert, oriented to person, place, and time and oriented to person, place and time.   Psychiatric:         Attention and Perception: Attention normal.         Mood and Affect: Mood and affect normal.         Speech: Speech normal.         Behavior: Behavior is cooperative.           Lab Review  Lab Results   Component Value Date     10/18/2020    K 4.5 10/18/2020    CL 98 10/18/2020    BUN 13 10/18/2020    CREATININE 1.41 (H) 10/18/2020    GLUCOSE 98 10/18/2020    CALCIUM 10.1 10/18/2020    ALT 22 10/18/2020    ALKPHOS 104 10/18/2020    LABIL2 1.5 04/08/2016     Lab Results   Component Value Date    CKTOTAL 74 10/03/2017     Lab Results   Component Value Date    WBC 11.95 (H) 10/18/2020    HGB  15.2 10/18/2020    HCT 48.7 10/18/2020     10/18/2020     No results found for: INR  Lab Results   Component Value Date    MG 2.3 09/20/2019     Lab Results   Component Value Date    PSA 0.340 11/20/2018    TSH 1.900 09/20/2019     Lab Results   Component Value Date    BNP 4.0 11/10/2017     Lab Results   Component Value Date    CHOL 366 (H) 07/06/2020    TRIG 222 (H) 07/06/2020    HDL 46 07/06/2020    VLDL 44.4 (H) 07/06/2020    LDLHDL 5.99 07/06/2020     Lab Results   Component Value Date     (H) 07/06/2020     (H) 09/12/2018         ECG 12 Lead    Date/Time: 10/22/2020 10:52 AM  Performed by: Bridgett Oviedo MD  Authorized by: Bridgett Oviedo MD   Comparison: compared with previous ECG from 9/1/2020  Comparison to previous ECG: Heart rate is slower today  Rhythm: sinus rhythm  Rate: normal  BPM: 95  Conduction: conduction normal  ST Segments: ST segments normal  T Waves: T waves normal  QRS axis: normal  Other: no other findings    Clinical impression: normal ECG               I personally viewed and interpreted the patient's LAB data         Assessment:     1. Hyperlipidemia LDL goal <100    2. Essential hypertension    3. Palpitations          Plan:     Patient has significant hyperlipidemia with LDL of 276.  He is tolerating Crestor very well however he has not had lab work done.  He was advised to check his lab work so Crestor dose could be increased.  Blood pressure is running low, he was advised to decrease his clonidine 0.1 mg half tablet 3 times a day which he is taking for psychiatric reasons.  He is taking metoprolol tartrate 50 mg 1 a day, he was advised to change it to 25 mg twice a day.    Later on after clonidine could be further decreased, metoprolol dose will be increased.    Follow-up scheduled        No follow-ups on file.

## 2020-10-28 ENCOUNTER — OFFICE VISIT (OUTPATIENT)
Dept: SURGERY | Facility: CLINIC | Age: 30
End: 2020-10-28

## 2020-10-28 VITALS — HEIGHT: 69 IN | BODY MASS INDEX: 46.65 KG/M2 | WEIGHT: 315 LBS

## 2020-10-28 DIAGNOSIS — Z98.84 LAP-BAND SURGERY STATUS: Primary | ICD-10-CM

## 2020-10-28 PROCEDURE — 99212 OFFICE O/P EST SF 10 MIN: CPT | Performed by: SURGERY

## 2020-10-28 NOTE — PROGRESS NOTES
Subjective   Andrew Narayan is a 29 y.o. male  is here today for follow-up.         Andrew Narayan is a 29 y.o. male here for follow up after EGD.  The patient is status post lap band at the time of EGD was noted to have an eroded lap band.  He has significant substernal pain radiating to the left shoulder.  No cellulitis perforation or evidence of active infection at this time.  On examination his abdomen is benign.          Assessment     Diagnoses and all orders for this visit:    1. LAP-BAND surgery status (Primary)      Andrew Narayan is a 29 y.o. male with eroded lap band.  He will be referred to bariatric surgery for removal.             Answers for HPI/ROS submitted by the patient on 10/26/2020   Abdominal pain  What is the primary reason for your visit?: Abdominal Pain  Chronicity: chronic  Onset: more than 1 month ago  Onset quality: gradual  Frequency: constantly  Progression since onset: rapidly worsening  Pain location: LUQ, RUQ, epigastric region  Pain - numeric: 10/10  Pain quality: aching, dull, a sensation of fullness, tearing  Radiates to: LUQ, RUQ, epigastric region, chest  anorexia: Yes  constipation: Yes  flatus: Yes  melena: Yes  nausea: Yes  Aggravated by: belching, certain positions, coughing, eating, movement  Relieved by: sitting up  Diagnostic workup: upper endoscopy

## 2020-10-29 DIAGNOSIS — Z98.84 LAP-BAND SURGERY STATUS: Primary | ICD-10-CM

## 2020-10-30 RX ORDER — ONDANSETRON 4 MG/1
4 TABLET, FILM COATED ORAL EVERY 8 HOURS PRN
Qty: 30 TABLET | Refills: 0 | OUTPATIENT
Start: 2020-10-30

## 2020-10-31 RX ORDER — ONDANSETRON 4 MG/1
4 TABLET, FILM COATED ORAL EVERY 8 HOURS PRN
Qty: 30 TABLET | Refills: 0 | Status: SHIPPED | OUTPATIENT
Start: 2020-10-31 | End: 2020-12-14

## 2020-11-03 RX ORDER — ONDANSETRON 4 MG/1
4 TABLET, FILM COATED ORAL EVERY 8 HOURS PRN
Qty: 30 TABLET | Refills: 0 | OUTPATIENT
Start: 2020-11-03

## 2020-12-01 ENCOUNTER — TELEPHONE (OUTPATIENT)
Dept: FAMILY MEDICINE CLINIC | Facility: CLINIC | Age: 30
End: 2020-12-01

## 2020-12-01 RX ORDER — NYSTATIN 100000 [USP'U]/G
POWDER TOPICAL 2 TIMES DAILY
Qty: 120 G | Refills: 0 | Status: SHIPPED | OUTPATIENT
Start: 2020-12-01 | End: 2020-12-14

## 2020-12-08 ENCOUNTER — OFFICE VISIT (OUTPATIENT)
Dept: BARIATRICS/WEIGHT MGMT | Facility: CLINIC | Age: 30
End: 2020-12-08

## 2020-12-08 VITALS
WEIGHT: 315 LBS | TEMPERATURE: 98.4 F | HEIGHT: 69 IN | RESPIRATION RATE: 18 BRPM | OXYGEN SATURATION: 99 % | BODY MASS INDEX: 46.65 KG/M2 | SYSTOLIC BLOOD PRESSURE: 132 MMHG | HEART RATE: 93 BPM | DIASTOLIC BLOOD PRESSURE: 88 MMHG

## 2020-12-08 DIAGNOSIS — R10.13 EPIGASTRIC PAIN: ICD-10-CM

## 2020-12-08 DIAGNOSIS — K95.09 GASTRIC BAND EROSION: Primary | ICD-10-CM

## 2020-12-08 DIAGNOSIS — K95.09 COMPLICATION OF GASTRIC BANDING: ICD-10-CM

## 2020-12-08 PROCEDURE — 99214 OFFICE O/P EST MOD 30 MIN: CPT | Performed by: PHYSICIAN ASSISTANT

## 2020-12-08 RX ORDER — ACETAMINOPHEN 500 MG
1000 TABLET ORAL ONCE
Status: CANCELLED | OUTPATIENT
Start: 2020-12-08 | End: 2020-12-08

## 2020-12-08 RX ORDER — SCOLOPAMINE TRANSDERMAL SYSTEM 1 MG/1
1 PATCH, EXTENDED RELEASE TRANSDERMAL ONCE
Status: CANCELLED | OUTPATIENT
Start: 2020-12-08 | End: 2020-12-08

## 2020-12-08 RX ORDER — POLYETHYLENE GLYCOL 3350 17 G/17G
POWDER, FOR SOLUTION ORAL
COMMUNITY
Start: 2020-11-12 | End: 2020-12-08

## 2020-12-08 RX ORDER — OMEPRAZOLE 40 MG/1
40 CAPSULE, DELAYED RELEASE ORAL DAILY
COMMUNITY
Start: 2020-11-12 | End: 2020-12-08

## 2020-12-08 RX ORDER — SODIUM CHLORIDE 9 MG/ML
150 INJECTION, SOLUTION INTRAVENOUS CONTINUOUS
Status: CANCELLED | OUTPATIENT
Start: 2020-12-08

## 2020-12-08 RX ORDER — SODIUM CHLORIDE 0.9 % (FLUSH) 0.9 %
3 SYRINGE (ML) INJECTION EVERY 12 HOURS SCHEDULED
Status: CANCELLED | OUTPATIENT
Start: 2020-12-08

## 2020-12-08 RX ORDER — SODIUM CHLORIDE 0.9 % (FLUSH) 0.9 %
3-10 SYRINGE (ML) INJECTION AS NEEDED
Status: CANCELLED | OUTPATIENT
Start: 2020-12-08
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
## 2020-12-09 PROBLEM — R10.13 EPIGASTRIC PAIN: Status: ACTIVE | Noted: 2020-12-09

## 2020-12-09 PROBLEM — K95.09 GASTRIC BAND EROSION: Status: ACTIVE | Noted: 2020-12-09

## 2020-12-09 PROBLEM — K95.09 COMPLICATION OF GASTRIC BANDING: Status: ACTIVE | Noted: 2020-12-09

## 2020-12-14 ENCOUNTER — ANESTHESIA EVENT (OUTPATIENT)
Dept: PERIOP | Facility: HOSPITAL | Age: 30
End: 2020-12-14

## 2020-12-14 ENCOUNTER — APPOINTMENT (OUTPATIENT)
Dept: PREADMISSION TESTING | Facility: HOSPITAL | Age: 30
End: 2020-12-14

## 2020-12-14 VITALS — HEIGHT: 69 IN | BODY MASS INDEX: 50.5 KG/M2

## 2020-12-14 DIAGNOSIS — Z98.84 LAP-BAND SURGERY STATUS: ICD-10-CM

## 2020-12-14 LAB
DEPRECATED RDW RBC AUTO: 42.8 FL (ref 37–54)
ERYTHROCYTE [DISTWIDTH] IN BLOOD BY AUTOMATED COUNT: 13.2 % (ref 12.3–15.4)
FLUAV RNA RESP QL NAA+PROBE: NOT DETECTED
FLUBV RNA RESP QL NAA+PROBE: NOT DETECTED
HBA1C MFR BLD: 6 % (ref 4.8–5.6)
HCT VFR BLD AUTO: 46 % (ref 37.5–51)
HGB BLD-MCNC: 14.6 G/DL (ref 13–17.7)
MCH RBC QN AUTO: 28 PG (ref 26.6–33)
MCHC RBC AUTO-ENTMCNC: 31.7 G/DL (ref 31.5–35.7)
MCV RBC AUTO: 88.1 FL (ref 79–97)
PLATELET # BLD AUTO: 382 10*3/MM3 (ref 140–450)
PMV BLD AUTO: 10 FL (ref 6–12)
POTASSIUM SERPL-SCNC: 4.6 MMOL/L (ref 3.5–5.2)
RBC # BLD AUTO: 5.22 10*6/MM3 (ref 4.14–5.8)
SARS-COV-2 RNA RESP QL NAA+PROBE: NOT DETECTED
WBC # BLD AUTO: 12.79 10*3/MM3 (ref 3.4–10.8)

## 2020-12-14 PROCEDURE — 36415 COLL VENOUS BLD VENIPUNCTURE: CPT

## 2020-12-14 PROCEDURE — 85027 COMPLETE CBC AUTOMATED: CPT

## 2020-12-14 PROCEDURE — 83036 HEMOGLOBIN GLYCOSYLATED A1C: CPT

## 2020-12-14 PROCEDURE — 87636 SARSCOV2 & INF A&B AMP PRB: CPT

## 2020-12-14 PROCEDURE — 84132 ASSAY OF SERUM POTASSIUM: CPT

## 2020-12-14 PROCEDURE — C9803 HOPD COVID-19 SPEC COLLECT: HCPCS

## 2020-12-14 NOTE — PAT
Patient instructed to drink 20 ounces (or until full) of Gatorade and it needs to be completed 1 hour before given arrival time for procedure (NO RED Gatorade)    Patient verbalized understanding.    Patient to apply Chlorhexadine wipes  to surgical area (as instructed) the night before procedure and the AM of procedure. Wipes provided.    An arrival time for procedure was not given during PAT visit. If patient had any questions or concerns about their arrival time, they were instructed to contact their surgeon/physician.  Additionally, if the patient referred to an arrival time that was acquired from their my chart account, patient was encouraged to verify that time with their surgeon/physician.  NO arrival times given in Pre Admission Testing Department.    COVID in PAT    Please obtain informed consent signature in preop once the patient meets dr Thakkar.

## 2020-12-15 ENCOUNTER — HOSPITAL ENCOUNTER (INPATIENT)
Facility: HOSPITAL | Age: 30
LOS: 1 days | Discharge: HOME HEALTH | End: 2020-12-16
Attending: SURGERY | Admitting: SURGERY

## 2020-12-15 ENCOUNTER — ANESTHESIA (OUTPATIENT)
Dept: PERIOP | Facility: HOSPITAL | Age: 30
End: 2020-12-15

## 2020-12-15 ENCOUNTER — APPOINTMENT (OUTPATIENT)
Dept: GENERAL RADIOLOGY | Facility: HOSPITAL | Age: 30
End: 2020-12-15

## 2020-12-15 DIAGNOSIS — K95.09 COMPLICATION OF GASTRIC BANDING: ICD-10-CM

## 2020-12-15 DIAGNOSIS — K95.09 GASTRIC BAND EROSION: ICD-10-CM

## 2020-12-15 DIAGNOSIS — R10.13 EPIGASTRIC PAIN: ICD-10-CM

## 2020-12-15 PROCEDURE — 43239 EGD BIOPSY SINGLE/MULTIPLE: CPT | Performed by: SURGERY

## 2020-12-15 PROCEDURE — 25010000002 ENOXAPARIN PER 10 MG: Performed by: SURGERY

## 2020-12-15 PROCEDURE — 25010000002 ONDANSETRON PER 1 MG: Performed by: NURSE ANESTHETIST, CERTIFIED REGISTERED

## 2020-12-15 PROCEDURE — 25010000003 CEFAZOLIN IN DEXTROSE 2-4 GM/100ML-% SOLUTION: Performed by: PHYSICIAN ASSISTANT

## 2020-12-15 PROCEDURE — 25010000002 PROPOFOL 10 MG/ML EMULSION: Performed by: NURSE ANESTHETIST, CERTIFIED REGISTERED

## 2020-12-15 PROCEDURE — 25010000002 NEOSTIGMINE 10 MG/10ML SOLUTION: Performed by: NURSE ANESTHETIST, CERTIFIED REGISTERED

## 2020-12-15 PROCEDURE — 25010000002 FENTANYL CITRATE (PF) 100 MCG/2ML SOLUTION: Performed by: NURSE ANESTHETIST, CERTIFIED REGISTERED

## 2020-12-15 PROCEDURE — 25010000002 HYDROMORPHONE PER 4 MG: Performed by: NURSE ANESTHETIST, CERTIFIED REGISTERED

## 2020-12-15 PROCEDURE — 0DP64CZ REMOVAL OF EXTRALUMINAL DEVICE FROM STOMACH, PERCUTANEOUS ENDOSCOPIC APPROACH: ICD-10-PCS | Performed by: SURGERY

## 2020-12-15 PROCEDURE — 74018 RADEX ABDOMEN 1 VIEW: CPT

## 2020-12-15 PROCEDURE — 25010000002 DEXAMETHASONE PER 1 MG: Performed by: NURSE ANESTHETIST, CERTIFIED REGISTERED

## 2020-12-15 PROCEDURE — 88300 SURGICAL PATH GROSS: CPT | Performed by: SURGERY

## 2020-12-15 PROCEDURE — 25010000002 CEFAZOLIN PER 500 MG: Performed by: SURGERY

## 2020-12-15 DEVICE — FLOSEAL HEMOSTATIC MATRIX, 5ML
Type: IMPLANTABLE DEVICE | Status: FUNCTIONAL
Brand: FLOSEAL HEMOSTATIC MATRIX

## 2020-12-15 RX ORDER — SODIUM CHLORIDE 0.9 % (FLUSH) 0.9 %
10 SYRINGE (ML) INJECTION AS NEEDED
Status: DISCONTINUED | OUTPATIENT
Start: 2020-12-15 | End: 2020-12-15 | Stop reason: HOSPADM

## 2020-12-15 RX ORDER — PROMETHAZINE HYDROCHLORIDE 12.5 MG/1
12.5 TABLET ORAL EVERY 6 HOURS PRN
Status: DISCONTINUED | OUTPATIENT
Start: 2020-12-15 | End: 2020-12-16 | Stop reason: HOSPADM

## 2020-12-15 RX ORDER — TRAMADOL HYDROCHLORIDE 50 MG/1
50 TABLET ORAL EVERY 6 HOURS PRN
Status: DISCONTINUED | OUTPATIENT
Start: 2020-12-15 | End: 2020-12-16 | Stop reason: HOSPADM

## 2020-12-15 RX ORDER — PANTOPRAZOLE SODIUM 40 MG/10ML
40 INJECTION, POWDER, LYOPHILIZED, FOR SOLUTION INTRAVENOUS
Status: DISCONTINUED | OUTPATIENT
Start: 2020-12-16 | End: 2020-12-16 | Stop reason: HOSPADM

## 2020-12-15 RX ORDER — FENTANYL CITRATE 50 UG/ML
INJECTION, SOLUTION INTRAMUSCULAR; INTRAVENOUS AS NEEDED
Status: DISCONTINUED | OUTPATIENT
Start: 2020-12-15 | End: 2020-12-15 | Stop reason: SURG

## 2020-12-15 RX ORDER — SODIUM CHLORIDE, SODIUM LACTATE, POTASSIUM CHLORIDE, CALCIUM CHLORIDE 600; 310; 30; 20 MG/100ML; MG/100ML; MG/100ML; MG/100ML
150 INJECTION, SOLUTION INTRAVENOUS CONTINUOUS
Status: DISCONTINUED | OUTPATIENT
Start: 2020-12-15 | End: 2020-12-16 | Stop reason: HOSPADM

## 2020-12-15 RX ORDER — SODIUM CHLORIDE 9 MG/ML
150 INJECTION, SOLUTION INTRAVENOUS CONTINUOUS
Status: DISCONTINUED | OUTPATIENT
Start: 2020-12-15 | End: 2020-12-16 | Stop reason: HOSPADM

## 2020-12-15 RX ORDER — DEXAMETHASONE SODIUM PHOSPHATE 4 MG/ML
INJECTION, SOLUTION INTRA-ARTICULAR; INTRALESIONAL; INTRAMUSCULAR; INTRAVENOUS; SOFT TISSUE AS NEEDED
Status: DISCONTINUED | OUTPATIENT
Start: 2020-12-15 | End: 2020-12-15 | Stop reason: SURG

## 2020-12-15 RX ORDER — MAGNESIUM HYDROXIDE 1200 MG/15ML
LIQUID ORAL AS NEEDED
Status: DISCONTINUED | OUTPATIENT
Start: 2020-12-15 | End: 2020-12-15 | Stop reason: HOSPADM

## 2020-12-15 RX ORDER — ONDANSETRON 4 MG/1
4 TABLET, FILM COATED ORAL EVERY 6 HOURS PRN
Status: DISCONTINUED | OUTPATIENT
Start: 2020-12-19 | End: 2020-12-16 | Stop reason: HOSPADM

## 2020-12-15 RX ORDER — METOCLOPRAMIDE HYDROCHLORIDE 5 MG/ML
10 INJECTION INTRAMUSCULAR; INTRAVENOUS EVERY 6 HOURS PRN
Status: DISCONTINUED | OUTPATIENT
Start: 2020-12-15 | End: 2020-12-16 | Stop reason: HOSPADM

## 2020-12-15 RX ORDER — OXYCODONE HYDROCHLORIDE 5 MG/1
5 TABLET ORAL EVERY 6 HOURS PRN
Status: DISCONTINUED | OUTPATIENT
Start: 2020-12-15 | End: 2020-12-16 | Stop reason: HOSPADM

## 2020-12-15 RX ORDER — ROSUVASTATIN CALCIUM 20 MG/1
20 TABLET, COATED ORAL DAILY
Status: DISCONTINUED | OUTPATIENT
Start: 2020-12-16 | End: 2020-12-16 | Stop reason: HOSPADM

## 2020-12-15 RX ORDER — ONDANSETRON 2 MG/ML
4 INJECTION INTRAMUSCULAR; INTRAVENOUS ONCE AS NEEDED
Status: COMPLETED | OUTPATIENT
Start: 2020-12-15 | End: 2020-12-15

## 2020-12-15 RX ORDER — FAMOTIDINE 10 MG/ML
20 INJECTION, SOLUTION INTRAVENOUS ONCE
Status: DISCONTINUED | OUTPATIENT
Start: 2020-12-15 | End: 2020-12-15 | Stop reason: HOSPADM

## 2020-12-15 RX ORDER — SODIUM CHLORIDE 0.9 % (FLUSH) 0.9 %
3 SYRINGE (ML) INJECTION EVERY 12 HOURS SCHEDULED
Status: DISCONTINUED | OUTPATIENT
Start: 2020-12-15 | End: 2020-12-15 | Stop reason: HOSPADM

## 2020-12-15 RX ORDER — CEFAZOLIN SODIUM 2 G/100ML
2 INJECTION, SOLUTION INTRAVENOUS
Status: COMPLETED | OUTPATIENT
Start: 2020-12-15 | End: 2020-12-15

## 2020-12-15 RX ORDER — CLONIDINE HYDROCHLORIDE 0.1 MG/1
0.1 TABLET ORAL 3 TIMES DAILY
Status: DISCONTINUED | OUTPATIENT
Start: 2020-12-15 | End: 2020-12-16 | Stop reason: HOSPADM

## 2020-12-15 RX ORDER — ROCURONIUM BROMIDE 10 MG/ML
INJECTION, SOLUTION INTRAVENOUS AS NEEDED
Status: DISCONTINUED | OUTPATIENT
Start: 2020-12-15 | End: 2020-12-15 | Stop reason: SURG

## 2020-12-15 RX ORDER — ESCITALOPRAM OXALATE 20 MG/1
20 TABLET ORAL DAILY
Status: DISCONTINUED | OUTPATIENT
Start: 2020-12-16 | End: 2020-12-16 | Stop reason: HOSPADM

## 2020-12-15 RX ORDER — SODIUM CHLORIDE, SODIUM LACTATE, POTASSIUM CHLORIDE, CALCIUM CHLORIDE 600; 310; 30; 20 MG/100ML; MG/100ML; MG/100ML; MG/100ML
9 INJECTION, SOLUTION INTRAVENOUS CONTINUOUS
Status: DISCONTINUED | OUTPATIENT
Start: 2020-12-15 | End: 2020-12-16 | Stop reason: HOSPADM

## 2020-12-15 RX ORDER — LIDOCAINE HYDROCHLORIDE 10 MG/ML
0.5 INJECTION, SOLUTION EPIDURAL; INFILTRATION; INTRACAUDAL; PERINEURAL ONCE AS NEEDED
Status: COMPLETED | OUTPATIENT
Start: 2020-12-15 | End: 2020-12-15

## 2020-12-15 RX ORDER — SODIUM CHLORIDE 0.9 % (FLUSH) 0.9 %
3-10 SYRINGE (ML) INJECTION AS NEEDED
Status: DISCONTINUED | OUTPATIENT
Start: 2020-12-15 | End: 2020-12-15 | Stop reason: HOSPADM

## 2020-12-15 RX ORDER — CEFAZOLIN SODIUM IN 0.9 % NACL 3 G/100 ML
3 INTRAVENOUS SOLUTION, PIGGYBACK (ML) INTRAVENOUS EVERY 8 HOURS
Status: COMPLETED | OUTPATIENT
Start: 2020-12-15 | End: 2020-12-16

## 2020-12-15 RX ORDER — SCOLOPAMINE TRANSDERMAL SYSTEM 1 MG/1
1 PATCH, EXTENDED RELEASE TRANSDERMAL ONCE
Status: DISCONTINUED | OUTPATIENT
Start: 2020-12-15 | End: 2020-12-16

## 2020-12-15 RX ORDER — HYDROMORPHONE HYDROCHLORIDE 2 MG/1
2 TABLET ORAL EVERY 4 HOURS PRN
Status: DISCONTINUED | OUTPATIENT
Start: 2020-12-15 | End: 2020-12-16 | Stop reason: HOSPADM

## 2020-12-15 RX ORDER — ALPRAZOLAM 0.25 MG/1
0.25 TABLET ORAL ONCE AS NEEDED
Status: DISCONTINUED | OUTPATIENT
Start: 2020-12-15 | End: 2020-12-16 | Stop reason: HOSPADM

## 2020-12-15 RX ORDER — FAMOTIDINE 20 MG/1
20 TABLET, FILM COATED ORAL ONCE
Status: COMPLETED | OUTPATIENT
Start: 2020-12-15 | End: 2020-12-15

## 2020-12-15 RX ORDER — SODIUM CHLORIDE 0.9 % (FLUSH) 0.9 %
10 SYRINGE (ML) INJECTION EVERY 12 HOURS SCHEDULED
Status: DISCONTINUED | OUTPATIENT
Start: 2020-12-15 | End: 2020-12-15 | Stop reason: HOSPADM

## 2020-12-15 RX ORDER — GLYCOPYRROLATE 0.2 MG/ML
INJECTION INTRAMUSCULAR; INTRAVENOUS AS NEEDED
Status: DISCONTINUED | OUTPATIENT
Start: 2020-12-15 | End: 2020-12-15 | Stop reason: SURG

## 2020-12-15 RX ORDER — MORPHINE SULFATE 4 MG/ML
4 INJECTION, SOLUTION INTRAMUSCULAR; INTRAVENOUS
Status: DISCONTINUED | OUTPATIENT
Start: 2020-12-15 | End: 2020-12-16 | Stop reason: HOSPADM

## 2020-12-15 RX ORDER — SODIUM CHLORIDE AND POTASSIUM CHLORIDE 150; 450 MG/100ML; MG/100ML
125 INJECTION, SOLUTION INTRAVENOUS CONTINUOUS
Status: DISCONTINUED | OUTPATIENT
Start: 2020-12-16 | End: 2020-12-16 | Stop reason: HOSPADM

## 2020-12-15 RX ORDER — FENTANYL CITRATE 50 UG/ML
50 INJECTION, SOLUTION INTRAMUSCULAR; INTRAVENOUS
Status: DISCONTINUED | OUTPATIENT
Start: 2020-12-15 | End: 2020-12-15 | Stop reason: HOSPADM

## 2020-12-15 RX ORDER — ACETAMINOPHEN 500 MG
1000 TABLET ORAL EVERY 8 HOURS SCHEDULED
Status: DISCONTINUED | OUTPATIENT
Start: 2020-12-15 | End: 2020-12-16 | Stop reason: HOSPADM

## 2020-12-15 RX ORDER — HYDROMORPHONE HYDROCHLORIDE 1 MG/ML
0.5 INJECTION, SOLUTION INTRAMUSCULAR; INTRAVENOUS; SUBCUTANEOUS
Status: DISCONTINUED | OUTPATIENT
Start: 2020-12-15 | End: 2020-12-15 | Stop reason: HOSPADM

## 2020-12-15 RX ORDER — PROPOFOL 10 MG/ML
VIAL (ML) INTRAVENOUS CONTINUOUS PRN
Status: DISCONTINUED | OUTPATIENT
Start: 2020-12-15 | End: 2020-12-15 | Stop reason: SURG

## 2020-12-15 RX ORDER — NEOSTIGMINE METHYLSULFATE 1 MG/ML
INJECTION, SOLUTION INTRAVENOUS AS NEEDED
Status: DISCONTINUED | OUTPATIENT
Start: 2020-12-15 | End: 2020-12-15 | Stop reason: SURG

## 2020-12-15 RX ORDER — SIMETHICONE 80 MG
80 TABLET,CHEWABLE ORAL 4 TIMES DAILY PRN
Status: DISCONTINUED | OUTPATIENT
Start: 2020-12-15 | End: 2020-12-16 | Stop reason: HOSPADM

## 2020-12-15 RX ORDER — GABAPENTIN 300 MG/1
600 CAPSULE ORAL EVERY 4 HOURS PRN
Status: DISCONTINUED | OUTPATIENT
Start: 2020-12-15 | End: 2020-12-16 | Stop reason: HOSPADM

## 2020-12-15 RX ORDER — ONDANSETRON 2 MG/ML
INJECTION INTRAMUSCULAR; INTRAVENOUS AS NEEDED
Status: DISCONTINUED | OUTPATIENT
Start: 2020-12-15 | End: 2020-12-15 | Stop reason: SURG

## 2020-12-15 RX ORDER — LORAZEPAM 2 MG/ML
0.5 INJECTION INTRAMUSCULAR EVERY 12 HOURS PRN
Status: DISCONTINUED | OUTPATIENT
Start: 2020-12-15 | End: 2020-12-16 | Stop reason: HOSPADM

## 2020-12-15 RX ORDER — SODIUM CHLORIDE 9 MG/ML
INJECTION, SOLUTION INTRAVENOUS AS NEEDED
Status: DISCONTINUED | OUTPATIENT
Start: 2020-12-15 | End: 2020-12-15 | Stop reason: HOSPADM

## 2020-12-15 RX ORDER — LABETALOL HYDROCHLORIDE 5 MG/ML
5 INJECTION, SOLUTION INTRAVENOUS
Status: DISCONTINUED | OUTPATIENT
Start: 2020-12-15 | End: 2020-12-15 | Stop reason: HOSPADM

## 2020-12-15 RX ORDER — NALOXONE HCL 0.4 MG/ML
0.4 VIAL (ML) INJECTION
Status: DISCONTINUED | OUTPATIENT
Start: 2020-12-15 | End: 2020-12-16 | Stop reason: HOSPADM

## 2020-12-15 RX ORDER — NALOXONE HCL 0.4 MG/ML
0.1 VIAL (ML) INJECTION
Status: DISCONTINUED | OUTPATIENT
Start: 2020-12-15 | End: 2020-12-16 | Stop reason: HOSPADM

## 2020-12-15 RX ORDER — ACETAMINOPHEN 500 MG
1000 TABLET ORAL ONCE
Status: COMPLETED | OUTPATIENT
Start: 2020-12-15 | End: 2020-12-15

## 2020-12-15 RX ORDER — LIDOCAINE HYDROCHLORIDE 10 MG/ML
INJECTION, SOLUTION EPIDURAL; INFILTRATION; INTRACAUDAL; PERINEURAL AS NEEDED
Status: DISCONTINUED | OUTPATIENT
Start: 2020-12-15 | End: 2020-12-15 | Stop reason: SURG

## 2020-12-15 RX ORDER — LORAZEPAM 1 MG/1
1 TABLET ORAL EVERY 12 HOURS PRN
Status: DISCONTINUED | OUTPATIENT
Start: 2020-12-15 | End: 2020-12-16 | Stop reason: HOSPADM

## 2020-12-15 RX ORDER — ONDANSETRON 2 MG/ML
4 INJECTION INTRAMUSCULAR; INTRAVENOUS EVERY 4 HOURS PRN
Status: DISCONTINUED | OUTPATIENT
Start: 2020-12-15 | End: 2020-12-16 | Stop reason: HOSPADM

## 2020-12-15 RX ORDER — PROCHLORPERAZINE MALEATE 10 MG
10 TABLET ORAL EVERY 6 HOURS PRN
Status: DISCONTINUED | OUTPATIENT
Start: 2020-12-15 | End: 2020-12-16 | Stop reason: HOSPADM

## 2020-12-15 RX ORDER — PROPOFOL 10 MG/ML
VIAL (ML) INTRAVENOUS AS NEEDED
Status: DISCONTINUED | OUTPATIENT
Start: 2020-12-15 | End: 2020-12-15 | Stop reason: SURG

## 2020-12-15 RX ORDER — DIPHENHYDRAMINE HYDROCHLORIDE 50 MG/ML
25 INJECTION INTRAMUSCULAR; INTRAVENOUS EVERY 4 HOURS PRN
Status: DISCONTINUED | OUTPATIENT
Start: 2020-12-15 | End: 2020-12-16 | Stop reason: HOSPADM

## 2020-12-15 RX ORDER — HYDRALAZINE HYDROCHLORIDE 20 MG/ML
10 INJECTION INTRAMUSCULAR; INTRAVENOUS
Status: DISCONTINUED | OUTPATIENT
Start: 2020-12-15 | End: 2020-12-16 | Stop reason: HOSPADM

## 2020-12-15 RX ADMIN — FENTANYL CITRATE 100 MCG: 50 INJECTION, SOLUTION INTRAMUSCULAR; INTRAVENOUS at 14:07

## 2020-12-15 RX ADMIN — HYDROMORPHONE HYDROCHLORIDE 0.5 MG: 1 INJECTION, SOLUTION INTRAMUSCULAR; INTRAVENOUS; SUBCUTANEOUS at 16:39

## 2020-12-15 RX ADMIN — FAMOTIDINE 20 MG: 20 TABLET, FILM COATED ORAL at 11:26

## 2020-12-15 RX ADMIN — PROPOFOL 300 MG: 10 INJECTION, EMULSION INTRAVENOUS at 14:13

## 2020-12-15 RX ADMIN — ROCURONIUM BROMIDE 10 MG: 10 INJECTION INTRAVENOUS at 15:12

## 2020-12-15 RX ADMIN — OXYCODONE 5 MG: 5 TABLET ORAL at 19:32

## 2020-12-15 RX ADMIN — CEFAZOLIN SODIUM 2 G: 2 INJECTION, SOLUTION INTRAVENOUS at 14:17

## 2020-12-15 RX ADMIN — LIDOCAINE HYDROCHLORIDE 50 MG: 10 INJECTION, SOLUTION EPIDURAL; INFILTRATION; INTRACAUDAL; PERINEURAL at 14:13

## 2020-12-15 RX ADMIN — SODIUM CHLORIDE: 9 INJECTION, SOLUTION INTRAVENOUS at 14:07

## 2020-12-15 RX ADMIN — GLYCOPYRROLATE 0.4 MG: 0.2 INJECTION INTRAMUSCULAR; INTRAVENOUS at 15:51

## 2020-12-15 RX ADMIN — METOPROLOL TARTRATE 25 MG: 25 TABLET, FILM COATED ORAL at 21:13

## 2020-12-15 RX ADMIN — FENTANYL CITRATE 50 MCG: 50 INJECTION, SOLUTION INTRAMUSCULAR; INTRAVENOUS at 16:21

## 2020-12-15 RX ADMIN — ONDANSETRON 4 MG: 2 INJECTION INTRAMUSCULAR; INTRAVENOUS at 15:33

## 2020-12-15 RX ADMIN — ACETAMINOPHEN 1000 MG: 500 TABLET ORAL at 21:13

## 2020-12-15 RX ADMIN — HYDROMORPHONE HYDROCHLORIDE 0.5 MG: 1 INJECTION, SOLUTION INTRAMUSCULAR; INTRAVENOUS; SUBCUTANEOUS at 16:35

## 2020-12-15 RX ADMIN — CLONIDINE HYDROCHLORIDE 0.1 MG: 0.1 TABLET ORAL at 21:13

## 2020-12-15 RX ADMIN — SCOPALAMINE 1 PATCH: 1 PATCH, EXTENDED RELEASE TRANSDERMAL at 11:26

## 2020-12-15 RX ADMIN — FENTANYL CITRATE 50 MCG: 50 INJECTION, SOLUTION INTRAMUSCULAR; INTRAVENOUS at 16:29

## 2020-12-15 RX ADMIN — DEXAMETHASONE SODIUM PHOSPHATE 8 MG: 4 INJECTION, SOLUTION INTRAMUSCULAR; INTRAVENOUS at 14:21

## 2020-12-15 RX ADMIN — ACETAMINOPHEN 1000 MG: 500 TABLET ORAL at 11:26

## 2020-12-15 RX ADMIN — SODIUM CHLORIDE 150 ML/HR: 9 INJECTION, SOLUTION INTRAVENOUS at 11:34

## 2020-12-15 RX ADMIN — NEOSTIGMINE 3 MG: 1 INJECTION INTRAVENOUS at 15:51

## 2020-12-15 RX ADMIN — SODIUM CHLORIDE 500 ML: 9 INJECTION, SOLUTION INTRAVENOUS at 11:26

## 2020-12-15 RX ADMIN — SODIUM CHLORIDE 150 ML/HR: 9 INJECTION, SOLUTION INTRAVENOUS at 13:33

## 2020-12-15 RX ADMIN — AMITRIPTYLINE HYDROCHLORIDE 75 MG: 50 TABLET, FILM COATED ORAL at 21:13

## 2020-12-15 RX ADMIN — ONDANSETRON 4 MG: 2 INJECTION INTRAMUSCULAR; INTRAVENOUS at 16:50

## 2020-12-15 RX ADMIN — LIDOCAINE HYDROCHLORIDE 0.5 ML: 10 INJECTION, SOLUTION EPIDURAL; INFILTRATION; INTRACAUDAL; PERINEURAL at 11:26

## 2020-12-15 RX ADMIN — SODIUM CHLORIDE: 9 INJECTION, SOLUTION INTRAVENOUS at 15:12

## 2020-12-15 RX ADMIN — ROCURONIUM BROMIDE 60 MG: 10 INJECTION INTRAVENOUS at 14:13

## 2020-12-15 RX ADMIN — HYDROMORPHONE HYDROCHLORIDE 0.5 MG: 1 INJECTION, SOLUTION INTRAMUSCULAR; INTRAVENOUS; SUBCUTANEOUS at 16:45

## 2020-12-15 RX ADMIN — FENTANYL CITRATE 50 MCG: 50 INJECTION, SOLUTION INTRAMUSCULAR; INTRAVENOUS at 16:57

## 2020-12-15 RX ADMIN — CEFAZOLIN SODIUM 3 G: 10 INJECTION, POWDER, FOR SOLUTION INTRAVENOUS at 21:13

## 2020-12-15 RX ADMIN — PROPOFOL 25 MCG/KG/MIN: 10 INJECTION, EMULSION INTRAVENOUS at 14:13

## 2020-12-15 NOTE — ANESTHESIA PROCEDURE NOTES
Airway  Urgency: elective    Date/Time: 12/15/2020 2:16 PM  Airway not difficult    General Information and Staff    Patient location during procedure: OR  CRNA: Ravinder Paul CRNA    Indications and Patient Condition  Indications for airway management: airway protection    Preoxygenated: yes  MILS not maintained throughout  Mask difficulty assessment: 2 - vent by mask + OA or adjuvant +/- NMBA    Final Airway Details  Final airway type: endotracheal airway      Successful airway: ETT  Cuffed: yes   Successful intubation technique: direct laryngoscopy  Endotracheal tube insertion site: oral  Blade: [place]  Blade size: 2  ETT size (mm): 7.5  Cormack-Lehane Classification: grade I - full view of glottis  Placement verified by: chest auscultation and capnometry   Cuff volume (mL): 10  Measured from: lips  ETT/EBT  to lips (cm): 20  Number of attempts at approach: 1  Assessment: lips, teeth, and gum same as pre-op and atraumatic intubation    Additional Comments  Negative epigastric sounds, Breath sound equal bilaterally with symmetric chest rise and fall

## 2020-12-15 NOTE — INTERVAL H&P NOTE
H&P updated. The patient was examined and the following changes are noted:  aware will leave old port site open and pack with plans to heal by secondary intention, stay at least overnight

## 2020-12-15 NOTE — BRIEF OP NOTE
GASTRIC BANDING REMOVAL LAPAROSCOPIC  Progress Note    Andrew Narayan  12/15/2020    Pre-op Diagnosis:   Gastric band erosion [K95.09]  Epigastric pain [R10.13]  Complication of gastric banding [K95.09]       Post-Op Diagnosis Codes:     * Gastric band erosion [K95.09]     * Epigastric pain [R10.13]     * Complication of gastric banding [K95.09]    Procedure/CPT® Codes:  [place] LAP, REMOVE ADJUST JANELLE RESTRICT DEVICE/PORT [54486]  [place] SURGICAL OPENING OF STOMACH [68400]      Procedure(s):  LAPAROSCOPIC GASTRIC BANDING REMOVAL THRU GASTROTOMY w/ intraop xray    Surgeon(s):  Arnulfo Thakkar MD    Anesthesia: General with Block    Staff:   Circulator: Gala Nicholson RN  Scrub Person: Norma Blake  Nursing Assistant: Smita Wall PCT         Estimated Blood Loss: minimal    Urine Voided: * No values recorded between 12/15/2020 12:00 AM and 12/15/2020  2:06 PM *    Specimens:                A: LapBand and port          Drains: * No LDAs found *    Findings:     Complications: none          Arnulfo Thakkar MD     Date: 12/15/2020  Time: 14:06 EST

## 2020-12-15 NOTE — OP NOTE
Preoperative diagnosis:           Erosion of LAP-BAND placed 2015                                                                                    Postoperative diagnosis:  Same     Procedure:   Laparoscopic Lap-Band removal through a gastrotomy and port removal     Surgeon: Arnulfo Thakkar MD                                      Anesth:  GETA     EBL:  Min     Specimens:  Lap-Band in several pieces and port     Drains:  #10 KYLEIGH     Counts:  Correct     Complications:  None     Indications:  This is a  30-year-old super morbidly obese male status post laparoscopic LAP-BAND placement by Dr. Mcguire in [place] in 2015.  He presented with complaints of worsening substernal chest and epigastric pain.  EGD on 10/14/2020 by Dr. Echavarria revealed an eroded lap band and the patient was referred for band removal.     Operative Technique:   The patient was brought to the operating room and placed supine upon the operating room table, SCD hose were placed, he underwent uneventful general endotracheal anesthesia per the anesthesiology staff, he received IV Ancef and subcutaneous lovenox, and her abdomen was prepped and draped with ChloraPrep in a sterile fashion, an Ioban was used.      The peritoneal cavity was entered in the upper abdomen to left of midline using an 11 mm trocar utilizing an Optiview technique and the abdomen is insufflated to pressure of 15 mmHg with CO2 gas.  Exploratory laparoscopy revealed no evidence of injury from the entrance technique, LAP-BAND tubing free in the epigastrium no obvious incisional hernia at the port site in the upper abdomen to the right of midline but the tubing was going through the falciform ligament.  The old port site in the right upper quadrant was incised and through this incision under direct visualization an 11 mm trocar placed.  Under direct visualization additional 11 mm trocar was placed in the left midabdomen and a 5 mm trocar in the left subcostal position.  Band tubing  was cut proximally and distally including the connector and this piece was retrieved to be sent later with the entire specimen.  Through a stab incision in the epigastrium a Isra retractor was used to elevate the left lobe of the liver.  Incidentally the liver had a normal appearance.  As expected quite a bit of omental adhesions and scarring to the undersurface of the left lateral liver in the area of the banding.  Omental adhesions were taken down with the harmonic scalpel exposing the cardia and fundus.  I could palpate the band through the anterior fundus.  An oblique gastrotomy was made and the eroded lap band encountered.  It was cut and eviscerated from the stomach.  There was discoloration of about a third of the balloon from the erosion.  The buckle was unclasped and the band pieces removed through the port site incision.  The buckle itself broke up into several small pieces which were all retrieved and placed aside to be sent later with the entire specimen.  The gastrotomy was closed in 2 layers using a running absorbable 2-0 V-loc suture.  Some FloSeal was placed over the area, negligible oozing noted during the procedure.  Under direct visualization a 5 mm trocar was placed in the right upper quadrant.  A #10 Lopez-Benavides drain fully perforated flat was brought in the peritoneal cavity placed underneath the left lobe of the liver and up over the spleen and brought out through the right upper quadrant 5 mm trocar site incision and anchored to the skin with a 2-0 nylon suture.  Remaining trochars were removed under direct visualization no bleeding noted from the sites.  The port site incision was deepened with cautery down to the port.  It was a type I port, it was well incorporated, it was in the appropriate orientation, no signs of infection.  It was dissected free from its fascial attachments and removed with its attached tubing thereby removing the entire foreign body which was sent together as  specimen.  Ethibond sutures were removed.  An intraoperative x-ray was obtained showing no foreign body retention.  All trochars removed under direct visualization no bleeding noted from their sites.  Port site incision was packed with a saline moistened Kerlix followed by dry sterile dressing.  A dry sterile dressing was placed around the KYLEIGH site.  Skin in the remaining incisions was closed using 3-0 Monocryl plus in an interrupted subcuticular stitch followed by skin affix.  The patient tolerated the procedure well without complication and was taken the recovery room in stable condition.

## 2020-12-15 NOTE — ANESTHESIA PREPROCEDURE EVALUATION
Anesthesia Evaluation     Patient summary reviewed   no history of anesthetic complications:  NPO Solid Status: > 8 hours  NPO Liquid Status: > 2 hours           Airway   Mallampati: III  TM distance: >3 FB  Neck ROM: full  Possible difficult intubation  Dental - normal exam     Pulmonary     breath sounds clear to auscultation  (+) a smoker (now vapes without nicotine) Current,   (-) COPD, asthma, shortness of breath, recent URI, sleep apnea, no home oxygen  Cardiovascular   Exercise tolerance: good (4-7 METS)    ECG reviewed  Rhythm: regular  Rate: normal    (+) hypertension, hyperlipidemia,   (-) past MI, dysrhythmias, angina, CHF, cardiac stents, DVT    ROS comment: 10/22/20-nsr; 5/2018 tte- lv/rv/valves nl.    Neuro/Psych  (+) headaches, numbness, psychiatric history,     (-) seizures, TIA, CVA  GI/Hepatic/Renal/Endo    (+) obesity,  hiatal hernia,    (-) liver disease, no renal disease, diabetes, no thyroid disorder    Musculoskeletal     Abdominal    Substance History - negative use     OB/GYN          Other   arthritis,      ROS/Med Hx Other: AW hx: mac4 gr2a, mac 3 gr1 view;   hgb 14.6 k 4.6        Anesthesia Evaluation     Patient summary reviewed and Nursing notes reviewed   no history of anesthetic complications:  NPO Solid Status: > 8 hours  NPO Liquid Status: > 8 hours           Airway   Mallampati: III  TM distance: >3 FB  Neck ROM: full  No difficulty expected  Dental - normal exam     Pulmonary - normal exam   (+) a smoker Current Abstained day of surgery, sleep apnea on CPAP,   (-) asthma  Cardiovascular - normal exam  Exercise tolerance: good (4-7 METS)    NYHA Classification: II  Patient on routine beta blocker and Beta blocker given within 24 hours of surgery    (+) hypertension, dysrhythmias, hyperlipidemia,   (-) past MI, angina, CHF      Neuro/Psych  (+) headaches, numbness, psychiatric history Anxiety and Depression,     GI/Hepatic/Renal/Endo    (+) morbid obesity,  renal disease stones,    (-)  obesity, liver disease, diabetes    Musculoskeletal     (+) back pain, radiculopathy Left lower extremity  Abdominal  - normal exam    Bowel sounds: normal.   Substance History - negative use     OB/GYN negative ob/gyn ROS         Other   arthritis,              Anesthesia Evaluation     Patient summary reviewed and Nursing notes reviewed   NPO Solid Status: > 8 hours  NPO Liquid Status: > 8 hours           Airway   Mallampati: III  TM distance: >3 FB  Neck ROM: full  No difficulty expected  Dental - normal exam     Pulmonary - normal exam   (+) a smoker Former, sleep apnea on CPAP,   Cardiovascular - normal exam    (+) hypertension, hyperlipidemia,       Neuro/Psych  (+) headaches, numbness, psychiatric history,     GI/Hepatic/Renal/Endo    (+) morbid obesity (super),  renal disease stones,     Musculoskeletal     Abdominal  - normal exam    Bowel sounds: normal.   Substance History - negative use     OB/GYN negative ob/gyn ROS         Other   (+) arthritis                     Anesthesia Plan    ASA 4     general     intravenous induction   Anesthetic plan, all risks, benefits, and alternatives have been provided, discussed and informed consent has been obtained with: patient.    Plan discussed with CRNA.               Anesthesia Plan    ASA 3     general     intravenous induction     Anesthetic plan, all risks, benefits, and alternatives have been provided, discussed and informed consent has been obtained with: patient and spouse/significant other.  Use of blood products discussed with patient and spouse/significant other  Consented to blood products.            Anesthesia Plan    ASA 3     general   (Risks and benefits of general anesthesia discussed with patient (including MI, CVA, death, recall, aspiration), questions answered, agreeable to proceed.  Benefits and risks of possible TAP discussed with patient ((including failed block, damage to nerve/nearby structures, intravascular injection)) if surgeon  requests; questions answered, agreeable to proceed.  )  intravenous induction     Anesthetic plan, all risks, benefits, and alternatives have been provided, discussed and informed consent has been obtained with: patient.    Plan discussed with CRNA.

## 2020-12-15 NOTE — ANESTHESIA POSTPROCEDURE EVALUATION
Patient: Andrew Narayan    Procedure Summary     Date: 12/15/20 Room / Location:  [facility]    Anesthesia Start: 1407 Anesthesia Stop:     Procedure: LAPAROSCOPIC GASTRIC BANDING REMOVAL THRU GASTROTOMY w/ intraop xray (N/A Abdomen) Diagnosis:       Gastric band erosion      Epigastric pain      Complication of gastric banding      (Gastric band erosion [K95.09])      (Epigastric pain [R10.13])      (Complication of gastric banding [K95.09])    Surgeon: Arnulfo Thakkar MD Provider: Kendra Celaay DO    Anesthesia Type: general ASA Status: 3          Anesthesia Type: general    Vitals  Vitals Value Taken Time   BP     Temp     Pulse 69 12/15/20 1617   Resp     SpO2 100 % 12/15/20 1617   Vitals shown include unvalidated device data.        Post Anesthesia Care and Evaluation    Patient location during evaluation: PACU  Patient participation: complete - patient participated  Level of consciousness: awake  Pain score: 0  Pain management: adequate  Airway patency: patent  Anesthetic complications: No anesthetic complications  PONV Status: none  Cardiovascular status: acceptable and stable  Respiratory status: nasal cannula, unassisted, acceptable and spontaneous ventilation  Hydration status: acceptable

## 2020-12-15 NOTE — PLAN OF CARE
Goal Outcome Evaluation:  Plan of Care Reviewed With: patient  Progress: improving  Outcome Summary: patient received from PACU after lap band revision with KYLEIGH drain complaining of abdominal pain, incision sites clean and dry patient sleeps if not bothered  Problem: Adult Inpatient Plan of Care  Goal: Plan of Care Review  Outcome: Ongoing, Progressing  Flowsheets  Taken 12/15/2020 1853 by Annetta Marino RN  Progress: improving  Outcome Summary: patient received from PACU after lap band revision with KYLEIGH drain complaining of abdominal pain, incision sites clean and dry patient sleeps if not bothered  Taken 12/15/2020 1637 by Annabella King RN  Plan of Care Reviewed With: patient  Goal: Patient-Specific Goal (Individualized)  Outcome: Ongoing, Progressing  Goal: Absence of Hospital-Acquired Illness or Injury  Outcome: Ongoing, Progressing  Intervention: Identify and Manage Fall Risk  Recent Flowsheet Documentation  Taken 12/15/2020 1800 by Annetta Marino RN  Safety Promotion/Fall Prevention:   activity supervised   fall prevention program maintained   nonskid shoes/slippers when out of bed  Taken 12/15/2020 1752 by Annetta Marino RN  Safety Promotion/Fall Prevention:   activity supervised   fall prevention program maintained   muscle strengthening facilitated  Intervention: Prevent Skin Injury  Recent Flowsheet Documentation  Taken 12/15/2020 1800 by Annetta Marino RN  Body Position: supine, legs elevated  Taken 12/15/2020 1752 by Annetta Mairno RN  Body Position: supine, legs elevated  Intervention: Prevent and Manage VTE (venous thromboembolism) Risk  Recent Flowsheet Documentation  Taken 12/15/2020 1752 by Annetta Marino RN  VTE Prevention/Management:   bilateral   sequential compression devices on   bleeding risk factor(s) identified  Intervention: Prevent Infection  Recent Flowsheet Documentation  Taken 12/15/2020 1752 by Annetta Marino RN  Infection Prevention: rest/sleep  promoted  Goal: Optimal Comfort and Wellbeing  Outcome: Ongoing, Progressing  Intervention: Provide Person-Centered Care  Recent Flowsheet Documentation  Taken 12/15/2020 1752 by Annetta Marino RN  Trust Relationship/Rapport:   care explained   choices provided   emotional support provided   empathic listening provided   questions answered   questions encouraged  Goal: Readiness for Transition of Care  Outcome: Ongoing, Progressing  Intervention: Mutually Develop Transition Plan  Recent Flowsheet Documentation  Taken 12/15/2020 1851 by Annetta Marino RN  Transportation Anticipated: family or friend will provide  Patient/Family Anticipated Services at Transition: none  Patient/Family Anticipates Transition to: home with family  Taken 12/15/2020 1850 by Annetta Marino RN  Equipment Currently Used at Home: none     Problem: Bariatric Care Environmental Safety (Bariatric Surgery)  Goal: Safety Maintained with Care  Outcome: Ongoing, Progressing     Problem: Bleeding (Bariatric Surgery)  Goal: Absence of Bleeding  Outcome: Ongoing, Progressing     Problem: Bowel Motility Impaired (Bariatric Surgery)  Goal: Effective Bowel Motility and Elimination  Outcome: Ongoing, Progressing     Problem: Glycemic Control Impaired (Bariatric Surgery)  Goal: Blood Glucose Level Within Desired Range  Outcome: Ongoing, Progressing     Problem: Infection (Bariatric Surgery)  Goal: Absence of Infection Signs and Symptoms  Outcome: Ongoing, Progressing     Problem: Ongoing Anesthesia Effects (Bariatric Surgery)  Goal: Anesthesia/Sedation Recovery  Outcome: Ongoing, Progressing  Intervention: Optimize Anesthesia Recovery  Recent Flowsheet Documentation  Taken 12/15/2020 1800 by Annetta Marino RN  Safety Promotion/Fall Prevention:   activity supervised   fall prevention program maintained   nonskid shoes/slippers when out of bed  Taken 12/15/2020 1752 by Annetta Marino RN  Safety Promotion/Fall Prevention:   activity  supervised   fall prevention program maintained   muscle strengthening facilitated     Problem: Pain (Bariatric Surgery)  Goal: Acceptable Pain Control  Outcome: Ongoing, Progressing  Intervention: Prevent or Manage Pain  Recent Flowsheet Documentation  Taken 12/15/2020 1800 by Annetta Marino RN  Pain Management Interventions: quiet environment facilitated  Taken 12/15/2020 1752 by Annetta Marino RN  Pain Management Interventions:   quiet environment facilitated   position adjusted  Diversional Activities: smartphone     Problem: Postoperative Nausea and Vomiting (Bariatric Surgery)  Goal: Nausea and Vomiting Relief  Outcome: Ongoing, Progressing  Intervention: Prevent or Manage Postoperative Nausea and Vomiting  Recent Flowsheet Documentation  Taken 12/15/2020 1752 by Annetta Marino RN  Nausea/Vomiting Interventions: stimuli minimized     Problem: Postoperative Urinary Retention (Bariatric Surgery)  Goal: Effective Urinary Elimination  Outcome: Ongoing, Progressing

## 2020-12-16 ENCOUNTER — READMISSION MANAGEMENT (OUTPATIENT)
Dept: CALL CENTER | Facility: HOSPITAL | Age: 30
End: 2020-12-16

## 2020-12-16 VITALS
DIASTOLIC BLOOD PRESSURE: 71 MMHG | RESPIRATION RATE: 18 BRPM | TEMPERATURE: 97.7 F | SYSTOLIC BLOOD PRESSURE: 124 MMHG | BODY MASS INDEX: 46.65 KG/M2 | HEIGHT: 69 IN | WEIGHT: 315 LBS | HEART RATE: 89 BPM | OXYGEN SATURATION: 95 %

## 2020-12-16 PROBLEM — K95.09 GASTRIC BAND EROSION: Status: RESOLVED | Noted: 2020-12-15 | Resolved: 2020-12-16

## 2020-12-16 LAB
ALBUMIN SERPL-MCNC: 3.4 G/DL (ref 3.5–5.2)
ALBUMIN/GLOB SERPL: 1.1 G/DL
ALP SERPL-CCNC: 79 U/L (ref 39–117)
ALT SERPL W P-5'-P-CCNC: 25 U/L (ref 1–41)
ANION GAP SERPL CALCULATED.3IONS-SCNC: 9 MMOL/L (ref 5–15)
AST SERPL-CCNC: 20 U/L (ref 1–40)
BASOPHILS # BLD AUTO: 0.02 10*3/MM3 (ref 0–0.2)
BASOPHILS NFR BLD AUTO: 0.1 % (ref 0–1.5)
BILIRUB SERPL-MCNC: 0.7 MG/DL (ref 0–1.2)
BUN SERPL-MCNC: 12 MG/DL (ref 6–20)
BUN/CREAT SERPL: 13.3 (ref 7–25)
CALCIUM SPEC-SCNC: 9.5 MG/DL (ref 8.6–10.5)
CHLORIDE SERPL-SCNC: 101 MMOL/L (ref 98–107)
CO2 SERPL-SCNC: 25 MMOL/L (ref 22–29)
CREAT SERPL-MCNC: 0.9 MG/DL (ref 0.76–1.27)
CYTO UR: NORMAL
DEPRECATED RDW RBC AUTO: 41.9 FL (ref 37–54)
EOSINOPHIL # BLD AUTO: 0 10*3/MM3 (ref 0–0.4)
EOSINOPHIL NFR BLD AUTO: 0 % (ref 0.3–6.2)
ERYTHROCYTE [DISTWIDTH] IN BLOOD BY AUTOMATED COUNT: 13 % (ref 12.3–15.4)
GFR SERPL CREATININE-BSD FRML MDRD: 98 ML/MIN/1.73
GLOBULIN UR ELPH-MCNC: 3.2 GM/DL
GLUCOSE SERPL-MCNC: 115 MG/DL (ref 65–99)
HCT VFR BLD AUTO: 41.6 % (ref 37.5–51)
HGB BLD-MCNC: 13.1 G/DL (ref 13–17.7)
IMM GRANULOCYTES # BLD AUTO: 0.12 10*3/MM3 (ref 0–0.05)
IMM GRANULOCYTES NFR BLD AUTO: 0.6 % (ref 0–0.5)
IRON 24H UR-MRATE: 34 MCG/DL (ref 59–158)
LAB AP CASE REPORT: NORMAL
LAB AP CLINICAL INFORMATION: NORMAL
LYMPHOCYTES # BLD AUTO: 1.72 10*3/MM3 (ref 0.7–3.1)
LYMPHOCYTES NFR BLD AUTO: 8.7 % (ref 19.6–45.3)
MCH RBC QN AUTO: 27.6 PG (ref 26.6–33)
MCHC RBC AUTO-ENTMCNC: 31.5 G/DL (ref 31.5–35.7)
MCV RBC AUTO: 87.8 FL (ref 79–97)
MONOCYTES # BLD AUTO: 0.94 10*3/MM3 (ref 0.1–0.9)
MONOCYTES NFR BLD AUTO: 4.8 % (ref 5–12)
NEUTROPHILS NFR BLD AUTO: 16.93 10*3/MM3 (ref 1.7–7)
NEUTROPHILS NFR BLD AUTO: 85.8 % (ref 42.7–76)
NRBC BLD AUTO-RTO: 0 /100 WBC (ref 0–0.2)
PATH REPORT.FINAL DX SPEC: NORMAL
PATH REPORT.GROSS SPEC: NORMAL
PLATELET # BLD AUTO: 305 10*3/MM3 (ref 140–450)
PMV BLD AUTO: 9.8 FL (ref 6–12)
POTASSIUM SERPL-SCNC: 4.5 MMOL/L (ref 3.5–5.2)
POTASSIUM SERPL-SCNC: 4.7 MMOL/L (ref 3.5–5.2)
PROT SERPL-MCNC: 6.6 G/DL (ref 6–8.5)
RBC # BLD AUTO: 4.74 10*6/MM3 (ref 4.14–5.8)
SODIUM SERPL-SCNC: 135 MMOL/L (ref 136–145)
WBC # BLD AUTO: 19.73 10*3/MM3 (ref 3.4–10.8)

## 2020-12-16 PROCEDURE — 80053 COMPREHEN METABOLIC PANEL: CPT | Performed by: SURGERY

## 2020-12-16 PROCEDURE — 25010000002 ENOXAPARIN PER 10 MG: Performed by: SURGERY

## 2020-12-16 PROCEDURE — 25010000003 POTASSIUM CHLORIDE PER 2 MEQ: Performed by: SURGERY

## 2020-12-16 PROCEDURE — 85025 COMPLETE CBC W/AUTO DIFF WBC: CPT | Performed by: SURGERY

## 2020-12-16 PROCEDURE — 25010000002 CEFAZOLIN PER 500 MG: Performed by: SURGERY

## 2020-12-16 PROCEDURE — 97161 PT EVAL LOW COMPLEX 20 MIN: CPT

## 2020-12-16 PROCEDURE — 83540 ASSAY OF IRON: CPT | Performed by: SURGERY

## 2020-12-16 PROCEDURE — 82150 ASSAY OF AMYLASE: CPT | Performed by: SURGERY

## 2020-12-16 PROCEDURE — 97597 DBRDMT OPN WND 1ST 20 CM/<: CPT

## 2020-12-16 PROCEDURE — 99024 POSTOP FOLLOW-UP VISIT: CPT | Performed by: SURGERY

## 2020-12-16 PROCEDURE — 25010000002 HYDROMORPHONE 1 MG/ML SOLUTION: Performed by: SURGERY

## 2020-12-16 PROCEDURE — 84132 ASSAY OF SERUM POTASSIUM: CPT | Performed by: ANESTHESIOLOGY

## 2020-12-16 RX ORDER — OXYCODONE HYDROCHLORIDE 5 MG/1
5 TABLET ORAL EVERY 4 HOURS PRN
Qty: 12 TABLET | Refills: 0 | Status: SHIPPED | OUTPATIENT
Start: 2020-12-16 | End: 2021-06-08

## 2020-12-16 RX ADMIN — POTASSIUM CHLORIDE AND SODIUM CHLORIDE 125 ML/HR: 450; 150 INJECTION, SOLUTION INTRAVENOUS at 08:37

## 2020-12-16 RX ADMIN — CLONIDINE HYDROCHLORIDE 0.1 MG: 0.1 TABLET ORAL at 08:33

## 2020-12-16 RX ADMIN — HYDROMORPHONE HYDROCHLORIDE 2 MG: 2 TABLET ORAL at 04:21

## 2020-12-16 RX ADMIN — ACETAMINOPHEN 1000 MG: 500 TABLET ORAL at 14:52

## 2020-12-16 RX ADMIN — ACETAMINOPHEN 1000 MG: 500 TABLET ORAL at 05:47

## 2020-12-16 RX ADMIN — METOPROLOL TARTRATE 25 MG: 25 TABLET, FILM COATED ORAL at 08:34

## 2020-12-16 RX ADMIN — PANTOPRAZOLE SODIUM 40 MG: 40 INJECTION, POWDER, FOR SOLUTION INTRAVENOUS at 05:47

## 2020-12-16 RX ADMIN — HYDROMORPHONE HYDROCHLORIDE 1 MG: 1 INJECTION, SOLUTION INTRAMUSCULAR; INTRAVENOUS; SUBCUTANEOUS at 08:33

## 2020-12-16 RX ADMIN — ENOXAPARIN SODIUM 40 MG: 40 INJECTION SUBCUTANEOUS at 08:33

## 2020-12-16 RX ADMIN — OXYCODONE 5 MG: 5 TABLET ORAL at 14:52

## 2020-12-16 RX ADMIN — CEFAZOLIN SODIUM 3 G: 10 INJECTION, POWDER, FOR SOLUTION INTRAVENOUS at 05:47

## 2020-12-16 RX ADMIN — ESCITALOPRAM OXALATE 20 MG: 20 TABLET ORAL at 08:34

## 2020-12-16 RX ADMIN — ROSUVASTATIN CALCIUM 20 MG: 20 TABLET, COATED ORAL at 08:34

## 2020-12-16 RX ADMIN — SIMETHICONE 80 MG: 80 TABLET, CHEWABLE ORAL at 00:22

## 2020-12-16 RX ADMIN — HYDROMORPHONE HYDROCHLORIDE 1 MG: 1 INJECTION, SOLUTION INTRAMUSCULAR; INTRAVENOUS; SUBCUTANEOUS at 00:23
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
## 2020-12-16 NOTE — PLAN OF CARE
Goal Outcome Evaluation:  Plan of Care Reviewed With: patient  Progress: improving  Outcome Summary: PT wound care evaluation completed. Patient presents with Abd wound s/p Lap-Band removal that occured 12/15/2020. PT consulted for potential placement of wound vac. Due to wound bed appearing vascular in nature with healthy granulation tissue present and only a small amount of drainage from wound, anticipate patient will heal with use of advanced dressing management instead. Patient also states he may be d/c today; if wound vac placed, patient would have to wait until insurance approved home vac before he could d/c. PT lightly packed Opticell Ag into depth and secured with optifoam. PT provided education to patient on use of Opticell Ag and how to perform home dressing management every 2-3 days. PT also informed patient that he will need follow up with HH wound care to assure wound healing is progressing as expected. Patient given information on how to contact [facility] wound care if needed, but given social situation and distance from [facility] wound care, anticipate follow up with HH will better serve patient.

## 2020-12-16 NOTE — PROGRESS NOTES
"Cc: POD#1 Lap AGBR through a gastrotomy  \"sore\"    His wife is in the room. He is sore but he would like to go home. He is tolerating a liquid diet. Ambulating and voiding okay. No pulmonary complaints. No fever or tachycardia pulse 89 respirations 18 blood pressure 124/71 UO 1025, KYLEIGH 75 - 20 SS he is no apparent distress abdomen is soft and appropriate wounds look okay.  KYLEIGH amylase pending. CMP normal except glucose of 115 sodium 135 albumin 3.40 iron 34 white blood count 19.7 with 86 segs 9 lymphs 5 monocytes no bands H&H 13.1 and 41.6. Hemoglobin A1c 6.00    Impression: Doing okay postop day #1 laparoscopic adjustable gastric band removal through a gastrotomy. Iron deficiency without evidence of bleeding on Lovenox. Asymptomatic leukocytosis without evidence of infection.    Plan: Discharge home. Appreciate wound evaluation and their recommendations for dressing changes rather than wound VAC. Patient has the materials and has been educated. SKYLER ARIZMENDI drain. Continue home PPI for another month. Avoid aspirin and nonsteroidals for the next month. Importance of regular ambulation to minimize the risk of VTE discussed and they voiced understanding. Follow-up in approximately 1 week and call in the meantime with any problems questions or concerns.  "

## 2020-12-16 NOTE — PLAN OF CARE
Problem: Adult Inpatient Plan of Care  Goal: Plan of Care Review  Outcome: Ongoing, Progressing  Flowsheets (Taken 12/16/2020 0440)  Outcome Summary: VSS, 2LNC when sleeping. Pt complains of abdominal pain which is controlled by PRNs Pt has been ambulating the halls and tolerating well. No other complaints at this time. Will continue to monitor     Problem: Adult Inpatient Plan of Care  Goal: Patient-Specific Goal (Individualized)  Outcome: Ongoing, Progressing  Flowsheets (Taken 12/16/2020 0440)  Patient-Specific Goals (Include Timeframe): Monitor pain q2h     Problem: Adult Inpatient Plan of Care  Goal: Absence of Hospital-Acquired Illness or Injury  Outcome: Ongoing, Progressing     Problem: Adult Inpatient Plan of Care  Goal: Absence of Hospital-Acquired Illness or Injury  Intervention: Identify and Manage Fall Risk  Recent Flowsheet Documentation  Taken 12/16/2020 0400 by Mady Roth RN  Safety Promotion/Fall Prevention: activity supervised  Taken 12/16/2020 0200 by Mady Roth RN  Safety Promotion/Fall Prevention: activity supervised  Taken 12/16/2020 0000 by Mady Roth RN  Safety Promotion/Fall Prevention: activity supervised  Taken 12/15/2020 2200 by Mady Roth RN  Safety Promotion/Fall Prevention: activity supervised  Taken 12/15/2020 2000 by Mady Roth RN  Safety Promotion/Fall Prevention: activity supervised     Problem: Adult Inpatient Plan of Care  Goal: Absence of Hospital-Acquired Illness or Injury  Intervention: Prevent Skin Injury  Recent Flowsheet Documentation  Taken 12/16/2020 0400 by Mady Roth RN  Body Position: position changed independently  Taken 12/16/2020 0200 by Mady Roth RN  Body Position: position changed independently  Taken 12/16/2020 0000 by Mady Roth RN  Body Position: position changed independently  Taken 12/15/2020 2200 by Mady Roth RN  Body Position: position changed independently  Taken 12/15/2020 2000 by  Mady Roth, RN  Body Position: position changed independently     Problem: Adult Inpatient Plan of Care  Goal: Absence of Hospital-Acquired Illness or Injury  Intervention: Prevent and Manage VTE (venous thromboembolism) Risk  Recent Flowsheet Documentation  Taken 12/15/2020 2000 by Mady Roth RN  VTE Prevention/Management:   bilateral   sequential compression devices on     Problem: Adult Inpatient Plan of Care  Goal: Optimal Comfort and Wellbeing  Outcome: Ongoing, Progressing     Problem: Adult Inpatient Plan of Care  Goal: Optimal Comfort and Wellbeing  Intervention: Provide Person-Centered Care  Recent Flowsheet Documentation  Taken 12/15/2020 2000 by Mady Roth RN  Trust Relationship/Rapport:   choices provided   care explained     Problem: Adult Inpatient Plan of Care  Goal: Readiness for Transition of Care  Outcome: Ongoing, Progressing     Problem: Bariatric Care Environmental Safety (Bariatric Surgery)  Goal: Safety Maintained with Care  Outcome: Ongoing, Progressing     Problem: Bleeding (Bariatric Surgery)  Goal: Absence of Bleeding  Outcome: Ongoing, Progressing     Problem: Bowel Motility Impaired (Bariatric Surgery)  Goal: Effective Bowel Motility and Elimination  Outcome: Ongoing, Progressing     Problem: Glycemic Control Impaired (Bariatric Surgery)  Goal: Blood Glucose Level Within Desired Range  Outcome: Ongoing, Progressing     Problem: Infection (Bariatric Surgery)  Goal: Absence of Infection Signs and Symptoms  Outcome: Ongoing, Progressing     Problem: Ongoing Anesthesia Effects (Bariatric Surgery)  Goal: Anesthesia/Sedation Recovery  Outcome: Ongoing, Progressing     Problem: Ongoing Anesthesia Effects (Bariatric Surgery)  Goal: Anesthesia/Sedation Recovery  Intervention: Optimize Anesthesia Recovery  Recent Flowsheet Documentation  Taken 12/16/2020 0400 by Mady Roth RN  Safety Promotion/Fall Prevention: activity supervised  Taken 12/16/2020 0200 by Ira  ERIC Earl  Safety Promotion/Fall Prevention: activity supervised  Taken 12/16/2020 0000 by Mady Roth RN  Safety Promotion/Fall Prevention: activity supervised  Taken 12/15/2020 2200 by Mady Roth RN  Safety Promotion/Fall Prevention: activity supervised  Taken 12/15/2020 2000 by Mady Roth RN  Safety Promotion/Fall Prevention: activity supervised     Problem: Pain (Bariatric Surgery)  Goal: Acceptable Pain Control  Outcome: Ongoing, Progressing     Problem: Pain (Bariatric Surgery)  Goal: Acceptable Pain Control  Intervention: Prevent or Manage Pain  Recent Flowsheet Documentation  Taken 12/16/2020 0400 by Mady Roth RN  Pain Management Interventions: see MAR  Taken 12/15/2020 2000 by Mady Roth RN  Diversional Activities:   television   smartphone     Problem: Postoperative Nausea and Vomiting (Bariatric Surgery)  Goal: Nausea and Vomiting Relief  Outcome: Ongoing, Progressing     Problem: Postoperative Urinary Retention (Bariatric Surgery)  Goal: Effective Urinary Elimination  Outcome: Ongoing, Progressing   Goal Outcome Evaluation:  Plan of Care Reviewed With: patient  Progress: improving  Outcome Summary: VSS, 2LNC when sleeping. Pt complains of abdominal pain which is controlled by PRNs Pt has been ambulating the halls and tolerating well. No other complaints at this time. Will continue to monitor

## 2020-12-16 NOTE — PLAN OF CARE
"Goal Outcome Evaluation:  Plan of Care Reviewed With: patient  Progress: improving  Outcome Summary: patient tolerating food, ambulating with out distress, pain is controlled by PO pain medication and denies nausea.  \"readdy to go\" KYLEIGH out wound care to evaluate for wound vac and not needed at this point.  Problem: Adult Inpatient Plan of Care  Goal: Plan of Care Review  Outcome: Ongoing, Progressing  Flowsheets  Taken 12/16/2020 1551 by Annetta Marino RN  Plan of Care Reviewed With: patient  Outcome Summary: patient tolerating food, ambulating with out distress, pain is controlled by PO pain medication and denies nausea.  \"readdy to go\" KYLEIGH out wound care to evaluate for wound vac and not needed at this point.  Taken 12/16/2020 0823 by Sulaiman Price PT  Progress: improving  Goal: Patient-Specific Goal (Individualized)  Outcome: Ongoing, Progressing  Goal: Absence of Hospital-Acquired Illness or Injury  Outcome: Ongoing, Progressing  Intervention: Identify and Manage Fall Risk  Recent Flowsheet Documentation  Taken 12/16/2020 1400 by Annetta Marino RN  Safety Promotion/Fall Prevention:   activity supervised   fall prevention program maintained   safety round/check completed   nonskid shoes/slippers when out of bed  Taken 12/16/2020 1200 by Annetta Marino RN  Safety Promotion/Fall Prevention:   activity supervised   fall prevention program maintained   safety round/check completed  Taken 12/16/2020 1000 by Annetta Marino RN  Safety Promotion/Fall Prevention: fall prevention program maintained  Taken 12/16/2020 0800 by Annetta Marino RN  Safety Promotion/Fall Prevention:   activity supervised   fall prevention program maintained   room organization consistent   safety round/check completed  Intervention: Prevent Skin Injury  Recent Flowsheet Documentation  Taken 12/16/2020 1400 by Annetta Marino RN  Body Position: supine, legs elevated  Taken 12/16/2020 1200 by Annetta Marino RN  Body " Position: supine, legs elevated  Taken 12/16/2020 1000 by Annetta Marino RN  Body Position: supine, legs elevated  Taken 12/16/2020 0800 by Annetta Marino RN  Body Position: supine, legs elevated  Intervention: Prevent and Manage VTE (venous thromboembolism) Risk  Recent Flowsheet Documentation  Taken 12/16/2020 0800 by Annetta Marino RN  VTE Prevention/Management: bleeding risk factor(s) identified  Intervention: Prevent Infection  Recent Flowsheet Documentation  Taken 12/16/2020 1400 by Annetta Marino RN  Infection Prevention: rest/sleep promoted  Taken 12/16/2020 1200 by Annetta Marino RN  Infection Prevention: rest/sleep promoted  Taken 12/16/2020 1000 by Annetta Marino RN  Infection Prevention: rest/sleep promoted  Taken 12/16/2020 0800 by Annetta Marino RN  Infection Prevention: rest/sleep promoted  Goal: Optimal Comfort and Wellbeing  Outcome: Ongoing, Progressing  Intervention: Provide Person-Centered Care  Recent Flowsheet Documentation  Taken 12/16/2020 0800 by Annetta Marino RN  Trust Relationship/Rapport:   care explained   choices provided   emotional support provided   empathic listening provided   questions answered   questions encouraged   reassurance provided   thoughts/feelings acknowledged  Goal: Readiness for Transition of Care  Outcome: Ongoing, Progressing     Problem: Bariatric Care Environmental Safety (Bariatric Surgery)  Goal: Safety Maintained with Care  Outcome: Ongoing, Progressing     Problem: Bleeding (Bariatric Surgery)  Goal: Absence of Bleeding  Outcome: Ongoing, Progressing     Problem: Bowel Motility Impaired (Bariatric Surgery)  Goal: Effective Bowel Motility and Elimination  Outcome: Ongoing, Progressing     Problem: Glycemic Control Impaired (Bariatric Surgery)  Goal: Blood Glucose Level Within Desired Range  Outcome: Ongoing, Progressing     Problem: Infection (Bariatric Surgery)  Goal: Absence of Infection Signs and Symptoms  Outcome: Ongoing,  Progressing     Problem: Ongoing Anesthesia Effects (Bariatric Surgery)  Goal: Anesthesia/Sedation Recovery  Outcome: Ongoing, Progressing  Intervention: Optimize Anesthesia Recovery  Recent Flowsheet Documentation  Taken 12/16/2020 1400 by Annetta Marino RN  Safety Promotion/Fall Prevention:   activity supervised   fall prevention program maintained   safety round/check completed   nonskid shoes/slippers when out of bed  Taken 12/16/2020 1200 by Annetta Marino RN  Safety Promotion/Fall Prevention:   activity supervised   fall prevention program maintained   safety round/check completed  Taken 12/16/2020 1000 by Annetta Marino RN  Safety Promotion/Fall Prevention: fall prevention program maintained  Taken 12/16/2020 0800 by Annetta Marino RN  Patient Tolerance (IS): good  Safety Promotion/Fall Prevention:   activity supervised   fall prevention program maintained   room organization consistent   safety round/check completed  Administration (IS): self-administered  Level Incentive Spirometer (mL): 2000  Incentive Spirometer Predicted Level (mL): 2000  Number of Repetitions (IS): 10     Problem: Pain (Bariatric Surgery)  Goal: Acceptable Pain Control  Outcome: Ongoing, Progressing  Intervention: Prevent or Manage Pain  Recent Flowsheet Documentation  Taken 12/16/2020 1400 by Annetta Marino RN  Pain Management Interventions: quiet environment facilitated  Taken 12/16/2020 0800 by Annetta Marino RN  Pain Management Interventions:   quiet environment facilitated   position adjusted  Diversional Activities: television     Problem: Postoperative Nausea and Vomiting (Bariatric Surgery)  Goal: Nausea and Vomiting Relief  Outcome: Ongoing, Progressing  Intervention: Prevent or Manage Postoperative Nausea and Vomiting  Recent Flowsheet Documentation  Taken 12/16/2020 0800 by Annetta Marino RN  Nausea/Vomiting Interventions: stimuli minimized     Problem: Postoperative Urinary Retention (Bariatric  Surgery)  Goal: Effective Urinary Elimination  Outcome: Ongoing, Progressing

## 2020-12-16 NOTE — PROGRESS NOTES
Discharge Planning Assessment  [facility]     Patient Name: Andrew Narayan  MRN: [id number]  Today's Date: 12/16/2020    Admit Date: 12/15/2020    Discharge Needs Assessment     Row Name 12/16/20 1221       Living Environment    Lives With  child(jaimie), dependent;spouse Pt resides in [facility]    Name(s) of Who Lives With Patient  wife- Lyly Narayan [phone]    Current Living Arrangements  home/apartment/condo    Primary Care Provided by  self    Provides Primary Care For  child(jaimie)    Family Caregiver if Needed  spouse    Family Caregiver Names  Lyly    Quality of Family Relationships  helpful;involved;supportive    Able to Return to Prior Arrangements  yes       Resource/Environmental Concerns    Resource/Environmental Concerns  none       Transition Planning    Patient/Family Anticipates Transition to  home with family;home with help/services    Patient/Family Anticipated Services at Transition  home health care    Transportation Anticipated  family or friend will provide       Discharge Needs Assessment    Readmission Within the Last 30 Days  no previous admission in last 30 days    Current Outpatient/Agency/Support Group  homecare agency    Equipment Currently Used at Home  cpap    Concerns to be Addressed  discharge planning    Anticipated Changes Related to Illness  none    Equipment Needed After Discharge  none    Outpatient/Agency/Support Group Needs  homecare agency    Discharge Facility/Level of Care Needs  home with home health    Provided Post Acute Provider List?  Yes    Post Acute Provider List  Home Health    Patient's Choice of Community Agency(s)  no preference        Discharge Plan     Row Name 12/16/20 1222       Plan    Plan  home with home health services    Patient/Family in Agreement with Plan  yes    Plan Comments  CM spoke with pt and wife Lyly at bedside. Pt resides in [facility] and is independent of adls. Pt reports he has a CPAP and BP cuff. Pt denies current  home health or outpatient services. Wound Care has seen pt and pt would benefit from home health services. CM discussed options with pt and he reports home health services would be better at this time verse going to outpatient wound care. CM reviewed options of home health agencies with pt and he had no preference to agancy. CM spoke with Abril at [facility] and they can accept pt's insurance and referral was made. CM faxed clinicals and orders to [phone] per request. CM will notify HH agency of pt's discharge. Pt reports he and his wife will be able to change dressing when home health is unavailable. Pt denies additional needs at this time and CM will continue to follow.    Final Discharge Disposition Code  06 - home with home health care        Continued Care and Services - Admitted Since 12/15/2020     Home Medical Care Coordination complete    Service Provider Request Status Selected Services Address Phone Fax Patient Preferred    [facility] [address] [facility] [id number] [phone] --                Demographic Summary     Row Name 12/16/20 1219       General Information    Referral Source  admission list    Reason for Consult  discharge planning    Preferred Language  English     Used During This Interaction  no    General Information Comments  PCP- Sherley Link       Contact Information    Permission Granted to Share Info With      Contact Information Comments  [phone]        Functional Status     Row Name 12/16/20 1220       Functional Status    Usual Activity Tolerance  good    Current Activity Tolerance  moderate       Functional Status, IADL    Medications  independent    Meal Preparation  independent    Housekeeping  independent    Laundry  independent    Shopping  independent       Mental Status Summary    Recent Changes in Mental Status/Cognitive Functioning  no changes        Employment/    Employment/ Comments  pt confirms he has [employer] Medicaid, denies concerns or disruption in coverage. Pt has prescription drug coverage and denies issues obtaining or affording current medications.        Psychosocial    No documentation.       Abuse/Neglect    No documentation.       Legal    No documentation.       Substance Abuse    No documentation.       Patient Forms    No documentation.           Sadaf Renee RN

## 2020-12-17 ENCOUNTER — TRANSITIONAL CARE MANAGEMENT TELEPHONE ENCOUNTER (OUTPATIENT)
Dept: CALL CENTER | Facility: HOSPITAL | Age: 30
End: 2020-12-17

## 2020-12-17 LAB — AMYLASE FLD-CCNC: 26 U/L

## 2020-12-17 NOTE — OUTREACH NOTE
Call Center TCM Note      Responses   [facility] patient discharged from?  [place]   Does the patient have one of the following disease processes/diagnoses(primary or secondary)?  General Surgery   TCM attempt successful?  No   Unsuccessful attempts  Attempt 2          Nitza Jarrett LPN    12/17/2020, 16:51 EST

## 2020-12-17 NOTE — OUTREACH NOTE
Prep Survey      Responses   [facility] facility patient discharged from?  [place]   Is LACE score < 7 ?  Yes   Eligibility  [facility]   Date of Admission  12/15/20   Date of Discharge  12/16/20   Discharge Disposition  Home or Self Care   Discharge diagnosis  Lap gastric banding removal   Does the patient have one of the following disease processes/diagnoses(primary or secondary)?  General Surgery   Does the patient have Home health ordered?  Yes   What is the Home health agency?   [facility]   Is there a DME ordered?  No   Prep survey completed?  Yes          Andree Kendrick RN

## 2020-12-17 NOTE — OUTREACH NOTE
Call Center TCM Note      Responses   [facility] patient discharged from?  [place]   Does the patient have one of the following disease processes/diagnoses(primary or secondary)?  General Surgery   TCM attempt successful?  No   Unsuccessful attempts  Attempt 1          Kathleen Fine MA    12/17/2020, 16:48 EST

## 2020-12-17 NOTE — PROGRESS NOTES
Case Management Discharge Note      Final Note: Called d/c of 12/16 and efaxe d/c summary to Gaby at [facility] in [place]. They will follow up with pt to arrange services.  1149: Efaxed d/c note from 12/16 due to no summary available. AB  Provided Post Acute Provider List?: Yes  Post Acute Provider List: Home Health    Selected Continued Care - Discharged on 12/16/2020 Admission date: 12/15/2020 - Discharge disposition: Home or Self Care    Destination    No services have been selected for the patient.              Durable Medical Equipment    No services have been selected for the patient.              Dialysis/Infusion    No services have been selected for the patient.              Home Medical Care Coordination complete    Service Provider Selected Services Address Phone Fax Patient Preferred    [facility] [place]  [facility] [facility] [address] [phone] --          Therapy    No services have been selected for the patient.              Community Resources    No services have been selected for the patient.                       Final Discharge Disposition Code: 06 - home with home health care

## 2020-12-18 ENCOUNTER — TRANSITIONAL CARE MANAGEMENT TELEPHONE ENCOUNTER (OUTPATIENT)
Dept: CALL CENTER | Facility: HOSPITAL | Age: 30
End: 2020-12-18

## 2020-12-18 ENCOUNTER — TELEPHONE (OUTPATIENT)
Dept: PSYCHIATRY | Facility: CLINIC | Age: 30
End: 2020-12-18

## 2020-12-18 NOTE — PAYOR COMM NOTE
"Ref # [id number]  Kendra Regalado RN, BSN  Phone # [phone]  Fax # [phone]  12/15/20 1405  Inpatient Admission Once    Completed   Level of Care: Med/Surg    Diagnosis: Gastric band erosion [680088]    Certification: I Certify That Inpatient Hospital Services Are Medically Necessary For Greater Than 2 Midnights        12/15/20 1412       Robyn Narayan (30 y.o. Male)     Date of Birth Social Security Number Address Home Phone MRN    1990  [address] [id number] [phone] [id number]    Yarsani Marital Status          None        Admission Date Admission Type Admitting Provider Attending Provider Department, Room/Bed    12/15/20 Elective Arnulfo Thakkar MD  [address], [address]/1    Discharge Date Discharge Disposition Discharge Destination        12/16/2020 Home or Self Care              Attending Provider: (none)   Allergies: Lisinopril, Contrast Dye    Isolation: None   Infection: None   Code Status: Prior    Ht: 175 cm (68.9\")   Wt: 155 kg (341 lb 11.4 oz)    Admission Cmt: None   Principal Problem: None                Active Insurance as of 12/15/2020     Primary Coverage     Payor Plan Insurance Group Employer/Plan Group    [employer] [place] [id number]     Payor Plan Address Payor Plan Phone Number Payor Plan Fax Number Effective Dates     [address] [address] [phone]  4/25/2016 - None Entered    [facility] [id number]       Subscriber Name Subscriber Birth Date Member ID       ROBYN NARAYAN 1990 [id number]                  Arnulfo Thakkar MD   Physician   Surgery   Op Note   Signed   Date of Service:  12/15/20 1428   Creation Time:  12/15/20 1804         Procedure: LAPAROSCOPIC GASTRIC BANDING REMOVAL THRU GASTROTOMY Case Time: 12/15/20 1428 Surgeon: Arnulfo Thakkar MD            Signed             Preoperative diagnosis:           Erosion of LAP-BAND placed 2015                                                     "                                Postoperative diagnosis:  Same     Procedure:   Laparoscopic Lap-Band removal through a gastrotomy and port removal     Surgeon: Arnulfo Thakkar MD                                      Anesth:  GETA     EBL:  Min     Specimens:  Lap-Band in several pieces and port     Drains:  #10 KYLEIGH     Counts:  Correct     Complications:  None     Indications:  This is a  30-year-old super morbidly obese male status post laparoscopic LAP-BAND placement by Dr. Mcguire in [place] in 2015.  He presented with complaints of worsening substernal chest and epigastric pain.  EGD on 10/14/2020 by Dr. Echavarria revealed an eroded lap band and the patient was referred for band removal.     Operative Technique:   The patient was brought to the operating room and placed supine upon the operating room table, SCD hose were placed, he underwent uneventful general endotracheal anesthesia per the anesthesiology staff, he received IV Ancef and subcutaneous lovenox, and her abdomen was prepped and draped with ChloraPrep in a sterile fashion, an Ioban was used.      The peritoneal cavity was entered in the upper abdomen to left of midline using an 11 mm trocar utilizing an Optiview technique and the abdomen is insufflated to pressure of 15 mmHg with CO2 gas.  Exploratory laparoscopy revealed no evidence of injury from the entrance technique, LAP-BAND tubing free in the epigastrium no obvious incisional hernia at the port site in the upper abdomen to the right of midline but the tubing was going through the falciform ligament.  The old port site in the right upper quadrant was incised and through this incision under direct visualization an 11 mm trocar placed.  Under direct visualization additional 11 mm trocar was placed in the left midabdomen and a 5 mm trocar in the left subcostal position.  Band tubing was cut proximally and distally including the connector and this piece was retrieved to be sent later with the entire  specimen.  Through a stab incision in the epigastrium a Isra retractor was used to elevate the left lobe of the liver.  Incidentally the liver had a normal appearance.  As expected quite a bit of omental adhesions and scarring to the undersurface of the left lateral liver in the area of the banding.  Omental adhesions were taken down with the harmonic scalpel exposing the cardia and fundus.  I could palpate the band through the anterior fundus.  An oblique gastrotomy was made and the eroded lap band encountered.  It was cut and eviscerated from the stomach.  There was discoloration of about a third of the balloon from the erosion.  The buckle was unclasped and the band pieces removed through the port site incision.  The buckle itself broke up into several small pieces which were all retrieved and placed aside to be sent later with the entire specimen.  The gastrotomy was closed in 2 layers using a running absorbable 2-0 V-loc suture.  Some FloSeal was placed over the area, negligible oozing noted during the procedure.  Under direct visualization a 5 mm trocar was placed in the right upper quadrant.  A #10 Lopez-Benavdies drain fully perforated flat was brought in the peritoneal cavity placed underneath the left lobe of the liver and up over the spleen and brought out through the right upper quadrant 5 mm trocar site incision and anchored to the skin with a 2-0 nylon suture.  Remaining trochars were removed under direct visualization no bleeding noted from the sites.  The port site incision was deepened with cautery down to the port.  It was a type I port, it was well incorporated, it was in the appropriate orientation, no signs of infection.  It was dissected free from its fascial attachments and removed with its attached tubing thereby removing the entire foreign body which was sent together as specimen.  Ethibond sutures were removed.  An intraoperative x-ray was obtained showing no foreign body retention.  All  trochars removed under direct visualization no bleeding noted from their sites.  Port site incision was packed with a saline moistened Kerlix followed by dry sterile dressing.  A dry sterile dressing was placed around the KYLEIGH site.  Skin in the remaining incisions was closed using 3-0 Monocryl plus in an interrupted subcuticular stitch followed by skin affix.  The patient tolerated the procedure well without complication and was taken the recovery room in stable condition.                       Annetta Marino RN   Registered Nurse   Nurse Navigator   Plan of Care   Signed   Date of Service:  12/15/20 1857   Creation Time:  12/15/20 1857            Signed             Goal Outcome Evaluation:  Plan of Care Reviewed With: patient  Progress: improving  Outcome Summary: patient received from PACU after lap band revision with KYLEIGH drain complaining of abdominal pain, incision sites clean and dry patient sleeps if not bothered  Problem: Adult Inpatient Plan of Care  Goal: Plan of Care Review  Outcome: Ongoing, Progressing  Flowsheets  Taken 12/15/2020 1853 by Annetta Marino RN  Progress: improving  Outcome Summary: patient received from PACU after lap band revision with KYLEIGH drain complaining of abdominal pain, incision sites clean and dry patient sleeps if not bothered  Taken 12/15/2020 1637 by Annabella King RN  Plan of Care Reviewed With: patient  Goal: Patient-Specific Goal (Individualized)  Outcome: Ongoing, Progressing  Goal: Absence of Hospital-Acquired Illness or Injury  Outcome: Ongoing, Progressing  Intervention: Identify and Manage Fall Risk  Recent Flowsheet Documentation  Taken 12/15/2020 1800 by Annetta Marino RN  Safety Promotion/Fall Prevention:  • activity supervised  • fall prevention program maintained  • nonskid shoes/slippers when out of bed  Taken 12/15/2020 1752 by Annetta Marino RN  Safety Promotion/Fall Prevention:  • activity supervised  • fall prevention program maintained  • muscle  strengthening facilitated  Intervention: Prevent Skin Injury  Recent Flowsheet Documentation  Taken 12/15/2020 1800 by Annetta Marino RN  Body Position: supine, legs elevated  Taken 12/15/2020 1752 by Annetta Marino RN  Body Position: supine, legs elevated  Intervention: Prevent and Manage VTE (venous thromboembolism) Risk  Recent Flowsheet Documentation  Taken 12/15/2020 1752 by Annetta Marino RN  VTE Prevention/Management:  • bilateral  • sequential compression devices on  • bleeding risk factor(s) identified  Intervention: Prevent Infection  Recent Flowsheet Documentation  Taken 12/15/2020 1752 by Annetta Marino RN  Infection Prevention: rest/sleep promoted  Goal: Optimal Comfort and Wellbeing  Outcome: Ongoing, Progressing  Intervention: Provide Person-Centered Care  Recent Flowsheet Documentation  Taken 12/15/2020 1752 by Annetta Marino RN  Trust Relationship/Rapport:  • care explained  • choices provided  • emotional support provided  • empathic listening provided  • questions answered  • questions encouraged  Goal: Readiness for Transition of Care  Outcome: Ongoing, Progressing  Intervention: Mutually Develop Transition Plan  Recent Flowsheet Documentation  Taken 12/15/2020 1851 by Annetta Marino RN  Transportation Anticipated: family or friend will provide  Patient/Family Anticipated Services at Transition: none  Patient/Family Anticipates Transition to: home with family  Taken 12/15/2020 1850 by Annetta Marino RN  Equipment Currently Used at Home: none     Problem: Bariatric Care Environmental Safety (Bariatric Surgery)  Goal: Safety Maintained with Care  Outcome: Ongoing, Progressing     Problem: Bleeding (Bariatric Surgery)  Goal: Absence of Bleeding  Outcome: Ongoing, Progressing     Problem: Bowel Motility Impaired (Bariatric Surgery)  Goal: Effective Bowel Motility and Elimination  Outcome: Ongoing, Progressing     Problem: Glycemic Control Impaired (Bariatric  Surgery)  Goal: Blood Glucose Level Within Desired Range  Outcome: Ongoing, Progressing     Problem: Infection (Bariatric Surgery)  Goal: Absence of Infection Signs and Symptoms  Outcome: Ongoing, Progressing     Problem: Ongoing Anesthesia Effects (Bariatric Surgery)  Goal: Anesthesia/Sedation Recovery  Outcome: Ongoing, Progressing  Intervention: Optimize Anesthesia Recovery  Recent Flowsheet Documentation  Taken 12/15/2020 1800 by Annetta Marino RN  Safety Promotion/Fall Prevention:  • activity supervised  • fall prevention program maintained  • nonskid shoes/slippers when out of bed  Taken 12/15/2020 1752 by Annetta Marino RN  Safety Promotion/Fall Prevention:  • activity supervised  • fall prevention program maintained  • muscle strengthening facilitated     Problem: Pain (Bariatric Surgery)  Goal: Acceptable Pain Control  Outcome: Ongoing, Progressing  Intervention: Prevent or Manage Pain  Recent Flowsheet Documentation  Taken 12/15/2020 1800 by Annetta Marino RN  Pain Management Interventions: quiet environment facilitated  Taken 12/15/2020 1752 by Annetta Marino RN  Pain Management Interventions:  • quiet environment facilitated  • position adjusted  Diversional Activities: smartphone     Problem: Postoperative Nausea and Vomiting (Bariatric Surgery)  Goal: Nausea and Vomiting Relief  Outcome: Ongoing, Progressing  Intervention: Prevent or Manage Postoperative Nausea and Vomiting  Recent Flowsheet Documentation  Taken 12/15/2020 1752 by Annetta Marino RN  Nausea/Vomiting Interventions: stimuli minimized     Problem: Postoperative Urinary Retention (Bariatric Surgery)  Goal: Effective Urinary Elimination  Outcome: Ongoing, Progressing                 Sadaf Renee RN      Case Management   Progress Notes   Signed   Date of Service:  12/16/20 1226   Creation Time:  12/16/20 1226            Signed             Discharge Planning Assessment   [place]     Patient Name:  Andrew Narayan                   MRN: [id number]  Today's Date: 12/16/2020                   Admit Date: 12/15/2020          Discharge Needs Assessment      Row Name 12/16/20 1221           Living Environment     Lives With  child(jaimie), dependent;spouse Pt resides in [facility]     Name(s) of Who Lives With Patient  wife- Lyly Narayan [phone]     Current Living Arrangements  home/apartment/condo     Primary Care Provided by  self     Provides Primary Care For  child(jaimie)     Family Caregiver if Needed  spouse     Family Caregiver Names  Lyly     Quality of Family Relationships  helpful;involved;supportive     Able to Return to Prior Arrangements  yes           Resource/Environmental Concerns     Resource/Environmental Concerns  none           Transition Planning     Patient/Family Anticipates Transition to  home with family;home with help/services     Patient/Family Anticipated Services at Transition  home health care     Transportation Anticipated  family or friend will provide           Discharge Needs Assessment     Readmission Within the Last 30 Days  no previous admission in last 30 days     Current Outpatient/Agency/Support Group  homecare agency     Equipment Currently Used at Home  cpap     Concerns to be Addressed  discharge planning     Anticipated Changes Related to Illness  none     Equipment Needed After Discharge  none     Outpatient/Agency/Support Group Needs  homecare agency     Discharge Facility/Level of Care Needs  home with home health     Provided Post Acute Provider List?  Yes     Post Acute Provider List  Home Health     Patient's Choice of Community Agency(s)  no preference               Discharge Plan      Row Name 12/16/20 1222           Plan     Plan  home with home health services     Patient/Family in Agreement with Plan  yes     Plan Comments  CM spoke with pt and wife Lyly at bedside. Pt resides in [facility] and is independent of adls. Pt reports he has a CPAP  and BP cuff. Pt denies current home health or outpatient services. Wound Care has seen pt and pt would benefit from home health services. CM discussed options with pt and he reports home health services would be better at this time verse going to outpatient wound care. CM reviewed options of home health agencies with pt and he had no preference to agancy. CM spoke with Abril at [facility] and they can accept pt's insurance and referral was made. CM faxed clinicals and orders to [phone] per request. CM will notify  agency of pt's discharge. Pt reports he and his wife will be able to change dressing when home health is unavailable. Pt denies additional needs at this time and CM will continue to follow.     Final Discharge Disposition Code  06 - home with home health care              Continued Care and Services - Admitted Since 12/15/2020                 Home Medical Care Coordination complete                Service Provider Request Status Selected Services Address Phone Fax Patient Preferred      [facility] [address] [facility] [address] [phone] --                          Demographic Summary      Row Name 12/16/20 1219           General Information     Referral Source  admission list     Reason for Consult  discharge planning     Preferred Language  English      Used During This Interaction  no     General Information Comments  PCP- Sherley Link           Contact Information     Permission Granted to Share Info With       Contact Information Comments  [phone]               Functional Status      Row Name 12/16/20 1220           Functional Status     Usual Activity Tolerance  good     Current Activity Tolerance  moderate           Functional Status, IADL     Medications  independent     Meal Preparation  independent     Housekeeping  independent     Laundry  independent     Shopping   "independent           Mental Status Summary     Recent Changes in Mental Status/Cognitive Functioning  no changes           Employment/     Employment/ Comments  pt confirms he has [employer], denies concerns or disruption in coverage. Pt has prescription drug coverage and denies issues obtaining or affording current medications.          Psychosocial    No documentation.         Abuse/Neglect    No documentation.         Legal    No documentation.         Substance Abuse    No documentation.         Patient Forms    No documentation.               ERIC Finch, Arnulfo Perez MD   Physician   Surgery   Progress Notes   Addendum   Date of Service:  12/16/20 1528   Creation Time:  12/16/20 1528                 Cc: POD#1 Lap AGBR through a gastrotomy  \"sore\"     His wife is in the room. He is sore but he would like to go home. He is tolerating a liquid diet. Ambulating and voiding okay. No pulmonary complaints. No fever or tachycardia pulse 89 respirations 18 blood pressure 124/71 UO 1025, KYLEIGH 75 - 20 SS he is no apparent distress abdomen is soft and appropriate wounds look okay.  KYLEIGH amylase pending. CMP normal except glucose of 115 sodium 135 albumin 3.40 iron 34 white blood count 19.7 with 86 segs 9 lymphs 5 monocytes no bands H&H 13.1 and 41.6. Hemoglobin A1c 6.00     Impression: Doing okay postop day #1 laparoscopic adjustable gastric band removal through a gastrotomy. Iron deficiency without evidence of bleeding on Lovenox. Asymptomatic leukocytosis without evidence of infection.     Plan: Discharge home. Appreciate wound evaluation and their recommendations for dressing changes rather than wound VAC. Patient has the materials and has been educated. SKYLER KYLEIGH drain. Continue home PPI for another month. Avoid aspirin and nonsteroidals for the next month. Importance of regular ambulation to minimize the risk of VTE discussed and they voiced " understanding. Follow-up in approximately 1 week and call in the meantime with any problems questions or concerns.      Revision History                          Routing History                Dorothea Randolph, RN      Case Management   Progress Notes   Addendum   Date of Service:  12/16/20 1642   Creation Time:  12/17/20 1146                 Case Management Discharge Note        Final Note: Called d/c of 12/16 and efaxe d/c summary to Gaby at [facility] in [place]. They will follow up with pt to arrange services.  1149: Efaxed d/c note from 12/16 due to no summary available. AB  Provided Post Acute Provider List?: Yes  Post Acute Provider List: Home Health         Selected Continued Care - Discharged on 12/16/2020 Admission date: 12/15/2020 - Discharge disposition: Home or Self Care     Destination    No services have been selected for the patient.                 Durable Medical Equipment    No services have been selected for the patient.                 Dialysis/Infusion    No services have been selected for the patient.                           Home Medical Care Coordination complete     Service Provider Selected Services Address Phone Fax Patient Preferred      [facility] [address] [facility] [address] [phone] --             Therapy    No services have been selected for the patient.                 Community Resources    No services have been selected for the patient.                         Final Discharge Disposition Code: 06 - home with home health care

## 2020-12-18 NOTE — TELEPHONE ENCOUNTER
Patients wife called requesting a refill on his elavil , patient had surgery recently and wife states nurse forgot to give patient his bottle of elavil back at discharge .

## 2020-12-18 NOTE — OUTREACH NOTE
Call Center TCM Note      Responses   [facility] patient discharged from?  [facility]   Does the patient have one of the following disease processes/diagnoses(primary or secondary)?  General Surgery   TCM attempt successful?  No   Unsuccessful attempts  Attempt 3          Marilu Canales RN    12/18/2020, 13:04 EST

## 2020-12-21 ENCOUNTER — TELEPHONE (OUTPATIENT)
Dept: FAMILY MEDICINE CLINIC | Facility: CLINIC | Age: 30
End: 2020-12-21

## 2020-12-21 DIAGNOSIS — G47.00 INSOMNIA, UNSPECIFIED TYPE: ICD-10-CM

## 2020-12-21 RX ORDER — AMITRIPTYLINE HYDROCHLORIDE 75 MG/1
75 TABLET, FILM COATED ORAL
Qty: 30 TABLET | Refills: 1 | Status: SHIPPED | OUTPATIENT
Start: 2020-12-21 | End: 2021-02-22 | Stop reason: SDUPTHER

## 2020-12-21 NOTE — TELEPHONE ENCOUNTER
Patient's wife, Lyly, wants to see if he can get some liquid Zofran.  He has surgery last week and is very nauseated.  His pharmacy is [facility].  She can be reached at [phone]

## 2020-12-22 RX ORDER — ONDANSETRON HYDROCHLORIDE 4 MG/5ML
4 SOLUTION ORAL ONCE
Qty: 150 ML | Refills: 0 | Status: SHIPPED | OUTPATIENT
Start: 2020-12-22 | End: 2020-12-22

## 2021-01-13 ENCOUNTER — OFFICE VISIT (OUTPATIENT)
Dept: PSYCHIATRY | Facility: CLINIC | Age: 31
End: 2021-01-13

## 2021-01-13 VITALS
HEART RATE: 85 BPM | HEIGHT: 69 IN | DIASTOLIC BLOOD PRESSURE: 82 MMHG | SYSTOLIC BLOOD PRESSURE: 132 MMHG | BODY MASS INDEX: 46.65 KG/M2 | TEMPERATURE: 97.3 F | WEIGHT: 315 LBS

## 2021-01-13 DIAGNOSIS — F90.2 ATTENTION DEFICIT HYPERACTIVITY DISORDER, COMBINED TYPE: ICD-10-CM

## 2021-01-13 DIAGNOSIS — F41.1 GENERALIZED ANXIETY DISORDER: ICD-10-CM

## 2021-01-13 DIAGNOSIS — F43.10 POST TRAUMATIC STRESS DISORDER (PTSD): ICD-10-CM

## 2021-01-13 DIAGNOSIS — G47.00 INSOMNIA, UNSPECIFIED TYPE: ICD-10-CM

## 2021-01-13 DIAGNOSIS — F42.2 MIXED OBSESSIONAL THOUGHTS AND ACTS: Primary | ICD-10-CM

## 2021-01-13 PROCEDURE — 99214 OFFICE O/P EST MOD 30 MIN: CPT | Performed by: NURSE PRACTITIONER

## 2021-01-13 RX ORDER — DULOXETIN HYDROCHLORIDE 30 MG/1
30 CAPSULE, DELAYED RELEASE ORAL DAILY
Qty: 30 CAPSULE | Refills: 2 | Status: SHIPPED | OUTPATIENT
Start: 2021-01-13 | End: 2021-02-22 | Stop reason: SDUPTHER

## 2021-01-13 RX ORDER — DEXTROAMPHETAMINE SACCHARATE, AMPHETAMINE ASPARTATE, DEXTROAMPHETAMINE SULFATE AND AMPHETAMINE SULFATE 2.5; 2.5; 2.5; 2.5 MG/1; MG/1; MG/1; MG/1
10 TABLET ORAL 2 TIMES DAILY
Qty: 60 TABLET | Refills: 0 | Status: SHIPPED | OUTPATIENT
Start: 2021-01-13 | End: 2021-02-10 | Stop reason: SDUPTHER

## 2021-01-13 RX ORDER — CLONIDINE HYDROCHLORIDE 0.1 MG/1
0.1 TABLET ORAL 3 TIMES DAILY
Qty: 90 TABLET | Refills: 1 | Status: SHIPPED | OUTPATIENT
Start: 2021-01-13 | End: 2021-02-22 | Stop reason: SDUPTHER

## 2021-01-13 NOTE — PROGRESS NOTES
Subjective   Andrew Narayan is a 30 y.o. male is here today for medication management follow-up.Presents by himself    Chief Complaint:  Recheck on anxiety and hallucinations and ADHD    History of Present Illness: Pt presents for follow up at the [facility].  He has had recent stressor.  He has had his gastric ring removed.  It was eroding through his stomach.  This has been stressful.  He has a wound that he is now having to pack from the surgery.  Continues to have anxiety and stress over this.  He has quit the Lexapro several weeks ago.  Says it was not helping his depression or anxiety.  Continues to have ongoing worrying and catastrophic thinking.  Says that he has catastrophic thoughts all the time.  When he takes his daughter and wife to a store he cannot go in the store due to the anxiety and entering a public place however he has to sit outside where he can watch them go in and out of the store as he is worried that something will happen.  Mind continues to race.  States that he try to come down on the clonidine to twice daily dosing however he could not tolerate it.  That is the only thing that helps slow his mind down he says.  He did take stimulants in the past and they did seem to help.  He just never restarted them after he with that.  If not coming back to the office for over 1-1/2 years.  He denies suicidal thoughts, homicidal thoughts, or any AV hallucinations.  States that his depression is more situational it is related to the ongoing anxiety.Body mass index is 50.39 kg/m². no appetite changes.  No anger outbursts.                       The following portions of the patient's history were reviewed and updated as appropriate: allergies, current medications, past family history, past medical history, past social history, past surgical history and problem list.    Review of Systems   Constitutional: Negative for activity change, appetite change and fatigue.   HENT:  "Negative.    Eyes: Negative for visual disturbance.   Respiratory: Negative.    Cardiovascular: Negative.    Gastrointestinal: Negative for nausea.   Endocrine: Negative.    Genitourinary: Negative.    Musculoskeletal: Positive for arthralgias.   Skin: Negative.    Allergic/Immunologic: Negative.    Neurological: Negative for dizziness, seizures and headaches.   Hematological: Negative.    Psychiatric/Behavioral: Positive for decreased concentration. Negative for agitation, behavioral problems, confusion, dysphoric mood, hallucinations, self-injury, sleep disturbance and suicidal ideas. The patient is nervous/anxious. The patient is not hyperactive.        Objective   Physical Exam   Constitutional: He is oriented to person, place, and time. He appears well-developed.   Neurological: He is alert and oriented to person, place, and time.   Psychiatric: His speech is normal and behavior is normal. Thought content normal. His mood appears not anxious. He expresses impulsivity.   Engaging in conversation   Vitals reviewed.    Blood pressure 132/82, pulse 85, temperature 97.3 °F (36.3 °C), height 175 cm (68.9\"), weight (!) 154 kg (340 lb 3.2 oz).    Medication List:   Current Outpatient Medications   Medication Sig Dispense Refill   • amitriptyline (ELAVIL) 75 MG tablet Take 1 tablet by mouth every night at bedtime. 30 tablet 1   • cloNIDine (CATAPRES) 0.1 MG tablet Take 1 tablet by mouth 3 (Three) Times a Day. 90 tablet 1   • gabapentin (NEURONTIN) 600 MG tablet Take 600 mg by mouth 6 (Six) Times a Day.     • metoprolol tartrate (LOPRESSOR) 50 MG tablet Take 0.5 tablets by mouth 2 (Two) Times a Day. 60 tablet 5   • oxyCODONE (Roxicodone) 5 MG immediate release tablet Take 1 tablet by mouth Every 4 (Four) Hours As Needed for Moderate Pain . 12 tablet 0   • pantoprazole (Protonix) 40 MG EC tablet Take 1 tablet by mouth Daily. (Patient taking differently: Take 40 mg by mouth As Needed.) 30 tablet 2   • polyethylene glycol " (MIRALAX) 17 g packet Take 17 g by mouth Daily As Needed (constipation). 100 each 2   • rosuvastatin (CRESTOR) 20 MG tablet Take 1 tablet by mouth Daily. 30 tablet 11   • amphetamine-dextroamphetamine (Adderall) 10 MG tablet Take 1 tablet by mouth 2 (Two) Times a Day. 60 tablet 0   • DULoxetine (Cymbalta) 30 MG capsule Take 1 capsule by mouth Daily. 30 capsule 2     No current facility-administered medications for this visit.        Mental Status Exam:   Hygiene:   good  Cooperation:  Cooperative  Eye Contact:  Good  Psychomotor Behavior:  Restless  Affect:  Full range  Hopelessness: Denies  Speech:  Normal  Thought Process:  Goal directed  Thought Content:  Normal  Suicidal:  None  Homicidal:  None  Hallucinations:  Auditory  Delusion:  None  Memory:  Intact  Orientation:  Person, Place, Time and Situation  Reliability:  fair  Insight:  Fair  Judgement:  Fair  Impulse Control:  Poor  Physical/Medical Issues:  Yes obesity, HTN, pain    Assessment/Plan   Problems Addressed this Visit        Other    Insomnia      Other Visit Diagnoses     Mixed obsessional thoughts and acts    -  Primary    Relevant Medications    DULoxetine (Cymbalta) 30 MG capsule    Post traumatic stress disorder (PTSD)        Relevant Medications    DULoxetine (Cymbalta) 30 MG capsule    amphetamine-dextroamphetamine (Adderall) 10 MG tablet    Generalized anxiety disorder        Relevant Medications    DULoxetine (Cymbalta) 30 MG capsule    amphetamine-dextroamphetamine (Adderall) 10 MG tablet    Attention deficit hyperactivity disorder, combined type        Relevant Medications    DULoxetine (Cymbalta) 30 MG capsule    amphetamine-dextroamphetamine (Adderall) 10 MG tablet    cloNIDine (CATAPRES) 0.1 MG tablet      Diagnoses       Codes Comments    Mixed obsessional thoughts and acts    -  Primary ICD-10-CM: F42.2  ICD-9-CM: 300.3     Post traumatic stress disorder (PTSD)     ICD-10-CM: F43.10  ICD-9-CM: 309.81     Generalized anxiety disorder      ICD-10-CM: F41.1  ICD-9-CM: 300.02     Attention deficit hyperactivity disorder, combined type     ICD-10-CM: F90.2  ICD-9-CM: 314.01     Insomnia, unspecified type     ICD-10-CM: G47.00  ICD-9-CM: 780.52       Steffen shows neurontin and occasional hydrocodone.   Functionality: pt having moderate impairment in important areas of daily functioning.  Prognosis: Guarded dependent on medication/follow up and treatment plan compliance.  He is to continue the clonidine at 3 times daily dosing.  He has follow-up with cardiology on the 20th of this month.  He is to continue the Elavil at bedtime dosing.  I am starting him on low-dose Cymbalta.  I will keep him at 30 mg and see how he tolerates this.  Risks, benefits, side effects of the medication were discussed with patient.  He is aware the onset will not be at least 4 weeks.  Also restarting him on a stimulant as this did seem to help him in the past.  I am starting him on Adderall.  He is aware of the risks, benefits side effects of this medication.  Narcotic agreement was reviewed and signed.As part of the patient's treatment plan they are being prescribed a controlled substance with potential for abuse.  The patient has been made aware of the appropriate use of such medications. It has been made clear these medications are for use by the patient only, without concomitant use of alcohol or other substances unless prescribed/advised by medical provider. Patient has completed prescribing agreement detailing term of continued prescribing of controlled substances including monitoring STEFFEN reports, urine drug screens, and pill counts.  The patient is aware STEFFEN reports are reviewed on a regular basis and scanned into the chart. He is to start therapy back soon and has appointment scheduled.       He is in agreement with treatment plan and recommendations.  Continuing efforts to promote the therapeutic alliance, address the patient's issues, and strengthen self  awareness, insights, and coping skills.   Patient is aware to call 911 or go to the nearest ER should begin having SI/HI. Pt will notify me should he have any negative side effects or any worsening depression.  RTC 5 weeks.  Sooner if needed.

## 2021-01-14 ENCOUNTER — APPOINTMENT (OUTPATIENT)
Dept: CT IMAGING | Facility: HOSPITAL | Age: 31
End: 2021-01-14

## 2021-01-14 ENCOUNTER — HOSPITAL ENCOUNTER (EMERGENCY)
Facility: HOSPITAL | Age: 31
Discharge: HOME | End: 2021-01-14
Attending: EMERGENCY MEDICINE | Admitting: EMERGENCY MEDICINE

## 2021-01-14 ENCOUNTER — APPOINTMENT (OUTPATIENT)
Dept: ULTRASOUND IMAGING | Facility: HOSPITAL | Age: 31
End: 2021-01-14

## 2021-01-14 ENCOUNTER — LAB (OUTPATIENT)
Dept: FAMILY MEDICINE CLINIC | Facility: CLINIC | Age: 31
End: 2021-01-14

## 2021-01-14 VITALS
TEMPERATURE: 99 F | HEIGHT: 69 IN | HEART RATE: 77 BPM | OXYGEN SATURATION: 97 % | RESPIRATION RATE: 18 BRPM | BODY MASS INDEX: 46.65 KG/M2 | WEIGHT: 315 LBS | DIASTOLIC BLOOD PRESSURE: 83 MMHG | SYSTOLIC BLOOD PRESSURE: 141 MMHG

## 2021-01-14 DIAGNOSIS — E78.5 HYPERLIPIDEMIA LDL GOAL <100: ICD-10-CM

## 2021-01-14 DIAGNOSIS — R10.9 ABDOMINAL PAIN, UNSPECIFIED ABDOMINAL LOCATION: Primary | ICD-10-CM

## 2021-01-14 DIAGNOSIS — R11.0 NAUSEA: ICD-10-CM

## 2021-01-14 LAB
ALBUMIN SERPL-MCNC: 4.54 G/DL (ref 3.5–5.2)
ALBUMIN/GLOB SERPL: 1.4 G/DL
ALP SERPL-CCNC: 91 U/L (ref 39–117)
ALT SERPL W P-5'-P-CCNC: 17 U/L (ref 1–41)
ANION GAP SERPL CALCULATED.3IONS-SCNC: 10 MMOL/L (ref 5–15)
AST SERPL-CCNC: 17 U/L (ref 1–40)
BASOPHILS # BLD AUTO: 0.03 10*3/MM3 (ref 0–0.2)
BASOPHILS NFR BLD AUTO: 0.3 % (ref 0–1.5)
BILIRUB SERPL-MCNC: 0.6 MG/DL (ref 0–1.2)
BILIRUB UR QL STRIP: NEGATIVE
BUN SERPL-MCNC: 11 MG/DL (ref 6–20)
BUN/CREAT SERPL: 9.2 (ref 7–25)
CALCIUM SPEC-SCNC: 10 MG/DL (ref 8.6–10.5)
CHLORIDE SERPL-SCNC: 101 MMOL/L (ref 98–107)
CLARITY UR: CLEAR
CO2 SERPL-SCNC: 29 MMOL/L (ref 22–29)
COLOR UR: YELLOW
CREAT SERPL-MCNC: 1.2 MG/DL (ref 0.76–1.27)
DEPRECATED RDW RBC AUTO: 44.8 FL (ref 37–54)
EOSINOPHIL # BLD AUTO: 0.15 10*3/MM3 (ref 0–0.4)
EOSINOPHIL NFR BLD AUTO: 1.3 % (ref 0.3–6.2)
ERYTHROCYTE [DISTWIDTH] IN BLOOD BY AUTOMATED COUNT: 13.9 % (ref 12.3–15.4)
GFR SERPL CREATININE-BSD FRML MDRD: 70 ML/MIN/1.73
GLOBULIN UR ELPH-MCNC: 3.2 GM/DL
GLUCOSE SERPL-MCNC: 97 MG/DL (ref 65–99)
GLUCOSE UR STRIP-MCNC: NEGATIVE MG/DL
HCT VFR BLD AUTO: 44.6 % (ref 37.5–51)
HGB BLD-MCNC: 14.2 G/DL (ref 13–17.7)
HGB UR QL STRIP.AUTO: NEGATIVE
HOLD SPECIMEN: NORMAL
HOLD SPECIMEN: NORMAL
IMM GRANULOCYTES # BLD AUTO: 0.04 10*3/MM3 (ref 0–0.05)
IMM GRANULOCYTES NFR BLD AUTO: 0.3 % (ref 0–0.5)
KETONES UR QL STRIP: NEGATIVE
LEUKOCYTE ESTERASE UR QL STRIP.AUTO: NEGATIVE
LIPASE SERPL-CCNC: 17 U/L (ref 13–60)
LYMPHOCYTES # BLD AUTO: 3.37 10*3/MM3 (ref 0.7–3.1)
LYMPHOCYTES NFR BLD AUTO: 28.6 % (ref 19.6–45.3)
MCH RBC QN AUTO: 28.2 PG (ref 26.6–33)
MCHC RBC AUTO-ENTMCNC: 31.8 G/DL (ref 31.5–35.7)
MCV RBC AUTO: 88.5 FL (ref 79–97)
MONOCYTES # BLD AUTO: 0.57 10*3/MM3 (ref 0.1–0.9)
MONOCYTES NFR BLD AUTO: 4.8 % (ref 5–12)
NEUTROPHILS NFR BLD AUTO: 64.7 % (ref 42.7–76)
NEUTROPHILS NFR BLD AUTO: 7.62 10*3/MM3 (ref 1.7–7)
NITRITE UR QL STRIP: NEGATIVE
NRBC BLD AUTO-RTO: 0 /100 WBC (ref 0–0.2)
PH UR STRIP.AUTO: 5.5 [PH] (ref 5–8)
PLATELET # BLD AUTO: 296 10*3/MM3 (ref 140–450)
PMV BLD AUTO: 9.5 FL (ref 6–12)
POTASSIUM SERPL-SCNC: 4.5 MMOL/L (ref 3.5–5.2)
PROT SERPL-MCNC: 7.7 G/DL (ref 6–8.5)
PROT UR QL STRIP: NEGATIVE
RBC # BLD AUTO: 5.04 10*6/MM3 (ref 4.14–5.8)
SODIUM SERPL-SCNC: 140 MMOL/L (ref 136–145)
SP GR UR STRIP: 1.02 (ref 1–1.03)
UROBILINOGEN UR QL STRIP: NORMAL
WBC # BLD AUTO: 11.78 10*3/MM3 (ref 3.4–10.8)
WHOLE BLOOD HOLD SPECIMEN: NORMAL
WHOLE BLOOD HOLD SPECIMEN: NORMAL

## 2021-01-14 PROCEDURE — 93005 ELECTROCARDIOGRAM TRACING: CPT | Performed by: EMERGENCY MEDICINE

## 2021-01-14 PROCEDURE — 83690 ASSAY OF LIPASE: CPT | Performed by: PHYSICIAN ASSISTANT

## 2021-01-14 PROCEDURE — 36415 COLL VENOUS BLD VENIPUNCTURE: CPT

## 2021-01-14 PROCEDURE — 80061 LIPID PANEL: CPT | Performed by: INTERNAL MEDICINE

## 2021-01-14 PROCEDURE — 25010000002 PROCHLORPERAZINE 10 MG/2ML SOLUTION: Performed by: EMERGENCY MEDICINE

## 2021-01-14 PROCEDURE — 96375 TX/PRO/DX INJ NEW DRUG ADDON: CPT

## 2021-01-14 PROCEDURE — 76705 ECHO EXAM OF ABDOMEN: CPT | Performed by: RADIOLOGY

## 2021-01-14 PROCEDURE — 74176 CT ABD & PELVIS W/O CONTRAST: CPT | Performed by: RADIOLOGY

## 2021-01-14 PROCEDURE — 80053 COMPREHEN METABOLIC PANEL: CPT | Performed by: PHYSICIAN ASSISTANT

## 2021-01-14 PROCEDURE — 76705 ECHO EXAM OF ABDOMEN: CPT

## 2021-01-14 PROCEDURE — 93010 ELECTROCARDIOGRAM REPORT: CPT | Performed by: INTERNAL MEDICINE

## 2021-01-14 PROCEDURE — 99283 EMERGENCY DEPT VISIT LOW MDM: CPT

## 2021-01-14 PROCEDURE — 25010000002 MORPHINE PER 10 MG: Performed by: EMERGENCY MEDICINE

## 2021-01-14 PROCEDURE — 96374 THER/PROPH/DIAG INJ IV PUSH: CPT

## 2021-01-14 PROCEDURE — 81003 URINALYSIS AUTO W/O SCOPE: CPT | Performed by: PHYSICIAN ASSISTANT

## 2021-01-14 PROCEDURE — 85025 COMPLETE CBC W/AUTO DIFF WBC: CPT | Performed by: PHYSICIAN ASSISTANT

## 2021-01-14 PROCEDURE — 74176 CT ABD & PELVIS W/O CONTRAST: CPT

## 2021-01-14 PROCEDURE — 82550 ASSAY OF CK (CPK): CPT | Performed by: INTERNAL MEDICINE

## 2021-01-14 RX ORDER — PROCHLORPERAZINE EDISYLATE 5 MG/ML
10 INJECTION INTRAMUSCULAR; INTRAVENOUS ONCE
Status: COMPLETED | OUTPATIENT
Start: 2021-01-14 | End: 2021-01-14

## 2021-01-14 RX ORDER — DICYCLOMINE HYDROCHLORIDE 10 MG/1
20 CAPSULE ORAL ONCE
Status: DISCONTINUED | OUTPATIENT
Start: 2021-01-14 | End: 2021-01-14 | Stop reason: HOSPADM

## 2021-01-14 RX ORDER — ONDANSETRON 4 MG/1
4 TABLET, ORALLY DISINTEGRATING ORAL EVERY 6 HOURS PRN
Qty: 12 TABLET | Refills: 0 | Status: SHIPPED | OUTPATIENT
Start: 2021-01-14 | End: 2021-07-28

## 2021-01-14 RX ORDER — DICYCLOMINE HCL 20 MG
20 TABLET ORAL EVERY 6 HOURS PRN
Qty: 20 TABLET | Refills: 0 | Status: SHIPPED | OUTPATIENT
Start: 2021-01-14 | End: 2021-02-02

## 2021-01-14 RX ORDER — DICYCLOMINE HCL 20 MG
20 TABLET ORAL EVERY 6 HOURS PRN
Qty: 20 TABLET | Refills: 0 | Status: SHIPPED | OUTPATIENT
Start: 2021-01-14 | End: 2021-01-19 | Stop reason: SDUPTHER

## 2021-01-14 RX ORDER — SODIUM CHLORIDE 0.9 % (FLUSH) 0.9 %
10 SYRINGE (ML) INJECTION AS NEEDED
Status: DISCONTINUED | OUTPATIENT
Start: 2021-01-14 | End: 2021-01-14 | Stop reason: HOSPADM

## 2021-01-14 RX ADMIN — MORPHINE SULFATE 4 MG: 4 INJECTION, SOLUTION INTRAMUSCULAR; INTRAVENOUS at 18:12

## 2021-01-14 RX ADMIN — PROCHLORPERAZINE EDISYLATE 10 MG: 5 INJECTION INTRAMUSCULAR; INTRAVENOUS at 18:12

## 2021-01-14 NOTE — ED NOTES
Provider bedside removed packing in abd wound, cleaned with saline and Hibiclens, provider packed wound , optifoam dressing applied       Alexander Bourne  01/14/21 8619
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
## 2021-01-15 LAB
CHOLEST SERPL-MCNC: 337 MG/DL (ref 0–200)
CK SERPL-CCNC: 71 U/L (ref 20–200)
HDLC SERPL-MCNC: 33 MG/DL (ref 40–60)
LDLC SERPL CALC-MCNC: 252 MG/DL (ref 0–100)
LDLC/HDLC SERPL: 7.72 {RATIO}
QT INTERVAL: 358 MS
QTC INTERVAL: 399 MS
TRIGL SERPL-MCNC: 247 MG/DL (ref 0–150)
VLDLC SERPL-MCNC: 52 MG/DL (ref 5–40)

## 2021-01-15 RX ORDER — ROSUVASTATIN CALCIUM 40 MG/1
40 TABLET, COATED ORAL DAILY
Qty: 30 TABLET | Refills: 11 | Status: SHIPPED | OUTPATIENT
Start: 2021-01-15 | End: 2023-01-23

## 2021-01-15 NOTE — TELEPHONE ENCOUNTER
----- Message from Bridgett Oviedo MD sent at 1/15/2021 10:14 AM EST -----  Cholesterol is very high.  Are you taking Crestor 20 mg daily

## 2021-01-19 ENCOUNTER — OFFICE VISIT (OUTPATIENT)
Dept: FAMILY MEDICINE CLINIC | Facility: CLINIC | Age: 31
End: 2021-01-19

## 2021-01-19 VITALS
SYSTOLIC BLOOD PRESSURE: 130 MMHG | BODY MASS INDEX: 46.65 KG/M2 | WEIGHT: 315 LBS | OXYGEN SATURATION: 99 % | DIASTOLIC BLOOD PRESSURE: 82 MMHG | TEMPERATURE: 97.7 F | HEIGHT: 69 IN | HEART RATE: 86 BPM

## 2021-01-19 DIAGNOSIS — R10.11 RUQ PAIN: Primary | ICD-10-CM

## 2021-01-19 PROCEDURE — 99213 OFFICE O/P EST LOW 20 MIN: CPT | Performed by: NURSE PRACTITIONER

## 2021-01-19 NOTE — PROGRESS NOTES
"Chief Complaint  Transitional Care Management (abdominal pain)    Subjective          Andrew Narayan presents to [facility] for   Abdominal Pain  This is a new (HX Lap band removal for erosion December.  One week ago developed abd pain following dining at [employer] restaurant.  Wife similar for less than 24 hours.  Pain persisted seen in ED with NEG work up suggested HIDA scan) problem. The onset quality is sudden. The problem occurs intermittently. The problem has been waxing and waning. The pain is located in the epigastric region and RUQ. The pain is at a severity of 5/10. The pain is moderate. The quality of the pain is aching, colicky and cramping. The abdominal pain radiates to the back. Associated symptoms include anorexia, belching, diarrhea and nausea. Pertinent negatives include no constipation, fever, flatus, vomiting or weight loss. The pain is aggravated by eating and certain positions. The pain is relieved by certain positions. He has tried nothing for the symptoms. Prior workup: EGD and swallow study recent with mild hiatial hernia. His past medical history is significant for abdominal surgery.       Objective   Vital Signs:   /82   Pulse 86   Temp 97.7 °F (36.5 °C) (Temporal)   Ht 175.3 cm (69\")   Wt (!) 157 kg (346 lb 3.2 oz)   SpO2 99%   BMI 51.12 kg/m²     Physical Exam  Vitals signs and nursing note reviewed.   Constitutional:       General: He is not in acute distress.     Appearance: He is well-developed. He is obese. He is not ill-appearing.   HENT:      Head: Normocephalic.   Cardiovascular:      Rate and Rhythm: Normal rate and regular rhythm.      Heart sounds: Normal heart sounds. No murmur.   Pulmonary:      Effort: Pulmonary effort is normal.      Breath sounds: Normal breath sounds.   Abdominal:      General: Bowel sounds are normal.      Tenderness: There is abdominal tenderness.      Comments: Open incision mid RUQ with minimal " drainage no odor or redness     Skin:     General: Skin is warm and dry.   Neurological:      Mental Status: He is alert and oriented to person, place, and time.   Psychiatric:         Behavior: Behavior normal.        Result Review :   The following data was reviewed by: APOLINAR Jones on 01/19/2021:  CMP    CMP 12/14/20 12/16/20 12/16/20 1/14/21     0526 0825    BUN  12  11   Creatinine  0.90  1.20   eGFR Non African Am  98  70   Sodium  135 (A)  140   Potassium 4.6 4.7 4.5 4.5   Chloride  101  101   Calcium  9.5  10.0   Albumin  3.40 (A)  4.54   Total Bilirubin  0.7  0.6   Alkaline Phosphatase  79  91   AST (SGOT)  20  17   ALT (SGPT)  25  17   (A) Abnormal value       Comments are available for some flowsheets but are not being displayed.           CBC    CBC 12/14/20 12/16/20 1/14/21   WBC 12.79 (A) 19.73 (A) 11.78 (A)   RBC 5.22 4.74 5.04   Hemoglobin 14.6 13.1 14.2   Hematocrit 46.0 41.6 44.6   MCV 88.1 87.8 88.5   MCH 28.0 27.6 28.2   MCHC 31.7 31.5 31.8   RDW 13.2 13.0 13.9   Platelets 382 305 296   (A) Abnormal value            Data reviewed: Radiologic studies US galbladder CT abd, GI studies EGD and swallow study and ED records          Assessment and Plan    Problem List Items Addressed This Visit     None      Visit Diagnoses     RUQ pain    -  Primary    Relevant Orders    NM HIDA scan with pharmacological intervention          Follow Up   No follow-ups on file.  Patient was given instructions and counseling regarding his condition or for health maintenance advice. Please see specific information pulled into the AVS if appropriate.

## 2021-01-21 ENCOUNTER — TELEPHONE (OUTPATIENT)
Dept: PSYCHIATRY | Facility: CLINIC | Age: 31
End: 2021-01-21

## 2021-01-27 ENCOUNTER — HOSPITAL ENCOUNTER (OUTPATIENT)
Dept: NUCLEAR MEDICINE | Facility: HOSPITAL | Age: 31
Discharge: HOME | End: 2021-01-27

## 2021-01-27 DIAGNOSIS — R10.11 RUQ PAIN: ICD-10-CM

## 2021-01-27 PROCEDURE — 0 TECHNETIUM TC 99M MEBROFENIN KIT: Performed by: NURSE PRACTITIONER

## 2021-01-27 PROCEDURE — 78227 HEPATOBIL SYST IMAGE W/DRUG: CPT | Performed by: RADIOLOGY

## 2021-01-27 PROCEDURE — 25010000002 SINCALIDE PER 5 MCG: Performed by: NURSE PRACTITIONER

## 2021-01-27 PROCEDURE — A9537 TC99M MEBROFENIN: HCPCS | Performed by: NURSE PRACTITIONER

## 2021-01-27 PROCEDURE — 78227 HEPATOBIL SYST IMAGE W/DRUG: CPT

## 2021-01-27 RX ORDER — KIT FOR THE PREPARATION OF TECHNETIUM TC 99M MEBROFENIN 45 MG/10ML
1 INJECTION, POWDER, LYOPHILIZED, FOR SOLUTION INTRAVENOUS
Status: COMPLETED | OUTPATIENT
Start: 2021-01-27 | End: 2021-01-27

## 2021-01-27 RX ADMIN — MEBROFENIN 1 DOSE: 45 INJECTION, POWDER, LYOPHILIZED, FOR SOLUTION INTRAVENOUS at 10:15

## 2021-01-27 RX ADMIN — SINCALIDE 6.3 MCG: 5 INJECTION, POWDER, LYOPHILIZED, FOR SOLUTION INTRAVENOUS at 11:16

## 2021-02-01 ENCOUNTER — TELEPHONE (OUTPATIENT)
Dept: FAMILY MEDICINE CLINIC | Facility: CLINIC | Age: 31
End: 2021-02-01

## 2021-02-01 DIAGNOSIS — R10.11 RUQ PAIN: Primary | ICD-10-CM

## 2021-02-01 DIAGNOSIS — R11.0 NAUSEA: ICD-10-CM

## 2021-02-01 NOTE — TELEPHONE ENCOUNTER
----- Message from APOLINAR Jones sent at 1/31/2021  2:46 PM EST -----  Hida scan is normal.  Continuing to have pain nausea?    Spoke with patient & he verbalized understanding,reports the pain comes & goes & the Nausea is when he has the pain.

## 2021-02-01 NOTE — TELEPHONE ENCOUNTER
I placed an referral for GI for further evaluation       Patient notified & verbalized understanding.

## 2021-02-02 ENCOUNTER — OFFICE VISIT (OUTPATIENT)
Dept: FAMILY MEDICINE CLINIC | Facility: CLINIC | Age: 31
End: 2021-02-02

## 2021-02-02 VITALS
WEIGHT: 315 LBS | TEMPERATURE: 97.5 F | DIASTOLIC BLOOD PRESSURE: 90 MMHG | SYSTOLIC BLOOD PRESSURE: 126 MMHG | HEIGHT: 69 IN | HEART RATE: 109 BPM | OXYGEN SATURATION: 98 % | BODY MASS INDEX: 46.65 KG/M2

## 2021-02-02 DIAGNOSIS — R79.89 LOW TESTOSTERONE: ICD-10-CM

## 2021-02-02 DIAGNOSIS — R10.13 EPIGASTRIC PAIN: ICD-10-CM

## 2021-02-02 DIAGNOSIS — R11.0 NAUSEA: Primary | ICD-10-CM

## 2021-02-02 DIAGNOSIS — R10.11 RUQ PAIN: ICD-10-CM

## 2021-02-02 PROCEDURE — 36415 COLL VENOUS BLD VENIPUNCTURE: CPT | Performed by: NURSE PRACTITIONER

## 2021-02-02 PROCEDURE — 86677 HELICOBACTER PYLORI ANTIBODY: CPT | Performed by: NURSE PRACTITIONER

## 2021-02-02 PROCEDURE — 99213 OFFICE O/P EST LOW 20 MIN: CPT | Performed by: NURSE PRACTITIONER

## 2021-02-02 PROCEDURE — 84402 ASSAY OF FREE TESTOSTERONE: CPT | Performed by: NURSE PRACTITIONER

## 2021-02-02 PROCEDURE — 84403 ASSAY OF TOTAL TESTOSTERONE: CPT | Performed by: NURSE PRACTITIONER

## 2021-02-02 NOTE — PROGRESS NOTES
"Chief Complaint  Follow-up and Heartburn    Subjective          Andrew Narayan presents to [facility] for   Abdominal Pain  This is a recurrent problem. The current episode started 1 to 4 weeks ago. The problem occurs 2 to 4 times per day. The problem has been unchanged. The pain is located in the epigastric region and RUQ. The pain is moderate. The quality of the pain is aching. The abdominal pain does not radiate. Associated symptoms include nausea. The pain is aggravated by eating. The pain is relieved by nothing. Prior diagnostic workup includes ultrasound (HIDA scan). pt had gastric band removed recently   Fatigue  This is a chronic problem. The current episode started more than 1 year ago. The problem occurs constantly. The problem has been unchanged. Associated symptoms include abdominal pain, fatigue and nausea. Nothing aggravates the symptoms. Treatments tried: hx of hypotestosteronemia and has taken injections in the past.       Objective   Vital Signs:   /90 (BP Location: Left arm, Patient Position: Sitting, Cuff Size: Large Adult)   Pulse 109   Temp 97.5 °F (36.4 °C)   Ht 175.3 cm (69\")   Wt (!) 155 kg (341 lb)   SpO2 98%   BMI 50.36 kg/m²     Physical Exam  Vitals signs and nursing note reviewed.   Constitutional:       Appearance: He is well-developed. He is obese.   Neck:      Musculoskeletal: Normal range of motion and neck supple.   Cardiovascular:      Rate and Rhythm: Normal rate and regular rhythm.      Heart sounds: Normal heart sounds.   Pulmonary:      Effort: Pulmonary effort is normal.      Breath sounds: Normal breath sounds.   Skin:     General: Skin is warm and dry.   Neurological:      Mental Status: He is alert and oriented to person, place, and time.   Psychiatric:         Behavior: Behavior normal.         Thought Content: Thought content normal.         Judgment: Judgment normal.        Result Review :     CMP    CMP 12/14/20 " 12/16/20 12/16/20 1/14/21     0526 0825    BUN  12  11   Creatinine  0.90  1.20   eGFR Non African Am  98  70   Sodium  135 (A)  140   Potassium 4.6 4.7 4.5 4.5   Chloride  101  101   Calcium  9.5  10.0   Albumin  3.40 (A)  4.54   Total Bilirubin  0.7  0.6   Alkaline Phosphatase  79  91   AST (SGOT)  20  17   ALT (SGPT)  25  17   (A) Abnormal value       Comments are available for some flowsheets but are not being displayed.           CBC    CBC 12/14/20 12/16/20 1/14/21   WBC 12.79 (A) 19.73 (A) 11.78 (A)   RBC 5.22 4.74 5.04   Hemoglobin 14.6 13.1 14.2   Hematocrit 46.0 41.6 44.6   MCV 88.1 87.8 88.5   MCH 28.0 27.6 28.2   MCHC 31.7 31.5 31.8   RDW 13.2 13.0 13.9   Platelets 382 305 296   (A) Abnormal value            Data reviewed: Radiologic studies CT abd/pelvis, US gallbladder, HIDA scan          Assessment and Plan    Problem List Items Addressed This Visit        Endocrine and Metabolic    Low testosterone    Relevant Orders    Testosterone, Free, Total      Other Visit Diagnoses     Nausea    -  Primary    Relevant Orders    H. Pylori IgM, Blood    RUQ pain        Relevant Orders    H. Pylori IgM, Blood    Epigastric pain        Relevant Orders    H. Pylori IgM, Blood          Follow Up   Return in about 3 months (around 5/2/2021).  Patient was given instructions and counseling regarding his condition or for health maintenance advice. Please see specific information pulled into the AVS if appropriate.

## 2021-02-03 ENCOUNTER — TELEPHONE (OUTPATIENT)
Dept: FAMILY MEDICINE CLINIC | Facility: CLINIC | Age: 31
End: 2021-02-03

## 2021-02-03 LAB — H PYLORI IGM SER-ACNC: <9 UNITS (ref 0–8.9)

## 2021-02-03 NOTE — TELEPHONE ENCOUNTER
----- Message from APOLINAR Gonzalez sent at 2/3/2021  3:38 PM EST -----  H. pylori is negative.  Please let him know.  Testosterone levels have not come back yet.      Patient notified & verbalized understanding.

## 2021-02-05 LAB
TESTOST FREE SERPL-MCNC: 5.7 PG/ML (ref 8.7–25.1)
TESTOST SERPL-MCNC: 224 NG/DL (ref 264–916)

## 2021-02-08 ENCOUNTER — TELEPHONE (OUTPATIENT)
Dept: FAMILY MEDICINE CLINIC | Facility: CLINIC | Age: 31
End: 2021-02-08

## 2021-02-08 NOTE — TELEPHONE ENCOUNTER
----- Message from APOLINAR Gonzalez sent at 2/6/2021  1:25 PM EST -----  His testosterone level is low.  Would he like referral to urology?

## 2021-02-09 DIAGNOSIS — E34.9 HYPOTESTOSTERONEMIA: Primary | ICD-10-CM

## 2021-02-10 ENCOUNTER — TELEPHONE (OUTPATIENT)
Dept: PSYCHIATRY | Facility: CLINIC | Age: 31
End: 2021-02-10

## 2021-02-10 DIAGNOSIS — F90.2 ATTENTION DEFICIT HYPERACTIVITY DISORDER, COMBINED TYPE: ICD-10-CM

## 2021-02-10 RX ORDER — DEXTROAMPHETAMINE SACCHARATE, AMPHETAMINE ASPARTATE, DEXTROAMPHETAMINE SULFATE AND AMPHETAMINE SULFATE 2.5; 2.5; 2.5; 2.5 MG/1; MG/1; MG/1; MG/1
10 TABLET ORAL 2 TIMES DAILY
Qty: 60 TABLET | Refills: 0 | Status: SHIPPED | OUTPATIENT
Start: 2021-02-10 | End: 2021-03-10 | Stop reason: SDUPTHER

## 2021-02-22 ENCOUNTER — OFFICE VISIT (OUTPATIENT)
Dept: PSYCHIATRY | Facility: CLINIC | Age: 31
End: 2021-02-22

## 2021-02-22 VITALS
WEIGHT: 315 LBS | TEMPERATURE: 96.9 F | BODY MASS INDEX: 46.65 KG/M2 | HEIGHT: 69 IN | SYSTOLIC BLOOD PRESSURE: 128 MMHG | DIASTOLIC BLOOD PRESSURE: 84 MMHG | HEART RATE: 90 BPM

## 2021-02-22 DIAGNOSIS — F43.10 POST TRAUMATIC STRESS DISORDER (PTSD): ICD-10-CM

## 2021-02-22 DIAGNOSIS — F90.2 ATTENTION DEFICIT HYPERACTIVITY DISORDER, COMBINED TYPE: Primary | ICD-10-CM

## 2021-02-22 DIAGNOSIS — F42.2 MIXED OBSESSIONAL THOUGHTS AND ACTS: ICD-10-CM

## 2021-02-22 DIAGNOSIS — F41.1 GENERALIZED ANXIETY DISORDER: ICD-10-CM

## 2021-02-22 DIAGNOSIS — G47.00 INSOMNIA, UNSPECIFIED TYPE: ICD-10-CM

## 2021-02-22 PROCEDURE — 99214 OFFICE O/P EST MOD 30 MIN: CPT | Performed by: NURSE PRACTITIONER

## 2021-02-22 RX ORDER — AMITRIPTYLINE HYDROCHLORIDE 75 MG/1
75 TABLET, FILM COATED ORAL
Qty: 30 TABLET | Refills: 1 | Status: SHIPPED | OUTPATIENT
Start: 2021-02-22 | End: 2021-04-13 | Stop reason: SDUPTHER

## 2021-02-22 RX ORDER — DULOXETIN HYDROCHLORIDE 60 MG/1
60 CAPSULE, DELAYED RELEASE ORAL DAILY
Qty: 30 CAPSULE | Refills: 2 | Status: SHIPPED | OUTPATIENT
Start: 2021-02-22 | End: 2021-04-13 | Stop reason: SDUPTHER

## 2021-02-22 RX ORDER — CLONIDINE HYDROCHLORIDE 0.1 MG/1
0.1 TABLET ORAL 3 TIMES DAILY
Qty: 90 TABLET | Refills: 1 | Status: SHIPPED | OUTPATIENT
Start: 2021-02-22 | End: 2021-04-13 | Stop reason: SDUPTHER

## 2021-02-22 NOTE — PROGRESS NOTES
Subjective   Andrew Narayan is a 30 y.o. male is here today for medication management follow-up.Presents by himself    Chief Complaint:  Recheck on anxiety and hallucinations and ADHD    History of Present Illness: Pt presents for follow up at the [facility].  Patient states that he is doing cindy  He does not know whether the Adderall or the Cymbalta is helping.  He continues to have some anxiety but feels like his mood has lifted.  He continues to worry but says it is slightly improved.  No negative side effects to the medication.  Patient states that he is seeing the doctor in the next couple of weeks who prescribes Neurontin and he is wanting to come off of that.  He feels like it inhibits him which makes his ADHD and anxiety worse.  Does not like taking it.  Sleeping well at night without difficulty.  No negative side effects to the medication.Body mass index is 50.19 kg/m².  Loss of 1 pound since last office visit.  States that he continues to improve after getting the gastric ring removed.  Denies any depression.  No suicidal thoughts.  Mood is stable. Pleased so far with minimal progress.                               The following portions of the patient's history were reviewed and updated as appropriate: allergies, current medications, past family history, past medical history, past social history, past surgical history and problem list.    Review of Systems   Constitutional: Negative for activity change, appetite change and fatigue.   HENT: Negative.    Eyes: Negative for visual disturbance.   Respiratory: Negative.    Cardiovascular: Negative.    Gastrointestinal: Negative for nausea.   Endocrine: Negative.    Genitourinary: Negative.    Musculoskeletal: Positive for arthralgias.   Skin: Negative.    Allergic/Immunologic: Negative.    Neurological: Negative for dizziness, seizures and headaches.   Hematological: Negative.    Psychiatric/Behavioral: Positive for decreased  "concentration. Negative for agitation, behavioral problems, confusion, dysphoric mood, hallucinations, self-injury, sleep disturbance and suicidal ideas. The patient is nervous/anxious. The patient is not hyperactive.        Objective   Physical Exam   Constitutional: He is oriented to person, place, and time. He appears well-developed.   Neurological: He is alert and oriented to person, place, and time.   Psychiatric: His speech is normal and behavior is normal. Mood and thought content normal. His mood appears not anxious. He expresses impulsivity.   Engaging in conversation   Vitals reviewed.    Blood pressure 128/84, pulse 90, temperature 96.9 °F (36.1 °C), height 175.3 cm (69.02\"), weight (!) 154 kg (340 lb).    Medication List:   Current Outpatient Medications   Medication Sig Dispense Refill   • amitriptyline (ELAVIL) 75 MG tablet Take 1 tablet by mouth every night at bedtime. 30 tablet 1   • amphetamine-dextroamphetamine (Adderall) 10 MG tablet Take 1 tablet by mouth 2 (Two) Times a Day. 60 tablet 0   • cloNIDine (CATAPRES) 0.1 MG tablet Take 1 tablet by mouth 3 (Three) Times a Day. 90 tablet 1   • DULoxetine (CYMBALTA) 60 MG capsule Take 1 capsule by mouth Daily. 30 capsule 2   • gabapentin (NEURONTIN) 600 MG tablet Take 600 mg by mouth 6 (Six) Times a Day.     • metoprolol tartrate (LOPRESSOR) 50 MG tablet Take 0.5 tablets by mouth 2 (Two) Times a Day. 60 tablet 5   • ondansetron ODT (ZOFRAN-ODT) 4 MG disintegrating tablet Place 1 tablet on the tongue Every 6 (Six) Hours As Needed for Nausea or Vomiting. 12 tablet 0   • pantoprazole (Protonix) 40 MG EC tablet Take 1 tablet by mouth Daily. (Patient taking differently: Take 40 mg by mouth As Needed.) 30 tablet 2   • polyethylene glycol (MIRALAX) 17 g packet Take 17 g by mouth Daily As Needed (constipation). 100 each 2   • rosuvastatin (CRESTOR) 40 MG tablet Take 1 tablet by mouth Daily. 30 tablet 11   • oxyCODONE (Roxicodone) 5 MG immediate release tablet " Take 1 tablet by mouth Every 4 (Four) Hours As Needed for Moderate Pain . 12 tablet 0     No current facility-administered medications for this visit.        Mental Status Exam:   Hygiene:   good  Cooperation:  Cooperative  Eye Contact:  Good  Psychomotor Behavior:  Restless  Affect:  Full range  Hopelessness: Denies  Speech:  Normal  Thought Process:  Goal directed  Thought Content:  Normal  Suicidal:  None  Homicidal:  None  Hallucinations:  Auditory  Delusion:  None  Memory:  Intact  Orientation:  Person, Place, Time and Situation  Reliability:  fair  Insight:  Fair  Judgement:  Fair  Impulse Control:  Poor  Physical/Medical Issues:  Yes obesity, HTN, pain    Assessment/Plan   Problems Addressed this Visit        Sleep    Insomnia    Relevant Medications    amitriptyline (ELAVIL) 75 MG tablet      Other Visit Diagnoses     Attention deficit hyperactivity disorder, combined type    -  Primary    Relevant Medications    cloNIDine (CATAPRES) 0.1 MG tablet    amitriptyline (ELAVIL) 75 MG tablet    DULoxetine (CYMBALTA) 60 MG capsule    Mixed obsessional thoughts and acts        Relevant Medications    DULoxetine (CYMBALTA) 60 MG capsule    Post traumatic stress disorder (PTSD)        Relevant Medications    amitriptyline (ELAVIL) 75 MG tablet    DULoxetine (CYMBALTA) 60 MG capsule    Generalized anxiety disorder        Relevant Medications    amitriptyline (ELAVIL) 75 MG tablet    DULoxetine (CYMBALTA) 60 MG capsule      Diagnoses       Codes Comments    Attention deficit hyperactivity disorder, combined type    -  Primary ICD-10-CM: F90.2  ICD-9-CM: 314.01     Mixed obsessional thoughts and acts     ICD-10-CM: F42.2  ICD-9-CM: 300.3     Post traumatic stress disorder (PTSD)     ICD-10-CM: F43.10  ICD-9-CM: 309.81     Generalized anxiety disorder     ICD-10-CM: F41.1  ICD-9-CM: 300.02     Insomnia, unspecified type     ICD-10-CM: G47.00  ICD-9-CM: 780.52       azalia reviewed.  UDS obtained today and pending.     Functionality: pt having moderate impairment in important areas of daily functioning.  Prognosis: Good dependent on medication/follow up and treatment plan compliance.  Patient screened negative for depression based on a PHQ-9 score of 0 on 2/22/2021. Follow-up recommendations include: ongoing assessment.    I am increasing his Cymbalta to 60 mg.  He is to continue with the clonidine for the ADHD, the Elavil for sleep, and the Adderall for the ADHD.  Appropriate refills have been submitted.  He is also to continue therapy with Urmila Gonzalez.     He is in agreement with treatment plan and recommendations.  Continuing efforts to promote the therapeutic alliance, address the patient's issues, and strengthen self awareness, insights, and coping skills.   Patient is aware to call 911 or go to the nearest ER should begin having SI/HI. Pt will notify me should he have any negative side effects or any worsening depression.  RTC 5 weeks.  Sooner if needed.

## 2021-03-10 DIAGNOSIS — F90.2 ATTENTION DEFICIT HYPERACTIVITY DISORDER, COMBINED TYPE: ICD-10-CM

## 2021-03-10 RX ORDER — DEXTROAMPHETAMINE SACCHARATE, AMPHETAMINE ASPARTATE, DEXTROAMPHETAMINE SULFATE AND AMPHETAMINE SULFATE 2.5; 2.5; 2.5; 2.5 MG/1; MG/1; MG/1; MG/1
10 TABLET ORAL 2 TIMES DAILY
Qty: 60 TABLET | Refills: 0 | Status: SHIPPED | OUTPATIENT
Start: 2021-03-10 | End: 2021-04-06 | Stop reason: SDUPTHER

## 2021-03-16 ENCOUNTER — BULK ORDERING (OUTPATIENT)
Dept: CASE MANAGEMENT | Facility: OTHER | Age: 31
End: 2021-03-16

## 2021-03-16 DIAGNOSIS — Z23 IMMUNIZATION DUE: ICD-10-CM

## 2021-03-22 ENCOUNTER — TELEPHONE (OUTPATIENT)
Dept: FAMILY MEDICINE CLINIC | Facility: CLINIC | Age: 31
End: 2021-03-22

## 2021-03-23 ENCOUNTER — OFFICE VISIT (OUTPATIENT)
Dept: UROLOGY | Facility: CLINIC | Age: 31
End: 2021-03-23

## 2021-03-23 VITALS — TEMPERATURE: 97.3 F | BODY MASS INDEX: 46.65 KG/M2 | HEIGHT: 69 IN | WEIGHT: 315 LBS

## 2021-03-23 DIAGNOSIS — N20.0 RENAL CALCULUS: ICD-10-CM

## 2021-03-23 DIAGNOSIS — N42.9 DISORDER OF PROSTATE: Primary | ICD-10-CM

## 2021-03-23 DIAGNOSIS — E34.8 HYPERESTROGENISM IN MALE: ICD-10-CM

## 2021-03-23 DIAGNOSIS — R79.89 LOW TESTOSTERONE: ICD-10-CM

## 2021-03-23 PROCEDURE — 84153 ASSAY OF PSA TOTAL: CPT | Performed by: UROLOGY

## 2021-03-23 PROCEDURE — 82670 ASSAY OF TOTAL ESTRADIOL: CPT | Performed by: UROLOGY

## 2021-03-23 PROCEDURE — 99214 OFFICE O/P EST MOD 30 MIN: CPT | Performed by: UROLOGY

## 2021-03-23 RX ORDER — TESTOSTERONE CYPIONATE 200 MG/ML
INJECTION, SOLUTION INTRAMUSCULAR
Qty: 10 ML | Refills: 2 | Status: SHIPPED | OUTPATIENT
Start: 2021-03-23 | End: 2021-04-07 | Stop reason: SDUPTHER
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
## 2021-03-24 LAB
ESTRADIOL SERPL HS-MCNC: 16.8 PG/ML
PSA SERPL-MCNC: 0.49 NG/ML (ref 0–4)

## 2021-03-29 PROBLEM — E34.8 HYPERESTROGENISM IN MALE: Status: ACTIVE | Noted: 2021-03-29

## 2021-03-30 ENCOUNTER — TELEPHONE (OUTPATIENT)
Dept: UROLOGY | Facility: CLINIC | Age: 31
End: 2021-03-30

## 2021-04-01 ENCOUNTER — TELEPHONE (OUTPATIENT)
Dept: UROLOGY | Facility: CLINIC | Age: 31
End: 2021-04-01

## 2021-04-01 NOTE — TELEPHONE ENCOUNTER
I called the pt to see what questions he had about medication Kacey has just spoke to him earlier and his Testosterone was not covered. He mentioned [facility] and I told him to call his pharmacy and see if they will transfer it to [place] and if not to call us and we would escribe it there

## 2021-04-06 DIAGNOSIS — F90.2 ATTENTION DEFICIT HYPERACTIVITY DISORDER, COMBINED TYPE: ICD-10-CM

## 2021-04-06 RX ORDER — DEXTROAMPHETAMINE SACCHARATE, AMPHETAMINE ASPARTATE, DEXTROAMPHETAMINE SULFATE AND AMPHETAMINE SULFATE 2.5; 2.5; 2.5; 2.5 MG/1; MG/1; MG/1; MG/1
10 TABLET ORAL 2 TIMES DAILY
Qty: 60 TABLET | Refills: 0 | Status: SHIPPED | OUTPATIENT
Start: 2021-04-06 | End: 2021-04-13

## 2021-04-07 DIAGNOSIS — R79.89 LOW TESTOSTERONE: ICD-10-CM

## 2021-04-07 RX ORDER — TESTOSTERONE CYPIONATE 200 MG/ML
INJECTION, SOLUTION INTRAMUSCULAR
Qty: 10 ML | Refills: 2 | Status: SHIPPED | OUTPATIENT
Start: 2021-04-07 | End: 2021-07-28

## 2021-04-07 NOTE — TELEPHONE ENCOUNTER
Pt called his pharmacy about transferring the rx for his testosterone over to [facility] [place], [place] and with it being a controlled substance, they are not wanting to transfer it. I was not able to get his insurance to cover the serum due to them wanting him to try the gel or patched first and [facility] does not offer any discounts cards for this med.     I explained that I will see if Dr Medina will send the med over to [facility] [place] [place] for him along with the syringes.

## 2021-04-13 ENCOUNTER — OFFICE VISIT (OUTPATIENT)
Dept: PSYCHIATRY | Facility: CLINIC | Age: 31
End: 2021-04-13

## 2021-04-13 VITALS
BODY MASS INDEX: 46.65 KG/M2 | HEART RATE: 84 BPM | TEMPERATURE: 97.9 F | WEIGHT: 315 LBS | HEIGHT: 69 IN | SYSTOLIC BLOOD PRESSURE: 123 MMHG | DIASTOLIC BLOOD PRESSURE: 88 MMHG

## 2021-04-13 DIAGNOSIS — F42.2 MIXED OBSESSIONAL THOUGHTS AND ACTS: ICD-10-CM

## 2021-04-13 DIAGNOSIS — F41.1 GENERALIZED ANXIETY DISORDER: ICD-10-CM

## 2021-04-13 DIAGNOSIS — F43.10 POST TRAUMATIC STRESS DISORDER (PTSD): ICD-10-CM

## 2021-04-13 DIAGNOSIS — G47.00 INSOMNIA, UNSPECIFIED TYPE: ICD-10-CM

## 2021-04-13 DIAGNOSIS — F90.2 ATTENTION DEFICIT HYPERACTIVITY DISORDER, COMBINED TYPE: Primary | ICD-10-CM

## 2021-04-13 PROCEDURE — 99214 OFFICE O/P EST MOD 30 MIN: CPT | Performed by: NURSE PRACTITIONER

## 2021-04-13 RX ORDER — DEXTROAMPHETAMINE SACCHARATE, AMPHETAMINE ASPARTATE, DEXTROAMPHETAMINE SULFATE AND AMPHETAMINE SULFATE 3.75; 3.75; 3.75; 3.75 MG/1; MG/1; MG/1; MG/1
15 TABLET ORAL 2 TIMES DAILY
Qty: 60 TABLET | Refills: 0 | Status: SHIPPED | OUTPATIENT
Start: 2021-04-13 | End: 2021-05-06 | Stop reason: SDUPTHER

## 2021-04-13 RX ORDER — CLONIDINE HYDROCHLORIDE 0.1 MG/1
0.1 TABLET ORAL 2 TIMES DAILY
Qty: 60 TABLET | Refills: 1 | Status: SHIPPED | OUTPATIENT
Start: 2021-04-13 | End: 2021-05-06 | Stop reason: SDUPTHER

## 2021-04-13 RX ORDER — DULOXETIN HYDROCHLORIDE 60 MG/1
60 CAPSULE, DELAYED RELEASE ORAL DAILY
Qty: 30 CAPSULE | Refills: 2 | Status: SHIPPED | OUTPATIENT
Start: 2021-04-13 | End: 2021-05-06 | Stop reason: SDUPTHER

## 2021-04-13 RX ORDER — AMITRIPTYLINE HYDROCHLORIDE 75 MG/1
75 TABLET, FILM COATED ORAL
Qty: 30 TABLET | Refills: 1 | Status: SHIPPED | OUTPATIENT
Start: 2021-04-13 | End: 2021-05-06 | Stop reason: SDUPTHER

## 2021-04-13 NOTE — PROGRESS NOTES
Subjective   Andrew Narayan is a 30 y.o. male is here today for medication management follow-up.Presents by himself    Chief Complaint:  Recheck on anxiety and hallucinations and ADHD    History of Present Illness: Pt presents for follow up at the [facility].  That he is doing fairly well.  Denies any overt depression.  No suicidal thoughts.  No AVH.  States that he is Adderall does help with his mind racing and he feels like the dosage could be increased.  No negative side effects from the medication.  Sleeping well at night without difficulty.  His cardiologist has decreased his clonidine down to twice daily dosing.  Mood is stable.  Says that the Cymbalta has helped with his mood and feels like it has lifted his mood. Body mass index is 51.87 kg/m².             The following portions of the patient's history were reviewed and updated as appropriate: allergies, current medications, past family history, past medical history, past social history, past surgical history and problem list.    Review of Systems   Constitutional: Negative for activity change, appetite change and fatigue.   HENT: Negative.    Eyes: Negative for visual disturbance.   Respiratory: Negative.    Cardiovascular: Negative.    Gastrointestinal: Negative for nausea.   Endocrine: Negative.    Genitourinary: Negative.    Musculoskeletal: Positive for arthralgias.   Skin: Negative.    Allergic/Immunologic: Negative.    Neurological: Negative for dizziness, seizures and headaches.   Hematological: Negative.    Psychiatric/Behavioral: Positive for decreased concentration. Negative for agitation, behavioral problems, confusion, dysphoric mood, hallucinations, self-injury, sleep disturbance and suicidal ideas. The patient is nervous/anxious. The patient is not hyperactive.        Objective   Physical Exam   Constitutional: He is oriented to person, place, and time. He appears well-developed.   Neurological: He is alert and  "oriented to person, place, and time.   Psychiatric: His speech is normal and behavior is normal. Mood and thought content normal. His mood appears not anxious. He expresses impulsivity.   Engaging in conversation   Vitals reviewed.    Blood pressure 123/88, pulse 84, temperature 97.9 °F (36.6 °C), height 175.3 cm (69.02\"), weight (!) 159 kg (351 lb 6.4 oz).    Medication List:   Current Outpatient Medications   Medication Sig Dispense Refill   • amitriptyline (ELAVIL) 75 MG tablet Take 1 tablet by mouth every night at bedtime. 30 tablet 1   • cloNIDine (CATAPRES) 0.1 MG tablet Take 1 tablet by mouth 2 (Two) Times a Day. 60 tablet 1   • DULoxetine (CYMBALTA) 60 MG capsule Take 1 capsule by mouth Daily. 30 capsule 2   • gabapentin (NEURONTIN) 600 MG tablet Take 600 mg by mouth 6 (Six) Times a Day.     • metoprolol tartrate (LOPRESSOR) 50 MG tablet Take 0.5 tablets by mouth 2 (Two) Times a Day. 60 tablet 5   • ondansetron ODT (ZOFRAN-ODT) 4 MG disintegrating tablet Place 1 tablet on the tongue Every 6 (Six) Hours As Needed for Nausea or Vomiting. 12 tablet 0   • oxyCODONE (Roxicodone) 5 MG immediate release tablet Take 1 tablet by mouth Every 4 (Four) Hours As Needed for Moderate Pain . 12 tablet 0   • pantoprazole (Protonix) 40 MG EC tablet Take 1 tablet by mouth Daily. (Patient taking differently: Take 40 mg by mouth As Needed.) 30 tablet 2   • polyethylene glycol (MIRALAX) 17 g packet Take 17 g by mouth Daily As Needed (constipation). 100 each 2   • rosuvastatin (CRESTOR) 40 MG tablet Take 1 tablet by mouth Daily. 30 tablet 11   • Syringe 25G X 5/8\" 3 ML misc Use as directed 2 x weekly 24 each 3   • Testosterone Cypionate (Depo-Testosterone) 200 MG/ML injection Inject 1/2 cc subcutaneously every Monday and Thursday 10 mL 2   • amphetamine-dextroamphetamine (Adderall) 15 MG tablet Take 1 tablet by mouth 2 (Two) Times a Day. 60 tablet 0     No current facility-administered medications for this visit.       Mental " Status Exam:   Hygiene:   good  Cooperation:  Cooperative  Eye Contact:  Good  Psychomotor Behavior:  Restless  Affect:  Full range  Hopelessness: Denies  Speech:  Normal  Thought Process:  Goal directed  Thought Content:  Normal  Suicidal:  None  Homicidal:  None  Hallucinations:  Auditory  Delusion:  None  Memory:  Intact  Orientation:  Person, Place, Time and Situation  Reliability:  fair  Insight:  Fair  Judgement:  Fair  Impulse Control:  Poor  Physical/Medical Issues:  Yes obesity, HTN, pain    Assessment/Plan   Problems Addressed this Visit        Sleep    Insomnia    Relevant Medications    amitriptyline (ELAVIL) 75 MG tablet      Other Visit Diagnoses     Attention deficit hyperactivity disorder, combined type    -  Primary    Relevant Medications    cloNIDine (CATAPRES) 0.1 MG tablet    DULoxetine (CYMBALTA) 60 MG capsule    amitriptyline (ELAVIL) 75 MG tablet    amphetamine-dextroamphetamine (Adderall) 15 MG tablet    Mixed obsessional thoughts and acts        Relevant Medications    DULoxetine (CYMBALTA) 60 MG capsule    Post traumatic stress disorder (PTSD)        Relevant Medications    DULoxetine (CYMBALTA) 60 MG capsule    amitriptyline (ELAVIL) 75 MG tablet    amphetamine-dextroamphetamine (Adderall) 15 MG tablet    Generalized anxiety disorder        Relevant Medications    DULoxetine (CYMBALTA) 60 MG capsule    amitriptyline (ELAVIL) 75 MG tablet    amphetamine-dextroamphetamine (Adderall) 15 MG tablet      Diagnoses       Codes Comments    Attention deficit hyperactivity disorder, combined type    -  Primary ICD-10-CM: F90.2  ICD-9-CM: 314.01     Mixed obsessional thoughts and acts     ICD-10-CM: F42.2  ICD-9-CM: 300.3     Post traumatic stress disorder (PTSD)     ICD-10-CM: F43.10  ICD-9-CM: 309.81     Generalized anxiety disorder     ICD-10-CM: F41.1  ICD-9-CM: 300.02     Insomnia, unspecified type     ICD-10-CM: G47.00  ICD-9-CM: 780.52       azalia reviewed.  UDS in past reviewed.     Functionality: pt having moderate impairment in important areas of daily functioning.  Prognosis: Good dependent on medication/follow up and treatment plan compliance.      I am increasing his Cymbalta to 60 mg.  He is to continue with the clonidine for the ADHD, the Elavil for sleep.  I am increasing the adderall slightly to 15mg BID.  I discussed with him the tolerance that develops from using the medication and suggested he leave off the med on the weekends.   Appropriate refills have been submitted.  He is also to continue therapy with Urmila Gonzalez.     He is in agreement with treatment plan and recommendations.  Continuing efforts to promote the therapeutic alliance, address the patient's issues, and strengthen self awareness, insights, and coping skills.   Patient is aware to call 911 or go to the nearest ER should begin having SI/HI. Pt will notify me should he have any negative side effects or any worsening depression.  RTC 5 weeks.  Sooner if needed.

## 2021-05-06 ENCOUNTER — OFFICE VISIT (OUTPATIENT)
Dept: PSYCHIATRY | Facility: CLINIC | Age: 31
End: 2021-05-06

## 2021-05-06 VITALS
OXYGEN SATURATION: 94 % | SYSTOLIC BLOOD PRESSURE: 145 MMHG | HEIGHT: 69 IN | HEART RATE: 102 BPM | TEMPERATURE: 97.1 F | BODY MASS INDEX: 46.65 KG/M2 | WEIGHT: 315 LBS | DIASTOLIC BLOOD PRESSURE: 57 MMHG

## 2021-05-06 DIAGNOSIS — F42.2 MIXED OBSESSIONAL THOUGHTS AND ACTS: ICD-10-CM

## 2021-05-06 DIAGNOSIS — G47.00 INSOMNIA, UNSPECIFIED TYPE: ICD-10-CM

## 2021-05-06 DIAGNOSIS — F90.2 ATTENTION DEFICIT HYPERACTIVITY DISORDER, COMBINED TYPE: ICD-10-CM

## 2021-05-06 DIAGNOSIS — F41.1 GENERALIZED ANXIETY DISORDER: ICD-10-CM

## 2021-05-06 DIAGNOSIS — F43.10 POST TRAUMATIC STRESS DISORDER (PTSD): ICD-10-CM

## 2021-05-06 PROCEDURE — 99214 OFFICE O/P EST MOD 30 MIN: CPT | Performed by: NURSE PRACTITIONER

## 2021-05-06 RX ORDER — DULOXETIN HYDROCHLORIDE 60 MG/1
60 CAPSULE, DELAYED RELEASE ORAL DAILY
Qty: 30 CAPSULE | Refills: 2 | Status: SHIPPED | OUTPATIENT
Start: 2021-05-06 | End: 2021-07-28 | Stop reason: SDUPTHER

## 2021-05-06 RX ORDER — AMITRIPTYLINE HYDROCHLORIDE 75 MG/1
75 TABLET, FILM COATED ORAL
Qty: 30 TABLET | Refills: 1 | Status: SHIPPED | OUTPATIENT
Start: 2021-05-06 | End: 2021-05-24 | Stop reason: SDUPTHER

## 2021-05-06 RX ORDER — DEXTROAMPHETAMINE SACCHARATE, AMPHETAMINE ASPARTATE, DEXTROAMPHETAMINE SULFATE AND AMPHETAMINE SULFATE 3.75; 3.75; 3.75; 3.75 MG/1; MG/1; MG/1; MG/1
15 TABLET ORAL 2 TIMES DAILY
Qty: 60 TABLET | Refills: 0 | Status: SHIPPED | OUTPATIENT
Start: 2021-05-06 | End: 2021-06-14 | Stop reason: SDUPTHER

## 2021-05-06 RX ORDER — CLONIDINE HYDROCHLORIDE 0.1 MG/1
0.1 TABLET ORAL 2 TIMES DAILY
Qty: 60 TABLET | Refills: 1 | Status: SHIPPED | OUTPATIENT
Start: 2021-05-06 | End: 2021-06-21 | Stop reason: SDUPTHER

## 2021-05-06 NOTE — PROGRESS NOTES
Subjective   Andrew Narayan is a 30 y.o. male is here today for medication management follow-up.Presents by himself. Student lan allen present with patient's permission.      Chief Complaint:  Recheck on anxiety and hallucinations and ADHD    History of Present Illness: Pt presents for follow up at the [place] [facility].  He feels he is doing well.  Says he can tell an improvement in his overall mood.  Denies depression.  No SI. No AVH.  Still some anxiety especially with talking with people and social situations.  No negative side effects to the meds.  Has started testosterone replacement. Is having some night sweats.  Testosterone has not seemed to cause any increased agitation.  Body mass index is 52.31 kg/m². weight gain 3 lbs since last visit.  Is having occasional nightmares.  Sleep is inconsistent.  No medical stressors.        The following portions of the patient's history were reviewed and updated as appropriate: allergies, current medications, past family history, past medical history, past social history, past surgical history and problem list.    Review of Systems   Constitutional: Negative for activity change, appetite change and fatigue.   HENT: Negative.    Eyes: Negative for visual disturbance.   Respiratory: Negative.    Cardiovascular: Negative.    Gastrointestinal: Negative for nausea.   Endocrine: Negative.    Genitourinary: Negative.    Musculoskeletal: Positive for arthralgias.   Skin: Negative.    Allergic/Immunologic: Negative.    Neurological: Negative for dizziness, seizures and headaches.   Hematological: Negative.    Psychiatric/Behavioral: Positive for decreased concentration. Negative for agitation, behavioral problems, confusion, dysphoric mood, hallucinations, self-injury, sleep disturbance and suicidal ideas. The patient is nervous/anxious. The patient is not hyperactive.        Objective   Physical Exam   Constitutional: He is oriented to person, place, and  "time. He appears well-developed.   Neurological: He is alert and oriented to person, place, and time.   Psychiatric: His speech is normal and behavior is normal. Mood and thought content normal. His mood appears not anxious. He expresses impulsivity.   Engaging in conversation   Vitals reviewed.    Blood pressure 145/57, pulse 102, temperature 97.1 °F (36.2 °C), temperature source Temporal, height 175.3 cm (69.02\"), weight (!) 161 kg (354 lb 6.4 oz), SpO2 94 %.    Medication List:   Current Outpatient Medications   Medication Sig Dispense Refill   • amitriptyline (ELAVIL) 75 MG tablet Take 1 tablet by mouth every night at bedtime. 30 tablet 1   • amphetamine-dextroamphetamine (Adderall) 15 MG tablet Take 1 tablet by mouth 2 (Two) Times a Day. 60 tablet 0   • cloNIDine (CATAPRES) 0.1 MG tablet Take 1 tablet by mouth 2 (Two) Times a Day. 60 tablet 1   • DULoxetine (CYMBALTA) 60 MG capsule Take 1 capsule by mouth Daily. 30 capsule 2   • gabapentin (NEURONTIN) 600 MG tablet Take 600 mg by mouth 6 (Six) Times a Day.     • metoprolol tartrate (LOPRESSOR) 50 MG tablet Take 0.5 tablets by mouth 2 (Two) Times a Day. 60 tablet 5   • ondansetron ODT (ZOFRAN-ODT) 4 MG disintegrating tablet Place 1 tablet on the tongue Every 6 (Six) Hours As Needed for Nausea or Vomiting. 12 tablet 0   • oxyCODONE (Roxicodone) 5 MG immediate release tablet Take 1 tablet by mouth Every 4 (Four) Hours As Needed for Moderate Pain . 12 tablet 0   • pantoprazole (Protonix) 40 MG EC tablet Take 1 tablet by mouth Daily. (Patient taking differently: Take 40 mg by mouth As Needed.) 30 tablet 2   • polyethylene glycol (MIRALAX) 17 g packet Take 17 g by mouth Daily As Needed (constipation). 100 each 2   • rosuvastatin (CRESTOR) 40 MG tablet Take 1 tablet by mouth Daily. 30 tablet 11   • Syringe 25G X 5/8\" 3 ML misc Use as directed 2 x weekly 24 each 3   • Testosterone Cypionate (Depo-Testosterone) 200 MG/ML injection Inject 1/2 cc subcutaneously every " Monday and Thursday 10 mL 2     No current facility-administered medications for this visit.       Mental Status Exam:   Hygiene:   good  Cooperation:  Cooperative  Eye Contact:  Good  Psychomotor Behavior:  Restless  Affect:  Full range  Hopelessness: Denies  Speech:  Normal  Thought Process:  Goal directed  Thought Content:  Normal  Suicidal:  None  Homicidal:  None  Hallucinations:  Auditory  Delusion:  None  Memory:  Intact  Orientation:  Person, Place, Time and Situation  Reliability:  fair  Insight:  Fair  Judgement:  Fair  Impulse Control:  Poor  Physical/Medical Issues:  Yes obesity, HTN, pain    Assessment/Plan   Problems Addressed this Visit        Sleep    Insomnia    Relevant Medications    amitriptyline (ELAVIL) 75 MG tablet      Other Visit Diagnoses     Mixed obsessional thoughts and acts        Relevant Medications    DULoxetine (CYMBALTA) 60 MG capsule    Post traumatic stress disorder (PTSD)        Relevant Medications    DULoxetine (CYMBALTA) 60 MG capsule    amitriptyline (ELAVIL) 75 MG tablet    amphetamine-dextroamphetamine (Adderall) 15 MG tablet    Generalized anxiety disorder        Relevant Medications    DULoxetine (CYMBALTA) 60 MG capsule    amitriptyline (ELAVIL) 75 MG tablet    amphetamine-dextroamphetamine (Adderall) 15 MG tablet    Attention deficit hyperactivity disorder, combined type        Relevant Medications    DULoxetine (CYMBALTA) 60 MG capsule    cloNIDine (CATAPRES) 0.1 MG tablet    amitriptyline (ELAVIL) 75 MG tablet    amphetamine-dextroamphetamine (Adderall) 15 MG tablet      Diagnoses       Codes Comments    Mixed obsessional thoughts and acts     ICD-10-CM: F42.2  ICD-9-CM: 300.3     Post traumatic stress disorder (PTSD)     ICD-10-CM: F43.10  ICD-9-CM: 309.81     Generalized anxiety disorder     ICD-10-CM: F41.1  ICD-9-CM: 300.02     Attention deficit hyperactivity disorder, combined type     ICD-10-CM: F90.2  ICD-9-CM: 314.01     Insomnia, unspecified type      ICD-10-CM: G47.00  ICD-9-CM: 780.52       azalia reviewed.  UDS in past reviewed.    Functionality: pt having moderate impairment in important areas of daily functioning.  Prognosis: Good dependent on medication/follow up and treatment plan compliance.      He is to continue the cymbalta for the depression, catapres for the adhd, elavil for sleep and the adderall for the adhd.  Refills submitted.     Continuing efforts to promote the therapeutic alliance, address the patient's issues, and strengthen self awareness, insights, and coping skills.   Patient is aware to call 911 or go to the nearest ER should begin having SI/HI. Pt will notify me should he have any negative side effects or any worsening depression.  RTC 6 weeks.  Sooner if needed.

## 2021-05-24 ENCOUNTER — TELEPHONE (OUTPATIENT)
Dept: PSYCHIATRY | Facility: CLINIC | Age: 31
End: 2021-05-24

## 2021-05-24 DIAGNOSIS — G47.00 INSOMNIA, UNSPECIFIED TYPE: ICD-10-CM

## 2021-05-24 RX ORDER — AMITRIPTYLINE HYDROCHLORIDE 75 MG/1
75 TABLET, FILM COATED ORAL
Qty: 30 TABLET | Refills: 1 | Status: SHIPPED | OUTPATIENT
Start: 2021-05-24 | End: 2021-07-26 | Stop reason: SDUPTHER

## 2021-06-08 ENCOUNTER — OFFICE VISIT (OUTPATIENT)
Dept: FAMILY MEDICINE CLINIC | Facility: CLINIC | Age: 31
End: 2021-06-08

## 2021-06-08 VITALS
BODY MASS INDEX: 46.65 KG/M2 | HEART RATE: 104 BPM | WEIGHT: 315 LBS | DIASTOLIC BLOOD PRESSURE: 70 MMHG | HEIGHT: 69 IN | TEMPERATURE: 96.8 F | OXYGEN SATURATION: 97 % | SYSTOLIC BLOOD PRESSURE: 134 MMHG

## 2021-06-08 DIAGNOSIS — R60.0 LOCALIZED EDEMA: ICD-10-CM

## 2021-06-08 DIAGNOSIS — J02.9 SORE THROAT: Primary | ICD-10-CM

## 2021-06-08 DIAGNOSIS — R05.9 COUGH: ICD-10-CM

## 2021-06-08 DIAGNOSIS — E66.01 CLASS 3 SEVERE OBESITY WITH SERIOUS COMORBIDITY AND BODY MASS INDEX (BMI) OF 50.0 TO 59.9 IN ADULT, UNSPECIFIED OBESITY TYPE (HCC): ICD-10-CM

## 2021-06-08 LAB
EXPIRATION DATE: NORMAL
EXPIRATION DATE: NORMAL
FLUAV AG NPH QL: NEGATIVE
FLUBV AG NPH QL: NEGATIVE
INTERNAL CONTROL: NORMAL
INTERNAL CONTROL: NORMAL
Lab: NORMAL
Lab: NORMAL
S PYO RRNA THROAT QL PROBE: NEGATIVE

## 2021-06-08 PROCEDURE — 87651 STREP A DNA AMP PROBE: CPT | Performed by: FAMILY MEDICINE

## 2021-06-08 PROCEDURE — 87804 INFLUENZA ASSAY W/OPTIC: CPT | Performed by: FAMILY MEDICINE

## 2021-06-08 PROCEDURE — 93000 ELECTROCARDIOGRAM COMPLETE: CPT | Performed by: FAMILY MEDICINE

## 2021-06-08 PROCEDURE — 99214 OFFICE O/P EST MOD 30 MIN: CPT | Performed by: FAMILY MEDICINE

## 2021-06-08 PROCEDURE — 36415 COLL VENOUS BLD VENIPUNCTURE: CPT | Performed by: FAMILY MEDICINE

## 2021-06-08 PROCEDURE — 80050 GENERAL HEALTH PANEL: CPT | Performed by: FAMILY MEDICINE

## 2021-06-08 RX ORDER — AMOXICILLIN AND CLAVULANATE POTASSIUM 875; 125 MG/1; MG/1
1 TABLET, FILM COATED ORAL EVERY 12 HOURS SCHEDULED
Qty: 14 TABLET | Refills: 0 | Status: SHIPPED | OUTPATIENT
Start: 2021-06-08 | End: 2021-10-05

## 2021-06-08 RX ORDER — FUROSEMIDE 20 MG/1
20 TABLET ORAL DAILY
Qty: 7 TABLET | Refills: 0 | Status: SHIPPED | OUTPATIENT
Start: 2021-06-08 | End: 2021-10-28
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
## 2021-06-09 ENCOUNTER — TELEPHONE (OUTPATIENT)
Dept: GENERAL RADIOLOGY | Facility: CLINIC | Age: 31
End: 2021-06-09

## 2021-06-09 LAB
ALBUMIN SERPL-MCNC: 4.7 G/DL (ref 3.5–5.2)
ALBUMIN/GLOB SERPL: 1.6 G/DL
ALP SERPL-CCNC: 72 U/L (ref 39–117)
ALT SERPL W P-5'-P-CCNC: 15 U/L (ref 1–41)
ANION GAP SERPL CALCULATED.3IONS-SCNC: 11 MMOL/L (ref 5–15)
AST SERPL-CCNC: 19 U/L (ref 1–40)
BASOPHILS # BLD AUTO: 0.05 10*3/MM3 (ref 0–0.2)
BASOPHILS NFR BLD AUTO: 0.3 % (ref 0–1.5)
BILIRUB SERPL-MCNC: 0.5 MG/DL (ref 0–1.2)
BUN SERPL-MCNC: 10 MG/DL (ref 6–20)
BUN/CREAT SERPL: 8.3 (ref 7–25)
CALCIUM SPEC-SCNC: 10.2 MG/DL (ref 8.6–10.5)
CHLORIDE SERPL-SCNC: 100 MMOL/L (ref 98–107)
CO2 SERPL-SCNC: 29 MMOL/L (ref 22–29)
CREAT SERPL-MCNC: 1.2 MG/DL (ref 0.76–1.27)
DEPRECATED RDW RBC AUTO: 44.8 FL (ref 37–54)
EOSINOPHIL # BLD AUTO: 0.22 10*3/MM3 (ref 0–0.4)
EOSINOPHIL NFR BLD AUTO: 1.5 % (ref 0.3–6.2)
ERYTHROCYTE [DISTWIDTH] IN BLOOD BY AUTOMATED COUNT: 15.2 % (ref 12.3–15.4)
GFR SERPL CREATININE-BSD FRML MDRD: 70 ML/MIN/1.73
GLOBULIN UR ELPH-MCNC: 3 GM/DL
GLUCOSE SERPL-MCNC: 82 MG/DL (ref 65–99)
HCT VFR BLD AUTO: 54.8 % (ref 37.5–51)
HGB BLD-MCNC: 17.8 G/DL (ref 13–17.7)
IMM GRANULOCYTES # BLD AUTO: 0.07 10*3/MM3 (ref 0–0.05)
IMM GRANULOCYTES NFR BLD AUTO: 0.5 % (ref 0–0.5)
LYMPHOCYTES # BLD AUTO: 4.04 10*3/MM3 (ref 0.7–3.1)
LYMPHOCYTES NFR BLD AUTO: 27.5 % (ref 19.6–45.3)
MCH RBC QN AUTO: 28.2 PG (ref 26.6–33)
MCHC RBC AUTO-ENTMCNC: 32.5 G/DL (ref 31.5–35.7)
MCV RBC AUTO: 86.7 FL (ref 79–97)
MONOCYTES # BLD AUTO: 0.86 10*3/MM3 (ref 0.1–0.9)
MONOCYTES NFR BLD AUTO: 5.9 % (ref 5–12)
NEUTROPHILS NFR BLD AUTO: 64.3 % (ref 42.7–76)
NEUTROPHILS NFR BLD AUTO: 9.46 10*3/MM3 (ref 1.7–7)
NRBC BLD AUTO-RTO: 0.1 /100 WBC (ref 0–0.2)
PLATELET # BLD AUTO: 378 10*3/MM3 (ref 140–450)
PMV BLD AUTO: 10.3 FL (ref 6–12)
POTASSIUM SERPL-SCNC: 4.8 MMOL/L (ref 3.5–5.2)
PROT SERPL-MCNC: 7.7 G/DL (ref 6–8.5)
RBC # BLD AUTO: 6.32 10*6/MM3 (ref 4.14–5.8)
SODIUM SERPL-SCNC: 140 MMOL/L (ref 136–145)
TSH SERPL DL<=0.05 MIU/L-ACNC: 2.77 UIU/ML (ref 0.27–4.2)
WBC # BLD AUTO: 14.7 10*3/MM3 (ref 3.4–10.8)

## 2021-06-09 NOTE — TELEPHONE ENCOUNTER
PA submitted for Saxenda. Waiting for a response.     PA was denied. [facility] will not pay for weight loss medications. I have the paperwork if you want to fill the forms out.

## 2021-06-14 DIAGNOSIS — F90.2 ATTENTION DEFICIT HYPERACTIVITY DISORDER, COMBINED TYPE: ICD-10-CM

## 2021-06-14 RX ORDER — DEXTROAMPHETAMINE SACCHARATE, AMPHETAMINE ASPARTATE, DEXTROAMPHETAMINE SULFATE AND AMPHETAMINE SULFATE 3.75; 3.75; 3.75; 3.75 MG/1; MG/1; MG/1; MG/1
15 TABLET ORAL 2 TIMES DAILY
Qty: 60 TABLET | Refills: 0 | Status: SHIPPED | OUTPATIENT
Start: 2021-06-14 | End: 2021-07-12 | Stop reason: SDUPTHER

## 2021-06-18 NOTE — TELEPHONE ENCOUNTER
Pt informed that medication was not covered by insurance plan, and to discuss options at next visit. He was told to limit carbs and to exercise. Patient voiced understanding.

## 2021-06-18 NOTE — TELEPHONE ENCOUNTER
Close and scan this. Let him know that [facility] is not covering weight loss medications at this point and we will discuss options at his next visit. In the meantime, decrease carbs and exercise.

## 2021-06-21 ENCOUNTER — OFFICE VISIT (OUTPATIENT)
Dept: PSYCHIATRY | Facility: CLINIC | Age: 31
End: 2021-06-21

## 2021-06-21 VITALS
TEMPERATURE: 96.9 F | HEART RATE: 104 BPM | BODY MASS INDEX: 46.65 KG/M2 | HEIGHT: 69 IN | SYSTOLIC BLOOD PRESSURE: 145 MMHG | OXYGEN SATURATION: 95 % | DIASTOLIC BLOOD PRESSURE: 85 MMHG | WEIGHT: 315 LBS

## 2021-06-21 DIAGNOSIS — F43.10 POST TRAUMATIC STRESS DISORDER (PTSD): ICD-10-CM

## 2021-06-21 DIAGNOSIS — F90.2 ATTENTION DEFICIT HYPERACTIVITY DISORDER, COMBINED TYPE: ICD-10-CM

## 2021-06-21 DIAGNOSIS — F42.2 MIXED OBSESSIONAL THOUGHTS AND ACTS: Primary | ICD-10-CM

## 2021-06-21 DIAGNOSIS — F41.1 GENERALIZED ANXIETY DISORDER: ICD-10-CM

## 2021-06-21 PROCEDURE — 99214 OFFICE O/P EST MOD 30 MIN: CPT | Performed by: NURSE PRACTITIONER

## 2021-06-21 RX ORDER — CLONIDINE HYDROCHLORIDE 0.1 MG/1
0.1 TABLET ORAL 2 TIMES DAILY
Qty: 60 TABLET | Refills: 1 | Status: SHIPPED | OUTPATIENT
Start: 2021-06-21 | End: 2021-07-28 | Stop reason: SDUPTHER
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
## 2021-06-23 ENCOUNTER — TELEPHONE (OUTPATIENT)
Dept: FAMILY MEDICINE CLINIC | Facility: CLINIC | Age: 31
End: 2021-06-23

## 2021-06-23 NOTE — TELEPHONE ENCOUNTER
----- Message from APOLINAR Martínez sent at 6/21/2021  1:09 PM EDT -----  Make his next upcoming appointments 15 minutes

## 2021-07-12 ENCOUNTER — TELEPHONE (OUTPATIENT)
Dept: FAMILY MEDICINE CLINIC | Facility: CLINIC | Age: 31
End: 2021-07-12

## 2021-07-12 DIAGNOSIS — F90.2 ATTENTION DEFICIT HYPERACTIVITY DISORDER, COMBINED TYPE: ICD-10-CM

## 2021-07-12 RX ORDER — DEXTROAMPHETAMINE SACCHARATE, AMPHETAMINE ASPARTATE, DEXTROAMPHETAMINE SULFATE AND AMPHETAMINE SULFATE 3.75; 3.75; 3.75; 3.75 MG/1; MG/1; MG/1; MG/1
15 TABLET ORAL 2 TIMES DAILY
Qty: 60 TABLET | Refills: 0 | Status: SHIPPED | OUTPATIENT
Start: 2021-07-12 | End: 2021-08-09 | Stop reason: SDUPTHER

## 2021-07-12 RX ORDER — DEXTROAMPHETAMINE SACCHARATE, AMPHETAMINE ASPARTATE, DEXTROAMPHETAMINE SULFATE AND AMPHETAMINE SULFATE 3.75; 3.75; 3.75; 3.75 MG/1; MG/1; MG/1; MG/1
TABLET ORAL
Qty: 60 TABLET | Refills: 0 | OUTPATIENT
Start: 2021-07-12

## 2021-07-22 ENCOUNTER — TELEPHONE (OUTPATIENT)
Dept: FAMILY MEDICINE CLINIC | Facility: CLINIC | Age: 31
End: 2021-07-22

## 2021-07-22 NOTE — TELEPHONE ENCOUNTER
Caller: Andrew Narayan    Relationship to patient: Self    Best call back number: [phone]     Chief complaint: FATIGUE, SHORTNESS OF BREATH WHEN MOVING AT ALL; BECOMES WINDED, SWEATS EXCESSIVELY THROUGHOUT THE DAY THE WORST BUT ALSO AT NIGHT.     Type of visit: OFFICE VISIT     Requested date: ASAP EXCEPT 7/22/21    If rescheduling, when is the original appointment:     Additional notes: PATIENT IS REQUESTING TO SEE PCP ONLY

## 2021-07-22 NOTE — TELEPHONE ENCOUNTER
INFORMED PATIENT THAT DR LOPEZ DOES NOT HAVE ANY OPENINGS TODAY OR TOMORROW AS OF RIGHT NOW. ENCOURAGED PATIENT TO CALL OUR OFFICE TOMORROW AT 8:00 A.M. FOR POSSIBLE CANCELLATION APPT. ALSO ENCOURAGED PATIENT TO GO TO NEAREST EMERGENCY ROOM IF SYMPTOMS PERSISTED.

## 2021-07-26 DIAGNOSIS — G47.00 INSOMNIA, UNSPECIFIED TYPE: ICD-10-CM

## 2021-07-26 RX ORDER — AMITRIPTYLINE HYDROCHLORIDE 75 MG/1
75 TABLET, FILM COATED ORAL
Qty: 30 TABLET | Refills: 1 | Status: SHIPPED | OUTPATIENT
Start: 2021-07-26 | End: 2021-07-28 | Stop reason: SDUPTHER

## 2021-07-28 ENCOUNTER — OFFICE VISIT (OUTPATIENT)
Dept: PSYCHIATRY | Facility: CLINIC | Age: 31
End: 2021-07-28

## 2021-07-28 VITALS
DIASTOLIC BLOOD PRESSURE: 74 MMHG | HEIGHT: 69 IN | WEIGHT: 315 LBS | HEART RATE: 105 BPM | TEMPERATURE: 98 F | BODY MASS INDEX: 46.65 KG/M2 | SYSTOLIC BLOOD PRESSURE: 120 MMHG

## 2021-07-28 DIAGNOSIS — F42.2 MIXED OBSESSIONAL THOUGHTS AND ACTS: ICD-10-CM

## 2021-07-28 DIAGNOSIS — F51.5 NIGHTMARES: ICD-10-CM

## 2021-07-28 DIAGNOSIS — F43.10 POST TRAUMATIC STRESS DISORDER (PTSD): ICD-10-CM

## 2021-07-28 DIAGNOSIS — F41.1 GENERALIZED ANXIETY DISORDER: ICD-10-CM

## 2021-07-28 DIAGNOSIS — F90.2 ATTENTION DEFICIT HYPERACTIVITY DISORDER, COMBINED TYPE: Primary | ICD-10-CM

## 2021-07-28 DIAGNOSIS — G47.00 INSOMNIA, UNSPECIFIED TYPE: ICD-10-CM

## 2021-07-28 PROBLEM — F90.9 ADHD (ATTENTION DEFICIT HYPERACTIVITY DISORDER): Status: ACTIVE | Noted: 2021-07-28

## 2021-07-28 PROCEDURE — 99214 OFFICE O/P EST MOD 30 MIN: CPT | Performed by: NURSE PRACTITIONER

## 2021-07-28 RX ORDER — DULOXETIN HYDROCHLORIDE 60 MG/1
60 CAPSULE, DELAYED RELEASE ORAL DAILY
Qty: 30 CAPSULE | Refills: 2 | Status: SHIPPED | OUTPATIENT
Start: 2021-07-28 | End: 2021-09-08 | Stop reason: SDUPTHER

## 2021-07-28 RX ORDER — AMITRIPTYLINE HYDROCHLORIDE 75 MG/1
75 TABLET, FILM COATED ORAL
Qty: 30 TABLET | Refills: 2 | Status: SHIPPED | OUTPATIENT
Start: 2021-07-28 | End: 2021-09-08 | Stop reason: SDUPTHER

## 2021-07-28 RX ORDER — DULOXETIN HYDROCHLORIDE 30 MG/1
30 CAPSULE, DELAYED RELEASE ORAL DAILY
Qty: 30 CAPSULE | Refills: 2 | Status: SHIPPED | OUTPATIENT
Start: 2021-07-28 | End: 2021-09-08

## 2021-07-28 RX ORDER — CLONIDINE HYDROCHLORIDE 0.1 MG/1
0.1 TABLET ORAL 2 TIMES DAILY
Qty: 60 TABLET | Refills: 2 | Status: SHIPPED | OUTPATIENT
Start: 2021-07-28 | End: 2021-09-08 | Stop reason: SDUPTHER

## 2021-07-28 RX ORDER — PRAZOSIN HYDROCHLORIDE 1 MG/1
1 CAPSULE ORAL NIGHTLY
Qty: 30 CAPSULE | Refills: 2 | Status: SHIPPED | OUTPATIENT
Start: 2021-07-28 | End: 2021-09-08 | Stop reason: SDUPTHER

## 2021-07-28 NOTE — PROGRESS NOTES
Subjective   Andrew Narayan is a 30 y.o. male is here today for medication management follow-up.Presents by himself.   Chief Complaint:  Recheck on anxiety and hallucinations and ADHD    History of Present Illness: Pt presents for follow up at the [facility].  Pt states he has tried several jobs.  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  Says he was panicking all the time and vomiting throughout the day.  Says he cannot talk to people he is not familiar with.  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  Says he is mainly just staying at his house.  Cannot stand to leave his home.  Has intense fear of talking or being around people he does not know.  He still has some repetitive thoughts at times but much improved on the cymbalta.  Still has daily flashbacks to previous trauma.  Says they are less intense on the cymbalta but still there.  Sleeping well however having nightmares nearly nightly.  Does not dream about the same thing.  Body mass index is 52.93 kg/m². weight loss 9 lbs since last visit.  No medical stressors.  No negative side effects to the meds.  Took prazosin in the past just stopped it when he stopped all medication.  Tolerated it well.  No anger outbursts.  No SI, HI, or any AVH.  Depression is stable.  Continues with anxiety.  Says the adderall has helped more than anything with racing thoughts.           The following portions of the patient's history were reviewed and updated as appropriate: allergies, current medications, past family history, past medical history, past social history, past surgical history and problem list.    Review of Systems   Constitutional: Negative for activity change, appetite change and fatigue.   HENT: Negative.    Eyes: Negative for visual disturbance.   Respiratory: Negative.    Cardiovascular: Negative.    Gastrointestinal: Negative for nausea.   Endocrine:  "Negative.    Genitourinary: Negative.    Musculoskeletal: Positive for arthralgias.   Skin: Negative.    Allergic/Immunologic: Negative.    Neurological: Negative for dizziness, seizures and headaches.   Hematological: Negative.    Psychiatric/Behavioral: Positive for decreased concentration. Negative for agitation, behavioral problems, confusion, dysphoric mood, hallucinations, self-injury, sleep disturbance and suicidal ideas. The patient is nervous/anxious. The patient is not hyperactive.         Nightmares       Objective   Physical Exam   Constitutional: He is oriented to person, place, and time. He appears well-developed.   HENT:   Head: Normocephalic.   Neurological: He is alert and oriented to person, place, and time.   Psychiatric: His speech is normal and behavior is normal. Mood and thought content normal. His mood appears not anxious. He expresses impulsivity.   Engaging in conversation   Vitals reviewed.    Blood pressure 120/74, pulse 105, temperature 98 °F (36.7 °C), height 175.3 cm (69.02\"), weight (!) 163 kg (358 lb 9.6 oz).    Medication List:   Current Outpatient Medications   Medication Sig Dispense Refill   • amitriptyline (ELAVIL) 75 MG tablet Take 1 tablet by mouth every night at bedtime. 30 tablet 2   • amphetamine-dextroamphetamine (Adderall) 15 MG tablet Take 1 tablet by mouth 2 (Two) Times a Day. 60 tablet 0   • cloNIDine (CATAPRES) 0.1 MG tablet Take 1 tablet by mouth 2 (Two) Times a Day. 60 tablet 2   • DULoxetine (CYMBALTA) 60 MG capsule Take 1 capsule by mouth Daily. Take along with the 30mg for total of 90mg 30 capsule 2   • furosemide (LASIX) 20 MG tablet Take 1 tablet by mouth Daily. 7 tablet 0   • gabapentin (NEURONTIN) 600 MG tablet Take 600 mg by mouth 6 (Six) Times a Day.     • metoprolol tartrate (LOPRESSOR) 50 MG tablet Take 0.5 tablets by mouth 2 (Two) Times a Day. 60 tablet 5   • rosuvastatin (CRESTOR) 40 MG tablet Take 1 tablet by mouth Daily. 30 tablet 11   • " amoxicillin-clavulanate (Augmentin) 875-125 MG per tablet Take 1 tablet by mouth Every 12 (Twelve) Hours. 14 tablet 0   • DULoxetine (Cymbalta) 30 MG capsule Take 1 capsule by mouth Daily. Take along with the 60mg for total of 90mg 30 capsule 2   • prazosin (MINIPRESS) 1 MG capsule Take 1 capsule by mouth Every Night. 30 capsule 2     No current facility-administered medications for this visit.       Mental Status Exam:   Hygiene:   good  Cooperation:  Cooperative  Eye Contact:  Good  Psychomotor Behavior:  Appropriate  Affect:  Full range  Hopelessness: Denies  Speech:  Normal  Thought Process:  Goal directed  Thought Content:  Normal  Suicidal:  None  Homicidal:  None  Hallucinations:  Auditory  Delusion:  None  Memory:  Intact  Orientation:  Person, Place, Time and Situation  Reliability:  fair  Insight:  Fair  Judgement:  Fair  Impulse Control:  Poor  Physical/Medical Issues:  Yes obesity, HTN, pain    Assessment/Plan   Problems Addressed this Visit        Sleep    Insomnia    Relevant Medications    amitriptyline (ELAVIL) 75 MG tablet      Other Visit Diagnoses     Attention deficit hyperactivity disorder, combined type    -  Primary    Relevant Medications    DULoxetine (CYMBALTA) 60 MG capsule    amitriptyline (ELAVIL) 75 MG tablet    cloNIDine (CATAPRES) 0.1 MG tablet    DULoxetine (Cymbalta) 30 MG capsule    Mixed obsessional thoughts and acts        Relevant Medications    DULoxetine (CYMBALTA) 60 MG capsule    Post traumatic stress disorder (PTSD)        Relevant Medications    DULoxetine (CYMBALTA) 60 MG capsule    amitriptyline (ELAVIL) 75 MG tablet    DULoxetine (Cymbalta) 30 MG capsule    prazosin (MINIPRESS) 1 MG capsule    Generalized anxiety disorder        Relevant Medications    DULoxetine (CYMBALTA) 60 MG capsule    amitriptyline (ELAVIL) 75 MG tablet    DULoxetine (Cymbalta) 30 MG capsule    Nightmares        Relevant Medications    DULoxetine (CYMBALTA) 60 MG capsule    amitriptyline (ELAVIL)  75 MG tablet    DULoxetine (Cymbalta) 30 MG capsule    prazosin (MINIPRESS) 1 MG capsule      Diagnoses       Codes Comments    Attention deficit hyperactivity disorder, combined type    -  Primary ICD-10-CM: F90.2  ICD-9-CM: 314.01     Mixed obsessional thoughts and acts     ICD-10-CM: F42.2  ICD-9-CM: 300.3     Post traumatic stress disorder (PTSD)     ICD-10-CM: F43.10  ICD-9-CM: 309.81     Generalized anxiety disorder     ICD-10-CM: F41.1  ICD-9-CM: 300.02     Insomnia, unspecified type     ICD-10-CM: G47.00  ICD-9-CM: 780.52     Nightmares     ICD-10-CM: F51.5  ICD-9-CM: 307.47       azalia reviewed.  UDS in past reviewed.    Functionality: pt having moderate impairment in important areas of daily functioning.  Prognosis: Good dependent on medication/follow up and treatment plan compliance.      I am increasing the cymbalta to 90mg for the ongoing flashbacks and OCD.  I am also restarting prazosin for the nightmares.  He has been instructed to monitor his blood pressure and heart rate on the med.  He is to continue the catapres for the adhd, elavil for sleep and the adderall for the adhd.  Appropriate Refills submitted.  He continues to not be interested in therapy at this time.  .     Continuing efforts to promote the therapeutic alliance, address the patient's issues, and strengthen self awareness, insights, and coping skills.   Patient is aware to call 911 or go to the nearest ER should begin having SI/HI. Pt will notify me should he have any negative side effects or any worsening depression.  RTC 6 weeks.  Sooner if needed.

## 2021-08-09 DIAGNOSIS — F90.2 ATTENTION DEFICIT HYPERACTIVITY DISORDER, COMBINED TYPE: ICD-10-CM

## 2021-08-09 RX ORDER — DEXTROAMPHETAMINE SACCHARATE, AMPHETAMINE ASPARTATE, DEXTROAMPHETAMINE SULFATE AND AMPHETAMINE SULFATE 3.75; 3.75; 3.75; 3.75 MG/1; MG/1; MG/1; MG/1
15 TABLET ORAL 2 TIMES DAILY
Qty: 60 TABLET | Refills: 0 | Status: SHIPPED | OUTPATIENT
Start: 2021-08-09 | End: 2021-09-08 | Stop reason: SDUPTHER

## 2021-08-31 DIAGNOSIS — I10 ESSENTIAL HYPERTENSION: ICD-10-CM

## 2021-08-31 RX ORDER — METOPROLOL TARTRATE 50 MG/1
25 TABLET, FILM COATED ORAL 2 TIMES DAILY
Qty: 60 TABLET | Refills: 5 | OUTPATIENT
Start: 2021-08-31 | End: 2021-09-08 | Stop reason: SDUPTHER

## 2021-08-31 NOTE — TELEPHONE ENCOUNTER
Caller: Andrew Narayan    Relationship: Self    Best call back number: [phone]    Medication needed:   Requested Prescriptions     Pending Prescriptions Disp Refills   • metoprolol tartrate (LOPRESSOR) 50 MG tablet 60 tablet 5     Sig: Take 0.5 tablets by mouth 2 (Two) Times a Day.       When do you need the refill by: 08/31/2021    What additional details did the patient provide when requesting the medication: PATIENT HAS A DAYS WORTH LEFT     Does the patient have less than a 3 day supply:  [x] Yes  [] No    What is the patient's preferred pharmacy: [address].  [phone] [facility] [phone]

## 2021-09-08 ENCOUNTER — TELEPHONE (OUTPATIENT)
Dept: FAMILY MEDICINE CLINIC | Facility: CLINIC | Age: 31
End: 2021-09-08

## 2021-09-08 ENCOUNTER — OFFICE VISIT (OUTPATIENT)
Dept: PSYCHIATRY | Facility: CLINIC | Age: 31
End: 2021-09-08

## 2021-09-08 VITALS
SYSTOLIC BLOOD PRESSURE: 136 MMHG | HEART RATE: 100 BPM | BODY MASS INDEX: 46.65 KG/M2 | TEMPERATURE: 98 F | WEIGHT: 315 LBS | DIASTOLIC BLOOD PRESSURE: 86 MMHG | HEIGHT: 69 IN

## 2021-09-08 DIAGNOSIS — F41.1 GENERALIZED ANXIETY DISORDER: ICD-10-CM

## 2021-09-08 DIAGNOSIS — G47.00 INSOMNIA, UNSPECIFIED TYPE: ICD-10-CM

## 2021-09-08 DIAGNOSIS — F43.10 POST TRAUMATIC STRESS DISORDER (PTSD): ICD-10-CM

## 2021-09-08 DIAGNOSIS — F90.2 ATTENTION DEFICIT HYPERACTIVITY DISORDER, COMBINED TYPE: Primary | ICD-10-CM

## 2021-09-08 DIAGNOSIS — F51.5 NIGHTMARES: ICD-10-CM

## 2021-09-08 DIAGNOSIS — I10 ESSENTIAL HYPERTENSION: ICD-10-CM

## 2021-09-08 DIAGNOSIS — F42.2 MIXED OBSESSIONAL THOUGHTS AND ACTS: ICD-10-CM

## 2021-09-08 PROCEDURE — 99214 OFFICE O/P EST MOD 30 MIN: CPT | Performed by: NURSE PRACTITIONER

## 2021-09-08 RX ORDER — CLONIDINE HYDROCHLORIDE 0.1 MG/1
0.1 TABLET ORAL 2 TIMES DAILY
Qty: 60 TABLET | Refills: 2 | Status: SHIPPED | OUTPATIENT
Start: 2021-09-08 | End: 2022-01-27 | Stop reason: SDUPTHER

## 2021-09-08 RX ORDER — DULOXETIN HYDROCHLORIDE 60 MG/1
60 CAPSULE, DELAYED RELEASE ORAL DAILY
Qty: 30 CAPSULE | Refills: 2 | Status: SHIPPED | OUTPATIENT
Start: 2021-09-08 | End: 2022-01-27 | Stop reason: SDUPTHER

## 2021-09-08 RX ORDER — PRAZOSIN HYDROCHLORIDE 2 MG/1
2 CAPSULE ORAL NIGHTLY
Qty: 30 CAPSULE | Refills: 2 | Status: SHIPPED | OUTPATIENT
Start: 2021-09-08 | End: 2022-01-27 | Stop reason: SDUPTHER

## 2021-09-08 RX ORDER — DEXTROAMPHETAMINE SACCHARATE, AMPHETAMINE ASPARTATE, DEXTROAMPHETAMINE SULFATE AND AMPHETAMINE SULFATE 3.75; 3.75; 3.75; 3.75 MG/1; MG/1; MG/1; MG/1
15 TABLET ORAL 2 TIMES DAILY
Qty: 60 TABLET | Refills: 0 | Status: SHIPPED | OUTPATIENT
Start: 2021-09-08 | End: 2021-10-06 | Stop reason: SDUPTHER

## 2021-09-08 RX ORDER — METOPROLOL TARTRATE 50 MG/1
25 TABLET, FILM COATED ORAL 2 TIMES DAILY
Qty: 60 TABLET | Refills: 0 | Status: SHIPPED | OUTPATIENT
Start: 2021-09-08 | End: 2021-11-24 | Stop reason: SDUPTHER

## 2021-09-08 RX ORDER — AMITRIPTYLINE HYDROCHLORIDE 75 MG/1
75 TABLET, FILM COATED ORAL
Qty: 30 TABLET | Refills: 2 | Status: SHIPPED | OUTPATIENT
Start: 2021-09-08 | End: 2021-12-30 | Stop reason: SDUPTHER

## 2021-09-08 NOTE — PROGRESS NOTES
Subjective   Andrew Narayan is a 30 y.o. male is here today for medication management follow-up.Presents by himself.   Chief Complaint:  Recheck on anxiety and hallucinations and ADHD    History of Present Illness: Pt presents for follow up at the [facility].  Patient states that he is doing pretty good.  He cannot tell any difference in the anxiety with the increase from the Cymbalta from 60 to 90 mg.  Feels that Cymbalta has really helped him overall with depression however.  Continues to have nightmares.  The prazosin has not helped that.  No negative side effects to the addition of prazosin.  He currently denies any depression.  Continues with high anxiety.  States he just cannot hardly talk to anyone he does not know.  Will not hardly go out in public.  Panics in a beach worrying.  Such as they are waiting room and will sit in the car.  States that he does fine in the small room like this.  Continues to have flashbacks at least daily to every other day.  States that he deals with them better than in the past.  They are not impeding his daily life.  States the Adderall continues to help with overall anxiety as his mind is clearer on the medication so this does help.  He is sleeping at night however just continues with nightmares.  No new medical stressors.Body mass index is 55 kg/m². weight gain since last visit.  Plans to reapply for disability.  Mood is stable.  No anger outbursts.  Will attend public functions of things his daughter involved in extracurricular however says he can barely focus on her during it as he is so paranoid.               The following portions of the patient's history were reviewed and updated as appropriate: allergies, current medications, past family history, past medical history, past social history, past surgical history and problem list.    Review of Systems   Constitutional: Negative for activity change, appetite change and fatigue.   HENT: Negative.     Eyes: Negative for visual disturbance.   Respiratory: Negative.    Cardiovascular: Negative.    Gastrointestinal: Negative for nausea.   Endocrine: Negative.    Genitourinary: Negative.    Musculoskeletal: Positive for arthralgias.   Skin: Negative.    Allergic/Immunologic: Negative.    Neurological: Negative for dizziness, seizures and headaches.   Hematological: Negative.    Psychiatric/Behavioral: Positive for decreased concentration. Negative for agitation, behavioral problems, confusion, dysphoric mood, hallucinations, self-injury, sleep disturbance and suicidal ideas. The patient is nervous/anxious. The patient is not hyperactive.         Nightmares     Reviewed copied data and there are no changes    Objective   Physical Exam   Constitutional: He is oriented to person, place, and time. He appears well-developed.   HENT:   Head: Normocephalic.   Neurological: He is alert and oriented to person, place, and time.   Psychiatric: His speech is normal and behavior is normal. Mood and thought content normal. His mood appears not anxious. He expresses impulsivity.   Engaging in conversation   Vitals reviewed.    There were no vitals taken for this visit.    Medication List:   Current Outpatient Medications   Medication Sig Dispense Refill   • amitriptyline (ELAVIL) 75 MG tablet Take 1 tablet by mouth every night at bedtime. 30 tablet 2   • amoxicillin-clavulanate (Augmentin) 875-125 MG per tablet Take 1 tablet by mouth Every 12 (Twelve) Hours. 14 tablet 0   • amphetamine-dextroamphetamine (Adderall) 15 MG tablet Take 1 tablet by mouth 2 (Two) Times a Day. 60 tablet 0   • cloNIDine (CATAPRES) 0.1 MG tablet Take 1 tablet by mouth 2 (Two) Times a Day. 60 tablet 2   • DULoxetine (Cymbalta) 30 MG capsule Take 1 capsule by mouth Daily. Take along with the 60mg for total of 90mg 30 capsule 2   • DULoxetine (CYMBALTA) 60 MG capsule Take 1 capsule by mouth Daily. Take along with the 30mg for total of 90mg 30 capsule 2   •  furosemide (LASIX) 20 MG tablet Take 1 tablet by mouth Daily. 7 tablet 0   • gabapentin (NEURONTIN) 600 MG tablet Take 600 mg by mouth 6 (Six) Times a Day.     • metoprolol tartrate (LOPRESSOR) 50 MG tablet Take 0.5 tablets by mouth 2 (Two) Times a Day. 60 tablet 5   • prazosin (MINIPRESS) 1 MG capsule Take 1 capsule by mouth Every Night. 30 capsule 2   • rosuvastatin (CRESTOR) 40 MG tablet Take 1 tablet by mouth Daily. 30 tablet 11     No current facility-administered medications for this visit.     Reviewed copied data and there are no changes    Mental Status Exam:   Hygiene:   good  Cooperation:  Cooperative  Eye Contact:  Good  Psychomotor Behavior:  Appropriate  Affect:  Full range  Hopelessness: Denies  Speech:  Normal  Thought Process:  Goal directed  Thought Content:  Normal  Suicidal:  None  Homicidal:  None  Hallucinations:  Auditory  Delusion:  None  Memory:  Intact  Orientation:  Person, Place, Time and Situation  Reliability:  fair  Insight:  Fair  Judgement:  Fair  Impulse Control:  Poor  Physical/Medical Issues:  Yes obesity, HTN, pain    Assessment/Plan   Problems Addressed this Visit        Sleep    Insomnia      Other Visit Diagnoses     Attention deficit hyperactivity disorder, combined type    -  Primary    Mixed obsessional thoughts and acts        Post traumatic stress disorder (PTSD)        Generalized anxiety disorder          Diagnoses       Codes Comments    Attention deficit hyperactivity disorder, combined type    -  Primary ICD-10-CM: F90.2  ICD-9-CM: 314.01     Mixed obsessional thoughts and acts     ICD-10-CM: F42.2  ICD-9-CM: 300.3     Post traumatic stress disorder (PTSD)     ICD-10-CM: F43.10  ICD-9-CM: 309.81     Generalized anxiety disorder     ICD-10-CM: F41.1  ICD-9-CM: 300.02     Insomnia, unspecified type     ICD-10-CM: G47.00  ICD-9-CM: 780.52       azalia reviewed.  UDS in past reviewed an appropriate   Functionality: pt having moderate impairment in important areas of  daily functioning.  Prognosis: Good dependent on medication/follow up and treatment plan compliance.  Increasing the prazosin to 2mg and decreasing the cymbalta back down to 60mg.        I am increasing the cymbalta to 90mg for the ongoing flashbacks and OCD.  I am also restarting prazosin for the nightmares.  He has been instructed to monitor his blood pressure and heart rate on the med.  He is to continue the catapres for the adhd, elavil for sleep and the adderall for the adhd.  Appropriate Refills submitted.  He continues to not be interested in therapy at this time.  .     Continuing efforts to promote the therapeutic alliance, address the patient's issues, and strengthen self awareness, insights, and coping skills.   Patient is aware to call 911 or go to the nearest ER should begin having SI/HI. Pt will notify me should he have any negative side effects or any worsening depression.  RTC 6 weeks.  Sooner if needed.

## 2021-09-08 NOTE — TELEPHONE ENCOUNTER
Patient in office seeing [facility] today, requesting refills on Metoprolol. They were sent on 08/31/2021 but it was sent as phone in. Pharmacy did not receive. Resent per protocol.

## 2021-10-04 DIAGNOSIS — F90.2 ATTENTION DEFICIT HYPERACTIVITY DISORDER, COMBINED TYPE: ICD-10-CM

## 2021-10-04 RX ORDER — DEXTROAMPHETAMINE SACCHARATE, AMPHETAMINE ASPARTATE, DEXTROAMPHETAMINE SULFATE AND AMPHETAMINE SULFATE 3.75; 3.75; 3.75; 3.75 MG/1; MG/1; MG/1; MG/1
TABLET ORAL
Qty: 60 TABLET | Refills: 0 | OUTPATIENT
Start: 2021-10-04

## 2021-10-05 ENCOUNTER — OFFICE VISIT (OUTPATIENT)
Dept: FAMILY MEDICINE CLINIC | Facility: CLINIC | Age: 31
End: 2021-10-05

## 2021-10-05 VITALS
TEMPERATURE: 97.8 F | BODY MASS INDEX: 46.65 KG/M2 | HEIGHT: 69 IN | DIASTOLIC BLOOD PRESSURE: 78 MMHG | SYSTOLIC BLOOD PRESSURE: 128 MMHG | WEIGHT: 315 LBS | OXYGEN SATURATION: 99 % | HEART RATE: 110 BPM

## 2021-10-05 DIAGNOSIS — R59.1 LYMPHADENOPATHY OF HEAD AND NECK: ICD-10-CM

## 2021-10-05 DIAGNOSIS — M79.621 PAIN IN BOTH UPPER ARMS: Primary | ICD-10-CM

## 2021-10-05 DIAGNOSIS — M79.622 PAIN IN BOTH UPPER ARMS: Primary | ICD-10-CM

## 2021-10-05 PROCEDURE — 99214 OFFICE O/P EST MOD 30 MIN: CPT | Performed by: FAMILY MEDICINE

## 2021-10-05 PROCEDURE — 36415 COLL VENOUS BLD VENIPUNCTURE: CPT | Performed by: FAMILY MEDICINE

## 2021-10-05 PROCEDURE — 85652 RBC SED RATE AUTOMATED: CPT | Performed by: FAMILY MEDICINE

## 2021-10-05 PROCEDURE — 80053 COMPREHEN METABOLIC PANEL: CPT | Performed by: FAMILY MEDICINE

## 2021-10-05 PROCEDURE — 82550 ASSAY OF CK (CPK): CPT | Performed by: FAMILY MEDICINE

## 2021-10-05 PROCEDURE — 85025 COMPLETE CBC W/AUTO DIFF WBC: CPT | Performed by: FAMILY MEDICINE

## 2021-10-05 PROCEDURE — 83735 ASSAY OF MAGNESIUM: CPT | Performed by: FAMILY MEDICINE

## 2021-10-05 PROCEDURE — 86140 C-REACTIVE PROTEIN: CPT | Performed by: FAMILY MEDICINE
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
## 2021-10-06 DIAGNOSIS — F90.2 ATTENTION DEFICIT HYPERACTIVITY DISORDER, COMBINED TYPE: ICD-10-CM

## 2021-10-06 LAB
ALBUMIN SERPL-MCNC: 4.4 G/DL (ref 3.5–5.2)
ALBUMIN/GLOB SERPL: 1.6 G/DL
ALP SERPL-CCNC: 89 U/L (ref 39–117)
ALT SERPL W P-5'-P-CCNC: 24 U/L (ref 1–41)
ANION GAP SERPL CALCULATED.3IONS-SCNC: 12 MMOL/L (ref 5–15)
AST SERPL-CCNC: 20 U/L (ref 1–40)
BASOPHILS # BLD AUTO: 0.04 10*3/MM3 (ref 0–0.2)
BASOPHILS NFR BLD AUTO: 0.3 % (ref 0–1.5)
BILIRUB SERPL-MCNC: 0.3 MG/DL (ref 0–1.2)
BUN SERPL-MCNC: 13 MG/DL (ref 6–20)
BUN/CREAT SERPL: 12.1 (ref 7–25)
CALCIUM SPEC-SCNC: 9.8 MG/DL (ref 8.6–10.5)
CHLORIDE SERPL-SCNC: 101 MMOL/L (ref 98–107)
CK SERPL-CCNC: 118 U/L (ref 20–200)
CO2 SERPL-SCNC: 26 MMOL/L (ref 22–29)
CREAT SERPL-MCNC: 1.07 MG/DL (ref 0.76–1.27)
CRP SERPL-MCNC: 0.43 MG/DL (ref 0–0.5)
DEPRECATED RDW RBC AUTO: 44.2 FL (ref 37–54)
EOSINOPHIL # BLD AUTO: 0.43 10*3/MM3 (ref 0–0.4)
EOSINOPHIL NFR BLD AUTO: 3.2 % (ref 0.3–6.2)
ERYTHROCYTE [DISTWIDTH] IN BLOOD BY AUTOMATED COUNT: 14.3 % (ref 12.3–15.4)
ERYTHROCYTE [SEDIMENTATION RATE] IN BLOOD: 59 MM/HR (ref 0–15)
GFR SERPL CREATININE-BSD FRML MDRD: 80 ML/MIN/1.73
GLOBULIN UR ELPH-MCNC: 2.8 GM/DL
GLUCOSE SERPL-MCNC: 84 MG/DL (ref 65–99)
HCT VFR BLD AUTO: 43.7 % (ref 37.5–51)
HGB BLD-MCNC: 14.4 G/DL (ref 13–17.7)
IMM GRANULOCYTES # BLD AUTO: 0.05 10*3/MM3 (ref 0–0.05)
IMM GRANULOCYTES NFR BLD AUTO: 0.4 % (ref 0–0.5)
LYMPHOCYTES # BLD AUTO: 3.03 10*3/MM3 (ref 0.7–3.1)
LYMPHOCYTES NFR BLD AUTO: 22.4 % (ref 19.6–45.3)
MAGNESIUM SERPL-MCNC: 2.1 MG/DL (ref 1.6–2.6)
MCH RBC QN AUTO: 28.1 PG (ref 26.6–33)
MCHC RBC AUTO-ENTMCNC: 33 G/DL (ref 31.5–35.7)
MCV RBC AUTO: 85.4 FL (ref 79–97)
MONOCYTES # BLD AUTO: 0.64 10*3/MM3 (ref 0.1–0.9)
MONOCYTES NFR BLD AUTO: 4.7 % (ref 5–12)
NEUTROPHILS NFR BLD AUTO: 69 % (ref 42.7–76)
NEUTROPHILS NFR BLD AUTO: 9.31 10*3/MM3 (ref 1.7–7)
NRBC BLD AUTO-RTO: 0 /100 WBC (ref 0–0.2)
PLATELET # BLD AUTO: 324 10*3/MM3 (ref 140–450)
PMV BLD AUTO: 10.2 FL (ref 6–12)
POTASSIUM SERPL-SCNC: 4.2 MMOL/L (ref 3.5–5.2)
PROT SERPL-MCNC: 7.2 G/DL (ref 6–8.5)
RBC # BLD AUTO: 5.12 10*6/MM3 (ref 4.14–5.8)
SODIUM SERPL-SCNC: 139 MMOL/L (ref 136–145)
WBC # BLD AUTO: 13.5 10*3/MM3 (ref 3.4–10.8)

## 2021-10-06 RX ORDER — DEXTROAMPHETAMINE SACCHARATE, AMPHETAMINE ASPARTATE, DEXTROAMPHETAMINE SULFATE AND AMPHETAMINE SULFATE 3.75; 3.75; 3.75; 3.75 MG/1; MG/1; MG/1; MG/1
15 TABLET ORAL 2 TIMES DAILY
Qty: 60 TABLET | Refills: 0 | Status: SHIPPED | OUTPATIENT
Start: 2021-10-06 | End: 2021-11-05

## 2021-10-07 ENCOUNTER — TELEPHONE (OUTPATIENT)
Dept: FAMILY MEDICINE CLINIC | Facility: CLINIC | Age: 31
End: 2021-10-07

## 2021-10-07 NOTE — TELEPHONE ENCOUNTER
Caller: Andrew Narayan    Relationship: Self    Best call back number: [phone]    Caller requesting test results: SELF    What test was performed: LABWORK AND XRAY    When was the test performed: 10/05    Where was the test performed: IN OFFICE    Additional notes: PATIENT WOULD LIKE A CALL BACK TO DISCUSS RESULTS

## 2021-10-22 ENCOUNTER — HOSPITAL ENCOUNTER (OUTPATIENT)
Dept: ULTRASOUND IMAGING | Facility: HOSPITAL | Age: 31
Discharge: HOME | End: 2021-10-22
Admitting: FAMILY MEDICINE

## 2021-10-22 PROCEDURE — 76536 US EXAM OF HEAD AND NECK: CPT | Performed by: RADIOLOGY

## 2021-10-22 PROCEDURE — 76536 US EXAM OF HEAD AND NECK: CPT

## 2021-10-25 ENCOUNTER — TELEPHONE (OUTPATIENT)
Dept: FAMILY MEDICINE CLINIC | Facility: CLINIC | Age: 31
End: 2021-10-25

## 2021-10-25 NOTE — TELEPHONE ENCOUNTER
----- Message from Lyly Juarez MD sent at 10/24/2021  9:47 PM EDT -----  Please call Andrew. That one lymph node has grown just a little bit. All the other lymph nodes have disappeared. Would he like to discuss with surgery to see if they would like to biopsy?      Spoke with patient & he verbalized understanding,he is agreeable to see surgery,stated the hospital group will be fine.

## 2021-10-28 ENCOUNTER — OFFICE VISIT (OUTPATIENT)
Dept: PSYCHIATRY | Facility: CLINIC | Age: 31
End: 2021-10-28

## 2021-10-28 VITALS
TEMPERATURE: 97.1 F | HEART RATE: 82 BPM | BODY MASS INDEX: 46.65 KG/M2 | SYSTOLIC BLOOD PRESSURE: 131 MMHG | WEIGHT: 315 LBS | HEIGHT: 69 IN | OXYGEN SATURATION: 96 % | DIASTOLIC BLOOD PRESSURE: 81 MMHG

## 2021-10-28 DIAGNOSIS — F43.10 POST TRAUMATIC STRESS DISORDER (PTSD): ICD-10-CM

## 2021-10-28 DIAGNOSIS — R59.1 LYMPHADENOPATHY OF HEAD AND NECK: Primary | ICD-10-CM

## 2021-10-28 DIAGNOSIS — F42.2 MIXED OBSESSIONAL THOUGHTS AND ACTS: ICD-10-CM

## 2021-10-28 DIAGNOSIS — F90.2 ATTENTION DEFICIT HYPERACTIVITY DISORDER, COMBINED TYPE: Primary | ICD-10-CM

## 2021-10-28 DIAGNOSIS — F41.1 GENERALIZED ANXIETY DISORDER: ICD-10-CM

## 2021-10-28 PROCEDURE — 99213 OFFICE O/P EST LOW 20 MIN: CPT | Performed by: NURSE PRACTITIONER

## 2021-10-28 RX ORDER — PRAZOSIN HYDROCHLORIDE 1 MG/1
1 CAPSULE ORAL
COMMUNITY
Start: 2021-09-28 | End: 2021-12-06

## 2021-10-28 NOTE — PROGRESS NOTES
"      Subjective   Andrew Narayan is a 30 y.o. male is here today for medication management follow-up.presents with his wife with whome he gives permission to speak in front of.  Student jose English present with patient's permission.      Presents Chief Complaint:  Recheck on anxiety and hallucinations and ADHD    History of Present Illness: pt states he is doing \"about the same\".  He continues to have agoraphobia.  When he goes places he sweats severely.  Tries to go to his daughters functions.  Will make himself go.  Says the entire time he cannot enjoy the function because he is so restless.  He gave example he worries \"what if something happens and we cannot get to the exit\".  He denies depression. No SI, HI, or any AVH.  Says the cymbalta has helped with the intrusive thoughts.  The adderall slows his mind and helps with irritability.   sleeping well.  No negative side effects to the meds.  Prazosin has helped with the nightmares.  Can take naps now without having a nightmare.  Body mass index is 53.46 kg/m². weight loss 13 lbs.  Is doing low carb diet.  Mood is stable.            The following portions of the patient's history were reviewed and updated as appropriate: allergies, current medications, past family history, past medical history, past social history, past surgical history and problem list.    Review of Systems   Constitutional: Negative for activity change, appetite change and fatigue.   HENT: Negative.    Eyes: Negative for visual disturbance.   Respiratory: Negative.    Cardiovascular: Negative.    Gastrointestinal: Negative for nausea.   Endocrine: Negative.    Genitourinary: Negative.    Musculoskeletal: Positive for arthralgias.   Skin: Negative.    Allergic/Immunologic: Negative.    Neurological: Negative for dizziness, seizures and headaches.   Hematological: Negative.    Psychiatric/Behavioral: Positive for decreased concentration. Negative for agitation, behavioral problems, " confusion, dysphoric mood, hallucinations, self-injury, sleep disturbance and suicidal ideas. The patient is nervous/anxious. The patient is not hyperactive.         Nightmares     Reviewed copied data and there are no changes    Objective   Physical Exam   Constitutional: He is oriented to person, place, and time. He appears well-developed.   HENT:   Head: Normocephalic.   Neurological: He is alert and oriented to person, place, and time.   Psychiatric: His speech is normal and behavior is normal. Mood and thought content normal. His mood appears not anxious. He expresses impulsivity.   Engaging in conversation   Vitals reviewed.    There were no vitals taken for this visit.    Medication List:   Current Outpatient Medications   Medication Sig Dispense Refill   • amitriptyline (ELAVIL) 75 MG tablet Take 1 tablet by mouth every night at bedtime. 30 tablet 2   • amphetamine-dextroamphetamine (Adderall) 15 MG tablet Take 1 tablet by mouth 2 (Two) Times a Day. 60 tablet 0   • cloNIDine (CATAPRES) 0.1 MG tablet Take 1 tablet by mouth 2 (Two) Times a Day. 60 tablet 2   • DULoxetine (CYMBALTA) 60 MG capsule Take 1 capsule by mouth Daily. 30 capsule 2   • furosemide (LASIX) 20 MG tablet Take 1 tablet by mouth Daily. 7 tablet 0   • gabapentin (NEURONTIN) 600 MG tablet Take 600 mg by mouth 6 (Six) Times a Day.     • metoprolol tartrate (LOPRESSOR) 50 MG tablet Take 0.5 tablets by mouth 2 (Two) Times a Day. (Patient taking differently: Take 50 mg by mouth Daily.) 60 tablet 0   • prazosin (MINIPRESS) 2 MG capsule Take 1 capsule by mouth Every Night. 30 capsule 2   • rosuvastatin (CRESTOR) 40 MG tablet Take 1 tablet by mouth Daily. 30 tablet 11     No current facility-administered medications for this visit.     Reviewed copied data and there are no changes    Mental Status Exam:   Hygiene:   good  Cooperation:  Cooperative  Eye Contact:  Good  Psychomotor Behavior:  Appropriate  Affect:  Full range  Hopelessness:  Denies  Speech:  Normal  Thought Process:  Goal directed  Thought Content:  Normal  Suicidal:  None  Homicidal:  None  Hallucinations:  Auditory  Delusion:  None  Memory:  Intact  Orientation:  Person, Place, Time and Situation  Reliability:  fair  Insight:  Fair  Judgement:  Fair  Impulse Control:  Poor  Physical/Medical Issues:  Yes obesity, HTN, pain    Assessment/Plan   Problems Addressed this Visit     None      Visit Diagnoses     Attention deficit hyperactivity disorder, combined type    -  Primary    Mixed obsessional thoughts and acts        Post traumatic stress disorder (PTSD)        Generalized anxiety disorder          Diagnoses       Codes Comments    Attention deficit hyperactivity disorder, combined type    -  Primary ICD-10-CM: F90.2  ICD-9-CM: 314.01     Mixed obsessional thoughts and acts     ICD-10-CM: F42.2  ICD-9-CM: 300.3     Post traumatic stress disorder (PTSD)     ICD-10-CM: F43.10  ICD-9-CM: 309.81     Generalized anxiety disorder     ICD-10-CM: F41.1  ICD-9-CM: 300.02       azalia reviewed.  UDS in past reviewed an appropriate   Functionality: pt having moderate impairment in important areas of daily functioning.  Prognosis: Good dependent on medication/follow up and treatment plan compliance.  He is to continue the cymbalta for the OCD, clonidine and adderall for the ADHD, prazosin for the nightmares, elavil for sleep.  Does not need refills today.  I am rescheduling him for theapy regarding the agoraphobia.  He does not really want to do therapy in regards to the ptsd.  Says he does not think he can do it.  He is willing to retry therapy.      .     Continuing efforts to promote the therapeutic alliance, address the patient's issues, and strengthen self awareness, insights, and coping skills.   Patient is aware to call 911 or go to the nearest ER should begin having SI/HI. Pt will notify me should he have any negative side effects or any worsening depression.  RTC 6 weeks.  Sooner if  needed.

## 2021-11-05 DIAGNOSIS — F90.2 ATTENTION DEFICIT HYPERACTIVITY DISORDER, COMBINED TYPE: ICD-10-CM

## 2021-11-05 RX ORDER — DEXTROAMPHETAMINE SACCHARATE, AMPHETAMINE ASPARTATE, DEXTROAMPHETAMINE SULFATE AND AMPHETAMINE SULFATE 3.75; 3.75; 3.75; 3.75 MG/1; MG/1; MG/1; MG/1
TABLET ORAL
Qty: 60 TABLET | Refills: 0 | Status: SHIPPED | OUTPATIENT
Start: 2021-11-05 | End: 2021-12-06 | Stop reason: SDUPTHER

## 2021-11-11 ENCOUNTER — OFFICE VISIT (OUTPATIENT)
Dept: SURGERY | Facility: CLINIC | Age: 31
End: 2021-11-11

## 2021-11-11 VITALS — BODY MASS INDEX: 46.65 KG/M2 | HEIGHT: 69 IN | WEIGHT: 315 LBS

## 2021-11-11 DIAGNOSIS — R59.9 ADENOPATHY: Primary | ICD-10-CM

## 2021-11-11 PROCEDURE — 99212 OFFICE O/P EST SF 10 MIN: CPT | Performed by: SURGERY
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
## 2021-11-12 ENCOUNTER — PATIENT ROUNDING (BHMG ONLY) (OUTPATIENT)
Dept: SURGERY | Facility: CLINIC | Age: 31
End: 2021-11-12

## 2021-11-12 DIAGNOSIS — R59.9 ADENOPATHY: Primary | ICD-10-CM

## 2021-11-12 NOTE — PROGRESS NOTES
November 12, 2021    Hello, may I speak with Andrew Narayan?    My name is Mary       I am  with [facility]  [facility] GENERAL SURGERY  [address] [place] [address] [phone].    Before we get started may I verify your date of birth? 1990    I am calling to officially welcome you to our practice and ask about your recent visit. Is this a good time to talk? yes    Tell me about your visit with us. What things went well?  everything went well       We're always looking for ways to make our patients' experiences even better. Do you have recommendations on ways we may improve?  no    Overall were you satisfied with your first visit to our practice? yes       I appreciate you taking the time to speak with me today. Is there anything else I can do for you? Yes. Patient had questions about the FNA process and where it would be done and if Dr. Daniels would be seeing the report. Patients questions where answered to his satisfaction and he was instructed to call if there was anything else we could help him with.      Thank you, and have a great day.

## 2021-11-24 DIAGNOSIS — I10 ESSENTIAL HYPERTENSION: ICD-10-CM

## 2021-11-24 RX ORDER — METOPROLOL TARTRATE 50 MG/1
25 TABLET, FILM COATED ORAL 2 TIMES DAILY
Qty: 60 TABLET | Refills: 0 | Status: SHIPPED | OUTPATIENT
Start: 2021-11-24 | End: 2022-01-13 | Stop reason: SDUPTHER

## 2021-11-24 NOTE — TELEPHONE ENCOUNTER
Caller: Andrew Narayan    Relationship: Self    Best call back number: [phone] ([phone])    Requested Prescriptions:   Requested Prescriptions     Pending Prescriptions Disp Refills   • metoprolol tartrate (LOPRESSOR) 50 MG tablet 60 tablet 0     Sig: Take 0.5 tablets by mouth 2 (Two) Times a Day.        Pharmacy where request should be sent: [address] [phone] [facility] [phone]      Additional details provided by patient: PATIENT IS OUT OF MEDICATION     Does the patient have less than a 3 day supply:  [x] Yes  [] No    Sherrill Cantor Rep   11/24/21 14:01 EST

## 2021-12-06 ENCOUNTER — TELEPHONE (OUTPATIENT)
Dept: FAMILY MEDICINE CLINIC | Facility: CLINIC | Age: 31
End: 2021-12-06

## 2021-12-06 ENCOUNTER — OFFICE VISIT (OUTPATIENT)
Dept: FAMILY MEDICINE CLINIC | Facility: CLINIC | Age: 31
End: 2021-12-06

## 2021-12-06 VITALS
OXYGEN SATURATION: 98 % | TEMPERATURE: 96.9 F | HEART RATE: 111 BPM | HEIGHT: 69 IN | WEIGHT: 315 LBS | BODY MASS INDEX: 46.65 KG/M2 | SYSTOLIC BLOOD PRESSURE: 122 MMHG | DIASTOLIC BLOOD PRESSURE: 88 MMHG

## 2021-12-06 DIAGNOSIS — R59.0 LYMPHADENOPATHY OF RIGHT CERVICAL REGION: Primary | ICD-10-CM

## 2021-12-06 DIAGNOSIS — K21.9 GASTROESOPHAGEAL REFLUX DISEASE, UNSPECIFIED WHETHER ESOPHAGITIS PRESENT: ICD-10-CM

## 2021-12-06 DIAGNOSIS — F90.2 ATTENTION DEFICIT HYPERACTIVITY DISORDER, COMBINED TYPE: ICD-10-CM

## 2021-12-06 DIAGNOSIS — Z23 IMMUNIZATION DUE: ICD-10-CM

## 2021-12-06 DIAGNOSIS — E78.5 HYPERLIPIDEMIA LDL GOAL <100: ICD-10-CM

## 2021-12-06 PROCEDURE — 99214 OFFICE O/P EST MOD 30 MIN: CPT | Performed by: FAMILY MEDICINE

## 2021-12-06 PROCEDURE — 87081 CULTURE SCREEN ONLY: CPT | Performed by: FAMILY MEDICINE

## 2021-12-06 PROCEDURE — 80050 GENERAL HEALTH PANEL: CPT | Performed by: FAMILY MEDICINE

## 2021-12-06 PROCEDURE — 90471 IMMUNIZATION ADMIN: CPT | Performed by: FAMILY MEDICINE

## 2021-12-06 PROCEDURE — 90686 IIV4 VACC NO PRSV 0.5 ML IM: CPT | Performed by: FAMILY MEDICINE

## 2021-12-06 PROCEDURE — 36415 COLL VENOUS BLD VENIPUNCTURE: CPT | Performed by: FAMILY MEDICINE

## 2021-12-06 RX ORDER — ONDANSETRON HYDROCHLORIDE 8 MG/1
8 TABLET, FILM COATED ORAL EVERY 8 HOURS PRN
COMMUNITY
Start: 2021-11-21 | End: 2021-12-06

## 2021-12-06 RX ORDER — DEXTROAMPHETAMINE SACCHARATE, AMPHETAMINE ASPARTATE, DEXTROAMPHETAMINE SULFATE AND AMPHETAMINE SULFATE 3.75; 3.75; 3.75; 3.75 MG/1; MG/1; MG/1; MG/1
15 TABLET ORAL 2 TIMES DAILY
Qty: 60 TABLET | Refills: 0 | Status: SHIPPED | OUTPATIENT
Start: 2021-12-06 | End: 2022-01-04 | Stop reason: SDUPTHER

## 2021-12-07 LAB
ALBUMIN SERPL-MCNC: 4.4 G/DL (ref 3.5–5.2)
ALBUMIN/GLOB SERPL: 1.6 G/DL
ALP SERPL-CCNC: 93 U/L (ref 39–117)
ALT SERPL W P-5'-P-CCNC: 18 U/L (ref 1–41)
ANION GAP SERPL CALCULATED.3IONS-SCNC: 11.8 MMOL/L (ref 5–15)
AST SERPL-CCNC: 14 U/L (ref 1–40)
BASOPHILS # BLD AUTO: 0.03 10*3/MM3 (ref 0–0.2)
BASOPHILS NFR BLD AUTO: 0.3 % (ref 0–1.5)
BILIRUB SERPL-MCNC: 0.2 MG/DL (ref 0–1.2)
BUN SERPL-MCNC: 14 MG/DL (ref 6–20)
BUN/CREAT SERPL: 13.7 (ref 7–25)
CALCIUM SPEC-SCNC: 9.4 MG/DL (ref 8.6–10.5)
CHLORIDE SERPL-SCNC: 103 MMOL/L (ref 98–107)
CO2 SERPL-SCNC: 25.2 MMOL/L (ref 22–29)
CREAT SERPL-MCNC: 1.02 MG/DL (ref 0.76–1.27)
DEPRECATED RDW RBC AUTO: 43.3 FL (ref 37–54)
EOSINOPHIL # BLD AUTO: 0.23 10*3/MM3 (ref 0–0.4)
EOSINOPHIL NFR BLD AUTO: 1.9 % (ref 0.3–6.2)
ERYTHROCYTE [DISTWIDTH] IN BLOOD BY AUTOMATED COUNT: 13.5 % (ref 12.3–15.4)
GFR SERPL CREATININE-BSD FRML MDRD: 84 ML/MIN/1.73
GLOBULIN UR ELPH-MCNC: 2.8 GM/DL
GLUCOSE SERPL-MCNC: 96 MG/DL (ref 65–99)
HCT VFR BLD AUTO: 46.2 % (ref 37.5–51)
HGB BLD-MCNC: 15.2 G/DL (ref 13–17.7)
IMM GRANULOCYTES # BLD AUTO: 0.08 10*3/MM3 (ref 0–0.05)
IMM GRANULOCYTES NFR BLD AUTO: 0.7 % (ref 0–0.5)
LAB AP CASE REPORT: NORMAL
LAB AP CLINICAL INFORMATION: NORMAL
LYMPHOCYTES # BLD AUTO: 3.2 10*3/MM3 (ref 0.7–3.1)
LYMPHOCYTES NFR BLD AUTO: 26.9 % (ref 19.6–45.3)
MCH RBC QN AUTO: 28.7 PG (ref 26.6–33)
MCHC RBC AUTO-ENTMCNC: 32.9 G/DL (ref 31.5–35.7)
MCV RBC AUTO: 87.2 FL (ref 79–97)
MONOCYTES # BLD AUTO: 0.73 10*3/MM3 (ref 0.1–0.9)
MONOCYTES NFR BLD AUTO: 6.1 % (ref 5–12)
NEUTROPHILS NFR BLD AUTO: 64.1 % (ref 42.7–76)
NEUTROPHILS NFR BLD AUTO: 7.62 10*3/MM3 (ref 1.7–7)
NRBC BLD AUTO-RTO: 0 /100 WBC (ref 0–0.2)
PATH REPORT.FINAL DX SPEC: NORMAL
PATHOLOGY REVIEW: YES
PLATELET # BLD AUTO: 323 10*3/MM3 (ref 140–450)
PMV BLD AUTO: 10.4 FL (ref 6–12)
POTASSIUM SERPL-SCNC: 4.2 MMOL/L (ref 3.5–5.2)
PROT SERPL-MCNC: 7.2 G/DL (ref 6–8.5)
RBC # BLD AUTO: 5.3 10*6/MM3 (ref 4.14–5.8)
SODIUM SERPL-SCNC: 140 MMOL/L (ref 136–145)
TSH SERPL DL<=0.05 MIU/L-ACNC: 2.56 UIU/ML (ref 0.27–4.2)
WBC NRBC COR # BLD: 11.89 10*3/MM3 (ref 3.4–10.8)
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
## 2021-12-08 LAB — BACTERIA SPEC AEROBE CULT: NORMAL

## 2021-12-10 ENCOUNTER — HOSPITAL ENCOUNTER (OUTPATIENT)
Dept: ULTRASOUND IMAGING | Facility: HOSPITAL | Age: 31
Discharge: HOME | End: 2021-12-10
Admitting: SURGERY

## 2021-12-10 VITALS
WEIGHT: 315 LBS | HEART RATE: 77 BPM | SYSTOLIC BLOOD PRESSURE: 143 MMHG | DIASTOLIC BLOOD PRESSURE: 99 MMHG | OXYGEN SATURATION: 97 % | RESPIRATION RATE: 18 BRPM | BODY MASS INDEX: 46.65 KG/M2 | HEIGHT: 69 IN

## 2021-12-10 DIAGNOSIS — R59.9 ADENOPATHY: ICD-10-CM

## 2021-12-10 PROCEDURE — 38505 NEEDLE BIOPSY LYMPH NODES: CPT | Performed by: RADIOLOGY

## 2021-12-10 PROCEDURE — 76942 ECHO GUIDE FOR BIOPSY: CPT

## 2021-12-14 LAB
LAB AP CASE REPORT: NORMAL
PATH REPORT.FINAL DX SPEC: NORMAL

## 2021-12-16 ENCOUNTER — OFFICE VISIT (OUTPATIENT)
Dept: SURGERY | Facility: CLINIC | Age: 31
End: 2021-12-16

## 2021-12-16 VITALS — HEIGHT: 69 IN | BODY MASS INDEX: 46.65 KG/M2 | WEIGHT: 315 LBS

## 2021-12-16 DIAGNOSIS — R59.9 ADENOPATHY: Primary | ICD-10-CM

## 2021-12-16 PROCEDURE — 99212 OFFICE O/P EST SF 10 MIN: CPT | Performed by: SURGERY
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
## 2021-12-30 DIAGNOSIS — G47.00 INSOMNIA, UNSPECIFIED TYPE: ICD-10-CM

## 2021-12-30 RX ORDER — AMITRIPTYLINE HYDROCHLORIDE 75 MG/1
75 TABLET, FILM COATED ORAL
Qty: 30 TABLET | Refills: 2 | Status: SHIPPED | OUTPATIENT
Start: 2021-12-30 | End: 2022-01-27 | Stop reason: SDUPTHER

## 2022-01-04 DIAGNOSIS — F90.2 ATTENTION DEFICIT HYPERACTIVITY DISORDER, COMBINED TYPE: ICD-10-CM

## 2022-01-04 RX ORDER — DEXTROAMPHETAMINE SACCHARATE, AMPHETAMINE ASPARTATE, DEXTROAMPHETAMINE SULFATE AND AMPHETAMINE SULFATE 3.75; 3.75; 3.75; 3.75 MG/1; MG/1; MG/1; MG/1
15 TABLET ORAL 2 TIMES DAILY
Qty: 60 TABLET | Refills: 0 | Status: SHIPPED | OUTPATIENT
Start: 2022-01-04 | End: 2022-01-27 | Stop reason: SDUPTHER

## 2022-01-13 DIAGNOSIS — I10 ESSENTIAL HYPERTENSION: ICD-10-CM

## 2022-01-13 RX ORDER — METOPROLOL TARTRATE 50 MG/1
25 TABLET, FILM COATED ORAL 2 TIMES DAILY
Qty: 60 TABLET | Refills: 0 | Status: SHIPPED | OUTPATIENT
Start: 2022-01-13 | End: 2022-02-21 | Stop reason: SDUPTHER

## 2022-01-13 NOTE — TELEPHONE ENCOUNTER
Caller: Andrew Narayan    Relationship: Self    Best call back number: [phone]  Requested Prescriptions:   Requested Prescriptions     Pending Prescriptions Disp Refills   • metoprolol tartrate (LOPRESSOR) 50 MG tablet 60 tablet 0     Sig: Take 0.5 tablets by mouth 2 (Two) Times a Day.        Pharmacy where request should be sent: [address] [phone] [facility] [phone] FX     Additional details provided by patient: PATIENT IS OUT    Does the patient have less than a 3 day supply:  [x] Yes  [] No    Sherrill Walker Rep   01/13/22 15:04 EST

## 2022-01-27 ENCOUNTER — OFFICE VISIT (OUTPATIENT)
Dept: PSYCHIATRY | Facility: CLINIC | Age: 32
End: 2022-01-27

## 2022-01-27 VITALS
WEIGHT: 315 LBS | HEART RATE: 107 BPM | BODY MASS INDEX: 46.65 KG/M2 | DIASTOLIC BLOOD PRESSURE: 81 MMHG | TEMPERATURE: 96.9 F | SYSTOLIC BLOOD PRESSURE: 118 MMHG | OXYGEN SATURATION: 98 % | HEIGHT: 69 IN

## 2022-01-27 DIAGNOSIS — G47.00 INSOMNIA, UNSPECIFIED TYPE: ICD-10-CM

## 2022-01-27 DIAGNOSIS — F42.2 MIXED OBSESSIONAL THOUGHTS AND ACTS: ICD-10-CM

## 2022-01-27 DIAGNOSIS — F43.10 POST TRAUMATIC STRESS DISORDER (PTSD): ICD-10-CM

## 2022-01-27 DIAGNOSIS — F41.1 GENERALIZED ANXIETY DISORDER: ICD-10-CM

## 2022-01-27 DIAGNOSIS — F90.2 ATTENTION DEFICIT HYPERACTIVITY DISORDER, COMBINED TYPE: Primary | ICD-10-CM

## 2022-01-27 DIAGNOSIS — F51.5 NIGHTMARES: ICD-10-CM

## 2022-01-27 PROCEDURE — 99214 OFFICE O/P EST MOD 30 MIN: CPT | Performed by: NURSE PRACTITIONER

## 2022-01-27 RX ORDER — DULOXETIN HYDROCHLORIDE 60 MG/1
60 CAPSULE, DELAYED RELEASE ORAL DAILY
Qty: 30 CAPSULE | Refills: 2 | Status: SHIPPED | OUTPATIENT
Start: 2022-01-27 | End: 2022-03-10 | Stop reason: SDUPTHER

## 2022-01-27 RX ORDER — PRAZOSIN HYDROCHLORIDE 2 MG/1
2 CAPSULE ORAL NIGHTLY
Qty: 30 CAPSULE | Refills: 2 | Status: SHIPPED | OUTPATIENT
Start: 2022-01-27 | End: 2022-03-10 | Stop reason: SDUPTHER

## 2022-01-27 RX ORDER — AMITRIPTYLINE HYDROCHLORIDE 75 MG/1
75 TABLET, FILM COATED ORAL
Qty: 30 TABLET | Refills: 2 | Status: SHIPPED | OUTPATIENT
Start: 2022-01-27 | End: 2022-05-05

## 2022-01-27 RX ORDER — DEXTROAMPHETAMINE SACCHARATE, AMPHETAMINE ASPARTATE, DEXTROAMPHETAMINE SULFATE AND AMPHETAMINE SULFATE 3.75; 3.75; 3.75; 3.75 MG/1; MG/1; MG/1; MG/1
15 TABLET ORAL 2 TIMES DAILY
Qty: 60 TABLET | Refills: 0 | Status: SHIPPED | OUTPATIENT
Start: 2022-01-27 | End: 2022-03-03 | Stop reason: SDUPTHER

## 2022-01-27 RX ORDER — CLONIDINE HYDROCHLORIDE 0.1 MG/1
0.1 TABLET ORAL 2 TIMES DAILY
Qty: 60 TABLET | Refills: 2 | Status: SHIPPED | OUTPATIENT
Start: 2022-01-27 | End: 2022-03-10 | Stop reason: SDUPTHER

## 2022-01-27 RX ORDER — DULOXETIN HYDROCHLORIDE 60 MG/1
CAPSULE, DELAYED RELEASE ORAL
Qty: 30 CAPSULE | Refills: 2 | OUTPATIENT
Start: 2022-01-27
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
## 2022-02-21 DIAGNOSIS — I10 ESSENTIAL HYPERTENSION: ICD-10-CM

## 2022-02-21 RX ORDER — METOPROLOL TARTRATE 50 MG/1
25 TABLET, FILM COATED ORAL 2 TIMES DAILY
Qty: 60 TABLET | Refills: 0 | Status: SHIPPED | OUTPATIENT
Start: 2022-02-21 | End: 2022-03-30 | Stop reason: SDUPTHER

## 2022-02-21 NOTE — TELEPHONE ENCOUNTER
Caller: Andrew Narayan    Relationship: Self    Best call back number: [phone]    Requested Prescriptions:   Requested Prescriptions     Pending Prescriptions Disp Refills   • metoprolol tartrate (LOPRESSOR) 50 MG tablet 60 tablet 0     Sig: Take 0.5 tablets by mouth 2 (Two) Times a Day.        Pharmacy where request should be sent: [address] [phone] [facility] [phone] FX     Additional details provided by patient: THE PATIENT STATES THAT HE IS OUT OF MEDICATION AND HAS NO MORE REFILLS ON THE BOTTLE     Does the patient have less than a 3 day supply:  [x] Yes  [] No    Sherrill Brown Rep   02/21/22 11:19 EST

## 2022-03-03 ENCOUNTER — TELEPHONE (OUTPATIENT)
Dept: FAMILY MEDICINE CLINIC | Facility: CLINIC | Age: 32
End: 2022-03-03

## 2022-03-03 DIAGNOSIS — F90.2 ATTENTION DEFICIT HYPERACTIVITY DISORDER, COMBINED TYPE: ICD-10-CM

## 2022-03-03 RX ORDER — DEXTROAMPHETAMINE SACCHARATE, AMPHETAMINE ASPARTATE, DEXTROAMPHETAMINE SULFATE AND AMPHETAMINE SULFATE 3.75; 3.75; 3.75; 3.75 MG/1; MG/1; MG/1; MG/1
15 TABLET ORAL 2 TIMES DAILY
Qty: 60 TABLET | Refills: 0 | Status: SHIPPED | OUTPATIENT
Start: 2022-03-03 | End: 2022-04-04 | Stop reason: SDUPTHER

## 2022-03-10 ENCOUNTER — OFFICE VISIT (OUTPATIENT)
Dept: PSYCHIATRY | Facility: CLINIC | Age: 32
End: 2022-03-10

## 2022-03-10 VITALS
SYSTOLIC BLOOD PRESSURE: 124 MMHG | WEIGHT: 315 LBS | BODY MASS INDEX: 46.65 KG/M2 | OXYGEN SATURATION: 98 % | TEMPERATURE: 97.2 F | HEART RATE: 87 BPM | DIASTOLIC BLOOD PRESSURE: 93 MMHG | HEIGHT: 69 IN

## 2022-03-10 DIAGNOSIS — F42.2 MIXED OBSESSIONAL THOUGHTS AND ACTS: ICD-10-CM

## 2022-03-10 DIAGNOSIS — F43.10 POST TRAUMATIC STRESS DISORDER (PTSD): ICD-10-CM

## 2022-03-10 DIAGNOSIS — F41.1 GENERALIZED ANXIETY DISORDER: ICD-10-CM

## 2022-03-10 DIAGNOSIS — F51.5 NIGHTMARES: ICD-10-CM

## 2022-03-10 DIAGNOSIS — F90.2 ATTENTION DEFICIT HYPERACTIVITY DISORDER, COMBINED TYPE: Primary | ICD-10-CM

## 2022-03-10 PROCEDURE — 99214 OFFICE O/P EST MOD 30 MIN: CPT | Performed by: NURSE PRACTITIONER

## 2022-03-10 RX ORDER — DULOXETIN HYDROCHLORIDE 60 MG/1
60 CAPSULE, DELAYED RELEASE ORAL DAILY
Qty: 30 CAPSULE | Refills: 2 | Status: SHIPPED | OUTPATIENT
Start: 2022-03-10 | End: 2022-05-05 | Stop reason: SDUPTHER

## 2022-03-10 RX ORDER — PRAZOSIN HYDROCHLORIDE 2 MG/1
2 CAPSULE ORAL NIGHTLY
Qty: 30 CAPSULE | Refills: 2 | Status: SHIPPED | OUTPATIENT
Start: 2022-03-10 | End: 2022-05-05 | Stop reason: SDUPTHER

## 2022-03-10 RX ORDER — CLONIDINE HYDROCHLORIDE 0.1 MG/1
0.1 TABLET ORAL 2 TIMES DAILY
Qty: 60 TABLET | Refills: 2 | Status: SHIPPED | OUTPATIENT
Start: 2022-03-10 | End: 2022-05-05 | Stop reason: SDUPTHER

## 2022-03-10 NOTE — PROGRESS NOTES
Subjective   Andrew Narayan is a 31 y.o. male is here today for medication management follow-up.  Presents Chief Complaint:  Recheck on anxiety and hallucinations and ADHD    History of Present Illness: pt states he is doing well.  Denies any depression.  Says his current OCD behaviors are stable.  adderall continues to help with focus and eases anxiety.  Continues to really help with intrusive thoughts.  He has had some elevated blood pressures recently in the morning.  Has occasional headache from them.  Sleeping well.  Mood is stable.  Body mass index is 56.56 kg/m². npo weight changes.  Nightmares are stable.  He feels like he needs back on the testosterone therapy as he does not seem to have as much energy as when he was on it in the past.              The following portions of the patient's history were reviewed and updated as appropriate: allergies, current medications, past family history, past medical history, past social history, past surgical history and problem list.    Review of Systems   Constitutional: Negative for activity change, appetite change and fatigue.   HENT: Negative.    Eyes: Negative for visual disturbance.   Respiratory: Negative.    Cardiovascular: Negative.    Gastrointestinal: Negative for nausea.   Endocrine: Negative.    Genitourinary: Negative.    Musculoskeletal: Positive for arthralgias.   Skin: Negative.    Allergic/Immunologic: Negative.    Neurological: Negative for dizziness, seizures and headaches.   Hematological: Negative.    Psychiatric/Behavioral: Negative for agitation, behavioral problems, confusion, decreased concentration, dysphoric mood, hallucinations, self-injury, sleep disturbance and suicidal ideas. The patient is not nervous/anxious and is not hyperactive.         Nightmares     Reviewed copied data and there are no changes    Objective   Physical Exam   Constitutional: He is oriented to person, place, and time. He appears well-developed.   HENT:  "  Head: Normocephalic.   Neurological: He is alert and oriented to person, place, and time.   Psychiatric: His speech is normal and behavior is normal. Mood and thought content normal. His mood appears not anxious. He expresses impulsivity.   Engaging in conversation   Vitals reviewed.    Blood pressure 124/93, pulse 87, temperature 97.2 °F (36.2 °C), height 175.3 cm (69.02\"), weight (!) 174 kg (383 lb 3.2 oz), SpO2 98 %.    Medication List:   Current Outpatient Medications   Medication Sig Dispense Refill   • amitriptyline (ELAVIL) 75 MG tablet Take 1 tablet by mouth every night at bedtime. 30 tablet 2   • amphetamine-dextroamphetamine (ADDERALL) 15 MG tablet Take 1 tablet by mouth 2 (Two) Times a Day. 60 tablet 0   • cloNIDine (CATAPRES) 0.1 MG tablet Take 1 tablet by mouth 2 (Two) Times a Day. 60 tablet 2   • DULoxetine (CYMBALTA) 60 MG capsule Take 1 capsule by mouth Daily. 30 capsule 2   • gabapentin (NEURONTIN) 600 MG tablet Take 600 mg by mouth 6 (Six) Times a Day.     • metoprolol tartrate (LOPRESSOR) 50 MG tablet Take 0.5 tablets by mouth 2 (Two) Times a Day. 60 tablet 0   • prazosin (MINIPRESS) 2 MG capsule Take 1 capsule by mouth Every Night. 30 capsule 2   • rosuvastatin (CRESTOR) 40 MG tablet Take 1 tablet by mouth Daily. 30 tablet 11     No current facility-administered medications for this visit.       Mental Status Exam:   Hygiene:   good  Cooperation:  Cooperative  Eye Contact:  Good  Psychomotor Behavior:  Appropriate  Affect:  Full range  Hopelessness: Denies  Speech:  Normal  Thought Process:  Goal directed  Thought Content:  Normal  Suicidal:  None  Homicidal:  None  Hallucinations:  None  Delusion:  None  Memory:  Intact  Orientation:  Person, Place, Time and Situation  Reliability:  fair  Insight:  Fair  Judgement:  Fair  Impulse Control:  Poor  Physical/Medical Issues:  Yes obesity, HTN, pain    Assessment/Plan   Problems Addressed this Visit    None     Visit Diagnoses     Attention deficit " hyperactivity disorder, combined type    -  Primary    Relevant Medications    DULoxetine (CYMBALTA) 60 MG capsule    cloNIDine (CATAPRES) 0.1 MG tablet    Mixed obsessional thoughts and acts        Relevant Medications    DULoxetine (CYMBALTA) 60 MG capsule    Post traumatic stress disorder (PTSD)        Relevant Medications    DULoxetine (CYMBALTA) 60 MG capsule    prazosin (MINIPRESS) 2 MG capsule    Generalized anxiety disorder        Relevant Medications    DULoxetine (CYMBALTA) 60 MG capsule    Nightmares        Relevant Medications    DULoxetine (CYMBALTA) 60 MG capsule    prazosin (MINIPRESS) 2 MG capsule      Diagnoses       Codes Comments    Attention deficit hyperactivity disorder, combined type    -  Primary ICD-10-CM: F90.2  ICD-9-CM: 314.01     Mixed obsessional thoughts and acts     ICD-10-CM: F42.2  ICD-9-CM: 300.3     Post traumatic stress disorder (PTSD)     ICD-10-CM: F43.10  ICD-9-CM: 309.81     Generalized anxiety disorder     ICD-10-CM: F41.1  ICD-9-CM: 300.02     Nightmares     ICD-10-CM: F51.5  ICD-9-CM: 307.47       azalia reviewed.  UDS in past reviewed an appropriate   Functionality: pt having moderate impairment in important areas of daily functioning.  Prognosis: Good dependent on medication/follow up and treatment plan compliance.      He is to continue the cymbalta for the OCD, clonidine and adderall for the ADHD, prazosin for the nightmares, elavil for sleep.  Refills submitted. He was instructed to keep blood pressure log including AM and PM readings to present to his PCP at next visit which is on the 21st.   .     Continuing efforts to promote the therapeutic alliance, address the patient's issues, and strengthen self awareness, insights, and coping skills.   Patient is aware to call 911 or go to the nearest ER should begin having SI/HI. Pt will notify me should he have any negative side effects or any worsening depression.  RTC 6 weeks.  Sooner if needed.

## 2022-03-21 ENCOUNTER — OFFICE VISIT (OUTPATIENT)
Dept: FAMILY MEDICINE CLINIC | Facility: CLINIC | Age: 32
End: 2022-03-21

## 2022-03-21 VITALS
SYSTOLIC BLOOD PRESSURE: 118 MMHG | OXYGEN SATURATION: 99 % | WEIGHT: 315 LBS | TEMPERATURE: 96.8 F | HEIGHT: 69 IN | BODY MASS INDEX: 46.65 KG/M2 | DIASTOLIC BLOOD PRESSURE: 68 MMHG | HEART RATE: 106 BPM

## 2022-03-21 DIAGNOSIS — R59.0 LAD (LYMPHADENOPATHY) OF RIGHT CERVICAL REGION: ICD-10-CM

## 2022-03-21 DIAGNOSIS — G47.33 OSA (OBSTRUCTIVE SLEEP APNEA): ICD-10-CM

## 2022-03-21 DIAGNOSIS — I10 ESSENTIAL HYPERTENSION: ICD-10-CM

## 2022-03-21 DIAGNOSIS — R53.83 OTHER FATIGUE: Primary | ICD-10-CM

## 2022-03-21 DIAGNOSIS — E53.8 VITAMIN B 12 DEFICIENCY: ICD-10-CM

## 2022-03-21 PROCEDURE — 96372 THER/PROPH/DIAG INJ SC/IM: CPT | Performed by: FAMILY MEDICINE

## 2022-03-21 PROCEDURE — 99214 OFFICE O/P EST MOD 30 MIN: CPT | Performed by: FAMILY MEDICINE

## 2022-03-21 RX ORDER — CYANOCOBALAMIN 1000 UG/ML
1000 INJECTION, SOLUTION INTRAMUSCULAR; SUBCUTANEOUS ONCE
Status: DISCONTINUED | OUTPATIENT
Start: 2022-03-21 | End: 2023-01-23

## 2022-03-21 RX ORDER — CYANOCOBALAMIN 1000 UG/ML
1000 INJECTION, SOLUTION INTRAMUSCULAR; SUBCUTANEOUS
Status: DISCONTINUED | OUTPATIENT
Start: 2022-03-21 | End: 2023-01-23

## 2022-03-21 RX ADMIN — CYANOCOBALAMIN 1000 MCG: 1000 INJECTION, SOLUTION INTRAMUSCULAR; SUBCUTANEOUS at 15:36

## 2022-03-30 DIAGNOSIS — I10 ESSENTIAL HYPERTENSION: ICD-10-CM

## 2022-03-30 NOTE — TELEPHONE ENCOUNTER
Patient called requesting a refill on the following medication    metoprolol tartrate (LOPRESSOR) 50 MG tablet

## 2022-03-30 NOTE — TELEPHONE ENCOUNTER
Caller: Andrew Narayan    Relationship: Self    Best call back number: [phone]    Requested Prescriptions:   metoprolol tartrate (LOPRESSOR) 50 MG tablet  PATIENT STATED HE TAKES 1 TABLET 2 TIMES DAILY NOT THE 0.5 TABLET 2 TIME DAILY SHOWN ON MEDICATION LIST    Pharmacy where request should be sent: [address] [phone] [facility] [phone] FX     Additional details provided by patient: PATIENT IS OUT OF MEDICATION    Does the patient have less than a 3 day supply:  [x] Yes  [] No    Sherrill Valladares Rep   03/30/22 10:51 EDT

## 2022-03-31 RX ORDER — METOPROLOL TARTRATE 50 MG/1
25 TABLET, FILM COATED ORAL 2 TIMES DAILY
Qty: 90 TABLET | Refills: 1 | Status: SHIPPED | OUTPATIENT
Start: 2022-03-31 | End: 2022-10-17

## 2022-03-31 NOTE — TELEPHONE ENCOUNTER
Patient called requesting a refill on the following medication    metoprolol tartrate (LOPRESSOR) 50 MG tablet     Patient has called back is out of med since yesterday.

## 2022-04-04 DIAGNOSIS — F90.2 ATTENTION DEFICIT HYPERACTIVITY DISORDER, COMBINED TYPE: ICD-10-CM

## 2022-04-04 RX ORDER — DEXTROAMPHETAMINE SACCHARATE, AMPHETAMINE ASPARTATE, DEXTROAMPHETAMINE SULFATE AND AMPHETAMINE SULFATE 3.75; 3.75; 3.75; 3.75 MG/1; MG/1; MG/1; MG/1
15 TABLET ORAL 2 TIMES DAILY
Qty: 60 TABLET | Refills: 0 | Status: SHIPPED | OUTPATIENT
Start: 2022-04-04 | End: 2022-05-05 | Stop reason: SDUPTHER

## 2022-04-13 ENCOUNTER — TELEPHONE (OUTPATIENT)
Dept: FAMILY MEDICINE CLINIC | Facility: CLINIC | Age: 32
End: 2022-04-13

## 2022-04-13 DIAGNOSIS — R59.0 LAD (LYMPHADENOPATHY) OF RIGHT CERVICAL REGION: Primary | ICD-10-CM

## 2022-04-13 NOTE — TELEPHONE ENCOUNTER
----- Message from Lyly Juarez MD sent at 4/13/2022  2:29 PM EDT -----  Please call patient. I have reached out to Dr. Daniels about his continued enlargement of his lymph nodes and the pathology report. He hasn't gotten back to me, but the path report does recommend complete removal should the lymph node continue to be enlarged. My opinion remains that it needs to be taken out. Would he like to call Dr. Daniels's office for an appointment for discussion or would he like me to send him somewhere else for a second opinion?        Spoke with patient he prefers to go somewhere else.No preference.

## 2022-05-05 ENCOUNTER — OFFICE VISIT (OUTPATIENT)
Dept: PSYCHIATRY | Facility: CLINIC | Age: 32
End: 2022-05-05

## 2022-05-05 VITALS
TEMPERATURE: 96.9 F | SYSTOLIC BLOOD PRESSURE: 130 MMHG | HEIGHT: 69 IN | BODY MASS INDEX: 46.65 KG/M2 | DIASTOLIC BLOOD PRESSURE: 90 MMHG | HEART RATE: 94 BPM | OXYGEN SATURATION: 96 % | WEIGHT: 315 LBS

## 2022-05-05 DIAGNOSIS — F51.5 NIGHTMARES: ICD-10-CM

## 2022-05-05 DIAGNOSIS — F43.10 POST TRAUMATIC STRESS DISORDER (PTSD): ICD-10-CM

## 2022-05-05 DIAGNOSIS — F41.1 GENERALIZED ANXIETY DISORDER: ICD-10-CM

## 2022-05-05 DIAGNOSIS — F90.2 ATTENTION DEFICIT HYPERACTIVITY DISORDER, COMBINED TYPE: Primary | ICD-10-CM

## 2022-05-05 DIAGNOSIS — F42.2 MIXED OBSESSIONAL THOUGHTS AND ACTS: ICD-10-CM

## 2022-05-05 DIAGNOSIS — G47.00 INSOMNIA, UNSPECIFIED TYPE: ICD-10-CM

## 2022-05-05 PROCEDURE — 99214 OFFICE O/P EST MOD 30 MIN: CPT | Performed by: NURSE PRACTITIONER

## 2022-05-05 RX ORDER — PRAZOSIN HYDROCHLORIDE 2 MG/1
2 CAPSULE ORAL NIGHTLY
Qty: 30 CAPSULE | Refills: 2 | Status: SHIPPED | OUTPATIENT
Start: 2022-05-05 | End: 2022-08-04 | Stop reason: SDUPTHER

## 2022-05-05 RX ORDER — DEXTROAMPHETAMINE SACCHARATE, AMPHETAMINE ASPARTATE, DEXTROAMPHETAMINE SULFATE AND AMPHETAMINE SULFATE 3.75; 3.75; 3.75; 3.75 MG/1; MG/1; MG/1; MG/1
15 TABLET ORAL 2 TIMES DAILY
Qty: 60 TABLET | Refills: 0 | Status: SHIPPED | OUTPATIENT
Start: 2022-05-05 | End: 2022-06-02 | Stop reason: SDUPTHER

## 2022-05-05 RX ORDER — DULOXETIN HYDROCHLORIDE 60 MG/1
60 CAPSULE, DELAYED RELEASE ORAL DAILY
Qty: 30 CAPSULE | Refills: 2 | Status: SHIPPED | OUTPATIENT
Start: 2022-05-05 | End: 2022-08-04 | Stop reason: SDUPTHER

## 2022-05-05 RX ORDER — CLONIDINE HYDROCHLORIDE 0.1 MG/1
0.1 TABLET ORAL 2 TIMES DAILY
Qty: 60 TABLET | Refills: 2 | Status: SHIPPED | OUTPATIENT
Start: 2022-05-05 | End: 2022-08-04 | Stop reason: SDUPTHER

## 2022-05-05 NOTE — PROGRESS NOTES
"      Subjective   Andrew Narayan is a 31 y.o. male is here today for medication management follow-up.  Presents Chief Complaint:  Recheck on anxiety and hallucinations and ADHD    History of Present Illness:   Patient presents with his wife with whom he gives permission to speak in front of.  He states that he continues to do well on the current medications.  He has stopped the Elavil on his own.  Says that he is now using the nighttime dose of clonidine to help with sleep.  Continues to have some intermittent nightmares but states he cannot manage currently with the current dose of prazosin he is on.  Does not really wish to increase that dosage.  He denies any current depression.  OCD behaviors are stable.  Anxiety is situational.  Adderall continues to help with focus and concentration as well as overall mood.  No new medical stressors.  Blood pressure has been stable.Body mass index is 55.17 kg/m². weight loss 5 lbs since last visit.  Says he is working outside around the house more.  Sleep is adequate.  Mood is stable.  Continues to be pleased with progress.  Says he does not \"think if the bad things of the past near as much\".               The following portions of the patient's history were reviewed and updated as appropriate: allergies, current medications, past family history, past medical history, past social history, past surgical history and problem list.    Review of Systems   Constitutional: Negative for activity change, appetite change and fatigue.   HENT: Negative.    Eyes: Negative for visual disturbance.   Respiratory: Negative.    Cardiovascular: Negative.    Gastrointestinal: Negative for nausea.   Endocrine: Negative.    Genitourinary: Negative.    Musculoskeletal: Positive for arthralgias.   Skin: Negative.    Allergic/Immunologic: Negative.    Neurological: Negative for dizziness, seizures and headaches.   Hematological: Negative.    Psychiatric/Behavioral: Negative for agitation, " "behavioral problems, confusion, decreased concentration, dysphoric mood, hallucinations, self-injury, sleep disturbance and suicidal ideas. The patient is not nervous/anxious and is not hyperactive.         Nightmares     Reviewed copied data and there are no changes    Objective   Physical Exam   Constitutional: He is oriented to person, place, and time. He appears well-developed.   HENT:   Head: Normocephalic.   Neurological: He is alert and oriented to person, place, and time.   Psychiatric: His speech is normal and behavior is normal. Mood and thought content normal. His mood appears not anxious. He expresses impulsivity.   Engaging in conversation   Vitals reviewed.    Blood pressure 130/90, pulse 94, temperature 96.9 °F (36.1 °C), temperature source Temporal, height 175.3 cm (69.02\"), weight (!) 170 kg (373 lb 12.8 oz), SpO2 96 %.    Medication List:   Current Outpatient Medications   Medication Sig Dispense Refill   • amphetamine-dextroamphetamine (ADDERALL) 15 MG tablet Take 1 tablet by mouth 2 (Two) Times a Day. 60 tablet 0   • cloNIDine (CATAPRES) 0.1 MG tablet Take 1 tablet by mouth 2 (Two) Times a Day. 60 tablet 2   • DULoxetine (CYMBALTA) 60 MG capsule Take 1 capsule by mouth Daily. 30 capsule 2   • prazosin (MINIPRESS) 2 MG capsule Take 1 capsule by mouth Every Night. 30 capsule 2   • gabapentin (NEURONTIN) 600 MG tablet Take 600 mg by mouth 6 (Six) Times a Day.     • metoprolol tartrate (LOPRESSOR) 50 MG tablet Take 0.5 tablets by mouth 2 (Two) Times a Day. 90 tablet 1   • rosuvastatin (CRESTOR) 40 MG tablet Take 1 tablet by mouth Daily. 30 tablet 11     Current Facility-Administered Medications   Medication Dose Route Frequency Provider Last Rate Last Admin   • cyanocobalamin injection 1,000 mcg  1,000 mcg Intramuscular Once Lyly Juarez MD       • cyanocobalamin injection 1,000 mcg  1,000 mcg Intramuscular Q28 Days Lyly Juarez MD   1,000 mcg at 03/21/22 1536         Mental Status Exam: "   Hygiene:   good  Cooperation:  Cooperative  Eye Contact:  Good  Psychomotor Behavior:  Appropriate  Affect:  Full range  Hopelessness: Denies  Speech:  Normal  Thought Process:  Goal directed  Thought Content:  Normal  Suicidal:  None  Homicidal:  None  Hallucinations:  None  Delusion:  None  Memory:  Intact  Orientation:  Person, Place, Time and Situation  Reliability:  fair  Insight:  Fair  Judgement:  Fair  Impulse Control:  Fair  Physical/Medical Issues:  Yes obesity, HTN, pain    Assessment/Plan   Problems Addressed this Visit        Sleep    Insomnia      Other Visit Diagnoses     Attention deficit hyperactivity disorder, combined type    -  Primary    Relevant Medications    cloNIDine (CATAPRES) 0.1 MG tablet    DULoxetine (CYMBALTA) 60 MG capsule    amphetamine-dextroamphetamine (ADDERALL) 15 MG tablet    Mixed obsessional thoughts and acts        Relevant Medications    DULoxetine (CYMBALTA) 60 MG capsule    Post traumatic stress disorder (PTSD)        Relevant Medications    DULoxetine (CYMBALTA) 60 MG capsule    prazosin (MINIPRESS) 2 MG capsule    amphetamine-dextroamphetamine (ADDERALL) 15 MG tablet    Generalized anxiety disorder        Relevant Medications    DULoxetine (CYMBALTA) 60 MG capsule    amphetamine-dextroamphetamine (ADDERALL) 15 MG tablet    Nightmares        Relevant Medications    DULoxetine (CYMBALTA) 60 MG capsule    prazosin (MINIPRESS) 2 MG capsule    amphetamine-dextroamphetamine (ADDERALL) 15 MG tablet      Diagnoses       Codes Comments    Attention deficit hyperactivity disorder, combined type    -  Primary ICD-10-CM: F90.2  ICD-9-CM: 314.01     Mixed obsessional thoughts and acts     ICD-10-CM: F42.2  ICD-9-CM: 300.3     Post traumatic stress disorder (PTSD)     ICD-10-CM: F43.10  ICD-9-CM: 309.81     Generalized anxiety disorder     ICD-10-CM: F41.1  ICD-9-CM: 300.02     Nightmares     ICD-10-CM: F51.5  ICD-9-CM: 307.47     Insomnia, unspecified type     ICD-10-CM:  G47.00  ICD-9-CM: 780.52       azalia reviewed.  UDS in past reviewed an appropriate   Functionality: pt having moderate impairment in important areas of daily functioning.  Prognosis: Good dependent on medication/follow up and treatment plan compliance.      He is to continue the cymbalta for the OCD, clonidine and adderall for the ADHD and sleep , prazosin for the nightmares, .  Refills submitted.Continuing efforts to promote the therapeutic alliance, address the patient's issues, and strengthen self awareness, insights, and coping skills.   Patient is aware to call 911 or go to the nearest ER should begin having SI/HI. Pt will notify me should he have any negative side effects or any worsening depression.  RTC 12 weeks.  Sooner if needed.

## 2022-06-01 ENCOUNTER — LAB (OUTPATIENT)
Dept: FAMILY MEDICINE CLINIC | Facility: CLINIC | Age: 32
End: 2022-06-01

## 2022-06-01 DIAGNOSIS — F41.9 ANXIETY AND DEPRESSION: ICD-10-CM

## 2022-06-01 DIAGNOSIS — F32.A ANXIETY AND DEPRESSION: ICD-10-CM

## 2022-06-01 DIAGNOSIS — R00.2 PALPITATIONS: ICD-10-CM

## 2022-06-01 DIAGNOSIS — I10 ESSENTIAL HYPERTENSION: Primary | ICD-10-CM

## 2022-06-01 LAB
ALBUMIN SERPL-MCNC: 3.86 G/DL (ref 3.5–5.2)
ALBUMIN/GLOB SERPL: 1.4 G/DL
ALP SERPL-CCNC: 81 U/L (ref 39–117)
ALT SERPL W P-5'-P-CCNC: 27 U/L (ref 1–41)
ANION GAP SERPL CALCULATED.3IONS-SCNC: 11 MMOL/L (ref 5–15)
AST SERPL-CCNC: 18 U/L (ref 1–40)
BASOPHILS # BLD AUTO: 0.03 10*3/MM3 (ref 0–0.2)
BASOPHILS NFR BLD AUTO: 0.2 % (ref 0–1.5)
BILIRUB SERPL-MCNC: 0.2 MG/DL (ref 0–1.2)
BUN SERPL-MCNC: 11 MG/DL (ref 6–20)
BUN/CREAT SERPL: 9.4 (ref 7–25)
CALCIUM SPEC-SCNC: 9.5 MG/DL (ref 8.6–10.5)
CHLORIDE SERPL-SCNC: 106 MMOL/L (ref 98–107)
CO2 SERPL-SCNC: 24 MMOL/L (ref 22–29)
CREAT SERPL-MCNC: 1.17 MG/DL (ref 0.76–1.27)
DEPRECATED RDW RBC AUTO: 43.4 FL (ref 37–54)
EGFRCR SERPLBLD CKD-EPI 2021: 84.4 ML/MIN/1.73
EOSINOPHIL # BLD AUTO: 0.24 10*3/MM3 (ref 0–0.4)
EOSINOPHIL NFR BLD AUTO: 2 % (ref 0.3–6.2)
ERYTHROCYTE [DISTWIDTH] IN BLOOD BY AUTOMATED COUNT: 13.4 % (ref 12.3–15.4)
GLOBULIN UR ELPH-MCNC: 2.8 GM/DL
GLUCOSE SERPL-MCNC: 122 MG/DL (ref 65–99)
HCT VFR BLD AUTO: 44.8 % (ref 37.5–51)
HGB BLD-MCNC: 14.4 G/DL (ref 13–17.7)
IMM GRANULOCYTES # BLD AUTO: 0.04 10*3/MM3 (ref 0–0.05)
IMM GRANULOCYTES NFR BLD AUTO: 0.3 % (ref 0–0.5)
LYMPHOCYTES # BLD AUTO: 4.52 10*3/MM3 (ref 0.7–3.1)
LYMPHOCYTES NFR BLD AUTO: 37.2 % (ref 19.6–45.3)
MCH RBC QN AUTO: 28.5 PG (ref 26.6–33)
MCHC RBC AUTO-ENTMCNC: 32.1 G/DL (ref 31.5–35.7)
MCV RBC AUTO: 88.5 FL (ref 79–97)
MONOCYTES # BLD AUTO: 0.6 10*3/MM3 (ref 0.1–0.9)
MONOCYTES NFR BLD AUTO: 4.9 % (ref 5–12)
NEUTROPHILS NFR BLD AUTO: 55.4 % (ref 42.7–76)
NEUTROPHILS NFR BLD AUTO: 6.73 10*3/MM3 (ref 1.7–7)
NRBC BLD AUTO-RTO: 0 /100 WBC (ref 0–0.2)
PLATELET # BLD AUTO: 343 10*3/MM3 (ref 140–450)
PMV BLD AUTO: 10 FL (ref 6–12)
POTASSIUM SERPL-SCNC: 4 MMOL/L (ref 3.5–5.2)
PROT SERPL-MCNC: 6.7 G/DL (ref 6–8.5)
RBC # BLD AUTO: 5.06 10*6/MM3 (ref 4.14–5.8)
SODIUM SERPL-SCNC: 141 MMOL/L (ref 136–145)
TSH SERPL DL<=0.05 MIU/L-ACNC: 4.57 UIU/ML (ref 0.27–4.2)
WBC NRBC COR # BLD: 12.16 10*3/MM3 (ref 3.4–10.8)

## 2022-06-01 PROCEDURE — 80050 GENERAL HEALTH PANEL: CPT | Performed by: FAMILY MEDICINE

## 2022-06-01 PROCEDURE — 36415 COLL VENOUS BLD VENIPUNCTURE: CPT

## 2022-06-02 DIAGNOSIS — F90.2 ATTENTION DEFICIT HYPERACTIVITY DISORDER, COMBINED TYPE: ICD-10-CM

## 2022-06-02 RX ORDER — DEXTROAMPHETAMINE SACCHARATE, AMPHETAMINE ASPARTATE, DEXTROAMPHETAMINE SULFATE AND AMPHETAMINE SULFATE 3.75; 3.75; 3.75; 3.75 MG/1; MG/1; MG/1; MG/1
15 TABLET ORAL 2 TIMES DAILY
Qty: 60 TABLET | Refills: 0 | Status: SHIPPED | OUTPATIENT
Start: 2022-06-02 | End: 2022-07-05 | Stop reason: SDUPTHER

## 2022-06-19 DIAGNOSIS — R79.89 ABNORMAL TSH: Primary | ICD-10-CM

## 2022-06-20 ENCOUNTER — TELEPHONE (OUTPATIENT)
Dept: FAMILY MEDICINE CLINIC | Facility: CLINIC | Age: 32
End: 2022-06-20

## 2022-06-20 NOTE — TELEPHONE ENCOUNTER
----- Message from Lyly Juarez MD sent at 6/19/2022 10:40 PM EDT -----  Please call Andrew. Has he gotten a call for a second opinion on his lymph nodes yet? His labs are stable except his thyroid is a little elevated. I don't know if there is a relationship between that and his lymph nodes, but I do need a workup on them. I have AM labs that he needs to do at the [facility]. No fasting needed, but I would prefer AM labs. The longer this continues, definitely I do recommend that second biopsy.       Patient notified & verbalized understanding,he is scheduled for a second opinion on 7/07/2022.

## 2022-06-21 ENCOUNTER — OFFICE VISIT (OUTPATIENT)
Dept: FAMILY MEDICINE CLINIC | Facility: CLINIC | Age: 32
End: 2022-06-21

## 2022-06-21 ENCOUNTER — TELEPHONE (OUTPATIENT)
Dept: FAMILY MEDICINE CLINIC | Facility: CLINIC | Age: 32
End: 2022-06-21

## 2022-06-21 ENCOUNTER — LAB (OUTPATIENT)
Dept: LAB | Facility: HOSPITAL | Age: 32
End: 2022-06-21

## 2022-06-21 VITALS
OXYGEN SATURATION: 98 % | DIASTOLIC BLOOD PRESSURE: 84 MMHG | WEIGHT: 315 LBS | HEIGHT: 69 IN | SYSTOLIC BLOOD PRESSURE: 124 MMHG | HEART RATE: 91 BPM | BODY MASS INDEX: 46.65 KG/M2 | TEMPERATURE: 96.8 F

## 2022-06-21 DIAGNOSIS — K21.9 GASTROESOPHAGEAL REFLUX DISEASE, UNSPECIFIED WHETHER ESOPHAGITIS PRESENT: ICD-10-CM

## 2022-06-21 DIAGNOSIS — R42 DIZZINESS: Primary | ICD-10-CM

## 2022-06-21 DIAGNOSIS — R42 DIZZINESS: ICD-10-CM

## 2022-06-21 DIAGNOSIS — R79.89 ABNORMAL TSH: ICD-10-CM

## 2022-06-21 DIAGNOSIS — K04.7 TOOTH ABSCESS: ICD-10-CM

## 2022-06-21 LAB
ALBUMIN SERPL-MCNC: 4.1 G/DL (ref 3.5–5.2)
ALBUMIN/GLOB SERPL: 1.6 G/DL
ALP SERPL-CCNC: 73 U/L (ref 39–117)
ALT SERPL W P-5'-P-CCNC: 32 U/L (ref 1–41)
ANION GAP SERPL CALCULATED.3IONS-SCNC: 12.2 MMOL/L (ref 5–15)
AST SERPL-CCNC: 19 U/L (ref 1–40)
BILIRUB SERPL-MCNC: 0.3 MG/DL (ref 0–1.2)
BUN SERPL-MCNC: 9 MG/DL (ref 6–20)
BUN/CREAT SERPL: 8.1 (ref 7–25)
CALCIUM SPEC-SCNC: 9.7 MG/DL (ref 8.6–10.5)
CHLORIDE SERPL-SCNC: 104 MMOL/L (ref 98–107)
CO2 SERPL-SCNC: 25.8 MMOL/L (ref 22–29)
CREAT SERPL-MCNC: 1.11 MG/DL (ref 0.76–1.27)
EGFRCR SERPLBLD CKD-EPI 2021: 89.9 ML/MIN/1.73
GLOBULIN UR ELPH-MCNC: 2.5 GM/DL
GLUCOSE SERPL-MCNC: 88 MG/DL (ref 65–99)
POTASSIUM SERPL-SCNC: 4.9 MMOL/L (ref 3.5–5.2)
PROT SERPL-MCNC: 6.6 G/DL (ref 6–8.5)
SODIUM SERPL-SCNC: 142 MMOL/L (ref 136–145)
T4 FREE SERPL-MCNC: 1.26 NG/DL (ref 0.93–1.7)
TSH SERPL DL<=0.05 MIU/L-ACNC: 2.13 UIU/ML (ref 0.27–4.2)

## 2022-06-21 PROCEDURE — 93000 ELECTROCARDIOGRAM COMPLETE: CPT | Performed by: FAMILY MEDICINE

## 2022-06-21 PROCEDURE — 36415 COLL VENOUS BLD VENIPUNCTURE: CPT

## 2022-06-21 PROCEDURE — 84443 ASSAY THYROID STIM HORMONE: CPT

## 2022-06-21 PROCEDURE — 80053 COMPREHEN METABOLIC PANEL: CPT

## 2022-06-21 PROCEDURE — 99214 OFFICE O/P EST MOD 30 MIN: CPT | Performed by: FAMILY MEDICINE

## 2022-06-21 PROCEDURE — 84439 ASSAY OF FREE THYROXINE: CPT

## 2022-06-21 RX ORDER — SCOLOPAMINE TRANSDERMAL SYSTEM 1 MG/1
1 PATCH, EXTENDED RELEASE TRANSDERMAL
Qty: 10 EACH | Refills: 2 | Status: SHIPPED | OUTPATIENT
Start: 2022-06-21 | End: 2023-01-23

## 2022-06-21 RX ORDER — MECLIZINE HYDROCHLORIDE 25 MG/1
25 TABLET ORAL 3 TIMES DAILY PRN
Qty: 60 TABLET | Refills: 2 | Status: SHIPPED | OUTPATIENT
Start: 2022-06-21 | End: 2022-06-21 | Stop reason: SDUPTHER

## 2022-06-21 RX ORDER — IBUPROFEN 800 MG/1
1600 TABLET ORAL EVERY 6 HOURS PRN
COMMUNITY
End: 2023-01-23

## 2022-06-21 RX ORDER — CEPHALEXIN 500 MG/1
500 CAPSULE ORAL 2 TIMES DAILY
Qty: 14 CAPSULE | Refills: 0 | Status: SHIPPED | OUTPATIENT
Start: 2022-06-21 | End: 2022-06-28

## 2022-06-21 RX ORDER — SCOLOPAMINE TRANSDERMAL SYSTEM 1 MG/1
1 PATCH, EXTENDED RELEASE TRANSDERMAL
Qty: 10 EACH | Refills: 2 | Status: SHIPPED | OUTPATIENT
Start: 2022-06-21 | End: 2022-06-21 | Stop reason: SDUPTHER

## 2022-06-21 RX ORDER — MECLIZINE HYDROCHLORIDE 25 MG/1
25 TABLET ORAL 3 TIMES DAILY PRN
Qty: 60 TABLET | Refills: 2 | Status: SHIPPED | OUTPATIENT
Start: 2022-06-21 | End: 2023-01-23

## 2022-06-21 RX ORDER — CEPHALEXIN 500 MG/1
500 CAPSULE ORAL 2 TIMES DAILY
Qty: 20 CAPSULE | Refills: 0 | Status: SHIPPED | OUTPATIENT
Start: 2022-06-21 | End: 2022-06-21 | Stop reason: SDUPTHER

## 2022-06-21 NOTE — TELEPHONE ENCOUNTER
----- Message from Lyly Juarez MD sent at 6/21/2022  2:22 PM EDT -----  I'm so sorry. Can you call him and see how he is taking the ibuprofen? On his med list, if he was actually taking that amount, that is way over. The max he can take is 800 mg TID. Thanks.    Spoke with patient he reports for the past few days he has been taking 1600 mg every 6 hours because his mouth hurt so bad,cautioned him & instructed the max dose is 800 mg three times a day as needed,he verbalized understanding.

## 2022-06-21 NOTE — TELEPHONE ENCOUNTER
It was sent to the wrong pharmacy. All three medications have now been resent to [facility].

## 2022-07-05 ENCOUNTER — TELEPHONE (OUTPATIENT)
Dept: FAMILY MEDICINE CLINIC | Facility: CLINIC | Age: 32
End: 2022-07-05

## 2022-07-05 DIAGNOSIS — F90.2 ATTENTION DEFICIT HYPERACTIVITY DISORDER, COMBINED TYPE: ICD-10-CM

## 2022-07-05 PROBLEM — N20.1 URETERAL STONE: Status: RESOLVED | Noted: 2019-04-11 | Resolved: 2022-07-05

## 2022-07-05 RX ORDER — DEXTROAMPHETAMINE SACCHARATE, AMPHETAMINE ASPARTATE, DEXTROAMPHETAMINE SULFATE AND AMPHETAMINE SULFATE 3.75; 3.75; 3.75; 3.75 MG/1; MG/1; MG/1; MG/1
15 TABLET ORAL 2 TIMES DAILY
Qty: 60 TABLET | Refills: 0 | Status: SHIPPED | OUTPATIENT
Start: 2022-07-05 | End: 2022-08-04

## 2022-07-05 NOTE — TELEPHONE ENCOUNTER
----- Message from Lyly Juarez MD sent at 7/3/2022  8:11 PM EDT -----  Labs stable. Okay to either call or send letter to patient. Thanks.    LETTER MAILED.

## 2022-07-08 ENCOUNTER — APPOINTMENT (OUTPATIENT)
Dept: MRI IMAGING | Facility: HOSPITAL | Age: 32
End: 2022-07-08

## 2022-07-08 ENCOUNTER — TELEPHONE (OUTPATIENT)
Dept: FAMILY MEDICINE CLINIC | Facility: CLINIC | Age: 32
End: 2022-07-08

## 2022-07-08 NOTE — TELEPHONE ENCOUNTER
[facility] MRI was denied due to not enough information(neck injury so they want more notes stating what brain or head problems and how long they have been going on and etc). Denial letter is uploaded. An appt was scheduled to follow up with patient about it, but there is also a P2P option if we would like to go about it that way.

## 2022-07-19 ENCOUNTER — APPOINTMENT (OUTPATIENT)
Dept: MRI IMAGING | Facility: HOSPITAL | Age: 32
End: 2022-07-19

## 2022-08-04 ENCOUNTER — OFFICE VISIT (OUTPATIENT)
Dept: PSYCHIATRY | Facility: CLINIC | Age: 32
End: 2022-08-04

## 2022-08-04 VITALS
HEART RATE: 71 BPM | WEIGHT: 315 LBS | BODY MASS INDEX: 46.65 KG/M2 | SYSTOLIC BLOOD PRESSURE: 127 MMHG | HEIGHT: 69 IN | DIASTOLIC BLOOD PRESSURE: 85 MMHG | TEMPERATURE: 96.8 F | OXYGEN SATURATION: 98 %

## 2022-08-04 DIAGNOSIS — F42.2 MIXED OBSESSIONAL THOUGHTS AND ACTS: ICD-10-CM

## 2022-08-04 DIAGNOSIS — F51.5 NIGHTMARES: ICD-10-CM

## 2022-08-04 DIAGNOSIS — F41.1 GENERALIZED ANXIETY DISORDER: ICD-10-CM

## 2022-08-04 DIAGNOSIS — F43.10 POST TRAUMATIC STRESS DISORDER (PTSD): ICD-10-CM

## 2022-08-04 DIAGNOSIS — F90.2 ATTENTION DEFICIT HYPERACTIVITY DISORDER, COMBINED TYPE: Primary | ICD-10-CM

## 2022-08-04 PROCEDURE — 99214 OFFICE O/P EST MOD 30 MIN: CPT | Performed by: NURSE PRACTITIONER

## 2022-08-04 RX ORDER — DULOXETIN HYDROCHLORIDE 60 MG/1
60 CAPSULE, DELAYED RELEASE ORAL DAILY
Qty: 30 CAPSULE | Refills: 2 | Status: SHIPPED | OUTPATIENT
Start: 2022-08-04 | End: 2022-11-07 | Stop reason: SDUPTHER

## 2022-08-04 RX ORDER — PRAZOSIN HYDROCHLORIDE 2 MG/1
2 CAPSULE ORAL NIGHTLY
Qty: 30 CAPSULE | Refills: 2 | Status: SHIPPED | OUTPATIENT
Start: 2022-08-04 | End: 2022-11-07

## 2022-08-04 RX ORDER — CLONIDINE HYDROCHLORIDE 0.1 MG/1
0.1 TABLET ORAL 2 TIMES DAILY
Qty: 60 TABLET | Refills: 2 | Status: SHIPPED | OUTPATIENT
Start: 2022-08-04 | End: 2022-11-07 | Stop reason: SDUPTHER

## 2022-08-04 NOTE — PROGRESS NOTES
Subjective   Andrew Narayan is a 31 y.o. male is here today for medication management follow-up.  Presents Chief Complaint:  Recheck on anxiety and hallucinations and ADHD    History of Present Illness:   Presents with his wife with whom he gives permission to speak in front of.  He states he is doing pretty good.  He does not feel like the Adderall seems to help as much anymore.  He is also seeming to experience a crash from the Adderall and states that he is binge eating at night once the effect of the Adderall wears off.  He has quit drinking soda pop he has lost 15 pounds since last office visit but still feels like he eats all the time at night.Body mass index is 53.79 kg/m².  He denies any negative side effects to the meds.  Continues to have nightmares but states it is manageable on the current dose of prazosin.  Denies any current depression.  Anxiety is stable on the Cymbalta.  The clonidine continues to help with racing thoughts and anxiety.  He is sleeping well at night without difficulty.  He does continue to have flashbacks and this is usually triggered by smell or a sound.  He states he can manage these however and does not feel like an increase in the Cymbalta is needed.  He denies any anger outbursts. Mood is stable.      The following portions of the patient's history were reviewed and updated as appropriate: allergies, current medications, past family history, past medical history, past social history, past surgical history and problem list.    Review of Systems   Constitutional: Negative for activity change, appetite change and fatigue.   HENT: Negative.    Eyes: Negative for visual disturbance.   Respiratory: Negative.    Cardiovascular: Negative.    Gastrointestinal: Negative for nausea.   Endocrine: Negative.    Genitourinary: Negative.    Musculoskeletal: Positive for arthralgias.   Skin: Negative.    Allergic/Immunologic: Negative.    Neurological: Negative for dizziness,  "seizures and headaches.   Hematological: Negative.    Psychiatric/Behavioral: Negative for agitation, behavioral problems, confusion, decreased concentration, dysphoric mood, hallucinations, self-injury, sleep disturbance and suicidal ideas. The patient is not nervous/anxious and is not hyperactive.         Nightmares     Reviewed copied data and there are no changes    Objective   Physical Exam   Constitutional: He is oriented to person, place, and time. He appears well-developed.   HENT:   Head: Normocephalic.   Neurological: He is alert and oriented to person, place, and time.   Psychiatric: His speech is normal and behavior is normal. Mood and thought content normal. His mood appears not anxious. He expresses impulsivity.   Engaging in conversation   Vitals reviewed.    Blood pressure 127/85, pulse 71, temperature 96.8 °F (36 °C), height 175.3 cm (69.02\"), weight (!) 165 kg (364 lb 6.4 oz), SpO2 98 %.    Medication List:   Current Outpatient Medications   Medication Sig Dispense Refill   • cloNIDine (CATAPRES) 0.1 MG tablet Take 1 tablet by mouth 2 (Two) Times a Day. 60 tablet 2   • DULoxetine (CYMBALTA) 60 MG capsule Take 1 capsule by mouth Daily. 30 capsule 2   • prazosin (MINIPRESS) 2 MG capsule Take 1 capsule by mouth Every Night. 30 capsule 2   • gabapentin (NEURONTIN) 600 MG tablet Take 600 mg by mouth 6 (Six) Times a Day.     • ibuprofen (ADVIL,MOTRIN) 800 MG tablet Take 1,600 mg by mouth Every 6 (Six) Hours As Needed for Mild Pain .     • lisdexamfetamine (Vyvanse) 40 MG capsule Take 1 capsule by mouth Every Morning 30 capsule 0   • meclizine (ANTIVERT) 25 MG tablet Take 1 tablet by mouth 3 (Three) Times a Day As Needed for Dizziness. 60 tablet 2   • metoprolol tartrate (LOPRESSOR) 50 MG tablet Take 0.5 tablets by mouth 2 (Two) Times a Day. (Patient taking differently: Take 25 mg by mouth Daily. Once daily) 90 tablet 1   • rosuvastatin (CRESTOR) 40 MG tablet Take 1 tablet by mouth Daily. 30 tablet 11   • " Scopolamine (Transderm-Scop, 1.5 MG,) 1 MG/3DAYS patch Place 1 patch on the skin as directed by provider Every 72 (Seventy-Two) Hours. 10 each 2     Current Facility-Administered Medications   Medication Dose Route Frequency Provider Last Rate Last Admin   • cyanocobalamin injection 1,000 mcg  1,000 mcg Intramuscular Once Lyly Juarez MD       • cyanocobalamin injection 1,000 mcg  1,000 mcg Intramuscular Q28 Days Lyly Juarez MD   1,000 mcg at 03/21/22 1536         Mental Status Exam:   Hygiene:   good  Cooperation:  Cooperative  Eye Contact:  Good  Psychomotor Behavior:  Appropriate  Affect:  Full range  Hopelessness: Denies  Speech:  Normal  Thought Process:  Goal directed  Thought Content:  Normal  Suicidal:  None  Homicidal:  None  Hallucinations:  None  Delusion:  None  Memory:  Intact  Orientation:  Person, Place, Time and Situation  Reliability:  fair  Insight:  Fair  Judgement:  Fair  Impulse Control:  Fair  Physical/Medical Issues:  Yes obesity, HTN, pain    Assessment & Plan   Problems Addressed this Visit    None     Visit Diagnoses     Attention deficit hyperactivity disorder, combined type    -  Primary    Relevant Medications    lisdexamfetamine (Vyvanse) 40 MG capsule    DULoxetine (CYMBALTA) 60 MG capsule    cloNIDine (CATAPRES) 0.1 MG tablet    Mixed obsessional thoughts and acts        Relevant Medications    DULoxetine (CYMBALTA) 60 MG capsule    Post traumatic stress disorder (PTSD)        Relevant Medications    lisdexamfetamine (Vyvanse) 40 MG capsule    prazosin (MINIPRESS) 2 MG capsule    DULoxetine (CYMBALTA) 60 MG capsule    Generalized anxiety disorder        Relevant Medications    lisdexamfetamine (Vyvanse) 40 MG capsule    DULoxetine (CYMBALTA) 60 MG capsule    Nightmares        Relevant Medications    lisdexamfetamine (Vyvanse) 40 MG capsule    prazosin (MINIPRESS) 2 MG capsule    DULoxetine (CYMBALTA) 60 MG capsule      Diagnoses       Codes Comments    Attention deficit  "hyperactivity disorder, combined type    -  Primary ICD-10-CM: F90.2  ICD-9-CM: 314.01     Mixed obsessional thoughts and acts     ICD-10-CM: F42.2  ICD-9-CM: 300.3     Post traumatic stress disorder (PTSD)     ICD-10-CM: F43.10  ICD-9-CM: 309.81     Generalized anxiety disorder     ICD-10-CM: F41.1  ICD-9-CM: 300.02     Nightmares     ICD-10-CM: F51.5  ICD-9-CM: 307.47       azalia reviewed.  UDS in past reviewed an appropriate   Functionality: pt having moderate impairment in important areas of daily functioning.  Prognosis: Good dependent on medication/follow up and treatment plan compliance.      He is to continue the cymbalta for the OCD, clonidine  for the ADHD and sleep , prazosin for the nightmares, .  I am changing him to vyvanse based on him having the \"crash\" with adderall as well as the binge eating.  Refills submitted.Continuing efforts to promote the therapeutic alliance, address the patient's issues, and strengthen self awareness, insights, and coping skills.   Patient is aware to call 911 or go to the nearest ER should begin having SI/HI. Pt will notify me should he have any negative side effects or any worsening depression.  RTC 12 weeks.  Sooner if needed.             " [Acute] : an acute visit [TextBox_44] : [place]

## 2022-09-01 ENCOUNTER — TELEPHONE (OUTPATIENT)
Dept: FAMILY MEDICINE CLINIC | Facility: CLINIC | Age: 32
End: 2022-09-01

## 2022-09-01 DIAGNOSIS — F90.2 ATTENTION DEFICIT HYPERACTIVITY DISORDER, COMBINED TYPE: ICD-10-CM

## 2022-10-03 DIAGNOSIS — F90.2 ATTENTION DEFICIT HYPERACTIVITY DISORDER, COMBINED TYPE: ICD-10-CM

## 2022-10-15 DIAGNOSIS — I10 ESSENTIAL HYPERTENSION: ICD-10-CM

## 2022-10-17 RX ORDER — METOPROLOL TARTRATE 50 MG/1
TABLET, FILM COATED ORAL
Qty: 30 TABLET | Refills: 0 | Status: SHIPPED | OUTPATIENT
Start: 2022-10-17 | End: 2023-01-23

## 2022-11-01 DIAGNOSIS — F90.2 ATTENTION DEFICIT HYPERACTIVITY DISORDER, COMBINED TYPE: ICD-10-CM

## 2022-11-07 ENCOUNTER — OFFICE VISIT (OUTPATIENT)
Dept: PSYCHIATRY | Facility: CLINIC | Age: 32
End: 2022-11-07

## 2022-11-07 ENCOUNTER — TELEPHONE (OUTPATIENT)
Dept: FAMILY MEDICINE CLINIC | Facility: CLINIC | Age: 32
End: 2022-11-07

## 2022-11-07 VITALS
TEMPERATURE: 96.6 F | WEIGHT: 315 LBS | SYSTOLIC BLOOD PRESSURE: 138 MMHG | HEART RATE: 101 BPM | DIASTOLIC BLOOD PRESSURE: 82 MMHG | HEIGHT: 69 IN | OXYGEN SATURATION: 99 % | BODY MASS INDEX: 46.65 KG/M2

## 2022-11-07 DIAGNOSIS — F90.2 ATTENTION DEFICIT HYPERACTIVITY DISORDER, COMBINED TYPE: Primary | ICD-10-CM

## 2022-11-07 DIAGNOSIS — F43.10 POST TRAUMATIC STRESS DISORDER (PTSD): ICD-10-CM

## 2022-11-07 DIAGNOSIS — F41.1 GENERALIZED ANXIETY DISORDER: ICD-10-CM

## 2022-11-07 DIAGNOSIS — F42.2 MIXED OBSESSIONAL THOUGHTS AND ACTS: ICD-10-CM

## 2022-11-07 DIAGNOSIS — F51.5 NIGHTMARES: ICD-10-CM

## 2022-11-07 PROCEDURE — 99214 OFFICE O/P EST MOD 30 MIN: CPT | Performed by: NURSE PRACTITIONER

## 2022-11-07 RX ORDER — CLONIDINE HYDROCHLORIDE 0.1 MG/1
0.1 TABLET ORAL 2 TIMES DAILY
Qty: 60 TABLET | Refills: 2 | Status: SHIPPED | OUTPATIENT
Start: 2022-11-07 | End: 2023-02-07 | Stop reason: SDUPTHER

## 2022-11-07 RX ORDER — DULOXETIN HYDROCHLORIDE 60 MG/1
60 CAPSULE, DELAYED RELEASE ORAL DAILY
Qty: 30 CAPSULE | Refills: 2 | Status: SHIPPED | OUTPATIENT
Start: 2022-11-07 | End: 2023-02-07 | Stop reason: SDUPTHER

## 2022-11-07 RX ORDER — DEXTROAMPHETAMINE SACCHARATE, AMPHETAMINE ASPARTATE, DEXTROAMPHETAMINE SULFATE AND AMPHETAMINE SULFATE 3.75; 3.75; 3.75; 3.75 MG/1; MG/1; MG/1; MG/1
15 TABLET ORAL 2 TIMES DAILY
Qty: 60 TABLET | Refills: 0 | Status: SHIPPED | OUTPATIENT
Start: 2022-11-07 | End: 2022-12-07 | Stop reason: SDUPTHER

## 2022-11-07 NOTE — PROGRESS NOTES
Subjective   Andrew Narayan is a 31 y.o. male is here today for medication management follow-up.  Presents Chief Complaint:  Recheck on anxiety and hallucinations and ADHD    History of Present Illness: pt states the vyvanse does not seem to be lasting.  He says he cannot really tell it is helping with focus hardly at all.  Says he thinks the adderall seemed to help better.  His depression and anxiety are stable.  No negative side effects to the meds.  Sleeping adequate.  Has vivid dreams but not nightmares.  Not using the prazosin.  Has only been really taking the clonidine once daily as there was issue with refill at the pharmacy and he was afraid he would run out of med.  Mood is stable.  Body mass index is 52.52 kg/m². weight loss 9 lbs since last no medical stressors.  His xoCD is stable currently.  The main issue is the ADHd which when not controlled contributes to ongoing anxiety and other symptoms.  He does feel the Vyvanse helps with his overall eating habits especially binge eating of an evening.    The following portions of the patient's history were reviewed and updated as appropriate: allergies, current medications, past family history, past medical history, past social history, past surgical history and problem list.    Review of Systems   Constitutional: Negative for activity change, appetite change and fatigue.   HENT: Negative.    Eyes: Negative for visual disturbance.   Respiratory: Negative.    Cardiovascular: Negative.    Gastrointestinal: Negative for nausea.   Endocrine: Negative.    Genitourinary: Negative.    Musculoskeletal: Positive for arthralgias.   Skin: Negative.    Allergic/Immunologic: Negative.    Neurological: Negative for dizziness, seizures and headaches.   Hematological: Negative.    Psychiatric/Behavioral: Negative for agitation, behavioral problems, confusion, decreased concentration, dysphoric mood, hallucinations, self-injury, sleep disturbance and suicidal  "ideas. The patient is not nervous/anxious and is not hyperactive.         Nightmares     Reviewed copied data and there are no changes    Objective   Physical Exam   Constitutional: He is oriented to person, place, and time. He appears well-developed.   HENT:   Head: Normocephalic.   Neurological: He is alert and oriented to person, place, and time.   Psychiatric: His speech is normal and behavior is normal. Mood and thought content normal. His mood appears not anxious. He expresses impulsivity.   Engaging in conversation   Vitals reviewed.    Blood pressure 138/82, pulse 101, temperature 96.6 °F (35.9 °C), height 175.3 cm (69.02\"), weight (!) 161 kg (355 lb 12.8 oz), SpO2 99 %.    Medication List:   Current Outpatient Medications   Medication Sig Dispense Refill   • cloNIDine (CATAPRES) 0.1 MG tablet Take 1 tablet by mouth 2 (Two) Times a Day. 60 tablet 2   • DULoxetine (CYMBALTA) 60 MG capsule Take 1 capsule by mouth Daily. 30 capsule 2   • amphetamine-dextroamphetamine (Adderall) 15 MG tablet Take 1 tablet by mouth 2 (Two) Times a Day. 60 tablet 0   • gabapentin (NEURONTIN) 600 MG tablet Take 600 mg by mouth 6 (Six) Times a Day.     • ibuprofen (ADVIL,MOTRIN) 800 MG tablet Take 1,600 mg by mouth Every 6 (Six) Hours As Needed for Mild Pain .     • meclizine (ANTIVERT) 25 MG tablet Take 1 tablet by mouth 3 (Three) Times a Day As Needed for Dizziness. 60 tablet 2   • metoprolol tartrate (LOPRESSOR) 50 MG tablet TAKE 0.5 TABLET BY MOUTH TWICE A DAY FOR BLOOD PRESSURE 30 tablet 0   • rosuvastatin (CRESTOR) 40 MG tablet Take 1 tablet by mouth Daily. 30 tablet 11   • Scopolamine (Transderm-Scop, 1.5 MG,) 1 MG/3DAYS patch Place 1 patch on the skin as directed by provider Every 72 (Seventy-Two) Hours. 10 each 2     Current Facility-Administered Medications   Medication Dose Route Frequency Provider Last Rate Last Admin   • cyanocobalamin injection 1,000 mcg  1,000 mcg Intramuscular Once Lyly Juarez MD       • " cyanocobalamin injection 1,000 mcg  1,000 mcg Intramuscular Q28 Days Lyly Juarez MD   1,000 mcg at 03/21/22 1536         Mental Status Exam:   Hygiene:   good  Cooperation:  Cooperative  Eye Contact:  Good  Psychomotor Behavior:  Appropriate  Affect:  Full range  Hopelessness: Denies  Speech:  Normal  Thought Process:  Goal directed  Thought Content:  Normal  Suicidal:  None  Homicidal:  None  Hallucinations:  None  Delusion:  None  Memory:  Intact  Orientation:  Person, Place, Time and Situation  Reliability:  fair  Insight:  Fair  Judgement:  Fair  Impulse Control:  Fair  Physical/Medical Issues:  Yes obesity, HTN, pain    Assessment & Plan   Problems Addressed this Visit    None  Visit Diagnoses     Attention deficit hyperactivity disorder, combined type    -  Primary    Relevant Medications    DULoxetine (CYMBALTA) 60 MG capsule    cloNIDine (CATAPRES) 0.1 MG tablet    amphetamine-dextroamphetamine (Adderall) 15 MG tablet    Post traumatic stress disorder (PTSD)        Relevant Medications    DULoxetine (CYMBALTA) 60 MG capsule    amphetamine-dextroamphetamine (Adderall) 15 MG tablet    Generalized anxiety disorder        Relevant Medications    DULoxetine (CYMBALTA) 60 MG capsule    amphetamine-dextroamphetamine (Adderall) 15 MG tablet    Mixed obsessional thoughts and acts        Relevant Medications    DULoxetine (CYMBALTA) 60 MG capsule    Nightmares        Relevant Medications    DULoxetine (CYMBALTA) 60 MG capsule    amphetamine-dextroamphetamine (Adderall) 15 MG tablet      Diagnoses       Codes Comments    Attention deficit hyperactivity disorder, combined type    -  Primary ICD-10-CM: F90.2  ICD-9-CM: 314.01     Post traumatic stress disorder (PTSD)     ICD-10-CM: F43.10  ICD-9-CM: 309.81     Generalized anxiety disorder     ICD-10-CM: F41.1  ICD-9-CM: 300.02     Mixed obsessional thoughts and acts     ICD-10-CM: F42.2  ICD-9-CM: 300.3     Nightmares     ICD-10-CM: F51.5  ICD-9-CM: 307.47      "  azalia reviewed.  UDS in past reviewed an appropriate   Functionality: pt having moderate impairment in important areas of daily functioning.  Prognosis: Good dependent on medication/follow up and treatment plan compliance.    I am going to go ahead and have him take the clonidine twice a day.  Stopping the prazosin since he is not really taking it anyway.  The vivid dreams are a side effect of the Cymbalta and he states he can deal with that as long as they are not nightmares.  Going to stop the Vyvanse and switch him back to the Adderall that he has taken in the past as it seemed that worked better.  He is familiar with this medicine as he has been on it before.  He will continue the Cymbalta for the anxiety and OCD.  Refills have all been submitted.  I did remind him that in the past he was having the Adderall \"crash and was eating more so he is going to watch for this.  Continuing efforts to promote the therapeutic alliance, address the patient's issues, and strengthen self awareness, insights, and coping skills.   Patient is aware to call 911 or go to the nearest ER should begin having SI/HI. Pt will notify me should he have any negative side effects or any worsening depression.  RTC 12 weeks.  Sooner if needed.             "

## 2022-12-07 DIAGNOSIS — F90.2 ATTENTION DEFICIT HYPERACTIVITY DISORDER, COMBINED TYPE: ICD-10-CM

## 2022-12-07 RX ORDER — DEXTROAMPHETAMINE SACCHARATE, AMPHETAMINE ASPARTATE, DEXTROAMPHETAMINE SULFATE AND AMPHETAMINE SULFATE 3.75; 3.75; 3.75; 3.75 MG/1; MG/1; MG/1; MG/1
15 TABLET ORAL 2 TIMES DAILY
Qty: 60 TABLET | Refills: 0 | Status: SHIPPED | OUTPATIENT
Start: 2022-12-07 | End: 2023-01-05 | Stop reason: SDUPTHER

## 2022-12-07 NOTE — TELEPHONE ENCOUNTER
Adderall doesn't require a prior auth and new prescription request was sent to [facility] verbally understood

## 2023-01-05 DIAGNOSIS — F90.2 ATTENTION DEFICIT HYPERACTIVITY DISORDER, COMBINED TYPE: ICD-10-CM

## 2023-01-05 RX ORDER — DEXTROAMPHETAMINE SACCHARATE, AMPHETAMINE ASPARTATE, DEXTROAMPHETAMINE SULFATE AND AMPHETAMINE SULFATE 3.75; 3.75; 3.75; 3.75 MG/1; MG/1; MG/1; MG/1
15 TABLET ORAL 2 TIMES DAILY
Qty: 60 TABLET | Refills: 0 | Status: SHIPPED | OUTPATIENT
Start: 2023-01-05 | End: 2023-02-02 | Stop reason: SDUPTHER

## 2023-01-23 ENCOUNTER — OFFICE VISIT (OUTPATIENT)
Dept: FAMILY MEDICINE CLINIC | Facility: CLINIC | Age: 33
End: 2023-01-23
Payer: COMMERCIAL

## 2023-01-23 VITALS
DIASTOLIC BLOOD PRESSURE: 92 MMHG | SYSTOLIC BLOOD PRESSURE: 136 MMHG | TEMPERATURE: 97.1 F | OXYGEN SATURATION: 100 % | HEART RATE: 82 BPM | BODY MASS INDEX: 46.65 KG/M2 | WEIGHT: 315 LBS | HEIGHT: 69 IN

## 2023-01-23 DIAGNOSIS — R79.89 LOW TESTOSTERONE: ICD-10-CM

## 2023-01-23 DIAGNOSIS — G89.29 CHRONIC BILATERAL LOW BACK PAIN WITH LEFT-SIDED SCIATICA: ICD-10-CM

## 2023-01-23 DIAGNOSIS — F90.0 ATTENTION DEFICIT HYPERACTIVITY DISORDER (ADHD), PREDOMINANTLY INATTENTIVE TYPE: Chronic | ICD-10-CM

## 2023-01-23 DIAGNOSIS — M54.42 CHRONIC BILATERAL LOW BACK PAIN WITH LEFT-SIDED SCIATICA: ICD-10-CM

## 2023-01-23 DIAGNOSIS — R07.9 CHEST PAIN, UNSPECIFIED TYPE: Primary | ICD-10-CM

## 2023-01-23 DIAGNOSIS — F41.9 ANXIETY AND DEPRESSION: ICD-10-CM

## 2023-01-23 DIAGNOSIS — G62.89 OTHER POLYNEUROPATHY: ICD-10-CM

## 2023-01-23 DIAGNOSIS — R00.2 PALPITATIONS: Chronic | ICD-10-CM

## 2023-01-23 DIAGNOSIS — M54.6 ACUTE RIGHT-SIDED THORACIC BACK PAIN: ICD-10-CM

## 2023-01-23 DIAGNOSIS — F32.A ANXIETY AND DEPRESSION: ICD-10-CM

## 2023-01-23 DIAGNOSIS — I10 ESSENTIAL HYPERTENSION: Chronic | ICD-10-CM

## 2023-01-23 DIAGNOSIS — E53.8 B12 DEFICIENCY: ICD-10-CM

## 2023-01-23 DIAGNOSIS — E78.2 MIXED HYPERLIPIDEMIA: Chronic | ICD-10-CM

## 2023-01-23 PROCEDURE — 96372 THER/PROPH/DIAG INJ SC/IM: CPT | Performed by: PHYSICIAN ASSISTANT

## 2023-01-23 PROCEDURE — 99214 OFFICE O/P EST MOD 30 MIN: CPT | Performed by: PHYSICIAN ASSISTANT

## 2023-01-23 PROCEDURE — 93000 ELECTROCARDIOGRAM COMPLETE: CPT | Performed by: PHYSICIAN ASSISTANT

## 2023-01-23 RX ORDER — CELECOXIB 200 MG/1
200 CAPSULE ORAL DAILY
Qty: 30 CAPSULE | Refills: 3 | Status: SHIPPED | OUTPATIENT
Start: 2023-01-23

## 2023-01-23 RX ORDER — CYANOCOBALAMIN 1000 UG/ML
1000 INJECTION, SOLUTION INTRAMUSCULAR; SUBCUTANEOUS ONCE
Status: COMPLETED | OUTPATIENT
Start: 2023-01-23 | End: 2023-01-23

## 2023-01-23 RX ORDER — METOPROLOL SUCCINATE 50 MG/1
50 TABLET, EXTENDED RELEASE ORAL DAILY
Qty: 30 TABLET | Refills: 5 | Status: SHIPPED | OUTPATIENT
Start: 2023-01-23

## 2023-01-23 RX ADMIN — CYANOCOBALAMIN 1000 MCG: 1000 INJECTION, SOLUTION INTRAMUSCULAR; SUBCUTANEOUS at 15:56
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
## 2023-02-01 ENCOUNTER — TELEPHONE (OUTPATIENT)
Dept: FAMILY MEDICINE CLINIC | Facility: CLINIC | Age: 33
End: 2023-02-01

## 2023-02-01 NOTE — TELEPHONE ENCOUNTER
Caller: Lyly Narayan    Relationship: Emergency Contact    Best call back number: [phone]    What orders are you requesting (i.e. lab or imaging): GALLBLADDER TESTED    In what timeframe would the patient need to come in: 2/17/23 (ALREADY HAS AN APPOINTMENT FOR LABS)    Where will you receive your lab/imaging services: [facility]    Additional notes: WIFE STATES THAT PATIENT HAS BEEN HAVING PAIN IN SHOULDER BLADE AND BY RIB CAGE

## 2023-02-02 DIAGNOSIS — F90.2 ATTENTION DEFICIT HYPERACTIVITY DISORDER, COMBINED TYPE: ICD-10-CM

## 2023-02-02 RX ORDER — DEXTROAMPHETAMINE SACCHARATE, AMPHETAMINE ASPARTATE, DEXTROAMPHETAMINE SULFATE AND AMPHETAMINE SULFATE 3.75; 3.75; 3.75; 3.75 MG/1; MG/1; MG/1; MG/1
15 TABLET ORAL 2 TIMES DAILY
Qty: 60 TABLET | Refills: 0 | Status: SHIPPED | OUTPATIENT
Start: 2023-02-02 | End: 2023-03-02 | Stop reason: SDUPTHER

## 2023-02-07 ENCOUNTER — OFFICE VISIT (OUTPATIENT)
Dept: PSYCHIATRY | Facility: CLINIC | Age: 33
End: 2023-02-07
Payer: COMMERCIAL

## 2023-02-07 VITALS
HEIGHT: 69 IN | TEMPERATURE: 96.8 F | DIASTOLIC BLOOD PRESSURE: 82 MMHG | BODY MASS INDEX: 46.65 KG/M2 | SYSTOLIC BLOOD PRESSURE: 136 MMHG | HEART RATE: 79 BPM | OXYGEN SATURATION: 99 % | WEIGHT: 315 LBS

## 2023-02-07 DIAGNOSIS — F90.2 ATTENTION DEFICIT HYPERACTIVITY DISORDER, COMBINED TYPE: Primary | ICD-10-CM

## 2023-02-07 DIAGNOSIS — G47.00 INSOMNIA, UNSPECIFIED TYPE: ICD-10-CM

## 2023-02-07 DIAGNOSIS — F42.2 MIXED OBSESSIONAL THOUGHTS AND ACTS: ICD-10-CM

## 2023-02-07 DIAGNOSIS — F43.10 POST TRAUMATIC STRESS DISORDER (PTSD): ICD-10-CM

## 2023-02-07 DIAGNOSIS — F51.5 NIGHTMARES: ICD-10-CM

## 2023-02-07 DIAGNOSIS — F41.1 GENERALIZED ANXIETY DISORDER: ICD-10-CM

## 2023-02-07 PROCEDURE — 99214 OFFICE O/P EST MOD 30 MIN: CPT | Performed by: NURSE PRACTITIONER

## 2023-02-07 RX ORDER — DULOXETIN HYDROCHLORIDE 60 MG/1
60 CAPSULE, DELAYED RELEASE ORAL DAILY
Qty: 30 CAPSULE | Refills: 2 | Status: SHIPPED | OUTPATIENT
Start: 2023-02-07 | End: 2024-02-07

## 2023-02-07 RX ORDER — CLONIDINE HYDROCHLORIDE 0.1 MG/1
0.1 TABLET ORAL NIGHTLY
Qty: 30 TABLET | Refills: 2 | Status: SHIPPED | OUTPATIENT
Start: 2023-02-07

## 2023-02-07 NOTE — PROGRESS NOTES
Subjective   Andrew Narayan is a 32 y.o. male is here today for medication management follow-up.  Presents Chief Complaint:  Recheck on anxiety and hallucinations and ADHD    History of Present Illness: pt states he is doing well.  He is only taking the clonidine once at night.  Says taking it during the day caused too much drowsiness.  He denies any depression.  Anxiety is minimal.  The stimulant helps with focus and concentration.  The adderall is working adequately.  No negative side effects to the meds.  Sleeping well.  Occasional nightmares.  Body mass index is 51.48 kg/m². no weight changes.  No medical stressors.  Mood is stable.  No excessive irritability.    The following portions of the patient's history were reviewed and updated as appropriate: allergies, current medications, past family history, past medical history, past social history, past surgical history and problem list.    Review of Systems   Constitutional: Negative for activity change, appetite change and fatigue.   HENT: Negative.    Eyes: Negative for visual disturbance.   Respiratory: Negative.    Cardiovascular: Negative.    Gastrointestinal: Negative for nausea.   Endocrine: Negative.    Genitourinary: Negative.    Musculoskeletal: Positive for arthralgias.   Skin: Negative.    Allergic/Immunologic: Negative.    Neurological: Negative for dizziness, seizures and headaches.   Hematological: Negative.    Psychiatric/Behavioral: Negative for agitation, behavioral problems, confusion, decreased concentration, dysphoric mood, hallucinations, self-injury, sleep disturbance and suicidal ideas. The patient is not nervous/anxious and is not hyperactive.         Nightmares     Reviewed copied data and there are no changes    Objective   Physical Exam   Constitutional: He is oriented to person, place, and time. He appears well-developed.   HENT:   Head: Normocephalic.   Neurological: He is alert and oriented to person, place, and time.  "  Psychiatric: His speech is normal and behavior is normal. Mood and thought content normal. His mood appears not anxious. He expresses impulsivity.   Engaging in conversation   Vitals reviewed.    Blood pressure 136/82, pulse 79, temperature 96.8 °F (36 °C), height 175.3 cm (69.02\"), weight (!) 158 kg (348 lb 12.8 oz), SpO2 99 %.    Medication List:   Current Outpatient Medications   Medication Sig Dispense Refill   • amphetamine-dextroamphetamine (Adderall) 15 MG tablet Take 1 tablet by mouth 2 (Two) Times a Day. 60 tablet 0   • cloNIDine (CATAPRES) 0.1 MG tablet Take 1 tablet by mouth Every Night. 30 tablet 2   • DULoxetine (CYMBALTA) 60 MG capsule Take 1 capsule by mouth Daily. 30 capsule 2   • celecoxib (CeleBREX) 200 MG capsule Take 1 capsule by mouth Daily. 30 capsule 3   • gabapentin (NEURONTIN) 600 MG tablet Take 600 mg by mouth 6 (Six) Times a Day.     • metoprolol succinate XL (TOPROL-XL) 50 MG 24 hr tablet Take 1 tablet by mouth Daily. 30 tablet 5     No current facility-administered medications for this visit.         Mental Status Exam:   Hygiene:   good  Cooperation:  Cooperative  Eye Contact:  Good  Psychomotor Behavior:  Appropriate  Affect:  Full range  Hopelessness: Denies  Speech:  Normal  Thought Process:  Goal directed  Thought Content:  Normal  Suicidal:  None  Homicidal:  None  Hallucinations:  None  Delusion:  None  Memory:  Intact  Orientation:  Person, Place, Time and Situation  Reliability:  fair  Insight:  Fair  Judgement:  Fair  Impulse Control:  Fair  Physical/Medical Issues:  Yes obesity, HTN, pain    Assessment & Plan   Problems Addressed this Visit        Sleep    Insomnia   Other Visit Diagnoses     Attention deficit hyperactivity disorder, combined type    -  Primary    Relevant Medications    DULoxetine (CYMBALTA) 60 MG capsule    cloNIDine (CATAPRES) 0.1 MG tablet    Post traumatic stress disorder (PTSD)        Relevant Medications    DULoxetine (CYMBALTA) 60 MG capsule    " Generalized anxiety disorder        Relevant Medications    DULoxetine (CYMBALTA) 60 MG capsule    Mixed obsessional thoughts and acts        Relevant Medications    DULoxetine (CYMBALTA) 60 MG capsule    Nightmares        Relevant Medications    DULoxetine (CYMBALTA) 60 MG capsule      Diagnoses       Codes Comments    Attention deficit hyperactivity disorder, combined type    -  Primary ICD-10-CM: F90.2  ICD-9-CM: 314.01     Post traumatic stress disorder (PTSD)     ICD-10-CM: F43.10  ICD-9-CM: 309.81     Generalized anxiety disorder     ICD-10-CM: F41.1  ICD-9-CM: 300.02     Mixed obsessional thoughts and acts     ICD-10-CM: F42.2  ICD-9-CM: 300.3     Nightmares     ICD-10-CM: F51.5  ICD-9-CM: 307.47     Insomnia, unspecified type     ICD-10-CM: G47.00  ICD-9-CM: 780.52       azalia reviewed.  UDS in past reviewed an appropriate. UDs performed today and pending  Functionality: pt having minimal impairment in important areas of daily functioning.  Prognosis: Good dependent on medication/follow up and treatment plan compliance.    He is currently pleased with his progress.  He is to continue the adderall for the adhd, clonidine for adhd and sleep, cymbalta for depression and anxiety.  Refills submitted.        Continuing efforts to promote the therapeutic alliance, address the patient's issues, and strengthen self awareness, insights, and coping skills.   Patient is aware to call 911 or go to the nearest ER should begin having SI/HI. Pt will notify me should he have any negative side effects or any worsening depression.  RTC 12 weeks.  Sooner if needed.

## 2023-02-17 ENCOUNTER — LAB (OUTPATIENT)
Dept: FAMILY MEDICINE CLINIC | Facility: CLINIC | Age: 33
End: 2023-02-17
Payer: COMMERCIAL

## 2023-02-17 DIAGNOSIS — E78.2 MIXED HYPERLIPIDEMIA: Chronic | ICD-10-CM

## 2023-02-17 DIAGNOSIS — R79.89 LOW TESTOSTERONE: ICD-10-CM

## 2023-02-17 DIAGNOSIS — F90.0 ATTENTION DEFICIT HYPERACTIVITY DISORDER (ADHD), PREDOMINANTLY INATTENTIVE TYPE: ICD-10-CM

## 2023-02-17 DIAGNOSIS — I10 ESSENTIAL HYPERTENSION: ICD-10-CM

## 2023-02-17 DIAGNOSIS — R07.9 CHEST PAIN, UNSPECIFIED TYPE: ICD-10-CM

## 2023-02-17 LAB
ALBUMIN SERPL-MCNC: 4.2 G/DL (ref 3.5–5.2)
ALBUMIN/GLOB SERPL: 1.4 G/DL
ALP SERPL-CCNC: 85 U/L (ref 39–117)
ALT SERPL W P-5'-P-CCNC: 14 U/L (ref 1–41)
ANION GAP SERPL CALCULATED.3IONS-SCNC: 7.8 MMOL/L (ref 5–15)
AST SERPL-CCNC: 14 U/L (ref 1–40)
BASOPHILS # BLD AUTO: 0.02 10*3/MM3 (ref 0–0.2)
BASOPHILS NFR BLD AUTO: 0.2 % (ref 0–1.5)
BILIRUB SERPL-MCNC: 0.4 MG/DL (ref 0–1.2)
BUN SERPL-MCNC: 11 MG/DL (ref 6–20)
BUN/CREAT SERPL: 9.3 (ref 7–25)
CALCIUM SPEC-SCNC: 9.7 MG/DL (ref 8.6–10.5)
CHLORIDE SERPL-SCNC: 100 MMOL/L (ref 98–107)
CHOLEST SERPL-MCNC: 343 MG/DL (ref 0–200)
CO2 SERPL-SCNC: 28.2 MMOL/L (ref 22–29)
CREAT SERPL-MCNC: 1.18 MG/DL (ref 0.76–1.27)
DEPRECATED RDW RBC AUTO: 39.1 FL (ref 37–54)
EGFRCR SERPLBLD CKD-EPI 2021: 83 ML/MIN/1.73
EOSINOPHIL # BLD AUTO: 0.13 10*3/MM3 (ref 0–0.4)
EOSINOPHIL NFR BLD AUTO: 1.2 % (ref 0.3–6.2)
ERYTHROCYTE [DISTWIDTH] IN BLOOD BY AUTOMATED COUNT: 13.1 % (ref 12.3–15.4)
GLOBULIN UR ELPH-MCNC: 2.9 GM/DL
GLUCOSE SERPL-MCNC: 88 MG/DL (ref 65–99)
HCT VFR BLD AUTO: 46.4 % (ref 37.5–51)
HDLC SERPL-MCNC: 33 MG/DL (ref 40–60)
HGB BLD-MCNC: 15.8 G/DL (ref 13–17.7)
IMM GRANULOCYTES # BLD AUTO: 0.04 10*3/MM3 (ref 0–0.05)
IMM GRANULOCYTES NFR BLD AUTO: 0.4 % (ref 0–0.5)
LDLC SERPL CALC-MCNC: 242 MG/DL (ref 0–100)
LDLC/HDLC SERPL: 7.47 {RATIO}
LYMPHOCYTES # BLD AUTO: 3.83 10*3/MM3 (ref 0.7–3.1)
LYMPHOCYTES NFR BLD AUTO: 35.3 % (ref 19.6–45.3)
MCH RBC QN AUTO: 28.2 PG (ref 26.6–33)
MCHC RBC AUTO-ENTMCNC: 34.1 G/DL (ref 31.5–35.7)
MCV RBC AUTO: 82.9 FL (ref 79–97)
MONOCYTES # BLD AUTO: 0.63 10*3/MM3 (ref 0.1–0.9)
MONOCYTES NFR BLD AUTO: 5.8 % (ref 5–12)
NEUTROPHILS NFR BLD AUTO: 57.1 % (ref 42.7–76)
NEUTROPHILS NFR BLD AUTO: 6.21 10*3/MM3 (ref 1.7–7)
NRBC BLD AUTO-RTO: 0 /100 WBC (ref 0–0.2)
PLATELET # BLD AUTO: 319 10*3/MM3 (ref 140–450)
PMV BLD AUTO: 10.1 FL (ref 6–12)
POTASSIUM SERPL-SCNC: 4.1 MMOL/L (ref 3.5–5.2)
PROT SERPL-MCNC: 7.1 G/DL (ref 6–8.5)
RBC # BLD AUTO: 5.6 10*6/MM3 (ref 4.14–5.8)
SODIUM SERPL-SCNC: 136 MMOL/L (ref 136–145)
TESTOST SERPL-MCNC: 224 NG/DL (ref 249–836)
TRIGL SERPL-MCNC: 317 MG/DL (ref 0–150)
TSH SERPL DL<=0.05 MIU/L-ACNC: 5.41 UIU/ML (ref 0.27–4.2)
VLDLC SERPL-MCNC: 68 MG/DL (ref 5–40)
WBC NRBC COR # BLD: 10.86 10*3/MM3 (ref 3.4–10.8)

## 2023-02-17 PROCEDURE — 84403 ASSAY OF TOTAL TESTOSTERONE: CPT | Performed by: PHYSICIAN ASSISTANT

## 2023-02-17 PROCEDURE — 84402 ASSAY OF FREE TESTOSTERONE: CPT | Performed by: PHYSICIAN ASSISTANT

## 2023-02-17 PROCEDURE — 80061 LIPID PANEL: CPT | Performed by: PHYSICIAN ASSISTANT

## 2023-02-17 PROCEDURE — 80050 GENERAL HEALTH PANEL: CPT | Performed by: PHYSICIAN ASSISTANT

## 2023-02-23 LAB — TESTOST FREE SERPL-MCNC: 5.2 PG/ML (ref 8.7–25.1)

## 2023-02-24 ENCOUNTER — OFFICE VISIT (OUTPATIENT)
Dept: FAMILY MEDICINE CLINIC | Facility: CLINIC | Age: 33
End: 2023-02-24
Payer: COMMERCIAL

## 2023-02-24 VITALS
HEIGHT: 69 IN | SYSTOLIC BLOOD PRESSURE: 118 MMHG | DIASTOLIC BLOOD PRESSURE: 86 MMHG | HEART RATE: 68 BPM | BODY MASS INDEX: 46.65 KG/M2 | OXYGEN SATURATION: 98 % | WEIGHT: 315 LBS | TEMPERATURE: 97.3 F

## 2023-02-24 DIAGNOSIS — E78.2 MIXED HYPERLIPIDEMIA: Chronic | ICD-10-CM

## 2023-02-24 DIAGNOSIS — E03.9 ACQUIRED HYPOTHYROIDISM: Chronic | ICD-10-CM

## 2023-02-24 DIAGNOSIS — M54.6 ACUTE RIGHT-SIDED THORACIC BACK PAIN: Primary | ICD-10-CM

## 2023-02-24 DIAGNOSIS — R79.89 LOW TESTOSTERONE: Chronic | ICD-10-CM

## 2023-02-24 PROCEDURE — 99214 OFFICE O/P EST MOD 30 MIN: CPT | Performed by: PHYSICIAN ASSISTANT

## 2023-02-24 RX ORDER — ROSUVASTATIN CALCIUM 10 MG/1
10 TABLET, COATED ORAL DAILY
Qty: 30 TABLET | Refills: 5 | Status: SHIPPED | OUTPATIENT
Start: 2023-02-24

## 2023-02-24 NOTE — PROGRESS NOTES
"Subjective   Andrew Narayan is a 32 y.o. male.       Chief Complaint -back pain    History of Present Illness -    ROS    Back pain-  He complains of moderate achy pain in the right thoracic back.  He does have associated tightness over the scapular musculature.  Worse with abduction of the right arm.  No known specific injury.  Some relief with celecoxib.    Hyperlipidemia-  Uncontrolled with very high cholesterol levels on recent lab work reviewed with patient.  He reports that hyperlipidemia and coronary artery disease do run in his family.    Hypothyroidism-  Lab work confirms hypothyroidism.  Patient does have issues with obesity and fatigue.  Not at goal    Low testosterone-  Discussed lab work including low testosterone.  Patient has been referred to urology and has pending appointment tomorrow    The following portions of the patient's history were reviewed and updated as appropriate: allergies, current medications, past family history, past medical history, past social history, past surgical history and problem list.    Review of Systems    Objective  Vital signs:  /86   Pulse 68   Temp 97.3 °F (36.3 °C) (Temporal)   Ht 175.3 cm (69\")   Wt (!) 155 kg (341 lb)   SpO2 98%   BMI 50.36 kg/m²     Physical Exam  Vitals and nursing note reviewed.   Constitutional:       Appearance: Normal appearance. He is well-developed.   Eyes:      Extraocular Movements: Extraocular movements intact.      Conjunctiva/sclera: Conjunctivae normal.   Cardiovascular:      Rate and Rhythm: Normal rate and regular rhythm.      Heart sounds: Normal heart sounds. No murmur heard.  Pulmonary:      Effort: Pulmonary effort is normal. No respiratory distress.      Breath sounds: Normal breath sounds. No wheezing.   Musculoskeletal:         General: No tenderness.   Skin:     General: Skin is warm and dry.      Findings: No rash.   Neurological:      Mental Status: He is alert and oriented to person, place, and time. "   Psychiatric:         Mood and Affect: Mood normal.         Behavior: Behavior normal.         Thought Content: Thought content normal.         The following data was reviewed by: CARLY Alford on 02/24/2023:  CMP    CMP 6/1/22 6/21/22 2/17/23   Glucose 122 (A) 88 88   BUN 11 9 11   Creatinine 1.17 1.11 1.18   eGFR 84.4 89.9 83.0   Sodium 141 142 136   Potassium 4.0 4.9 4.1   Chloride 106 104 100   Calcium 9.5 9.7 9.7   Total Protein 6.7 6.6 7.1   Albumin 3.86 4.10 4.2   Globulin 2.8 2.5 2.9   Total Bilirubin 0.2 0.3 0.4   Alkaline Phosphatase 81 73 85   AST (SGOT) 18 19 14   ALT (SGPT) 27 32 14   Albumin/Globulin Ratio 1.4 1.6 1.4   BUN/Creatinine Ratio 9.4 8.1 9.3   Anion Gap 11.0 12.2 7.8   (A) Abnormal value       Comments are available for some flowsheets but are not being displayed.           CBC w/diff    CBC w/Diff 6/1/22 2/17/23   WBC 12.16 (A) 10.86 (A)   RBC 5.06 5.60   Hemoglobin 14.4 15.8   Hematocrit 44.8 46.4   MCV 88.5 82.9   MCH 28.5 28.2   MCHC 32.1 34.1   RDW 13.4 13.1   Platelets 343 319   Neutrophil Rel % 55.4 57.1   Immature Granulocyte Rel % 0.3 0.4   Lymphocyte Rel % 37.2 35.3   Monocyte Rel % 4.9 (A) 5.8   Eosinophil Rel % 2.0 1.2   Basophil Rel % 0.2 0.2   (A) Abnormal value            Lipid Panel    Lipid Panel 2/17/23   Total Cholesterol 343 (A)   Triglycerides 317 (A)   HDL Cholesterol 33 (A)   VLDL Cholesterol 68 (A)   LDL Cholesterol  242 (A)   LDL/HDL Ratio 7.47   (A) Abnormal value            TSH    TSH 6/1/22 6/21/22 2/17/23   TSH 4.570 (A) 2.130 5.410 (A)   (A) Abnormal value                       Assessment & Plan     Diagnoses and all orders for this visit:    1. Acute right-sided thoracic back pain (Primary)  Comments:  Plan to PA trigger point injection as pain is likely musculoskeletal  X-rays ordered for further evaluation  Refer to PT  Orders:  -     XR spine thoracic 2 vw; Future  -     XR Scapula Right; Future  -     Ambulatory Referral to Physical Therapy  Evaluate and treat    2. Mixed hyperlipidemia  Comments:  start crestor  Advised low-cholesterol diet    Orders:  -     rosuvastatin (Crestor) 10 MG tablet; Take 1 tablet by mouth Daily.  Dispense: 30 tablet; Refill: 5  -     Comprehensive Metabolic Panel; Future  -     Lipid Panel; Future    3. Acquired hypothyroidism  Comments:  Continue to monitor with lab work in 3 months  Discussed diagnosis of hypothyroidism and treatment options  Orders:  -     TSH; Future  -     T4, Free; Future    4. Low testosterone  Comments:  Discussed lab work including low testosterone  Advised patient to keep pending appointment with urology tomorrow            Patient was given instructions and counseling regarding his condition or for health maintenance advice. Please see specific information pulled into the AVS if appropriate      This document has been electronically signed by:  Ashley Rojas PA-C

## 2023-02-27 ENCOUNTER — TELEPHONE (OUTPATIENT)
Dept: FAMILY MEDICINE CLINIC | Facility: CLINIC | Age: 33
End: 2023-02-27
Payer: COMMERCIAL

## 2023-02-27 NOTE — TELEPHONE ENCOUNTER
Spoke with patient about xray results of his t-spine. Per Ashley Rojas PA-C, shows degenerative changes.

## 2023-02-28 ENCOUNTER — OFFICE VISIT (OUTPATIENT)
Dept: UROLOGY | Facility: CLINIC | Age: 33
End: 2023-02-28
Payer: COMMERCIAL

## 2023-02-28 VITALS — HEIGHT: 69 IN | WEIGHT: 315 LBS | BODY MASS INDEX: 46.65 KG/M2

## 2023-02-28 DIAGNOSIS — R79.89 LOW TESTOSTERONE: ICD-10-CM

## 2023-02-28 DIAGNOSIS — N32.81 DETRUSOR INSTABILITY: Primary | ICD-10-CM

## 2023-02-28 PROCEDURE — 1159F MED LIST DOCD IN RCRD: CPT | Performed by: UROLOGY

## 2023-02-28 PROCEDURE — 99213 OFFICE O/P EST LOW 20 MIN: CPT | Performed by: UROLOGY

## 2023-02-28 PROCEDURE — 1160F RVW MEDS BY RX/DR IN RCRD: CPT | Performed by: UROLOGY

## 2023-02-28 RX ORDER — TADALAFIL 5 MG/1
5 TABLET ORAL DAILY PRN
Qty: 30 TABLET | Refills: 6 | Status: SHIPPED | OUTPATIENT
Start: 2023-02-28

## 2023-02-28 RX ORDER — TESTOSTERONE CYPIONATE 200 MG/ML
INJECTION, SOLUTION INTRAMUSCULAR
Qty: 10 ML | Refills: 2 | Status: SHIPPED | OUTPATIENT
Start: 2023-02-28

## 2023-03-02 DIAGNOSIS — F90.2 ATTENTION DEFICIT HYPERACTIVITY DISORDER, COMBINED TYPE: ICD-10-CM

## 2023-03-02 RX ORDER — DEXTROAMPHETAMINE SACCHARATE, AMPHETAMINE ASPARTATE, DEXTROAMPHETAMINE SULFATE AND AMPHETAMINE SULFATE 3.75; 3.75; 3.75; 3.75 MG/1; MG/1; MG/1; MG/1
15 TABLET ORAL 2 TIMES DAILY
Qty: 60 TABLET | Refills: 0 | Status: SHIPPED | OUTPATIENT
Start: 2023-03-02 | End: 2023-03-29 | Stop reason: SDUPTHER

## 2023-03-08 ENCOUNTER — TELEPHONE (OUTPATIENT)
Dept: UROLOGY | Facility: CLINIC | Age: 33
End: 2023-03-08
Payer: COMMERCIAL

## 2023-03-08 NOTE — TELEPHONE ENCOUNTER
PA was sent to patient's insurance company. Currently waiting for a response back.                            Drug  Tadalafil 5MG tablets  Form  [employer] [place] Medicaid ePA Form 2017 NCPDP  Original Claim Info  75 TRANS FEE = 0.00PA REQUIRED CALL [phone] FOR ASSISTANCE

## 2023-03-10 NOTE — TELEPHONE ENCOUNTER
Deniedon March 8  This request has not been approved. Based on the information submitted for review, you did not meet our guideline rules for the requested drug. In order for your request to be approved, your provider would need to show that you have met the guideline rules below. The details below are written in medical language. If you have questions, please contact your provider. In some cases, the requested medication or alternatives offered may have additional approval requirements. Our guideline named BENIGN PROSTATIC HYPERPLASIA (BPH) AGENTS requires the following rule(s) be met for approval:A. The member had at least a 4-month trial and therapeutic failure [drug did not work] on a 5- Alpha reductase inhibitor.Your doctor told us you have a diagnosis of benign prostatic hyperplasia (a type of prostate condition). We do not have records or chart notes that show you have had a 4-month trial to a 5-Alpha reductase inhibitor such as finasteride. This is why your request is denied. Please work with your doctor to use a different medication or get us more information if it will allow us to approve this request. A written notification letter will follow with add

## 2023-03-29 DIAGNOSIS — F90.2 ATTENTION DEFICIT HYPERACTIVITY DISORDER, COMBINED TYPE: ICD-10-CM

## 2023-03-29 RX ORDER — DEXTROAMPHETAMINE SACCHARATE, AMPHETAMINE ASPARTATE, DEXTROAMPHETAMINE SULFATE AND AMPHETAMINE SULFATE 3.75; 3.75; 3.75; 3.75 MG/1; MG/1; MG/1; MG/1
15 TABLET ORAL 2 TIMES DAILY
Qty: 60 TABLET | Refills: 0 | Status: SHIPPED | OUTPATIENT
Start: 2023-03-29

## 2023-04-26 DIAGNOSIS — F90.2 ATTENTION DEFICIT HYPERACTIVITY DISORDER, COMBINED TYPE: ICD-10-CM

## 2023-04-26 RX ORDER — DEXTROAMPHETAMINE SACCHARATE, AMPHETAMINE ASPARTATE, DEXTROAMPHETAMINE SULFATE AND AMPHETAMINE SULFATE 3.75; 3.75; 3.75; 3.75 MG/1; MG/1; MG/1; MG/1
15 TABLET ORAL 2 TIMES DAILY
Qty: 60 TABLET | Refills: 0 | Status: SHIPPED | OUTPATIENT
Start: 2023-04-26

## 2023-04-26 NOTE — TELEPHONE ENCOUNTER
PATIENT CALLED TO REQUEST A REFILL ON  THE FOLLOWING MEDICATION.    amphetamine-dextroamphetamine (Adderall) 15 MG tablet

## 2023-05-16 ENCOUNTER — LAB (OUTPATIENT)
Dept: FAMILY MEDICINE CLINIC | Facility: CLINIC | Age: 33
End: 2023-05-16
Payer: COMMERCIAL

## 2023-05-16 DIAGNOSIS — E78.2 MIXED HYPERLIPIDEMIA: Chronic | ICD-10-CM

## 2023-05-16 DIAGNOSIS — E03.9 ACQUIRED HYPOTHYROIDISM: Chronic | ICD-10-CM

## 2023-05-16 LAB
ALBUMIN SERPL-MCNC: 4.2 G/DL (ref 3.5–5.2)
ALBUMIN/GLOB SERPL: 1.5 G/DL
ALP SERPL-CCNC: 70 U/L (ref 39–117)
ALT SERPL W P-5'-P-CCNC: 20 U/L (ref 1–41)
ANION GAP SERPL CALCULATED.3IONS-SCNC: 7 MMOL/L (ref 5–15)
AST SERPL-CCNC: 20 U/L (ref 1–40)
BILIRUB SERPL-MCNC: 0.7 MG/DL (ref 0–1.2)
BUN SERPL-MCNC: 9 MG/DL (ref 6–20)
BUN/CREAT SERPL: 6.8 (ref 7–25)
CALCIUM SPEC-SCNC: 9.4 MG/DL (ref 8.6–10.5)
CHLORIDE SERPL-SCNC: 101 MMOL/L (ref 98–107)
CHOLEST SERPL-MCNC: 238 MG/DL (ref 0–200)
CO2 SERPL-SCNC: 30 MMOL/L (ref 22–29)
CREAT SERPL-MCNC: 1.33 MG/DL (ref 0.76–1.27)
EGFRCR SERPLBLD CKD-EPI 2021: 71.9 ML/MIN/1.73
GLOBULIN UR ELPH-MCNC: 2.8 GM/DL
GLUCOSE SERPL-MCNC: 103 MG/DL (ref 65–99)
HDLC SERPL-MCNC: 29 MG/DL (ref 40–60)
LDLC SERPL CALC-MCNC: 178 MG/DL (ref 0–100)
LDLC/HDLC SERPL: 6.06 {RATIO}
POTASSIUM SERPL-SCNC: 4.3 MMOL/L (ref 3.5–5.2)
PROT SERPL-MCNC: 7 G/DL (ref 6–8.5)
SODIUM SERPL-SCNC: 138 MMOL/L (ref 136–145)
T4 FREE SERPL-MCNC: 1.19 NG/DL (ref 0.93–1.7)
TRIGL SERPL-MCNC: 167 MG/DL (ref 0–150)
TSH SERPL DL<=0.05 MIU/L-ACNC: 4.87 UIU/ML (ref 0.27–4.2)
VLDLC SERPL-MCNC: 31 MG/DL (ref 5–40)

## 2023-05-16 PROCEDURE — 80053 COMPREHEN METABOLIC PANEL: CPT | Performed by: PHYSICIAN ASSISTANT

## 2023-05-16 PROCEDURE — 84443 ASSAY THYROID STIM HORMONE: CPT | Performed by: PHYSICIAN ASSISTANT

## 2023-05-16 PROCEDURE — 80061 LIPID PANEL: CPT | Performed by: PHYSICIAN ASSISTANT

## 2023-05-16 PROCEDURE — 84439 ASSAY OF FREE THYROXINE: CPT | Performed by: PHYSICIAN ASSISTANT

## 2023-05-19 DIAGNOSIS — F43.10 POST TRAUMATIC STRESS DISORDER (PTSD): ICD-10-CM

## 2023-05-19 DIAGNOSIS — F90.2 ATTENTION DEFICIT HYPERACTIVITY DISORDER, COMBINED TYPE: ICD-10-CM

## 2023-05-19 DIAGNOSIS — F42.2 MIXED OBSESSIONAL THOUGHTS AND ACTS: ICD-10-CM

## 2023-05-19 DIAGNOSIS — F41.1 GENERALIZED ANXIETY DISORDER: ICD-10-CM

## 2023-05-19 RX ORDER — DULOXETIN HYDROCHLORIDE 60 MG/1
60 CAPSULE, DELAYED RELEASE ORAL DAILY
Qty: 30 CAPSULE | Refills: 2 | Status: SHIPPED | OUTPATIENT
Start: 2023-05-19 | End: 2024-05-18

## 2023-05-19 RX ORDER — CLONIDINE HYDROCHLORIDE 0.1 MG/1
0.1 TABLET ORAL NIGHTLY
Qty: 30 TABLET | Refills: 2 | Status: SHIPPED | OUTPATIENT
Start: 2023-05-19

## 2023-05-23 ENCOUNTER — OFFICE VISIT (OUTPATIENT)
Dept: FAMILY MEDICINE CLINIC | Facility: CLINIC | Age: 33
End: 2023-05-23
Payer: COMMERCIAL

## 2023-05-23 VITALS
TEMPERATURE: 98 F | DIASTOLIC BLOOD PRESSURE: 86 MMHG | WEIGHT: 315 LBS | BODY MASS INDEX: 46.65 KG/M2 | HEIGHT: 69 IN | SYSTOLIC BLOOD PRESSURE: 124 MMHG | OXYGEN SATURATION: 99 % | HEART RATE: 98 BPM

## 2023-05-23 DIAGNOSIS — I10 ESSENTIAL HYPERTENSION: Chronic | ICD-10-CM

## 2023-05-23 DIAGNOSIS — E03.9 ACQUIRED HYPOTHYROIDISM: Chronic | ICD-10-CM

## 2023-05-23 DIAGNOSIS — L30.9 ECZEMA OF LEFT HAND: Chronic | ICD-10-CM

## 2023-05-23 DIAGNOSIS — E78.2 MIXED HYPERLIPIDEMIA: Primary | Chronic | ICD-10-CM

## 2023-05-23 PROCEDURE — 1160F RVW MEDS BY RX/DR IN RCRD: CPT | Performed by: PHYSICIAN ASSISTANT

## 2023-05-23 PROCEDURE — 99214 OFFICE O/P EST MOD 30 MIN: CPT | Performed by: PHYSICIAN ASSISTANT

## 2023-05-23 PROCEDURE — 1159F MED LIST DOCD IN RCRD: CPT | Performed by: PHYSICIAN ASSISTANT

## 2023-05-23 PROCEDURE — 3074F SYST BP LT 130 MM HG: CPT | Performed by: PHYSICIAN ASSISTANT

## 2023-05-23 PROCEDURE — 3079F DIAST BP 80-89 MM HG: CPT | Performed by: PHYSICIAN ASSISTANT

## 2023-05-23 RX ORDER — TRIAMCINOLONE ACETONIDE 5 MG/G
1 CREAM TOPICAL 3 TIMES DAILY
Qty: 30 G | Refills: 0 | Status: SHIPPED | OUTPATIENT
Start: 2023-05-23

## 2023-05-23 RX ORDER — ROSUVASTATIN CALCIUM 20 MG/1
20 TABLET, COATED ORAL DAILY
Qty: 90 TABLET | Refills: 3 | Status: SHIPPED | OUTPATIENT
Start: 2023-05-23

## 2023-05-23 RX ORDER — LEVOTHYROXINE SODIUM 0.03 MG/1
25 TABLET ORAL DAILY
Qty: 90 TABLET | Refills: 3 | Status: SHIPPED | OUTPATIENT
Start: 2023-05-23

## 2023-05-23 RX ORDER — METOPROLOL SUCCINATE 50 MG/1
50 TABLET, EXTENDED RELEASE ORAL DAILY
Qty: 90 TABLET | Refills: 3 | Status: SHIPPED | OUTPATIENT
Start: 2023-05-23

## 2023-05-23 NOTE — PROGRESS NOTES
"Subjective   Andrew Narayan is a 32 y.o. male.       Chief Complaint -hyperlipidemia    History of Present Illness -       Hyperlipidemia-  Not at goal but cholesterol is significantly improved by rosuvastatin 10 mg daily.  Patient denies any unwanted side effects with the use of this medication.    Hypertension-  Stable with Toprol-XL 50 mg every morning.    Hypothyroidism-  Not at goal by recent lab work showing underactive thyroid.  Patient does complain of weight gain and fatigue.    Rash-  Patient complains of rash on the left hand just proximal to second PIP joint with associated drying and cracking of skin.    The following portions of the patient's history were reviewed and updated as appropriate: allergies, current medications, past family history, past medical history, past social history, past surgical history and problem list.        Objective  Vital signs:  /86 Comment: rechecked  Pulse 98   Temp 98 °F (36.7 °C) (Temporal)   Ht 175.3 cm (69\")   Wt (!) 161 kg (354 lb)   SpO2 99%   BMI 52.28 kg/m²     Physical Exam  Vitals and nursing note reviewed.   Constitutional:       Appearance: Normal appearance. He is well-developed. He is obese.   Eyes:      Extraocular Movements: Extraocular movements intact.      Conjunctiva/sclera: Conjunctivae normal.   Cardiovascular:      Rate and Rhythm: Normal rate and regular rhythm.      Heart sounds: Normal heart sounds. No murmur heard.  Pulmonary:      Effort: Pulmonary effort is normal. No respiratory distress.      Breath sounds: Normal breath sounds. No wheezing.   Musculoskeletal:         General: No tenderness.   Skin:     General: Skin is warm and dry.      Findings: Rash present.      Comments: Approximately 3 x 2 cm lichenification with slight erythema noted left hand near second PIP joint   Neurological:      Mental Status: He is alert and oriented to person, place, and time.   Psychiatric:         Mood and Affect: Mood normal.         " Behavior: Behavior normal.         Thought Content: Thought content normal.         The following data was reviewed by Ashley Rojas PA-C:         Lab Results   Component Value Date    BUN 9 05/16/2023    CREATININE 1.33 (H) 05/16/2023    EGFR 71.9 05/16/2023    ALT 20 05/16/2023    AST 20 05/16/2023    WBC 10.86 (H) 02/17/2023    HGB 15.8 02/17/2023    HCT 46.4 02/17/2023     02/17/2023    CHOL 238 (H) 05/16/2023    TRIG 167 (H) 05/16/2023    HDL 29 (L) 05/16/2023     (H) 05/16/2023    TSH 4.870 (H) 05/16/2023    HGBA1C 6.00 (H) 12/14/2020           Assessment & Plan     Diagnoses and all orders for this visit:    1. Mixed hyperlipidemia (Primary)  Comments:  Increase Crestor 20 mg daily  Advised low-cholesterol diet    Orders:  -     rosuvastatin (Crestor) 20 MG tablet; Take 1 tablet by mouth Daily.  Dispense: 90 tablet; Refill: 3  -     Comprehensive Metabolic Panel; Future  -     Lipid Panel; Future    2. Essential hypertension  Comments:  Discontinue metoprolol  Continue Toprol-XL as patient is only taking medication once daily anyway  Advise daily BP monitoring  Orders:  -     metoprolol succinate XL (TOPROL-XL) 50 MG 24 hr tablet; Take 1 tablet by mouth Daily.  Dispense: 90 tablet; Refill: 3    3. Acquired hypothyroidism  Comments:  Start Synthroid 25 mcg daily  Recheck labs in 8 weeks  Orders:  -     levothyroxine (SYNTHROID, LEVOTHROID) 25 MCG tablet; Take 1 tablet by mouth Daily.  Dispense: 90 tablet; Refill: 3    4. Eczema of left hand  Comments:  Advised to use working man hands for moisturization nightly  Start triamcinolone cream  Orders:  -     triamcinolone (KENALOG) 0.5 % cream; Apply 1 application topically to the appropriate area as directed 3 (Three) Times a Day.  Dispense: 30 g; Refill: 0        Class 3 Severe Obesity (BMI >=40). Obesity-related health conditions include the following: obstructive sleep apnea, hypertension and dyslipidemias. Obesity is worsening. BMI is is above  average; BMI management plan is completed. We discussed portion control and increasing exercise.          Patient was given instructions and counseling regarding his condition or for health maintenance advice. Please see specific information pulled into the AVS if appropriate      This document has been electronically signed by:  Ashley Rojas PA-C

## 2023-05-23 NOTE — PATIENT INSTRUCTIONS
"Fat and Cholesterol Restricted Eating Plan  Getting too much fat and cholesterol in your diet may cause health problems. Choosing the right foods helps keep your fat and cholesterol at normal levels. This can keep you from getting certain diseases.  Your doctor may recommend an eating plan that includes:  Total fat: ______% or less of total calories a day. This is ______g of fat a day.  Saturated fat: ______% or less of total calories a day. This is ______g of saturated fat a day.  Cholesterol: less than _________mg a day.  Fiber: ______g a day.  What are tips for following this plan?  General tips  Work with your doctor to lose weight if you need to.  Avoid:  Foods with added sugar.  Fried foods.  Foods with trans fat or partially hydrogenated oils. This includes some margarines and baked goods.  If you drink alcohol:  Limit how much you have to:  0-1 drink a day for women who are not pregnant.  0-2 drinks a day for men.  Know how much alcohol is in a drink. In the [place] one drink equals one 12 oz bottle of beer (355 mL), one 5 oz glass of wine (148 mL), or one 1½ oz glass of hard liquor (44 mL).  Reading food labels  Check food labels for:  Trans fats.  Partially hydrogenated oils.  Saturated fat (g) in each serving.  Cholesterol (mg) in each serving.  Fiber (g) in each serving.  Choose foods with healthy fats, such as:  Monounsaturated fats and polyunsaturated fats. These include olive and canola oil, flaxseeds, walnuts, almonds, and seeds.  Omega-3 fats. These are found in certain fish, flaxseed oil, and ground flaxseeds.  Choose grain products that have whole grains. Look for the word \"whole\" as the first word in the ingredient list.  Cooking  Cook foods using low-fat methods. These include baking, boiling, grilling, and broiling.  Eat more home-cooked foods. Eat at restaurants and buffets less often. Eat less fast food.  Avoid cooking using saturated fats, such as butter, cream, palm oil, palm kernel oil, and " coconut oil.  Meal planning    At meals, divide your plate into four equal parts:  Fill one-half of your plate with vegetables, green salads, and fruit.  Fill one-fourth of your plate with whole grains.  Fill one-fourth of your plate with low-fat (lean) protein foods.  Eat fish that is high in omega-3 fats at least two times a week. This includes mackerel, tuna, sardines, and salmon.  Eat foods that are high in fiber, such as whole grains, beans, apples, pears, berries, broccoli, carrots, peas, and barley.  What foods should I eat?  Fruits  All fresh, canned (in natural juice), or frozen fruits.  Vegetables  Fresh or frozen vegetables (raw, steamed, roasted, or grilled). Green salads.  Grains  Whole grains, such as whole wheat or whole grain breads, crackers, cereals, and pasta. Unsweetened oatmeal, bulgur, barley, quinoa, or brown rice. Corn or whole wheat flour tortillas.  Meats and other protein foods  Ground beef (85% or leaner), grass-fed beef, or beef trimmed of fat. Skinless chicken or turkey. Ground chicken or turkey. Pork trimmed of fat. All fish and seafood. Egg whites. Dried beans, peas, or lentils. Unsalted nuts or seeds. Unsalted canned beans. Nut butters without added sugar or oil.  Dairy  Low-fat or nonfat dairy products, such as skim or 1% milk, 2% or reduced-fat cheeses, low-fat and fat-free ricotta or cottage cheese, or plain low-fat and nonfat yogurt.  Fats and oils  Tub margarine without trans fats. Light or reduced-fat mayonnaise and salad dressings. Avocado. Olive, canola, sesame, or safflower oils.  The items listed above may not be a complete list of foods and beverages you can eat. Contact a dietitian for more information.  What foods should I avoid?  Fruits  Canned fruit in heavy syrup. Fruit in cream or butter sauce. Fried fruit.  Vegetables  Vegetables cooked in cheese, cream, or butter sauce. Fried vegetables.  Grains  White bread. White pasta. White rice. Cornbread. Bagels, pastries,  and croissants. Crackers and snack foods that contain trans fat and hydrogenated oils.  Meats and other protein foods  Fatty cuts of meat. Ribs, chicken wings, cotto, sausage, bologna, salami, chitterlings, fatback, hot dogs, bratwurst, and packaged lunch meats. Liver and organ meats. Whole eggs and egg yolks. Chicken and turkey with skin. Fried meat.  Dairy  Whole or 2% milk, cream, half-and-half, and cream cheese. Whole milk cheeses. Whole-fat or sweetened yogurt. Full-fat cheeses. Nondairy creamers and whipped toppings. Processed cheese, cheese spreads, and cheese curds.  Fats and oils  Butter, stick margarine, lard, shortening, ghee, or cotto fat. Coconut, palm kernel, and palm oils.  Beverages  Alcohol. Sugar-sweetened drinks such as sodas, lemonade, and fruit drinks.  Sweets and desserts  Corn syrup, sugars, honey, and molasses. Candy. Jam and jelly. Syrup. Sweetened cereals. Cookies, pies, cakes, donuts, muffins, and ice cream.  The items listed above may not be a complete list of foods and beverages you should avoid. Contact a dietitian for more information.  Summary  Choosing the right foods helps keep your fat and cholesterol at normal levels. This can keep you from getting certain diseases.  At meals, fill one-half of your plate with vegetables, green salads, and fruits.  Eat high fiber foods, like whole grains, beans, apples, pears, berries, carrots, peas, and barley.  Limit added sugar, saturated fats, alcohol, and fried foods.  This information is not intended to replace advice given to you by your health care provider. Make sure you discuss any questions you have with your health care provider.  Document Revised: 04/29/2022 Document Reviewed: 04/29/2022  Elsevier Patient Education © 2022 [employer]

## 2023-05-24 ENCOUNTER — TELEPHONE (OUTPATIENT)
Dept: FAMILY MEDICINE CLINIC | Facility: CLINIC | Age: 33
End: 2023-05-24
Payer: COMMERCIAL

## 2023-05-24 DIAGNOSIS — F90.2 ATTENTION DEFICIT HYPERACTIVITY DISORDER, COMBINED TYPE: ICD-10-CM

## 2023-05-24 DIAGNOSIS — M54.42 CHRONIC BILATERAL LOW BACK PAIN WITH LEFT-SIDED SCIATICA: ICD-10-CM

## 2023-05-24 DIAGNOSIS — G89.29 CHRONIC BILATERAL LOW BACK PAIN WITH LEFT-SIDED SCIATICA: ICD-10-CM

## 2023-05-24 RX ORDER — DEXTROAMPHETAMINE SACCHARATE, AMPHETAMINE ASPARTATE, DEXTROAMPHETAMINE SULFATE AND AMPHETAMINE SULFATE 3.75; 3.75; 3.75; 3.75 MG/1; MG/1; MG/1; MG/1
15 TABLET ORAL 2 TIMES DAILY
Qty: 60 TABLET | Refills: 0 | Status: SHIPPED | OUTPATIENT
Start: 2023-05-24 | End: 2023-05-24 | Stop reason: SDUPTHER

## 2023-05-24 RX ORDER — DEXTROAMPHETAMINE SACCHARATE, AMPHETAMINE ASPARTATE, DEXTROAMPHETAMINE SULFATE AND AMPHETAMINE SULFATE 3.75; 3.75; 3.75; 3.75 MG/1; MG/1; MG/1; MG/1
15 TABLET ORAL 2 TIMES DAILY
Qty: 60 TABLET | Refills: 0 | Status: SHIPPED | OUTPATIENT
Start: 2023-05-24

## 2023-05-24 NOTE — TELEPHONE ENCOUNTER
[facility] doesn't have any either they ran out today.  Will you call in vvyanse to [facility] [place] please?

## 2023-05-24 NOTE — TELEPHONE ENCOUNTER
[facility] in [place] doesn't have his medications.  Will you call it into [facility] in [place]

## 2023-05-25 DIAGNOSIS — F90.2 ATTENTION DEFICIT HYPERACTIVITY DISORDER, COMBINED TYPE: Primary | ICD-10-CM

## 2023-05-25 RX ORDER — CELECOXIB 200 MG/1
200 CAPSULE ORAL DAILY
Qty: 30 CAPSULE | Refills: 3 | Status: SHIPPED | OUTPATIENT
Start: 2023-05-25

## 2023-05-30 ENCOUNTER — TELEPHONE (OUTPATIENT)
Dept: FAMILY MEDICINE CLINIC | Facility: CLINIC | Age: 33
End: 2023-05-30

## 2023-05-30 NOTE — TELEPHONE ENCOUNTER
"    Caller: SylvainAndrew    Relationship: Self    Best call back number: [phone]    What medications are you currently taking:   Current Outpatient Medications on File Prior to Visit   Medication Sig Dispense Refill   • amphetamine-dextroamphetamine (Adderall) 15 MG tablet Take 1 tablet by mouth 2 (Two) Times a Day. 60 tablet 0   • celecoxib (CeleBREX) 200 MG capsule TAKE 1 CAPSULE BY MOUTH DAILY 30 capsule 3   • cloNIDine (CATAPRES) 0.1 MG tablet TAKE 1 TABLET BY MOUTH EVERY NIGHT 30 tablet 2   • DULoxetine (CYMBALTA) 60 MG capsule TAKE 1 CAPSULE BY MOUTH DAILY 30 capsule 2   • gabapentin (NEURONTIN) 600 MG tablet Take 1 tablet by mouth 6 (Six) Times a Day.     • levothyroxine (SYNTHROID, LEVOTHROID) 25 MCG tablet Take 1 tablet by mouth Daily. 90 tablet 3   • lisdexamfetamine (Vyvanse) 30 MG capsule Take 1 capsule by mouth Every Morning 30 capsule 0   • metoprolol succinate XL (TOPROL-XL) 50 MG 24 hr tablet Take 1 tablet by mouth Daily. 90 tablet 3   • rosuvastatin (Crestor) 20 MG tablet Take 1 tablet by mouth Daily. 90 tablet 3   • Syringe 25G X 5/8\" 3 ML misc Use as directed 2 x weekly 24 each 3   • Testosterone Cypionate (Depo-Testosterone) 200 MG/ML injection Inject 1/2 cc subcutaneously every Monday and Thursday 10 mL 2   • triamcinolone (KENALOG) 0.5 % cream Apply 1 application topically to the appropriate area as directed 3 (Three) Times a Day. 30 g 0     No current facility-administered medications on file prior to visit.        What are your concerns:   PT CALLED STATED THAT RX    levothyroxine (SYNTHROID, LEVOTHROID) 25 MCG tablet      IS MAKING HIM SICK STATED THAT HE HAS TAKEN RX 2-3 DAYS IN A ROW AND HAS BEEN SUPER NAUSEOUS, STOMACH PAIN AND WILL LIKE TO KNOW WHAT DR SUGGEST  "

## 2023-08-09 ENCOUNTER — OFFICE VISIT (OUTPATIENT)
Dept: PSYCHIATRY | Facility: CLINIC | Age: 33
End: 2023-08-09
Payer: COMMERCIAL

## 2023-08-09 VITALS
WEIGHT: 315 LBS | SYSTOLIC BLOOD PRESSURE: 134 MMHG | HEIGHT: 69 IN | HEART RATE: 96 BPM | TEMPERATURE: 97.3 F | BODY MASS INDEX: 46.65 KG/M2 | OXYGEN SATURATION: 96 % | DIASTOLIC BLOOD PRESSURE: 83 MMHG

## 2023-08-09 DIAGNOSIS — F42.2 MIXED OBSESSIONAL THOUGHTS AND ACTS: ICD-10-CM

## 2023-08-09 DIAGNOSIS — F43.10 POST TRAUMATIC STRESS DISORDER (PTSD): ICD-10-CM

## 2023-08-09 DIAGNOSIS — F90.2 ATTENTION DEFICIT HYPERACTIVITY DISORDER, COMBINED TYPE: Primary | ICD-10-CM

## 2023-08-09 DIAGNOSIS — G47.00 INSOMNIA, UNSPECIFIED TYPE: ICD-10-CM

## 2023-08-09 DIAGNOSIS — F41.1 GENERALIZED ANXIETY DISORDER: ICD-10-CM

## 2023-08-09 PROCEDURE — 3075F SYST BP GE 130 - 139MM HG: CPT | Performed by: NURSE PRACTITIONER

## 2023-08-09 PROCEDURE — 3079F DIAST BP 80-89 MM HG: CPT | Performed by: NURSE PRACTITIONER

## 2023-08-09 PROCEDURE — 99214 OFFICE O/P EST MOD 30 MIN: CPT | Performed by: NURSE PRACTITIONER

## 2023-08-09 PROCEDURE — 1159F MED LIST DOCD IN RCRD: CPT | Performed by: NURSE PRACTITIONER

## 2023-08-09 PROCEDURE — 1160F RVW MEDS BY RX/DR IN RCRD: CPT | Performed by: NURSE PRACTITIONER

## 2023-08-09 RX ORDER — DULOXETIN HYDROCHLORIDE 60 MG/1
60 CAPSULE, DELAYED RELEASE ORAL DAILY
Qty: 30 CAPSULE | Refills: 2 | Status: SHIPPED | OUTPATIENT
Start: 2023-08-09 | End: 2024-08-08

## 2023-08-09 RX ORDER — CLONIDINE HYDROCHLORIDE 0.1 MG/1
0.1 TABLET ORAL NIGHTLY
Qty: 30 TABLET | Refills: 2 | Status: SHIPPED | OUTPATIENT
Start: 2023-08-09

## 2023-08-09 NOTE — PROGRESS NOTES
Subjective   Andrew Narayan is a 32 y.o. male is here today for medication management follow-up.  Presents Chief Complaint:  Recheck on anxiety and hallucinations and ADHD    History of Present Illness: pt states he is doing well. He denies any depression.  Still will barely leave the house but this is not new.  He states the vyvanse is not helping with his focus as well as the adderall.  The stimulants actually help with overall mood and motivation as well and he does not feel the vyvanse kicks in as fast or lasts as long.  He has been unable to get adderall due to nationwide shortage.  Body mass index is 51.51 kg/mý.  Weight loss 5 lbs since last visit.  No medical stressors.  No anger outbursts.  No negative side effects to the meds.  Clonidine continues to help with sleep.  No panic attacks.  Anxiety is more situational.    The following portions of the patient's history were reviewed and updated as appropriate: allergies, current medications, past family history, past medical history, past social history, past surgical history and problem list.    Review of Systems   Constitutional:  Negative for activity change, appetite change and fatigue.   HENT: Negative.     Eyes:  Negative for visual disturbance.   Respiratory: Negative.     Cardiovascular: Negative.    Gastrointestinal:  Negative for nausea.   Endocrine: Negative.    Genitourinary: Negative.    Musculoskeletal:  Positive for arthralgias.   Skin: Negative.    Allergic/Immunologic: Negative.    Neurological:  Negative for dizziness, seizures and headaches.   Hematological: Negative.    Psychiatric/Behavioral:  Negative for agitation, behavioral problems, confusion, decreased concentration, dysphoric mood, hallucinations, self-injury, sleep disturbance and suicidal ideas. The patient is not nervous/anxious and is not hyperactive.         Nightmares   Reviewed copied data and there are no changes    Objective   Physical Exam   Constitutional: He  "is oriented to person, place, and time. He appears well-developed.   HENT:   Head: Normocephalic.   Neurological: He is alert and oriented to person, place, and time.   Psychiatric: His speech is normal and behavior is normal. Mood and thought content normal. His mood appears not anxious. He expresses impulsivity.   Engaging in conversation   Vitals reviewed.  Blood pressure 134/83, pulse 96, temperature 97.3 øF (36.3 øC), height 175.3 cm (69.02\"), weight (!) 158 kg (349 lb), SpO2 96 %.    Medication List:   Current Outpatient Medications   Medication Sig Dispense Refill    cloNIDine (CATAPRES) 0.1 MG tablet Take 1 tablet by mouth Every Night. 30 tablet 2    DULoxetine (CYMBALTA) 60 MG capsule Take 1 capsule by mouth Daily. 30 capsule 2    lisdexamfetamine (Vyvanse) 40 MG capsule Take 1 capsule by mouth Every Morning 30 capsule 0    celecoxib (CeleBREX) 200 MG capsule TAKE 1 CAPSULE BY MOUTH DAILY 30 capsule 3    gabapentin (NEURONTIN) 600 MG tablet Take 1 tablet by mouth 6 (Six) Times a Day.      levothyroxine (SYNTHROID, LEVOTHROID) 25 MCG tablet Take 1 tablet by mouth Daily. 90 tablet 3    metoprolol succinate XL (TOPROL-XL) 50 MG 24 hr tablet Take 1 tablet by mouth Daily. 90 tablet 3    rosuvastatin (Crestor) 20 MG tablet Take 1 tablet by mouth Daily. 90 tablet 3    Syringe 25G X 5/8\" 3 ML misc Use as directed 2 x weekly 24 each 3    Testosterone Cypionate (Depo-Testosterone) 200 MG/ML injection Inject 1/2 cc subcutaneously every Monday and Thursday 10 mL 2    triamcinolone (KENALOG) 0.5 % cream Apply 1 application topically to the appropriate area as directed 3 (Three) Times a Day. 30 g 0     No current facility-administered medications for this visit.     Reviewed copied data and there are no changes      Mental Status Exam:   Hygiene:   good  Cooperation:  Cooperative  Eye Contact:  Good  Psychomotor Behavior:  Appropriate  Affect:  Full range  Hopelessness: Denies  Speech:  Normal  Thought Process:  Goal " directed  Thought Content:  Normal  Suicidal:  None  Homicidal:  None  Hallucinations:  None  Delusion:  None  Memory:  Intact  Orientation:  Person, Place, Time and Situation  Reliability:  fair  Insight:  Fair  Judgement:  Fair  Impulse Control:  Fair  Physical/Medical Issues:  Yes obesity, HTN, pain    Assessment & Plan   Problems Addressed this Visit          Sleep    Insomnia     Other Visit Diagnoses       Attention deficit hyperactivity disorder, combined type    -  Primary    Relevant Medications    lisdexamfetamine (Vyvanse) 40 MG capsule    DULoxetine (CYMBALTA) 60 MG capsule    cloNIDine (CATAPRES) 0.1 MG tablet    Generalized anxiety disorder        Relevant Medications    lisdexamfetamine (Vyvanse) 40 MG capsule    DULoxetine (CYMBALTA) 60 MG capsule    Mixed obsessional thoughts and acts        Relevant Medications    DULoxetine (CYMBALTA) 60 MG capsule    Post traumatic stress disorder (PTSD)        Relevant Medications    lisdexamfetamine (Vyvanse) 40 MG capsule    DULoxetine (CYMBALTA) 60 MG capsule          Diagnoses         Codes Comments    Attention deficit hyperactivity disorder, combined type    -  Primary ICD-10-CM: F90.2  ICD-9-CM: 314.01     Generalized anxiety disorder     ICD-10-CM: F41.1  ICD-9-CM: 300.02     Mixed obsessional thoughts and acts     ICD-10-CM: F42.2  ICD-9-CM: 300.3     Post traumatic stress disorder (PTSD)     ICD-10-CM: F43.10  ICD-9-CM: 309.81     Insomnia, unspecified type     ICD-10-CM: G47.00  ICD-9-CM: 780.52         azalia reviewed.  UDS in past reviewed an appropriate.   Functionality: pt having minimal impairment in important areas of daily functioning.  Prognosis: Good dependent on medication/follow up and treatment plan compliance.    I am increasing the vyvanse to 40mg for the ongoing symptoms.  Continue the clonidine for adhd and sleep and the cymbalta for depression and anxiety.  Refills submitted.        Continuing efforts to promote the therapeutic  alliance, address the patient's issues, and strengthen self awareness, insights, and coping skills.   Patient is aware to call 911 or go to the nearest ER should begin having SI/HI. Pt will notify me should he have any negative side effects or any worsening depression.  RTC 12 weeks.  Sooner if needed.

## 2023-08-15 DIAGNOSIS — F41.1 GENERALIZED ANXIETY DISORDER: ICD-10-CM

## 2023-08-15 DIAGNOSIS — F43.10 POST TRAUMATIC STRESS DISORDER (PTSD): ICD-10-CM

## 2023-08-15 DIAGNOSIS — F42.2 MIXED OBSESSIONAL THOUGHTS AND ACTS: ICD-10-CM

## 2023-08-15 DIAGNOSIS — F90.2 ATTENTION DEFICIT HYPERACTIVITY DISORDER, COMBINED TYPE: ICD-10-CM

## 2023-08-15 RX ORDER — DULOXETIN HYDROCHLORIDE 60 MG/1
60 CAPSULE, DELAYED RELEASE ORAL DAILY
Qty: 30 CAPSULE | Refills: 2 | OUTPATIENT
Start: 2023-08-15 | End: 2024-08-14

## 2023-08-15 RX ORDER — CLONIDINE HYDROCHLORIDE 0.1 MG/1
0.1 TABLET ORAL NIGHTLY
Qty: 30 TABLET | Refills: 2 | OUTPATIENT
Start: 2023-08-15

## 2023-08-16 DIAGNOSIS — F90.2 ATTENTION DEFICIT HYPERACTIVITY DISORDER, COMBINED TYPE: ICD-10-CM

## 2023-08-24 ENCOUNTER — LAB (OUTPATIENT)
Dept: FAMILY MEDICINE CLINIC | Facility: CLINIC | Age: 33
End: 2023-08-24
Payer: COMMERCIAL

## 2023-08-24 DIAGNOSIS — E78.2 MIXED HYPERLIPIDEMIA: Chronic | ICD-10-CM

## 2023-08-24 LAB
ALBUMIN SERPL-MCNC: 4.1 G/DL (ref 3.5–5.2)
ALBUMIN/GLOB SERPL: 1.3 G/DL
ALP SERPL-CCNC: 81 U/L (ref 39–117)
ALT SERPL W P-5'-P-CCNC: 35 U/L (ref 1–41)
ANION GAP SERPL CALCULATED.3IONS-SCNC: 10.5 MMOL/L (ref 5–15)
AST SERPL-CCNC: 24 U/L (ref 1–40)
BILIRUB SERPL-MCNC: 0.6 MG/DL (ref 0–1.2)
BUN SERPL-MCNC: 11 MG/DL (ref 6–20)
BUN/CREAT SERPL: 8.3 (ref 7–25)
CALCIUM SPEC-SCNC: 10 MG/DL (ref 8.6–10.5)
CHLORIDE SERPL-SCNC: 102 MMOL/L (ref 98–107)
CHOLEST SERPL-MCNC: 255 MG/DL (ref 0–200)
CO2 SERPL-SCNC: 26.5 MMOL/L (ref 22–29)
CREAT SERPL-MCNC: 1.33 MG/DL (ref 0.76–1.27)
EGFRCR SERPLBLD CKD-EPI 2021: 71.9 ML/MIN/1.73
GLOBULIN UR ELPH-MCNC: 3.1 GM/DL
GLUCOSE SERPL-MCNC: 91 MG/DL (ref 65–99)
HDLC SERPL-MCNC: 39 MG/DL (ref 40–60)
LDLC SERPL CALC-MCNC: 181 MG/DL (ref 0–100)
LDLC/HDLC SERPL: 4.59 {RATIO}
POTASSIUM SERPL-SCNC: 4.6 MMOL/L (ref 3.5–5.2)
PROT SERPL-MCNC: 7.2 G/DL (ref 6–8.5)
SODIUM SERPL-SCNC: 139 MMOL/L (ref 136–145)
TRIGL SERPL-MCNC: 184 MG/DL (ref 0–150)
VLDLC SERPL-MCNC: 35 MG/DL (ref 5–40)

## 2023-08-24 PROCEDURE — 80053 COMPREHEN METABOLIC PANEL: CPT | Performed by: PHYSICIAN ASSISTANT

## 2023-08-24 PROCEDURE — 36415 COLL VENOUS BLD VENIPUNCTURE: CPT | Performed by: PHYSICIAN ASSISTANT

## 2023-08-24 PROCEDURE — 80061 LIPID PANEL: CPT | Performed by: PHYSICIAN ASSISTANT

## 2023-08-29 ENCOUNTER — TELEPHONE (OUTPATIENT)
Dept: UROLOGY | Facility: CLINIC | Age: 33
End: 2023-08-29
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
## 2023-09-15 DIAGNOSIS — F90.2 ATTENTION DEFICIT HYPERACTIVITY DISORDER, COMBINED TYPE: ICD-10-CM

## 2023-09-15 RX ORDER — LISDEXAMFETAMINE DIMESYLATE 40 MG/1
CAPSULE ORAL
Qty: 30 CAPSULE | Refills: 0 | Status: SHIPPED | OUTPATIENT
Start: 2023-09-15

## 2023-10-12 DIAGNOSIS — F90.2 ATTENTION DEFICIT HYPERACTIVITY DISORDER, COMBINED TYPE: ICD-10-CM

## 2023-10-14 RX ORDER — LISDEXAMFETAMINE DIMESYLATE CAPSULES 40 MG/1
40 CAPSULE ORAL EVERY MORNING
Qty: 30 CAPSULE | Refills: 0 | Status: SHIPPED | OUTPATIENT
Start: 2023-10-14

## 2023-10-20 ENCOUNTER — HOSPITAL ENCOUNTER (EMERGENCY)
Facility: HOSPITAL | Age: 33
Discharge: HOME | End: 2023-10-21
Attending: STUDENT IN AN ORGANIZED HEALTH CARE EDUCATION/TRAINING PROGRAM
Payer: OTHER GOVERNMENT

## 2023-10-20 DIAGNOSIS — R00.0 TACHYCARDIA: ICD-10-CM

## 2023-10-20 DIAGNOSIS — T50.901A DRUG OVERDOSE, ACCIDENTAL OR UNINTENTIONAL, INITIAL ENCOUNTER: Primary | ICD-10-CM

## 2023-10-20 DIAGNOSIS — E87.20 METABOLIC ACIDOSIS: ICD-10-CM

## 2023-10-20 DIAGNOSIS — E87.29 HIGH ANION GAP METABOLIC ACIDOSIS: ICD-10-CM

## 2023-10-20 LAB
ALBUMIN SERPL-MCNC: 4.5 G/DL (ref 3.4–5)
ALBUMIN/GLOB SERPL: 1.2 (ref 0.8–1.7)
ALP SERPL-CCNC: 76 U/L (ref 45–117)
ALT SERPL-CCNC: 39 U/L (ref 16–61)
ANION GAP SERPL CALC-SCNC: 17 MMOL/L (ref 3–18)
APAP SERPL-MCNC: <2 UG/ML (ref 10–30)
AST SERPL-CCNC: 23 U/L (ref 10–38)
BASOPHILS # BLD: 0.1 K/UL (ref 0–0.1)
BASOPHILS NFR BLD: 1 % (ref 0–2)
BILIRUB SERPL-MCNC: 0.5 MG/DL (ref 0.2–1)
BUN SERPL-MCNC: 16 MG/DL (ref 7–18)
BUN/CREAT SERPL: 12 (ref 12–20)
CALCIUM SERPL-MCNC: 9.4 MG/DL (ref 8.5–10.1)
CHLORIDE SERPL-SCNC: 108 MMOL/L (ref 100–111)
CO2 SERPL-SCNC: 14 MMOL/L (ref 21–32)
CREAT SERPL-MCNC: 1.34 MG/DL (ref 0.6–1.3)
DIFFERENTIAL METHOD BLD: ABNORMAL
EOSINOPHIL # BLD: 0.1 K/UL (ref 0–0.4)
EOSINOPHIL NFR BLD: 1 % (ref 0–5)
ERYTHROCYTE [DISTWIDTH] IN BLOOD BY AUTOMATED COUNT: 11.9 % (ref 11.6–14.5)
ETHANOL SERPL-MCNC: <3 MG/DL (ref 0–3)
GLOBULIN SER CALC-MCNC: 3.7 G/DL (ref 2–4)
GLUCOSE SERPL-MCNC: 141 MG/DL (ref 74–99)
HCT VFR BLD AUTO: 50 % (ref 36–48)
HGB BLD-MCNC: 17.4 G/DL (ref 13–16)
IMM GRANULOCYTES # BLD AUTO: 0.3 K/UL (ref 0–0.04)
IMM GRANULOCYTES NFR BLD AUTO: 2 % (ref 0–0.5)
LYMPHOCYTES # BLD: 3.9 K/UL (ref 0.9–3.6)
LYMPHOCYTES NFR BLD: 28 % (ref 21–52)
MAGNESIUM SERPL-MCNC: 3 MG/DL (ref 1.6–2.6)
MCH RBC QN AUTO: 31.2 PG (ref 24–34)
MCHC RBC AUTO-ENTMCNC: 34.8 G/DL (ref 31–37)
MCV RBC AUTO: 89.8 FL (ref 78–100)
MONOCYTES # BLD: 1.2 K/UL (ref 0.05–1.2)
MONOCYTES NFR BLD: 9 % (ref 3–10)
NEUTS SEG # BLD: 8.1 K/UL (ref 1.8–8)
NEUTS SEG NFR BLD: 59 % (ref 40–73)
NRBC # BLD: 0 K/UL (ref 0–0.01)
NRBC BLD-RTO: 0 PER 100 WBC
PLATELET # BLD AUTO: 350 K/UL (ref 135–420)
PMV BLD AUTO: 9.5 FL (ref 9.2–11.8)
POTASSIUM SERPL-SCNC: 3.7 MMOL/L (ref 3.5–5.5)
PROT SERPL-MCNC: 8.2 G/DL (ref 6.4–8.2)
RBC # BLD AUTO: 5.57 M/UL (ref 4.35–5.65)
SALICYLATES SERPL-MCNC: <1.7 MG/DL (ref 2.8–20)
SODIUM SERPL-SCNC: 139 MMOL/L (ref 136–145)
TROPONIN I SERPL HS-MCNC: 5 NG/L (ref 0–78)
WBC # BLD AUTO: 13.6 K/UL (ref 4.6–13.2)

## 2023-10-20 PROCEDURE — 80307 DRUG TEST PRSMV CHEM ANLYZR: CPT

## 2023-10-20 PROCEDURE — 96361 HYDRATE IV INFUSION ADD-ON: CPT

## 2023-10-20 PROCEDURE — 80053 COMPREHEN METABOLIC PANEL: CPT

## 2023-10-20 PROCEDURE — 82077 ASSAY SPEC XCP UR&BREATH IA: CPT

## 2023-10-20 PROCEDURE — 99284 EMERGENCY DEPT VISIT MOD MDM: CPT

## 2023-10-20 PROCEDURE — 80143 DRUG ASSAY ACETAMINOPHEN: CPT

## 2023-10-20 PROCEDURE — 96360 HYDRATION IV INFUSION INIT: CPT

## 2023-10-20 PROCEDURE — 84484 ASSAY OF TROPONIN QUANT: CPT

## 2023-10-20 PROCEDURE — 80179 DRUG ASSAY SALICYLATE: CPT

## 2023-10-20 PROCEDURE — 83735 ASSAY OF MAGNESIUM: CPT

## 2023-10-20 PROCEDURE — 85025 COMPLETE CBC W/AUTO DIFF WBC: CPT

## 2023-10-20 PROCEDURE — 93005 ELECTROCARDIOGRAM TRACING: CPT | Performed by: STUDENT IN AN ORGANIZED HEALTH CARE EDUCATION/TRAINING PROGRAM

## 2023-10-20 PROCEDURE — 2580000003 HC RX 258: Performed by: STUDENT IN AN ORGANIZED HEALTH CARE EDUCATION/TRAINING PROGRAM

## 2023-10-20 RX ORDER — 0.9 % SODIUM CHLORIDE 0.9 %
1000 INTRAVENOUS SOLUTION INTRAVENOUS ONCE
Status: COMPLETED | OUTPATIENT
Start: 2023-10-20 | End: 2023-10-21

## 2023-10-20 RX ADMIN — SODIUM CHLORIDE 1000 ML: 900 INJECTION, SOLUTION INTRAVENOUS at 22:34

## 2023-10-20 ASSESSMENT — PATIENT HEALTH QUESTIONNAIRE - PHQ9
5. POOR APPETITE OR OVEREATING: 0
2. FEELING DOWN, DEPRESSED OR HOPELESS: 2
4. FEELING TIRED OR HAVING LITTLE ENERGY: 2
7. TROUBLE CONCENTRATING ON THINGS, SUCH AS READING THE NEWSPAPER OR WATCHING TELEVISION: 2
3. TROUBLE FALLING OR STAYING ASLEEP: 2
8. MOVING OR SPEAKING SO SLOWLY THAT OTHER PEOPLE COULD HAVE NOTICED. OR THE OPPOSITE, BEING SO FIGETY OR RESTLESS THAT YOU HAVE BEEN MOVING AROUND A LOT MORE THAN USUAL: 2
SUM OF ALL RESPONSES TO PHQ QUESTIONS 1-9: 14
1. LITTLE INTEREST OR PLEASURE IN DOING THINGS: 2
6. FEELING BAD ABOUT YOURSELF - OR THAT YOU ARE A FAILURE OR HAVE LET YOURSELF OR YOUR FAMILY DOWN: 2
SUM OF ALL RESPONSES TO PHQ9 QUESTIONS 1 & 2: 4
SUM OF ALL RESPONSES TO PHQ QUESTIONS 1-9: 14

## 2023-10-20 ASSESSMENT — ENCOUNTER SYMPTOMS
CONSTIPATION: 0
DIARRHEA: 0
FACIAL SWELLING: 0
CHEST TIGHTNESS: 0
EYE PAIN: 0
BACK PAIN: 0
SHORTNESS OF BREATH: 0
ABDOMINAL PAIN: 0
ABDOMINAL DISTENTION: 0
BLOOD IN STOOL: 0

## 2023-10-20 ASSESSMENT — PAIN - FUNCTIONAL ASSESSMENT: PAIN_FUNCTIONAL_ASSESSMENT: NONE - DENIES PAIN

## 2023-10-21 VITALS
WEIGHT: 190 LBS | BODY MASS INDEX: 26.6 KG/M2 | HEIGHT: 71 IN | OXYGEN SATURATION: 96 % | TEMPERATURE: 98.7 F | RESPIRATION RATE: 26 BRPM | DIASTOLIC BLOOD PRESSURE: 67 MMHG | SYSTOLIC BLOOD PRESSURE: 138 MMHG | HEART RATE: 92 BPM

## 2023-10-21 LAB
AMPHET UR QL SCN: NEGATIVE
ANION GAP SERPL CALC-SCNC: 6 MMOL/L (ref 3–18)
BARBITURATES UR QL SCN: NEGATIVE
BENZODIAZ UR QL: NEGATIVE
BUN SERPL-MCNC: 11 MG/DL (ref 7–18)
BUN/CREAT SERPL: 10 (ref 12–20)
CALCIUM SERPL-MCNC: 8.1 MG/DL (ref 8.5–10.1)
CANNABINOIDS UR QL SCN: POSITIVE
CHLORIDE SERPL-SCNC: 113 MMOL/L (ref 100–111)
CO2 SERPL-SCNC: 21 MMOL/L (ref 21–32)
COCAINE UR QL SCN: NEGATIVE
CREAT SERPL-MCNC: 1.09 MG/DL (ref 0.6–1.3)
GLUCOSE SERPL-MCNC: 104 MG/DL (ref 74–99)
LACTATE BLD-SCNC: 0.8 MMOL/L (ref 0.4–2)
Lab: ABNORMAL
METHADONE UR QL: ABNORMAL
OPIATES UR QL: NEGATIVE
PCP UR QL: NEGATIVE
POTASSIUM SERPL-SCNC: 3.5 MMOL/L (ref 3.5–5.5)
SODIUM SERPL-SCNC: 140 MMOL/L (ref 136–145)

## 2023-10-21 PROCEDURE — 2580000003 HC RX 258: Performed by: STUDENT IN AN ORGANIZED HEALTH CARE EDUCATION/TRAINING PROGRAM

## 2023-10-21 PROCEDURE — 6370000000 HC RX 637 (ALT 250 FOR IP): Performed by: STUDENT IN AN ORGANIZED HEALTH CARE EDUCATION/TRAINING PROGRAM

## 2023-10-21 PROCEDURE — 83605 ASSAY OF LACTIC ACID: CPT

## 2023-10-21 PROCEDURE — 80048 BASIC METABOLIC PNL TOTAL CA: CPT

## 2023-10-21 RX ORDER — 0.9 % SODIUM CHLORIDE 0.9 %
1000 INTRAVENOUS SOLUTION INTRAVENOUS ONCE
Status: COMPLETED | OUTPATIENT
Start: 2023-10-21 | End: 2023-10-21

## 2023-10-21 RX ORDER — ACETAMINOPHEN 325 MG/1
650 TABLET ORAL
Status: COMPLETED | OUTPATIENT
Start: 2023-10-21 | End: 2023-10-21

## 2023-10-21 RX ORDER — 0.9 % SODIUM CHLORIDE 0.9 %
500 INTRAVENOUS SOLUTION INTRAVENOUS ONCE
Status: COMPLETED | OUTPATIENT
Start: 2023-10-21 | End: 2023-10-21

## 2023-10-21 RX ADMIN — ACETAMINOPHEN 650 MG: 325 TABLET ORAL at 01:17

## 2023-10-21 RX ADMIN — SODIUM CHLORIDE 1000 ML: 9 INJECTION, SOLUTION INTRAVENOUS at 02:54

## 2023-10-21 RX ADMIN — SODIUM CHLORIDE 500 ML: 9 INJECTION, SOLUTION INTRAVENOUS at 02:54

## 2023-10-21 ASSESSMENT — PAIN DESCRIPTION - LOCATION
LOCATION: BACK
LOCATION: BACK

## 2023-10-21 ASSESSMENT — PAIN DESCRIPTION - ONSET
ONSET: ON-GOING
ONSET: ON-GOING

## 2023-10-21 ASSESSMENT — PAIN DESCRIPTION - FREQUENCY
FREQUENCY: CONTINUOUS
FREQUENCY: CONTINUOUS

## 2023-10-21 ASSESSMENT — PAIN SCALES - GENERAL
PAINLEVEL_OUTOF10: 3
PAINLEVEL_OUTOF10: 7

## 2023-10-21 ASSESSMENT — PAIN DESCRIPTION - DESCRIPTORS
DESCRIPTORS: ACHING
DESCRIPTORS: ACHING

## 2023-10-21 ASSESSMENT — PAIN DESCRIPTION - PAIN TYPE
TYPE: CHRONIC PAIN
TYPE: CHRONIC PAIN

## 2023-10-21 ASSESSMENT — LIFESTYLE VARIABLES
HOW MANY STANDARD DRINKS CONTAINING ALCOHOL DO YOU HAVE ON A TYPICAL DAY: PATIENT DECLINED
HOW OFTEN DO YOU HAVE A DRINK CONTAINING ALCOHOL: PATIENT DECLINED

## 2023-10-21 NOTE — ED NOTES
EMERGENCY DEPARTMENT HISTORY AND PHYSICAL EXAM    7:48 PM      Date: 10/20/2023  Patient Name: Hadley Duenas    History of Presenting Illness     No chief complaint on file. History From: Patient  HPI  38-year-old male with history of bipolar disorder, depression, back pain who presents for overdose. As per patient he chronically has back pain and was taking dextromethorphan to help alleviate pain. Patient took 10 tablets at 7 PM.  Patient's wife called EMS to bring patient to the hospital.  Unable to get collateral at this time. Patient came to the ED and was tachycardic and diaphoretic. When assessing ROS he denies any nausea, vomiting, chest pain, suicidal ideations, homicidal ideations, visual or auditory hallucinations abdominal pain, changes in urination, or any other changes. Nursing Notes were all reviewed and agreed with or any disagreements were addressed in the HPI. PCP: None, None    No current facility-administered medications for this encounter. No current outpatient medications on file. Past History     Past Medical History:  History reviewed. No pertinent past medical history. Past Surgical History:  History reviewed. No pertinent surgical history. Family History:  History reviewed. No pertinent family history. Social History: Allergies:  No Known Allergies      Review of Systems       Review of Systems   Constitutional:  Positive for diaphoresis. Negative for activity change, appetite change and fever. HENT:  Negative for ear pain and facial swelling. Eyes:  Negative for pain. Respiratory:  Negative for chest tightness and shortness of breath. Cardiovascular:  Negative for chest pain and leg swelling. Gastrointestinal:  Negative for abdominal distention, abdominal pain, blood in stool, constipation and diarrhea. Genitourinary:  Negative for dysuria and flank pain. Musculoskeletal:  Negative for arthralgias and back pain.    Neurological:
Oncoming RN assumes care of pt, RN assesses chart and pt, RN aggress with prior charted assessments except where charted otherwise      Darlyn Chance RN  10/21/23 0002
Physician notified HR and BP     Sebastián Hopper RN  10/21/23 4141
Pt discharged per order, pt educated on discharge instructions and follow up, pt verbalized understanding of education with teachback, VSS, pt tarah additional questions at this time       Sloane Parsons RN  10/21/23 8431
Pt provided with the urinal per his request. Pt has had appox 1500 ml of urine output since arrival.      Javi Miguel, [place]  10/21/23 8535
Pt resting in bed comfortably at this time.       Micaela Kurtz, RN  10/21/23 4973
Spoke with Cedrick Seaman at [facility] , she has the lab results. Aware the pt is still tachycardic. She will call back to follow up.       Deion De La Torre RN  10/21/23 4481
The following labs were labeled with appropriate pt sticker and tubed to lab:     [x] Blue     [x] Lavender   [] on ice  [x] Green/yellow  [x] Green/black [] on ice  [] Judamandae Maxcy  [] on ice  [x] Yellow  [x] Red  [] Pink  [] Type/ Screen  [] ABG  [] VBG    [] COVID-19 swab    [] Rapid  [] PCR  [] Flu swab  [] Peds Viral Panel     [] Urine Sample  [] Fecal Sample  [] Pelvic Cultures  [] Blood Cultures  [] X 2  [] STREP Cultures       Kayren Lundborg, RN  10/20/23 8281
Knee sprain

## 2023-10-21 NOTE — DISCHARGE INSTRUCTIONS
You were evaluated for drug overdose, . Based on your work-up it was deemed that she was stable for discharge. Please follow-up with your primary care physician if you have any further concerns and go over your work-up. If you experience any chest pain, shortness of breath, worsening abdominal pain, vomiting blood, worsening headache, seizures, or any worsening of your symptoms please return to the emergency department immediately. If you have any pending results or any further questions please contact the emergency department at [phone].

## 2023-10-22 LAB
EKG ATRIAL RATE: 155 BPM
EKG DIAGNOSIS: NORMAL
EKG P AXIS: 24 DEGREES
EKG P-R INTERVAL: 116 MS
EKG Q-T INTERVAL: 262 MS
EKG QRS DURATION: 94 MS
EKG QTC CALCULATION (BAZETT): 420 MS
EKG R AXIS: 112 DEGREES
EKG T AXIS: 0 DEGREES
EKG VENTRICULAR RATE: 155 BPM

## 2023-10-23 LAB
EKG ATRIAL RATE: 153 BPM
EKG DIAGNOSIS: NORMAL
EKG P AXIS: -5 DEGREES
EKG P-R INTERVAL: 114 MS
EKG Q-T INTERVAL: 270 MS
EKG QRS DURATION: 96 MS
EKG QTC CALCULATION (BAZETT): 431 MS
EKG R AXIS: 110 DEGREES
EKG T AXIS: -2 DEGREES
EKG VENTRICULAR RATE: 153 BPM

## 2023-11-08 ENCOUNTER — OFFICE VISIT (OUTPATIENT)
Dept: PSYCHIATRY | Facility: CLINIC | Age: 33
End: 2023-11-08
Payer: COMMERCIAL

## 2023-11-08 VITALS
TEMPERATURE: 97.8 F | BODY MASS INDEX: 46.65 KG/M2 | HEIGHT: 69 IN | WEIGHT: 315 LBS | DIASTOLIC BLOOD PRESSURE: 81 MMHG | HEART RATE: 96 BPM | OXYGEN SATURATION: 96 % | SYSTOLIC BLOOD PRESSURE: 124 MMHG

## 2023-11-08 DIAGNOSIS — F41.1 GENERALIZED ANXIETY DISORDER: ICD-10-CM

## 2023-11-08 DIAGNOSIS — F42.2 MIXED OBSESSIONAL THOUGHTS AND ACTS: ICD-10-CM

## 2023-11-08 DIAGNOSIS — F43.12 NIGHTMARES ASSOCIATED WITH CHRONIC POST-TRAUMATIC STRESS DISORDER: ICD-10-CM

## 2023-11-08 DIAGNOSIS — F90.2 ATTENTION DEFICIT HYPERACTIVITY DISORDER, COMBINED TYPE: Primary | ICD-10-CM

## 2023-11-08 DIAGNOSIS — F43.10 POST TRAUMATIC STRESS DISORDER (PTSD): ICD-10-CM

## 2023-11-08 DIAGNOSIS — F51.5 NIGHTMARES ASSOCIATED WITH CHRONIC POST-TRAUMATIC STRESS DISORDER: ICD-10-CM

## 2023-11-08 PROCEDURE — 3079F DIAST BP 80-89 MM HG: CPT | Performed by: NURSE PRACTITIONER

## 2023-11-08 PROCEDURE — 1160F RVW MEDS BY RX/DR IN RCRD: CPT | Performed by: NURSE PRACTITIONER

## 2023-11-08 PROCEDURE — 99214 OFFICE O/P EST MOD 30 MIN: CPT | Performed by: NURSE PRACTITIONER

## 2023-11-08 PROCEDURE — 3074F SYST BP LT 130 MM HG: CPT | Performed by: NURSE PRACTITIONER

## 2023-11-08 PROCEDURE — 1159F MED LIST DOCD IN RCRD: CPT | Performed by: NURSE PRACTITIONER

## 2023-11-08 RX ORDER — LISDEXAMFETAMINE DIMESYLATE CAPSULES 40 MG/1
40 CAPSULE ORAL EVERY MORNING
Qty: 30 CAPSULE | Refills: 0 | Status: SHIPPED | OUTPATIENT
Start: 2023-11-08

## 2023-11-08 RX ORDER — PRAZOSIN HYDROCHLORIDE 1 MG/1
1 CAPSULE ORAL NIGHTLY
Qty: 30 CAPSULE | Refills: 2 | Status: SHIPPED | OUTPATIENT
Start: 2023-11-08

## 2023-11-08 RX ORDER — DULOXETIN HYDROCHLORIDE 60 MG/1
60 CAPSULE, DELAYED RELEASE ORAL DAILY
Qty: 30 CAPSULE | Refills: 2 | Status: SHIPPED | OUTPATIENT
Start: 2023-11-08 | End: 2024-11-07

## 2023-11-08 NOTE — PROGRESS NOTES
"      Subjective   Andrew Narayan is a 32 y.o. male is here today for medication management follow-up.  Presents Chief Complaint:  Recheck on anxiety and hallucinations and ADHD    History of Present Illness: Patient presents with his wife with whom he gives permission to speak in front of.  He states that they have had some situational stressors recently dealing with financial issues so this does cause him to feel depressed at times but he states this is all situational and it comes and goes.  He denies overt depression and denies any hopelessness or thoughts of self-harm.  The Vyvanse continues to help.  He states that it \"slows my head down\".  States without it he feels like he has \"restless legs in my head\".  Vyvanse seems to help with his obsessional thinking as he does not sit and repeat thoughts over and over when he takes it.  He is sleeping at night however having the nightmares quite frequently about at least every other night.  Took prazosin in the past and it helped and he just eventually stopped taking it. Body mass index is 48.09 kg/m². Weight loss 5 lbs since last visit.  No medical stressors.  Continues to barely want to leave the house.  Has severe anxiety when thinking of   The following portions of the patient's history were reviewed and updated as appropriate: allergies, current medications, past family history, past medical history, past social history, past surgical history and problem list.    Review of Systems   Constitutional:  Negative for activity change, appetite change and fatigue.   HENT: Negative.     Eyes:  Negative for visual disturbance.   Respiratory: Negative.     Cardiovascular: Negative.    Gastrointestinal:  Negative for nausea.   Endocrine: Negative.    Genitourinary: Negative.    Musculoskeletal:  Positive for arthralgias.   Skin: Negative.    Allergic/Immunologic: Negative.    Neurological:  Negative for dizziness, seizures and headaches.   Hematological: Negative.  " "  Psychiatric/Behavioral:  Negative for agitation, behavioral problems, confusion, decreased concentration, dysphoric mood, hallucinations, self-injury, sleep disturbance and suicidal ideas. The patient is not nervous/anxious and is not hyperactive.         Nightmares     Reviewed copied data and there are no changes    Objective   Physical Exam   Constitutional: He is oriented to person, place, and time. He appears well-developed.   HENT:   Head: Normocephalic.   Neurological: He is alert and oriented to person, place, and time.   Psychiatric: His speech is normal and behavior is normal. Mood and thought content normal. His mood appears not anxious. He expresses impulsivity.   Engaging in conversation   Vitals reviewed.    Blood pressure 124/81, pulse 96, temperature 97.8 °F (36.6 °C), height 175.3 cm (69.02\"), weight (!) 148 kg (325 lb 12.8 oz), SpO2 96%.    Medication List:   Current Outpatient Medications   Medication Sig Dispense Refill    DULoxetine (CYMBALTA) 60 MG capsule Take 1 capsule by mouth Daily. 30 capsule 2    lisdexamfetamine (Vyvanse) 40 MG capsule Take 1 capsule by mouth Every Morning 30 capsule 0    celecoxib (CeleBREX) 200 MG capsule TAKE 1 CAPSULE BY MOUTH DAILY 30 capsule 3    gabapentin (NEURONTIN) 600 MG tablet Take 1 tablet by mouth 6 (Six) Times a Day.      levothyroxine (SYNTHROID, LEVOTHROID) 25 MCG tablet Take 1 tablet by mouth Daily. 90 tablet 3    metoprolol succinate XL (TOPROL-XL) 50 MG 24 hr tablet Take 1 tablet by mouth Daily. 90 tablet 3    prazosin (MINIPRESS) 1 MG capsule Take 1 capsule by mouth Every Night. 30 capsule 2    rosuvastatin (Crestor) 20 MG tablet Take 1 tablet by mouth Daily. 90 tablet 3    Syringe 25G X 5/8\" 3 ML misc Use as directed 2 x weekly 24 each 3    Testosterone Cypionate (Depo-Testosterone) 200 MG/ML injection Inject 1/2 cc subcutaneously every Monday and Thursday 10 mL 2    triamcinolone (KENALOG) 0.5 % cream Apply 1 application topically to the " appropriate area as directed 3 (Three) Times a Day. 30 g 0     No current facility-administered medications for this visit.     Reviewed copied data and there are no changes      Mental Status Exam:   Hygiene:   good  Cooperation:  Cooperative  Eye Contact:  Good  Psychomotor Behavior:  Appropriate  Affect:  Full range  Hopelessness: Denies  Speech:  Normal  Thought Process:  Goal directed  Thought Content:  Normal  Suicidal:  None  Homicidal:  None  Hallucinations:  None  Delusion:  None  Memory:  Intact  Orientation:  Person, Place, Time and Situation  Reliability:  fair  Insight:  Fair  Judgement:  Fair  Impulse Control:  Fair  Physical/Medical Issues:  Yes obesity, HTN, pain    Assessment & Plan   Problems Addressed this Visit    None  Visit Diagnoses       Attention deficit hyperactivity disorder, combined type    -  Primary    Relevant Medications    lisdexamfetamine (Vyvanse) 40 MG capsule    DULoxetine (CYMBALTA) 60 MG capsule    Generalized anxiety disorder        Relevant Medications    lisdexamfetamine (Vyvanse) 40 MG capsule    DULoxetine (CYMBALTA) 60 MG capsule    Post traumatic stress disorder (PTSD)        Relevant Medications    lisdexamfetamine (Vyvanse) 40 MG capsule    DULoxetine (CYMBALTA) 60 MG capsule    Mixed obsessional thoughts and acts        Relevant Medications    DULoxetine (CYMBALTA) 60 MG capsule    Nightmares associated with chronic post-traumatic stress disorder        Relevant Medications    lisdexamfetamine (Vyvanse) 40 MG capsule    DULoxetine (CYMBALTA) 60 MG capsule    prazosin (MINIPRESS) 1 MG capsule          Diagnoses         Codes Comments    Attention deficit hyperactivity disorder, combined type    -  Primary ICD-10-CM: F90.2  ICD-9-CM: 314.01     Generalized anxiety disorder     ICD-10-CM: F41.1  ICD-9-CM: 300.02     Post traumatic stress disorder (PTSD)     ICD-10-CM: F43.10  ICD-9-CM: 309.81     Mixed obsessional thoughts and acts     ICD-10-CM: F42.2  ICD-9-CM: 300.3      Nightmares associated with chronic post-traumatic stress disorder     ICD-10-CM: F51.5, F43.12  ICD-9-CM: 307.47, 309.81         azalia reviewed.  UDS in past reviewed an appropriate.   Functionality: pt having minimal impairment in important areas of daily functioning.  Prognosis: Good dependent on medication/follow up and treatment plan compliance.    Stopping the clonidine as he is not taking it nightly anyway.  Restarting prazosin for the ongoing nightmares.  He is familiar with the risks, benefits, side effects of the prazosin as he has taken in the past.  He is also been instructed to check his blood pressure and heart rate when restarting the prazosin since he is taking the metoprolol.  He will continue the Cymbalta for the anxiety and PTSD, continue the Vyvanse for the ADHD.  Refills of all been submitted.  I once again discussed therapy with him and he does not want to do therapy as it states he is afraid of any EM DR and therapy sometimes seems to make his flashbacks worse.  I then discussed how he is going to have to really fight the urge to not get out of the house as this only worsens with time the more that he gives into that.     continuing efforts to promote the therapeutic alliance, address the patient's issues, and strengthen self awareness, insights, and coping skills.   Patient is aware to call 911 or go to the nearest ER should begin having SI/HI. Pt will notify me should he have any negative side effects or any worsening depression.  RTC 12 weeks.  Sooner if needed.

## 2023-11-15 RX ORDER — LISDEXAMFETAMINE DIMESYLATE CAPSULES 40 MG/1
40 CAPSULE ORAL EVERY MORNING
Qty: 30 CAPSULE | Refills: 0 | OUTPATIENT
Start: 2023-11-15

## 2023-12-08 DIAGNOSIS — F90.2 ATTENTION DEFICIT HYPERACTIVITY DISORDER, COMBINED TYPE: ICD-10-CM

## 2023-12-08 RX ORDER — LISDEXAMFETAMINE DIMESYLATE CAPSULES 40 MG/1
40 CAPSULE ORAL EVERY MORNING
Qty: 30 CAPSULE | Refills: 0 | Status: SHIPPED | OUTPATIENT
Start: 2023-12-08

## 2024-01-08 DIAGNOSIS — F90.2 ATTENTION DEFICIT HYPERACTIVITY DISORDER, COMBINED TYPE: ICD-10-CM

## 2024-01-08 RX ORDER — LISDEXAMFETAMINE DIMESYLATE CAPSULES 40 MG/1
40 CAPSULE ORAL EVERY MORNING
Qty: 30 CAPSULE | Refills: 0 | Status: SHIPPED | OUTPATIENT
Start: 2024-01-08

## 2024-02-05 DIAGNOSIS — F90.2 ATTENTION DEFICIT HYPERACTIVITY DISORDER, COMBINED TYPE: ICD-10-CM

## 2024-02-05 RX ORDER — LISDEXAMFETAMINE DIMESYLATE CAPSULES 40 MG/1
40 CAPSULE ORAL EVERY MORNING
Qty: 30 CAPSULE | Refills: 0 | Status: SHIPPED | OUTPATIENT
Start: 2024-02-07 | End: 2024-02-07 | Stop reason: SDUPTHER

## 2024-02-07 ENCOUNTER — TELEPHONE (OUTPATIENT)
Dept: FAMILY MEDICINE CLINIC | Facility: CLINIC | Age: 34
End: 2024-02-07
Payer: COMMERCIAL

## 2024-02-07 DIAGNOSIS — F90.2 ATTENTION DEFICIT HYPERACTIVITY DISORDER, COMBINED TYPE: ICD-10-CM

## 2024-02-07 RX ORDER — LISDEXAMFETAMINE DIMESYLATE CAPSULES 40 MG/1
40 CAPSULE ORAL EVERY MORNING
Qty: 30 CAPSULE | Refills: 0 | Status: SHIPPED | OUTPATIENT
Start: 2024-02-07

## 2024-02-07 NOTE — TELEPHONE ENCOUNTER
Patient went to pick his medication (Vyvanse 40mg)  up at [facility] and they do not have it in the store and asked for it to be sent to [facility] in [place].

## 2024-02-29 ENCOUNTER — OFFICE VISIT (OUTPATIENT)
Dept: FAMILY MEDICINE CLINIC | Facility: CLINIC | Age: 34
End: 2024-02-29
Payer: COMMERCIAL

## 2024-02-29 VITALS
BODY MASS INDEX: 46.65 KG/M2 | HEIGHT: 69 IN | OXYGEN SATURATION: 98 % | SYSTOLIC BLOOD PRESSURE: 122 MMHG | TEMPERATURE: 97.6 F | HEART RATE: 68 BPM | DIASTOLIC BLOOD PRESSURE: 90 MMHG | WEIGHT: 315 LBS

## 2024-02-29 DIAGNOSIS — E78.2 MIXED HYPERLIPIDEMIA: Chronic | ICD-10-CM

## 2024-02-29 DIAGNOSIS — I10 ESSENTIAL HYPERTENSION: Chronic | ICD-10-CM

## 2024-02-29 DIAGNOSIS — M75.51 ACUTE BURSITIS OF RIGHT SHOULDER: Primary | ICD-10-CM

## 2024-02-29 DIAGNOSIS — E03.9 ACQUIRED HYPOTHYROIDISM: Chronic | ICD-10-CM

## 2024-02-29 DIAGNOSIS — R73.03 PREDIABETES: ICD-10-CM

## 2024-02-29 PROCEDURE — 80061 LIPID PANEL: CPT | Performed by: PHYSICIAN ASSISTANT

## 2024-02-29 PROCEDURE — 80053 COMPREHEN METABOLIC PANEL: CPT | Performed by: PHYSICIAN ASSISTANT

## 2024-02-29 PROCEDURE — 84439 ASSAY OF FREE THYROXINE: CPT | Performed by: PHYSICIAN ASSISTANT

## 2024-02-29 PROCEDURE — 84443 ASSAY THYROID STIM HORMONE: CPT | Performed by: PHYSICIAN ASSISTANT

## 2024-02-29 RX ORDER — ROSUVASTATIN CALCIUM 20 MG/1
20 TABLET, COATED ORAL DAILY
Qty: 90 TABLET | Refills: 3 | Status: SHIPPED | OUTPATIENT
Start: 2024-02-29

## 2024-02-29 RX ORDER — METOPROLOL SUCCINATE 50 MG/1
50 TABLET, EXTENDED RELEASE ORAL DAILY
Qty: 90 TABLET | Refills: 3 | Status: SHIPPED | OUTPATIENT
Start: 2024-02-29

## 2024-02-29 NOTE — PROGRESS NOTES
"Chief Complaint -right shoulder pain    History of Present Illness -     Andrew Narayan is a 33 y.o. male.     Right shoulder pain-  Patient reports that approximately 2 months ago he was closing a door and when he pushed on the door he felt his shoulder strain.  This was not a quick or aggressive action.  Patient states the right shoulder has been sore with decreased range of motion in abduction since that time.  He did have a different injury to his right calf since that time and has been using a crutch under the right arm.    Hypothyroidism-  Presumed not at goal as patient has not been able to tolerate Synthroid secondary to nausea.  He has not been taking Synthroid.    Hypertension-  Stable with metoprolol XL 50 mg daily    Hyperlipidemia-  Stable with rosuvastatin and low-cholesterol diet      The following portions of the patient's history were reviewed and updated as appropriate: allergies, current medications, past family history, past medical history, past social history, past surgical history and problem list.        Objective  Vital signs:  /90   Pulse 68   Temp 97.6 °F (36.4 °C) (Temporal)   Ht 175.3 cm (69.02\")   Wt (!) 152 kg (334 lb)   SpO2 98%   BMI 49.29 kg/m²     Physical Exam  Vitals and nursing note reviewed.   Constitutional:       Appearance: Normal appearance. He is well-developed. He is obese.   Eyes:      Extraocular Movements: Extraocular movements intact.      Conjunctiva/sclera: Conjunctivae normal.   Cardiovascular:      Rate and Rhythm: Normal rate and regular rhythm.      Heart sounds: Normal heart sounds. No murmur heard.  Pulmonary:      Effort: Pulmonary effort is normal. No respiratory distress.      Breath sounds: Normal breath sounds. No wheezing.   Musculoskeletal:         General: No tenderness.   Skin:     General: Skin is warm and dry.      Findings: No rash.   Neurological:      Mental Status: He is alert and oriented to person, place, and time. "   Psychiatric:         Mood and Affect: Mood normal.         Behavior: Behavior normal.         Thought Content: Thought content normal.         The following data was reviewed by Ashley Rojas PA-C:         Lab Results   Component Value Date    BUN 11 08/24/2023    CREATININE 1.33 (H) 08/24/2023    EGFR 71.9 08/24/2023    ALT 35 08/24/2023    AST 24 08/24/2023    WBC 10.86 (H) 02/17/2023    HGB 15.8 02/17/2023    HCT 46.4 02/17/2023     02/17/2023    CHOL 255 (H) 08/24/2023    TRIG 184 (H) 08/24/2023    HDL 39 (L) 08/24/2023     (H) 08/24/2023    TSH 4.870 (H) 05/16/2023    HGBA1C 6.00 (H) 12/14/2020           Assessment & Plan     Diagnoses and all orders for this visit:    1. Acute bursitis of right shoulder (Primary)  Comments:  Right shoulder intra-articular injection today  Advised use of crutch may irritate shoulder  Patient taught home PT for range of motion    He was advised to contact Dr. Dubin's office as he missed his first appointment with orthopedic with regards to the right leg injury and reschedule that appointment.    2. Acquired hypothyroidism  Comments:  Unable to tolerate Synthroid secondary to nausea  Recheck lab work today for further evaluation  Orders:  -     TSH  -     T4, Free    3. Essential hypertension  Comments:  Continue Toprol XL  Advised daily BP and pulse monitoring  Orders:  -     metoprolol succinate XL (TOPROL-XL) 50 MG 24 hr tablet; Take 1 tablet by mouth Daily.  Dispense: 90 tablet; Refill: 3  -     Comprehensive Metabolic Panel    4. Mixed hyperlipidemia  Comments:  Continue Crestor 20 mg daily  Advised low-cholesterol diet  Orders:  -     rosuvastatin (Crestor) 20 MG tablet; Take 1 tablet by mouth Daily.  Dispense: 90 tablet; Refill: 3  -     Lipid Panel    5. Prediabetes  Comments:  Advised a low carbohydrate diabetic diet    Procedure: Right shoulder intra-articular injection  Provider: Ashley Rojas PA-C  Indication: Right shoulder pain secondary to  bursitis  Timeout was taken to verify correct patient procedure and location  Description: The right shoulder was prepped and draped in sterile fashion.  An intra-articular injection was given using 3 cc 1% lidocaine 1 cc of Depo-Medrol and 1 cc of triamcinolone.  Pressure was held and a sterile dressing was placed.  No complications  Estimated blood loss: Minimal  Patient tolerated procedure well    Lidocaine 1% 10 mg/mL  NDC number 78672-369-92  Lot #[id number]  Expiration 2/26    Triamcinolone 40 mg/mL  NDC number [address]-2   Lot number [id number]  Lot number 7/2025    Depo-Medrol 80 mg/mL  NDC number 009-0306-02  Lot #[id number]  Expiration 5/2025                 Patient was given instructions and counseling regarding his condition or for health maintenance advice. Please see specific information pulled into the AVS if appropriate      This document has been electronically signed by:  Ashley Rojas PA-C

## 2024-02-29 NOTE — PATIENT INSTRUCTIONS
Joint Steroid Injection  A joint steroid injection is a procedure to relieve swelling and pain in a joint. Steroids are medicines that reduce inflammation. In this procedure, your health care provider uses a syringe and a needle to inject a steroid medicine into a painful and inflamed joint. A pain-relieving medicine (anesthetic) may be injected along with the steroid. In some cases, your health care provider may use an imaging technique such as ultrasound or fluoroscopy to guide the injection.  Joints that are often treated with steroid injections include the knee, shoulder, hip, and spine. These injections may also be used in the elbow, ankle, and joints of the hands or feet. You may have joint steroid injections as part of your treatment for inflammation caused by:  Gout.  Rheumatoid arthritis.  Advanced wear-and-tear arthritis (osteoarthritis).  Tendinitis.  Bursitis.  Joint steroid injections may be repeated, but having them too often can damage a joint or the skin over the joint. You should not have joint steroid injections less than 6 weeks apart or more than four times a year.  Tell a health care provider about:  Any allergies you have.  All medicines you are taking, including vitamins, herbs, eye drops, creams, and over-the-counter medicines.  Any problems you or family members have had with anesthetic medicines.  Any blood disorders you have.  Any surgeries you have had.  Any medical conditions you have.  Whether you are pregnant or may be pregnant.  What are the risks?  Generally, this is a safe treatment. However, problems may occur, including:  Infection.  Bleeding.  Allergic reactions to medicines.  Damage to the joint or tissues around the joint.  Thinning of skin or loss of skin color over the joint.  Temporary flushing of the face or chest.  Temporary increase in pain.  Temporary increase in blood sugar.  Failure to relieve inflammation or pain.  What happens before the treatment?  Medicines  Ask  your health care provider about:  Changing or stopping your regular medicines. This is especially important if you are taking diabetes medicines or blood thinners.  Taking medicines such as aspirin and ibuprofen. These medicines can thin your blood. Do not take these medicines unless your health care provider tells you to take them.  Taking over-the-counter medicines, vitamins, herbs, and supplements.  General instructions  You may have imaging tests of your joint.  Ask your health care provider if you can drive yourself home after the procedure.  What happens during the treatment?    Your health care provider will position you for the injection and locate the injection site over your joint.  The skin over the joint will be cleaned with a germ-killing soap.  Your health care provider may:  Spray a numbing solution (topical anesthetic) over the injection site.  Inject a local anesthetic under the skin above your joint.  The needle will be placed through your skin into your joint. Your health care provider may use imaging to guide the needle to the right spot for the injection. If imaging is used, a special contrast dye may be injected to confirm that the needle is in the correct location.  The steroid medicine will be injected into your joint.  Anesthetic may be injected along with the steroid. This may be a medicine that relieves pain for a short time (short-acting anesthetic) or for a longer time (long-acting anesthetic).  The needle will be removed, and an adhesive bandage (dressing) will be placed over the injection site.  The procedure may vary among health care providers and hospitals.  What can I expect after the treatment?  You will be able to go home after the treatment.  It is normal to feel slight flushing for a few days after the injection.  After the treatment, it is common to have an increase in joint pain after the anesthetic has worn off. This may happen about an hour after a short-acting anesthetic  or about 8 hours after a longer-acting anesthetic.  You should begin to feel relief from joint pain and swelling after 24 to 48 hours. Contact your health care provider if you do not begin to feel relief after 2 days.  Follow these instructions at home:  Injection site care  Leave the adhesive dressing over your injection site in place until your health care provider says you can remove it.  Check your injection site every day for signs of infection. Check for:  More redness, swelling, or pain.  Fluid or blood.  Warmth.  Pus or a bad smell.  Activity  Return to your normal activities as told by your health care provider. Ask your health care provider what activities are safe for you. You may be asked to limit activities that put stress on the joint for a few days.  Do joint exercises as told by your health care provider.  Do not take baths, swim, or use a hot tub until your health care provider approves. Ask your health care provider if you may take showers. You may only be allowed to take sponge baths.  Managing pain, stiffness, and swelling    If directed, put ice on the joint. To do this:  Put ice in a plastic bag.  Place a towel between your skin and the bag.  Leave the ice on for 20 minutes, 2-3 times a day.  Remove the ice if your skin turns bright red. This is very important. If you cannot feel pain, heat, or cold, you have a greater risk of damage to the area.  Raise (elevate) your joint above the level of your heart when you are sitting or lying down.  General instructions  Take over-the-counter and prescription medicines only as told by your health care provider.  Do not use any products that contain nicotine or tobacco, such as cigarettes, e-cigarettes, and chewing tobacco. These can delay joint healing. If you need help quitting, ask your health care provider.  If you have diabetes, be aware that your blood sugar may be slightly elevated for several days after the injection.  Keep all follow-up visits.  This is important.  Contact a health care provider if you have:  Chills or a fever.  Any signs of infection at your injection site.  Increased pain or swelling or no relief after 2 days.  Summary  A joint steroid injection is a treatment to relieve pain and swelling in a joint.  Steroids are medicines that reduce inflammation. Your health care provider may add an anesthetic along with the steroid.  You may have joint steroid injections as part of your arthritis treatment.  Joint steroid injections may be repeated, but having them too often can damage a joint or the skin over the joint.  Contact your health care provider if you have a fever, chills, or signs of infection, or if you get no relief from joint pain or swelling.  This information is not intended to replace advice given to you by your health care provider. Make sure you discuss any questions you have with your health care provider.  Document Revised: 05/28/2021 Document Reviewed: 05/28/2021  [employer] Patient Education © 2023 [employer]  Carbohydrate Counting for Diabetes Mellitus, Adult  Carbohydrate counting is a method of keeping track of how many carbohydrates you eat. Eating carbohydrates increases the amount of sugar (glucose) in the blood. Counting how many carbohydrates you eat improves how well you manage your blood glucose. This, in turn, helps you manage your diabetes.  Carbohydrates are measured in grams (g) per serving. It is important to know how many carbohydrates (in grams or by serving size) you can have in each meal. This is different for every person. A dietitian can help you make a meal plan and calculate how many carbohydrates you should have at each meal and snack.  What foods contain carbohydrates?  Carbohydrates are found in the following foods:  Grains, such as breads and cereals.  Dried beans and soy products.  Starchy vegetables, such as potatoes, peas, and corn.  Fruit and fruit juices.  Milk and yogurt.  Sweets and snack  foods, such as cake, cookies, candy, chips, and soft drinks.  How do I count carbohydrates in foods?  There are two ways to count carbohydrates in food. You can read food labels or learn standard serving sizes of foods. You can use either of these methods or a combination of both.  Using the Nutrition Facts label  The Nutrition Facts list is included on the labels of almost all packaged foods and beverages in the [place]. It includes:  The serving size.  Information about nutrients in each serving, including the grams of carbohydrate per serving.  To use the Nutrition Facts, decide how many servings you will have. Then, multiply the number of servings by the number of carbohydrates per serving. The resulting number is the total grams of carbohydrates that you will be having.  Learning the standard serving sizes of foods  When you eat carbohydrate foods that are not packaged or do not include Nutrition Facts on the label, you need to measure the servings in order to count the grams of carbohydrates.  Measure the foods that you will eat with a food scale or measuring cup, if needed.  Decide how many standard-size servings you will eat.  Multiply the number of servings by 15. For foods that contain carbohydrates, one serving equals 15 g of carbohydrates.  For example, if you eat 2 cups or 10 oz (300 g) of strawberries, you will have eaten 2 servings and 30 g of carbohydrates (2 servings x 15 g = 30 g).  For foods that have more than one food mixed, such as soups and casseroles, you must count the carbohydrates in each food that is included.  The following list contains standard serving sizes of common carbohydrate-rich foods. Each of these servings has about 15 g of carbohydrates:  1 slice of bread.  1 six-inch (15 cm) tortilla.  ? cup or 2 oz (53 g) cooked rice or pasta.  ½ cup or 3 oz (85 g) cooked or canned, drained and rinsed beans or lentils.  ½ cup or 3 oz (85 g) starchy vegetable, such as peas, corn, or  squash.  ½ cup or 4 oz (120 g) hot cereal.  ½ cup or 3 oz (85 g) boiled or mashed potatoes, or ¼ or 3 oz (85 g) of a large baked potato.  ½ cup or 4 fl oz (118 mL) fruit juice.  1 cup or 8 fl oz (237 mL) milk.  1 small or 4 oz (106 g) apple.  ½ or 2 oz (63 g) of a medium banana.  1 cup or 5 oz (150 g) strawberries.  3 cups or 1 oz (28.3 g) popped popcorn.  What is an example of carbohydrate counting?  To calculate the grams of carbohydrates in this sample meal, follow the steps shown below.  Sample meal  3 oz (85 g) chicken breast.  ? cup or 4 oz (106 g) brown rice.  ½ cup or 3 oz (85 g) corn.  1 cup or 8 fl oz (237 mL) milk.  1 cup or 5 oz (150 g) strawberries with sugar-free whipped topping.  Carbohydrate calculation  Identify the foods that contain carbohydrates:  Rice.  Corn.  Milk.  Strawberries.  Calculate how many servings you have of each food:  2 servings rice.  1 serving corn.  1 serving milk.  1 serving strawberries.  Multiply each number of servings by 15  servings rice x 15 g = 30 g.  1 serving corn x 15 g = 15 g.  1 serving milk x 15 g = 15 g.  1 serving strawberries x 15 g = 15 g.  Add together all of the amounts to find the total grams of carbohydrates eaten:  30 g + 15 g + 15 g + 15 g = 75 g of carbohydrates total.  What are tips for following this plan?  Shopping  Develop a meal plan and then make a shopping list.  Buy fresh and frozen vegetables, fresh and frozen fruit, dairy, eggs, beans, lentils, and whole grains.  Look at food labels. Choose foods that have more fiber and less sugar.  Avoid processed foods and foods with added sugars.  Meal planning  Aim to have the same number of grams of carbohydrates at each meal and for each snack time.  Plan to have regular, balanced meals and snacks.  Where to find more information  American Diabetes Association: diabetes.org  Centers for Disease Control and Prevention: cdc.gov  Academy of Nutrition and Dietetics: eatright.org  Association of  Diabetes Care & Education Specialists: diabeteseducator.org  Summary  Carbohydrate counting is a method of keeping track of how many carbohydrates you eat.  Eating carbohydrates increases the amount of sugar (glucose) in your blood.  Counting how many carbohydrates you eat improves how well you manage your blood glucose. This helps you manage your diabetes.  A dietitian can help you make a meal plan and calculate how many carbohydrates you should have at each meal and snack.  This information is not intended to replace advice given to you by your health care provider. Make sure you discuss any questions you have with your health care provider.  Document Revised: 07/21/2021 Document Reviewed: 07/21/2021  [employer] Patient Education © 2023 [employer]  Fat and Cholesterol Restricted Eating Plan  Getting too much fat and cholesterol in your diet may cause health problems. Choosing the right foods helps keep your fat and cholesterol at normal levels. This can keep you from getting certain diseases.  Your doctor may recommend an eating plan that includes:  Total fat: ______% or less of total calories a day. This is ______g of fat a day.  Saturated fat: ______% or less of total calories a day. This is ______g of saturated fat a day.  Cholesterol: less than _________mg a day.  Fiber: ______g a day.  What are tips for following this plan?  General tips  Work with your doctor to lose weight if you need to.  Avoid:  Foods with added sugar.  Fried foods.  Foods with trans fat or partially hydrogenated oils. This includes some margarines and baked goods.  If you drink alcohol:  Limit how much you have to:  0-1 drink a day for women who are not pregnant.  0-2 drinks a day for men.  Know how much alcohol is in a drink. In the [place] one drink equals one 12 oz bottle of beer (355 mL), one 5 oz glass of wine (148 mL), or one 1½ oz glass of hard liquor (44 mL).  Reading food labels  Check food labels for:  Trans fats.  Partially  "hydrogenated oils.  Saturated fat (g) in each serving.  Cholesterol (mg) in each serving.  Fiber (g) in each serving.  Choose foods with healthy fats, such as:  Monounsaturated fats and polyunsaturated fats. These include olive and canola oil, flaxseeds, walnuts, almonds, and seeds.  Omega-3 fats. These are found in certain fish, flaxseed oil, and ground flaxseeds.  Choose grain products that have whole grains. Look for the word \"whole\" as the first word in the ingredient list.  Cooking  Cook foods using low-fat methods. These include baking, boiling, grilling, and broiling.  Eat more home-cooked foods. Eat at restaurants and buffets less often. Eat less fast food.  Avoid cooking using saturated fats, such as butter, cream, palm oil, palm kernel oil, and coconut oil.  Meal planning    At meals, divide your plate into four equal parts:  Fill one-half of your plate with vegetables, green salads, and fruit.  Fill one-fourth of your plate with whole grains.  Fill one-fourth of your plate with low-fat (lean) protein foods.  Eat fish that is high in omega-3 fats at least two times a week. This includes mackerel, tuna, sardines, and salmon.  Eat foods that are high in fiber, such as whole grains, beans, apples, pears, berries, broccoli, carrots, peas, and barley.  What foods should I eat?  Fruits  All fresh, canned (in natural juice), or frozen fruits.  Vegetables  Fresh or frozen vegetables (raw, steamed, roasted, or grilled). Green salads.  Grains  Whole grains, such as whole wheat or whole grain breads, crackers, cereals, and pasta. Unsweetened oatmeal, bulgur, barley, quinoa, or brown rice. Corn or whole wheat flour tortillas.  Meats and other protein foods  Ground beef (85% or leaner), grass-fed beef, or beef trimmed of fat. Skinless chicken or turkey. Ground chicken or turkey. Pork trimmed of fat. All fish and seafood. Egg whites. Dried beans, peas, or lentils. Unsalted nuts or seeds. Unsalted canned beans. Nut " butters without added sugar or oil.  Dairy  Low-fat or nonfat dairy products, such as skim or 1% milk, 2% or reduced-fat cheeses, low-fat and fat-free ricotta or cottage cheese, or plain low-fat and nonfat yogurt.  Fats and oils  Tub margarine without trans fats. Light or reduced-fat mayonnaise and salad dressings. Avocado. Olive, canola, sesame, or safflower oils.  The items listed above may not be a complete list of foods and beverages you can eat. Contact a dietitian for more information.  What foods should I avoid?  Fruits  Canned fruit in heavy syrup. Fruit in cream or butter sauce. Fried fruit.  Vegetables  Vegetables cooked in cheese, cream, or butter sauce. Fried vegetables.  Grains  White bread. White pasta. White rice. Cornbread. Bagels, pastries, and croissants. Crackers and snack foods that contain trans fat and hydrogenated oils.  Meats and other protein foods  Fatty cuts of meat. Ribs, chicken wings, cotto, sausage, bologna, salami, chitterlings, fatback, hot dogs, bratwurst, and packaged lunch meats. Liver and organ meats. Whole eggs and egg yolks. Chicken and turkey with skin. Fried meat.  Dairy  Whole or 2% milk, cream, half-and-half, and cream cheese. Whole milk cheeses. Whole-fat or sweetened yogurt. Full-fat cheeses. Nondairy creamers and whipped toppings. Processed cheese, cheese spreads, and cheese curds.  Fats and oils  Butter, stick margarine, lard, shortening, ghee, or cotto fat. Coconut, palm kernel, and palm oils.  Beverages  Alcohol. Sugar-sweetened drinks such as sodas, lemonade, and fruit drinks.  Sweets and desserts  Corn syrup, sugars, honey, and molasses. Candy. Jam and jelly. Syrup. Sweetened cereals. Cookies, pies, cakes, donuts, muffins, and ice cream.  The items listed above may not be a complete list of foods and beverages you should avoid. Contact a dietitian for more information.  Summary  Choosing the right foods helps keep your fat and cholesterol at normal levels. This can  keep you from getting certain diseases.  At meals, fill one-half of your plate with vegetables, green salads, and fruits.  Eat high fiber foods, like whole grains, beans, apples, pears, berries, carrots, peas, and barley.  Limit added sugar, saturated fats, alcohol, and fried foods.  This information is not intended to replace advice given to you by your health care provider. Make sure you discuss any questions you have with your health care provider.  Document Revised: 04/29/2022 Document Reviewed: 04/29/2022  [employer] Patient Education © 2023 [employer]

## 2024-03-01 DIAGNOSIS — E78.2 MIXED HYPERLIPIDEMIA: Primary | ICD-10-CM

## 2024-03-01 LAB
ALBUMIN SERPL-MCNC: 4.5 G/DL (ref 3.5–5.2)
ALBUMIN/GLOB SERPL: 1.5 G/DL
ALP SERPL-CCNC: 82 U/L (ref 39–117)
ALT SERPL W P-5'-P-CCNC: 35 U/L (ref 1–41)
ANION GAP SERPL CALCULATED.3IONS-SCNC: 12.2 MMOL/L (ref 5–15)
AST SERPL-CCNC: 25 U/L (ref 1–40)
BILIRUB SERPL-MCNC: 0.6 MG/DL (ref 0–1.2)
BUN SERPL-MCNC: 15 MG/DL (ref 6–20)
BUN/CREAT SERPL: 11.9 (ref 7–25)
CALCIUM SPEC-SCNC: 10.1 MG/DL (ref 8.6–10.5)
CHLORIDE SERPL-SCNC: 101 MMOL/L (ref 98–107)
CHOLEST SERPL-MCNC: 280 MG/DL (ref 0–200)
CO2 SERPL-SCNC: 24.8 MMOL/L (ref 22–29)
CREAT SERPL-MCNC: 1.26 MG/DL (ref 0.76–1.27)
EGFRCR SERPLBLD CKD-EPI 2021: 76.3 ML/MIN/1.73
GLOBULIN UR ELPH-MCNC: 3 GM/DL
GLUCOSE SERPL-MCNC: 84 MG/DL (ref 65–99)
HDLC SERPL-MCNC: 46 MG/DL (ref 40–60)
LDLC SERPL CALC-MCNC: 196 MG/DL (ref 0–100)
LDLC/HDLC SERPL: 4.23 {RATIO}
POTASSIUM SERPL-SCNC: 4.5 MMOL/L (ref 3.5–5.2)
PROT SERPL-MCNC: 7.5 G/DL (ref 6–8.5)
SODIUM SERPL-SCNC: 138 MMOL/L (ref 136–145)
T4 FREE SERPL-MCNC: 1.34 NG/DL (ref 0.93–1.7)
TRIGL SERPL-MCNC: 197 MG/DL (ref 0–150)
TSH SERPL DL<=0.05 MIU/L-ACNC: 3.53 UIU/ML (ref 0.27–4.2)
VLDLC SERPL-MCNC: 38 MG/DL (ref 5–40)

## 2024-03-06 DIAGNOSIS — F90.2 ATTENTION DEFICIT HYPERACTIVITY DISORDER, COMBINED TYPE: ICD-10-CM

## 2024-03-06 RX ORDER — LISDEXAMFETAMINE DIMESYLATE CAPSULES 40 MG/1
40 CAPSULE ORAL EVERY MORNING
Qty: 30 CAPSULE | Refills: 0 | Status: SHIPPED | OUTPATIENT
Start: 2024-03-06

## 2024-04-04 DIAGNOSIS — F90.2 ATTENTION DEFICIT HYPERACTIVITY DISORDER, COMBINED TYPE: ICD-10-CM

## 2024-04-04 RX ORDER — LISDEXAMFETAMINE DIMESYLATE CAPSULES 40 MG/1
40 CAPSULE ORAL EVERY MORNING
Qty: 30 CAPSULE | Refills: 0 | Status: SHIPPED | OUTPATIENT
Start: 2024-04-04

## 2024-04-30 ENCOUNTER — OFFICE VISIT (OUTPATIENT)
Dept: PSYCHIATRY | Facility: CLINIC | Age: 34
End: 2024-04-30
Payer: COMMERCIAL

## 2024-04-30 VITALS
HEART RATE: 97 BPM | SYSTOLIC BLOOD PRESSURE: 137 MMHG | HEIGHT: 69 IN | DIASTOLIC BLOOD PRESSURE: 86 MMHG | BODY MASS INDEX: 46.65 KG/M2 | TEMPERATURE: 98 F | WEIGHT: 315 LBS | OXYGEN SATURATION: 96 %

## 2024-04-30 DIAGNOSIS — F42.2 MIXED OBSESSIONAL THOUGHTS AND ACTS: ICD-10-CM

## 2024-04-30 DIAGNOSIS — F41.1 GENERALIZED ANXIETY DISORDER: ICD-10-CM

## 2024-04-30 DIAGNOSIS — F43.10 POST TRAUMATIC STRESS DISORDER (PTSD): ICD-10-CM

## 2024-04-30 DIAGNOSIS — F90.2 ATTENTION DEFICIT HYPERACTIVITY DISORDER, COMBINED TYPE: Primary | ICD-10-CM

## 2024-04-30 DIAGNOSIS — F43.12 NIGHTMARES ASSOCIATED WITH CHRONIC POST-TRAUMATIC STRESS DISORDER: ICD-10-CM

## 2024-04-30 DIAGNOSIS — F51.5 NIGHTMARES ASSOCIATED WITH CHRONIC POST-TRAUMATIC STRESS DISORDER: ICD-10-CM

## 2024-04-30 PROCEDURE — 1160F RVW MEDS BY RX/DR IN RCRD: CPT | Performed by: NURSE PRACTITIONER

## 2024-04-30 PROCEDURE — 99214 OFFICE O/P EST MOD 30 MIN: CPT | Performed by: NURSE PRACTITIONER

## 2024-04-30 PROCEDURE — 3079F DIAST BP 80-89 MM HG: CPT | Performed by: NURSE PRACTITIONER

## 2024-04-30 PROCEDURE — 1159F MED LIST DOCD IN RCRD: CPT | Performed by: NURSE PRACTITIONER

## 2024-04-30 PROCEDURE — 3075F SYST BP GE 130 - 139MM HG: CPT | Performed by: NURSE PRACTITIONER

## 2024-04-30 RX ORDER — LISDEXAMFETAMINE DIMESYLATE 50 MG/1
50 CAPSULE ORAL EVERY MORNING
Qty: 30 CAPSULE | Refills: 0 | Status: SHIPPED | OUTPATIENT
Start: 2024-04-30

## 2024-04-30 RX ORDER — PRAZOSIN HYDROCHLORIDE 2 MG/1
2 CAPSULE ORAL NIGHTLY
Qty: 30 CAPSULE | Refills: 2 | Status: SHIPPED | OUTPATIENT
Start: 2024-04-30

## 2024-04-30 RX ORDER — DULOXETIN HYDROCHLORIDE 60 MG/1
60 CAPSULE, DELAYED RELEASE ORAL DAILY
Qty: 30 CAPSULE | Refills: 2 | Status: SHIPPED | OUTPATIENT
Start: 2024-04-30 | End: 2025-04-30

## 2024-04-30 NOTE — PROGRESS NOTES
Subjective   Andrew Narayan is a 33 y.o. male is here today for medication management follow-up.  Presents Chief Complaint:  Recheck on anxiety and hallucinations and ADHD    History of Present Illness: Patient presents with his wife with whom he gives permission to speak in front of.  He states that he is doing well.  The prazosin has helped with the frequency of the nightmares.  He is now having them about 2 nights a week.  He does feel like the Vyvanse could be increased slightly as he sees the effect of the medication waning.  It still helps but he feels like it could be a little stronger.  He denies overt depression.  He has some depressive days but overall feels like he is doing well in that aspect.  He continues to have daily flashbacks from PTSD.  He states the smell can trigger them or noise.  He has gotten used to this and states he has done this for years.  Sleep is adequate.Body mass index is 47.47 kg/m².  He does show a weight loss of 13 pounds since last office visit.  No acute medical stressors.  Mood is stable.  No anger outbursts.  The following portions of the patient's history were reviewed and updated as appropriate: allergies, current medications, past family history, past medical history, past social history, past surgical history and problem list.    Review of Systems   Constitutional:  Negative for activity change, appetite change and fatigue.   HENT: Negative.     Eyes:  Negative for visual disturbance.   Respiratory: Negative.     Cardiovascular: Negative.    Gastrointestinal:  Negative for nausea.   Endocrine: Negative.    Genitourinary: Negative.    Musculoskeletal:  Positive for arthralgias.   Skin: Negative.    Allergic/Immunologic: Negative.    Neurological:  Negative for dizziness, seizures and headaches.   Hematological: Negative.    Psychiatric/Behavioral:  Negative for agitation, behavioral problems, confusion, decreased concentration, dysphoric mood, hallucinations,  "self-injury, sleep disturbance and suicidal ideas. The patient is not nervous/anxious and is not hyperactive.         Nightmares     Reviewed copied data and there are no changes    Objective   Physical Exam   Constitutional: He is oriented to person, place, and time. He appears well-developed.   HENT:   Head: Normocephalic.   Neurological: He is alert and oriented to person, place, and time.   Psychiatric: His speech is normal and behavior is normal. Mood and thought content normal. His mood appears not anxious. He expresses impulsivity.   Engaging in conversation   Vitals reviewed.    Blood pressure 137/86, pulse 97, temperature 98 °F (36.7 °C), height 175.3 cm (69.02\"), weight (!) 146 kg (321 lb 9.6 oz), SpO2 96%.    Medication List:   Current Outpatient Medications   Medication Sig Dispense Refill    DULoxetine (CYMBALTA) 60 MG capsule Take 1 capsule by mouth Daily. 30 capsule 2    lisdexamfetamine (Vyvanse) 50 MG capsule Take 1 capsule by mouth Every Morning 30 capsule 0    prazosin (MINIPRESS) 2 MG capsule Take 1 capsule by mouth Every Night. 30 capsule 2    gabapentin (NEURONTIN) 600 MG tablet Take 1 tablet by mouth 6 (Six) Times a Day.      metoprolol succinate XL (TOPROL-XL) 50 MG 24 hr tablet Take 1 tablet by mouth Daily. 90 tablet 3    rosuvastatin (Crestor) 20 MG tablet Take 1 tablet by mouth Daily. 90 tablet 3    Syringe 25G X 5/8\" 3 ML misc Use as directed 2 x weekly 24 each 3    Testosterone Cypionate (Depo-Testosterone) 200 MG/ML injection Inject 1/2 cc subcutaneously every Monday and Thursday 10 mL 2    triamcinolone (KENALOG) 0.5 % cream Apply 1 application topically to the appropriate area as directed 3 (Three) Times a Day. 30 g 0     No current facility-administered medications for this visit.     Reviewed copied data and there are no changes      Mental Status Exam:   Hygiene:   good  Cooperation:  Cooperative  Eye Contact:  Good  Psychomotor Behavior:  Appropriate  Affect:  Full " range  Hopelessness: Denies  Speech:  Normal  Thought Process:  Goal directed  Thought Content:  Normal  Suicidal:  None  Homicidal:  None  Hallucinations:  None  Delusion:  None  Memory:  Intact  Orientation:  Person, Place, Time and Situation  Reliability:  fair  Insight:  Fair  Judgement:  Fair  Impulse Control:  Fair  Physical/Medical Issues:  Yes obesity, HTN, pain    Assessment & Plan   Problems Addressed this Visit    None  Visit Diagnoses       Attention deficit hyperactivity disorder, combined type    -  Primary    Relevant Medications    DULoxetine (CYMBALTA) 60 MG capsule    lisdexamfetamine (Vyvanse) 50 MG capsule    Generalized anxiety disorder        Relevant Medications    DULoxetine (CYMBALTA) 60 MG capsule    lisdexamfetamine (Vyvanse) 50 MG capsule    Post traumatic stress disorder (PTSD)        Relevant Medications    DULoxetine (CYMBALTA) 60 MG capsule    lisdexamfetamine (Vyvanse) 50 MG capsule    Mixed obsessional thoughts and acts        Relevant Medications    DULoxetine (CYMBALTA) 60 MG capsule    Nightmares associated with chronic post-traumatic stress disorder        Relevant Medications    DULoxetine (CYMBALTA) 60 MG capsule    lisdexamfetamine (Vyvanse) 50 MG capsule    prazosin (MINIPRESS) 2 MG capsule          Diagnoses         Codes Comments    Attention deficit hyperactivity disorder, combined type    -  Primary ICD-10-CM: F90.2  ICD-9-CM: 314.01     Generalized anxiety disorder     ICD-10-CM: F41.1  ICD-9-CM: 300.02     Post traumatic stress disorder (PTSD)     ICD-10-CM: F43.10  ICD-9-CM: 309.81     Mixed obsessional thoughts and acts     ICD-10-CM: F42.2  ICD-9-CM: 300.3     Nightmares associated with chronic post-traumatic stress disorder     ICD-10-CM: F51.5, F43.12  ICD-9-CM: 307.47, 309.81         azalia reviewed.  UDS in past reviewed an appropriate.   Functionality: pt having minimal impairment in important areas of daily functioning.  Prognosis: Good dependent on  medication/follow up and treatment plan compliance.    We did a lengthy discussion.  Patient had tried a couple of therapy visits however therapist wanted to perform EM  and he was afraid of doing that.  I did discuss with him that that would hopefully help PTSD symptoms however that is a decision he has to make and he states he will consider it.  I am going to increase his prazosin for the ongoing nightmares up to 2 mg.  I am also going to go ahead and increase the Vyvanse up to 50 mg for the ADHD.  I did discuss with him the more that we go up on the medication and the more likely he is to develop tolerance to this medicine and he verbalized understanding of this.  He will continue the Cymbalta for the anxiety, depression and PTSD.  Refills of all been submitted.     continuing efforts to promote the therapeutic alliance, address the patient's issues, and strengthen self awareness, insights, and coping skills.   Patient is aware to call 911 or go to the nearest ER should begin having SI/HI. Pt will notify me should he have any negative side effects or any worsening depression.  RTC 12 weeks.  Sooner if needed.

## 2024-05-29 DIAGNOSIS — F90.2 ATTENTION DEFICIT HYPERACTIVITY DISORDER, COMBINED TYPE: ICD-10-CM

## 2024-05-29 RX ORDER — LISDEXAMFETAMINE DIMESYLATE 50 MG/1
50 CAPSULE ORAL EVERY MORNING
Qty: 30 CAPSULE | Refills: 0 | Status: SHIPPED | OUTPATIENT
Start: 2024-05-29

## 2024-06-01 DIAGNOSIS — E78.2 MIXED HYPERLIPIDEMIA: Chronic | ICD-10-CM

## 2024-06-01 DIAGNOSIS — I10 ESSENTIAL HYPERTENSION: Chronic | ICD-10-CM

## 2024-06-01 DIAGNOSIS — E03.9 ACQUIRED HYPOTHYROIDISM: Chronic | ICD-10-CM

## 2024-06-03 RX ORDER — ROSUVASTATIN CALCIUM 20 MG/1
20 TABLET, COATED ORAL DAILY
Qty: 90 TABLET | Refills: 3 | Status: SHIPPED | OUTPATIENT
Start: 2024-06-03

## 2024-06-03 RX ORDER — LEVOTHYROXINE SODIUM 0.03 MG/1
25 TABLET ORAL DAILY
Qty: 90 TABLET | Refills: 3 | Status: SHIPPED | OUTPATIENT
Start: 2024-06-03

## 2024-06-03 RX ORDER — METOPROLOL SUCCINATE 50 MG/1
50 TABLET, EXTENDED RELEASE ORAL DAILY
Qty: 90 TABLET | Refills: 3 | Status: SHIPPED | OUTPATIENT
Start: 2024-06-03

## 2024-06-28 DIAGNOSIS — F90.2 ATTENTION DEFICIT HYPERACTIVITY DISORDER, COMBINED TYPE: ICD-10-CM

## 2024-06-28 RX ORDER — LISDEXAMFETAMINE DIMESYLATE 50 MG
50 CAPSULE ORAL EVERY MORNING
Qty: 30 CAPSULE | Refills: 0 | Status: SHIPPED | OUTPATIENT
Start: 2024-06-28

## 2024-07-29 DIAGNOSIS — F90.2 ATTENTION DEFICIT HYPERACTIVITY DISORDER, COMBINED TYPE: ICD-10-CM

## 2024-07-29 RX ORDER — LISDEXAMFETAMINE DIMESYLATE 50 MG/1
50 CAPSULE ORAL EVERY MORNING
Qty: 30 CAPSULE | Refills: 0 | Status: SHIPPED | OUTPATIENT
Start: 2024-07-29

## 2024-08-26 DIAGNOSIS — F90.2 ATTENTION DEFICIT HYPERACTIVITY DISORDER, COMBINED TYPE: ICD-10-CM

## 2024-08-26 RX ORDER — LISDEXAMFETAMINE DIMESYLATE 50 MG/1
50 CAPSULE ORAL EVERY MORNING
Qty: 30 CAPSULE | Refills: 0 | Status: SHIPPED | OUTPATIENT
Start: 2024-08-26

## 2024-09-10 ENCOUNTER — OFFICE VISIT (OUTPATIENT)
Dept: PSYCHIATRY | Facility: CLINIC | Age: 34
End: 2024-09-10
Payer: COMMERCIAL

## 2024-09-10 VITALS
BODY MASS INDEX: 46.65 KG/M2 | OXYGEN SATURATION: 97 % | DIASTOLIC BLOOD PRESSURE: 87 MMHG | WEIGHT: 315 LBS | SYSTOLIC BLOOD PRESSURE: 137 MMHG | HEART RATE: 116 BPM | HEIGHT: 69 IN

## 2024-09-10 DIAGNOSIS — F43.12 NIGHTMARES ASSOCIATED WITH CHRONIC POST-TRAUMATIC STRESS DISORDER: ICD-10-CM

## 2024-09-10 DIAGNOSIS — F43.10 POST TRAUMATIC STRESS DISORDER (PTSD): ICD-10-CM

## 2024-09-10 DIAGNOSIS — F51.5 NIGHTMARES ASSOCIATED WITH CHRONIC POST-TRAUMATIC STRESS DISORDER: ICD-10-CM

## 2024-09-10 DIAGNOSIS — F41.1 GENERALIZED ANXIETY DISORDER: ICD-10-CM

## 2024-09-10 DIAGNOSIS — F90.2 ATTENTION DEFICIT HYPERACTIVITY DISORDER, COMBINED TYPE: Primary | ICD-10-CM

## 2024-09-10 DIAGNOSIS — F42.2 MIXED OBSESSIONAL THOUGHTS AND ACTS: ICD-10-CM

## 2024-09-10 PROCEDURE — 1160F RVW MEDS BY RX/DR IN RCRD: CPT | Performed by: NURSE PRACTITIONER

## 2024-09-10 PROCEDURE — 1159F MED LIST DOCD IN RCRD: CPT | Performed by: NURSE PRACTITIONER

## 2024-09-10 PROCEDURE — 3079F DIAST BP 80-89 MM HG: CPT | Performed by: NURSE PRACTITIONER

## 2024-09-10 PROCEDURE — 99214 OFFICE O/P EST MOD 30 MIN: CPT | Performed by: NURSE PRACTITIONER

## 2024-09-10 PROCEDURE — 3075F SYST BP GE 130 - 139MM HG: CPT | Performed by: NURSE PRACTITIONER

## 2024-09-10 RX ORDER — PRAZOSIN HYDROCHLORIDE 2 MG/1
4 CAPSULE ORAL NIGHTLY
Qty: 60 CAPSULE | Refills: 2 | Status: SHIPPED | OUTPATIENT
Start: 2024-09-10

## 2024-09-10 RX ORDER — DULOXETIN HYDROCHLORIDE 60 MG/1
60 CAPSULE, DELAYED RELEASE ORAL DAILY
Qty: 30 CAPSULE | Refills: 2 | Status: SHIPPED | OUTPATIENT
Start: 2024-09-10 | End: 2025-09-10

## 2024-09-10 NOTE — PROGRESS NOTES
Subjective   Andrew Narayan is a 33 y.o. male is here today for medication management follow-up.  Presents Chief Complaint:  Recheck on anxiety and hallucinations and ADHD    History of Present Illness: Patient presents by himself.  He says that he is doing pretty good.  He feels like his depression is present and it comes and goes and presently stable on his current meds.  He is having issues with nightmares.  He states he has nightmares nightly.  The prazosin helps and that without the prazosin he states if he wakes up 10 times he will go into a nightmare each time he wakes up and with the prazosin it is only a couple of times with nightmares like that nightly.  He states that the nightmares really affect him more the next day than actual sleeping.  He denies any negative side effects to current meds.  No acute medical stressors.  Mood is stable he denies anger outburst.  Still has flashbacks daily but states he can deal with this.  Does not really want to do therapy at this time and he has done it in the past.Body mass index is 48.68 kg/m².  Weight gain of 8 pounds since last office visit.  The stimulant continues to help with focus and concentration as well as his mind racing.  Increase in the dosage at the last visit did help more.  The following portions of the patient's history were reviewed and updated as appropriate: allergies, current medications, past family history, past medical history, past social history, past surgical history and problem list.    Review of Systems   Constitutional:  Negative for activity change, appetite change and fatigue.   HENT: Negative.     Eyes:  Negative for visual disturbance.   Respiratory: Negative.     Cardiovascular: Negative.    Gastrointestinal:  Negative for nausea.   Endocrine: Negative.    Genitourinary: Negative.    Musculoskeletal:  Positive for arthralgias.   Skin: Negative.    Allergic/Immunologic: Negative.    Neurological:  Negative for dizziness,  "seizures and headaches.   Hematological: Negative.    Psychiatric/Behavioral:  Negative for agitation, behavioral problems, confusion, decreased concentration, dysphoric mood, hallucinations, self-injury, sleep disturbance and suicidal ideas. The patient is not nervous/anxious and is not hyperactive.         Nightmares     Reviewed copied data and there are no changes    Objective   Physical Exam   Constitutional: He is oriented to person, place, and time. He appears well-developed.   HENT:   Head: Normocephalic.   Neurological: He is alert and oriented to person, place, and time.   Psychiatric: His speech is normal and behavior is normal. Mood and thought content normal. His mood appears not anxious. He expresses impulsivity.   Engaging in conversation   Vitals reviewed.    Blood pressure 137/87, pulse 116, height 175.3 cm (69.02\"), weight (!) 150 kg (329 lb 12.8 oz), SpO2 97%.    Medication List:   Current Outpatient Medications   Medication Sig Dispense Refill    DULoxetine (CYMBALTA) 60 MG capsule Take 1 capsule by mouth Daily. 30 capsule 2    gabapentin (NEURONTIN) 600 MG tablet Take 1 tablet by mouth 6 (Six) Times a Day.      levothyroxine (SYNTHROID, LEVOTHROID) 25 MCG tablet TAKE 1 TABLET BY MOUTH DAILY 90 tablet 3    lisdexamfetamine (Vyvanse) 50 MG capsule Take 1 capsule by mouth Every Morning 30 capsule 0    metoprolol succinate XL (TOPROL-XL) 50 MG 24 hr tablet TAKE 1 TABLET BY MOUTH DAILY 90 tablet 3    prazosin (MINIPRESS) 2 MG capsule Take 1 capsule by mouth Every Night. 30 capsule 2    rosuvastatin (CRESTOR) 20 MG tablet TAKE 1 TABLET BY MOUTH DAILY 90 tablet 3    Syringe 25G X 5/8\" 3 ML misc Use as directed 2 x weekly 24 each 3    Testosterone Cypionate (Depo-Testosterone) 200 MG/ML injection Inject 1/2 cc subcutaneously every Monday and Thursday 10 mL 2    triamcinolone (KENALOG) 0.5 % cream Apply 1 application topically to the appropriate area as directed 3 (Three) Times a Day. 30 g 0     No " current facility-administered medications for this visit.     Reviewed copied data and there are no changes      Mental Status Exam:   Hygiene:   good  Cooperation:  Cooperative  Eye Contact:  Good  Psychomotor Behavior:  Appropriate  Affect:  Full range  Hopelessness: Denies  Speech:  Normal  Thought Process:  Goal directed  Thought Content:  Normal  Suicidal:  None  Homicidal:  None  Hallucinations:  None  Delusion:  None  Memory:  Intact  Orientation:  Person, Place, Time and Situation  Reliability:  fair  Insight:  Fair  Judgement:  Fair  Impulse Control:  Fair  Physical/Medical Issues:  Yes obesity, HTN, pain    Assessment & Plan   Problems Addressed this Visit    None  Visit Diagnoses       Attention deficit hyperactivity disorder, combined type    -  Primary    Post traumatic stress disorder (PTSD)        Generalized anxiety disorder        Mixed obsessional thoughts and acts              Diagnoses         Codes Comments    Attention deficit hyperactivity disorder, combined type    -  Primary ICD-10-CM: F90.2  ICD-9-CM: 314.01     Post traumatic stress disorder (PTSD)     ICD-10-CM: F43.10  ICD-9-CM: 309.81     Generalized anxiety disorder     ICD-10-CM: F41.1  ICD-9-CM: 300.02     Mixed obsessional thoughts and acts     ICD-10-CM: F42.2  ICD-9-CM: 300.3         azalia reviewed.  UDS in past reviewed an appropriate.   Functionality: pt having minimal impairment in important areas of daily functioning.  Prognosis: Good dependent on medication/follow up and treatment plan compliance.  UDS performed today and pending    I am going ahead and increase his prazosin on up to 4 mg for the nightmares.  I discussed with him that medication along with Toprol together can increase risk of hypotension and he has a blood pressure cuff at home he is going to monitor his blood pressure once he starts doing this.  If he has any problems he will let me know.  He will continue the Cymbalta for PTSD and OCD and continue the  stimulant for ADHD.  He does not require refill on the Vyvanse for now.  Other refills have been submitted.     continuing efforts to promote the therapeutic alliance, address the patient's issues, and strengthen self awareness, insights, and coping skills.   Patient is aware to call 911 or go to the nearest ER should begin having SI/HI. Pt will notify me should he have any negative side effects or any worsening depression.  RTC 12 weeks.  Sooner if needed.

## 2024-09-24 DIAGNOSIS — F90.2 ATTENTION DEFICIT HYPERACTIVITY DISORDER, COMBINED TYPE: ICD-10-CM

## 2024-09-24 RX ORDER — LISDEXAMFETAMINE DIMESYLATE 50 MG/1
50 CAPSULE ORAL EVERY MORNING
Qty: 30 CAPSULE | Refills: 0 | Status: SHIPPED | OUTPATIENT
Start: 2024-09-24

## 2024-10-22 ENCOUNTER — OFFICE VISIT (OUTPATIENT)
Dept: FAMILY MEDICINE CLINIC | Facility: CLINIC | Age: 34
End: 2024-10-22
Payer: COMMERCIAL

## 2024-10-22 VITALS
DIASTOLIC BLOOD PRESSURE: 88 MMHG | HEIGHT: 69 IN | BODY MASS INDEX: 46.65 KG/M2 | WEIGHT: 315 LBS | SYSTOLIC BLOOD PRESSURE: 130 MMHG | RESPIRATION RATE: 16 BRPM | TEMPERATURE: 99.8 F | HEART RATE: 104 BPM | OXYGEN SATURATION: 99 %

## 2024-10-22 DIAGNOSIS — R42 VERTIGO: ICD-10-CM

## 2024-10-22 DIAGNOSIS — Z23 NEED FOR INFLUENZA VACCINATION: ICD-10-CM

## 2024-10-22 DIAGNOSIS — H55.00 NYSTAGMUS: Primary | ICD-10-CM

## 2024-10-22 DIAGNOSIS — Z00.00 HEALTH MAINTENANCE EXAMINATION: ICD-10-CM

## 2024-10-22 DIAGNOSIS — F90.2 ATTENTION DEFICIT HYPERACTIVITY DISORDER, COMBINED TYPE: ICD-10-CM

## 2024-10-22 PROCEDURE — 90471 IMMUNIZATION ADMIN: CPT | Performed by: PHYSICIAN ASSISTANT

## 2024-10-22 PROCEDURE — 1160F RVW MEDS BY RX/DR IN RCRD: CPT | Performed by: PHYSICIAN ASSISTANT

## 2024-10-22 PROCEDURE — 82607 VITAMIN B-12: CPT | Performed by: PHYSICIAN ASSISTANT

## 2024-10-22 PROCEDURE — 80050 GENERAL HEALTH PANEL: CPT | Performed by: PHYSICIAN ASSISTANT

## 2024-10-22 PROCEDURE — 3079F DIAST BP 80-89 MM HG: CPT | Performed by: PHYSICIAN ASSISTANT

## 2024-10-22 PROCEDURE — 84439 ASSAY OF FREE THYROXINE: CPT | Performed by: PHYSICIAN ASSISTANT

## 2024-10-22 PROCEDURE — 1159F MED LIST DOCD IN RCRD: CPT | Performed by: PHYSICIAN ASSISTANT

## 2024-10-22 PROCEDURE — 80061 LIPID PANEL: CPT | Performed by: PHYSICIAN ASSISTANT

## 2024-10-22 PROCEDURE — 99214 OFFICE O/P EST MOD 30 MIN: CPT | Performed by: PHYSICIAN ASSISTANT

## 2024-10-22 PROCEDURE — 83036 HEMOGLOBIN GLYCOSYLATED A1C: CPT | Performed by: PHYSICIAN ASSISTANT

## 2024-10-22 PROCEDURE — 1126F AMNT PAIN NOTED NONE PRSNT: CPT | Performed by: PHYSICIAN ASSISTANT

## 2024-10-22 PROCEDURE — 90656 IIV3 VACC NO PRSV 0.5 ML IM: CPT | Performed by: PHYSICIAN ASSISTANT

## 2024-10-22 PROCEDURE — 3075F SYST BP GE 130 - 139MM HG: CPT | Performed by: PHYSICIAN ASSISTANT

## 2024-10-22 RX ORDER — MECLIZINE HYDROCHLORIDE 25 MG/1
25 TABLET ORAL 3 TIMES DAILY PRN
Qty: 90 TABLET | Refills: 0 | Status: SHIPPED | OUTPATIENT
Start: 2024-10-22

## 2024-10-22 RX ORDER — LISDEXAMFETAMINE DIMESYLATE 50 MG/1
50 CAPSULE ORAL EVERY MORNING
Qty: 30 CAPSULE | Refills: 0 | Status: SHIPPED | OUTPATIENT
Start: 2024-10-22

## 2024-10-22 RX ORDER — ONDANSETRON 4 MG/1
4 TABLET, FILM COATED ORAL EVERY 8 HOURS PRN
Qty: 30 TABLET | Refills: 1 | Status: SHIPPED | OUTPATIENT
Start: 2024-10-22
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
## 2024-10-23 ENCOUNTER — TELEPHONE (OUTPATIENT)
Dept: FAMILY MEDICINE CLINIC | Facility: CLINIC | Age: 34
End: 2024-10-23
Payer: COMMERCIAL

## 2024-10-23 ENCOUNTER — HOSPITAL ENCOUNTER (OUTPATIENT)
Dept: MRI IMAGING | Facility: HOSPITAL | Age: 34
Discharge: HOME | End: 2024-10-23
Admitting: PHYSICIAN ASSISTANT
Payer: COMMERCIAL

## 2024-10-23 DIAGNOSIS — R42 VERTIGO: ICD-10-CM

## 2024-10-23 DIAGNOSIS — H55.00 NYSTAGMUS: ICD-10-CM

## 2024-10-23 LAB
ALBUMIN SERPL-MCNC: 4.1 G/DL (ref 3.5–5.2)
ALBUMIN/GLOB SERPL: 1.2 G/DL
ALP SERPL-CCNC: 93 U/L (ref 39–117)
ALT SERPL W P-5'-P-CCNC: 28 U/L (ref 1–41)
ANION GAP SERPL CALCULATED.3IONS-SCNC: 10.2 MMOL/L (ref 5–15)
AST SERPL-CCNC: 18 U/L (ref 1–40)
BASOPHILS # BLD AUTO: 0.03 10*3/MM3 (ref 0–0.2)
BASOPHILS NFR BLD AUTO: 0.3 % (ref 0–1.5)
BILIRUB SERPL-MCNC: 0.6 MG/DL (ref 0–1.2)
BUN SERPL-MCNC: 11 MG/DL (ref 6–20)
BUN/CREAT SERPL: 9.5 (ref 7–25)
CALCIUM SPEC-SCNC: 10.1 MG/DL (ref 8.6–10.5)
CHLORIDE SERPL-SCNC: 102 MMOL/L (ref 98–107)
CHOLEST SERPL-MCNC: 261 MG/DL (ref 0–200)
CO2 SERPL-SCNC: 26.8 MMOL/L (ref 22–29)
CREAT BLDA-MCNC: 1.2 MG/DL (ref 0.6–1.3)
CREAT SERPL-MCNC: 1.16 MG/DL (ref 0.76–1.27)
DEPRECATED RDW RBC AUTO: 40.6 FL (ref 37–54)
EGFRCR SERPLBLD CKD-EPI 2021: 84.2 ML/MIN/1.73
EOSINOPHIL # BLD AUTO: 0.15 10*3/MM3 (ref 0–0.4)
EOSINOPHIL NFR BLD AUTO: 1.3 % (ref 0.3–6.2)
ERYTHROCYTE [DISTWIDTH] IN BLOOD BY AUTOMATED COUNT: 12.6 % (ref 12.3–15.4)
GLOBULIN UR ELPH-MCNC: 3.3 GM/DL
GLUCOSE SERPL-MCNC: 91 MG/DL (ref 65–99)
HBA1C MFR BLD: 5.7 % (ref 4.8–5.6)
HCT VFR BLD AUTO: 49.2 % (ref 37.5–51)
HDLC SERPL-MCNC: 36 MG/DL (ref 40–60)
HGB BLD-MCNC: 16.2 G/DL (ref 13–17.7)
IMM GRANULOCYTES # BLD AUTO: 0.06 10*3/MM3 (ref 0–0.05)
IMM GRANULOCYTES NFR BLD AUTO: 0.5 % (ref 0–0.5)
LDLC SERPL CALC-MCNC: 182 MG/DL (ref 0–100)
LDLC/HDLC SERPL: 5.01 {RATIO}
LYMPHOCYTES # BLD AUTO: 3.16 10*3/MM3 (ref 0.7–3.1)
LYMPHOCYTES NFR BLD AUTO: 28.2 % (ref 19.6–45.3)
MCH RBC QN AUTO: 28.9 PG (ref 26.6–33)
MCHC RBC AUTO-ENTMCNC: 32.9 G/DL (ref 31.5–35.7)
MCV RBC AUTO: 87.9 FL (ref 79–97)
MONOCYTES # BLD AUTO: 0.56 10*3/MM3 (ref 0.1–0.9)
MONOCYTES NFR BLD AUTO: 5 % (ref 5–12)
NEUTROPHILS NFR BLD AUTO: 64.7 % (ref 42.7–76)
NEUTROPHILS NFR BLD AUTO: 7.25 10*3/MM3 (ref 1.7–7)
NRBC BLD AUTO-RTO: 0 /100 WBC (ref 0–0.2)
PLATELET # BLD AUTO: 314 10*3/MM3 (ref 140–450)
PMV BLD AUTO: 10.4 FL (ref 6–12)
POTASSIUM SERPL-SCNC: 4.4 MMOL/L (ref 3.5–5.2)
PROT SERPL-MCNC: 7.4 G/DL (ref 6–8.5)
RBC # BLD AUTO: 5.6 10*6/MM3 (ref 4.14–5.8)
SODIUM SERPL-SCNC: 139 MMOL/L (ref 136–145)
T4 FREE SERPL-MCNC: 1.36 NG/DL (ref 0.92–1.68)
TRIGL SERPL-MCNC: 224 MG/DL (ref 0–150)
TSH SERPL DL<=0.05 MIU/L-ACNC: 1.76 UIU/ML (ref 0.27–4.2)
VIT B12 BLD-MCNC: 318 PG/ML (ref 211–946)
VLDLC SERPL-MCNC: 43 MG/DL (ref 5–40)
WBC NRBC COR # BLD AUTO: 11.21 10*3/MM3 (ref 3.4–10.8)

## 2024-10-23 PROCEDURE — 70553 MRI BRAIN STEM W/O & W/DYE: CPT | Performed by: RADIOLOGY

## 2024-10-23 PROCEDURE — 70553 MRI BRAIN STEM W/O & W/DYE: CPT

## 2024-10-23 PROCEDURE — A9577 INJ MULTIHANCE: HCPCS | Performed by: PHYSICIAN ASSISTANT

## 2024-10-23 PROCEDURE — 82565 ASSAY OF CREATININE: CPT

## 2024-10-23 PROCEDURE — 0 GADOBENATE DIMEGLUMINE 529 MG/ML SOLUTION: Performed by: PHYSICIAN ASSISTANT

## 2024-10-23 RX ORDER — TRIAMTERENE/HYDROCHLOROTHIAZID 37.5-25 MG
1 TABLET ORAL DAILY
Qty: 30 TABLET | Refills: 0 | Status: SHIPPED | OUTPATIENT
Start: 2024-10-23

## 2024-10-23 RX ADMIN — GADOBENATE DIMEGLUMINE 20 ML: 529 INJECTION, SOLUTION INTRAVENOUS at 14:36

## 2024-10-23 NOTE — TELEPHONE ENCOUNTER
Called and spoke to patient wife and made her and him aware that he is scheduled for a STAT MRI today, 10/23/24 at [facility] at 12:45 pm. She verbalized understanding.

## 2024-10-24 RX ORDER — TRIAMTERENE/HYDROCHLOROTHIAZID 37.5-25 MG
1 TABLET ORAL DAILY
Qty: 90 TABLET | OUTPATIENT
Start: 2024-10-24

## 2024-11-18 DIAGNOSIS — R42 DIZZINESS: ICD-10-CM

## 2024-11-18 DIAGNOSIS — R42 VERTIGO: Primary | ICD-10-CM

## 2024-11-20 DIAGNOSIS — F90.2 ATTENTION DEFICIT HYPERACTIVITY DISORDER, COMBINED TYPE: ICD-10-CM

## 2024-11-20 RX ORDER — LISDEXAMFETAMINE DIMESYLATE 50 MG/1
50 CAPSULE ORAL EVERY MORNING
Qty: 30 CAPSULE | Refills: 0 | Status: SHIPPED | OUTPATIENT
Start: 2024-11-20

## 2024-12-10 ENCOUNTER — OFFICE VISIT (OUTPATIENT)
Dept: PSYCHIATRY | Facility: CLINIC | Age: 34
End: 2024-12-10
Payer: COMMERCIAL

## 2024-12-10 VITALS
BODY MASS INDEX: 46.65 KG/M2 | OXYGEN SATURATION: 97 % | HEART RATE: 97 BPM | SYSTOLIC BLOOD PRESSURE: 138 MMHG | WEIGHT: 315 LBS | HEIGHT: 69 IN | DIASTOLIC BLOOD PRESSURE: 83 MMHG

## 2024-12-10 DIAGNOSIS — F90.2 ATTENTION DEFICIT HYPERACTIVITY DISORDER, COMBINED TYPE: ICD-10-CM

## 2024-12-10 DIAGNOSIS — F43.12 NIGHTMARES ASSOCIATED WITH CHRONIC POST-TRAUMATIC STRESS DISORDER: ICD-10-CM

## 2024-12-10 DIAGNOSIS — F40.00 AGORAPHOBIA: ICD-10-CM

## 2024-12-10 DIAGNOSIS — F41.1 GENERALIZED ANXIETY DISORDER: Primary | ICD-10-CM

## 2024-12-10 DIAGNOSIS — F51.5 NIGHTMARES ASSOCIATED WITH CHRONIC POST-TRAUMATIC STRESS DISORDER: ICD-10-CM

## 2024-12-10 DIAGNOSIS — F43.10 POST TRAUMATIC STRESS DISORDER (PTSD): ICD-10-CM

## 2024-12-10 PROCEDURE — 1159F MED LIST DOCD IN RCRD: CPT | Performed by: NURSE PRACTITIONER

## 2024-12-10 PROCEDURE — 3075F SYST BP GE 130 - 139MM HG: CPT | Performed by: NURSE PRACTITIONER

## 2024-12-10 PROCEDURE — 3079F DIAST BP 80-89 MM HG: CPT | Performed by: NURSE PRACTITIONER

## 2024-12-10 PROCEDURE — 1160F RVW MEDS BY RX/DR IN RCRD: CPT | Performed by: NURSE PRACTITIONER

## 2024-12-10 PROCEDURE — 99214 OFFICE O/P EST MOD 30 MIN: CPT | Performed by: NURSE PRACTITIONER

## 2024-12-10 RX ORDER — PRAZOSIN HYDROCHLORIDE 2 MG/1
4 CAPSULE ORAL NIGHTLY
Qty: 60 CAPSULE | Refills: 2 | Status: SHIPPED | OUTPATIENT
Start: 2024-12-10

## 2024-12-10 RX ORDER — DULOXETIN HYDROCHLORIDE 60 MG/1
60 CAPSULE, DELAYED RELEASE ORAL DAILY
Qty: 30 CAPSULE | Refills: 2 | Status: SHIPPED | OUTPATIENT
Start: 2024-12-10 | End: 2025-12-10

## 2024-12-10 NOTE — PROGRESS NOTES
"      Subjective   Andrew Narayan is a 34 y.o. male is here today for medication management follow-up.  Presents Chief Complaint:  Recheck on anxiety and hallucinations and ADHD    History of Present Illness: Patient presents by himself.  He states his anxiety and depression has been up a little bit just because of all the health issues going on with his daughter.  The daughter is suffering from cranial nerve palsy with the left side of her face paralyzed and it is just really making him worry a lot.  He verbalizes this is situational however and does not feel like medication changes are needed.  The increase in the prazosin did help with his nightmares.  He states now he is only having them 2-3 times a week and they are not as intense.  He states he can handle this current level of the nightmares.  He continues to exhibit agoraphobia rarely goes out in public will take his wife to a place such as [employer] but he sits in the car.  He states he gets in \"almost homesick like feeling\".  With very high anxiety.  He will not go out to a restaurant to eat.  He has been this way for years.Body mass index is 49.33 kg/m².  Weight gain of 2 pounds since last office visit.  No medical stressors.  Denies any negative side effects to current meds.  Mood is stable no anger outbursts.  The stimulant continues to help with focus and concentration and overall mood management.  The following portions of the patient's history were reviewed and updated as appropriate: allergies, current medications, past family history, past medical history, past social history, past surgical history and problem list.    Review of Systems   Constitutional:  Negative for activity change, appetite change and fatigue.   HENT: Negative.     Eyes:  Negative for visual disturbance.   Respiratory: Negative.     Cardiovascular: Negative.    Gastrointestinal:  Negative for nausea.   Endocrine: Negative.    Genitourinary: Negative.    Musculoskeletal:  " "Positive for arthralgias.   Skin: Negative.    Allergic/Immunologic: Negative.    Neurological:  Negative for dizziness, seizures and headaches.   Hematological: Negative.    Psychiatric/Behavioral:  Negative for agitation, behavioral problems, confusion, decreased concentration, dysphoric mood, hallucinations, self-injury, sleep disturbance and suicidal ideas. The patient is not nervous/anxious and is not hyperactive.         Nightmares     Reviewed copied data and there are no changes    Objective   Physical Exam   Constitutional: He is oriented to person, place, and time. He appears well-developed.   HENT:   Head: Normocephalic.   Neurological: He is alert and oriented to person, place, and time.   Psychiatric: His speech is normal and behavior is normal. Mood and thought content normal. His mood appears not anxious. He expresses impulsivity.   Engaging in conversation   Vitals reviewed.    Blood pressure 138/83, pulse 97, height 175.3 cm (69.02\"), weight (!) 152 kg (334 lb 3.2 oz), SpO2 97%.    Medication List:   Current Outpatient Medications   Medication Sig Dispense Refill    DULoxetine (CYMBALTA) 60 MG capsule Take 1 capsule by mouth Daily. 30 capsule 2    prazosin (MINIPRESS) 2 MG capsule Take 2 capsules by mouth Every Night. 60 capsule 2    gabapentin (NEURONTIN) 600 MG tablet Take 1 tablet by mouth 6 (Six) Times a Day.      levothyroxine (SYNTHROID, LEVOTHROID) 25 MCG tablet TAKE 1 TABLET BY MOUTH DAILY 90 tablet 3    lisdexamfetamine (Vyvanse) 50 MG capsule Take 1 capsule by mouth Every Morning 30 capsule 0    meclizine (ANTIVERT) 25 MG tablet Take 1 tablet by mouth 3 (Three) Times a Day As Needed for Dizziness. 90 tablet 0    metoprolol succinate XL (TOPROL-XL) 50 MG 24 hr tablet TAKE 1 TABLET BY MOUTH DAILY 90 tablet 3    ondansetron (Zofran) 4 MG tablet Take 1 tablet by mouth Every 8 (Eight) Hours As Needed for Nausea or Vomiting. 30 tablet 1    rosuvastatin (CRESTOR) 20 MG tablet TAKE 1 TABLET BY " "MOUTH DAILY 90 tablet 3    Syringe 25G X 5/8\" 3 ML misc Use as directed 2 x weekly 24 each 3    Testosterone Cypionate (Depo-Testosterone) 200 MG/ML injection Inject 1/2 cc subcutaneously every Monday and Thursday 10 mL 2    triamcinolone (KENALOG) 0.5 % cream Apply 1 application topically to the appropriate area as directed 3 (Three) Times a Day. 30 g 0    triamterene-hydrochlorothiazide (MAXZIDE-25) 37.5-25 MG per tablet Take 1 tablet by mouth Daily. 30 tablet 0     No current facility-administered medications for this visit.     Reviewed copied data and there are no changes      Mental Status Exam:   Hygiene:   good  Cooperation:  Cooperative  Eye Contact:  Good  Psychomotor Behavior:  Appropriate  Affect:  Full range  Hopelessness: Denies  Speech:  Normal  Thought Process:  Goal directed  Thought Content:  Normal  Suicidal:  None  Homicidal:  None  Hallucinations:  None  Delusion:  None  Memory:  Intact  Orientation:  Person, Place, Time and Situation  Reliability:  fair  Insight:  Fair  Judgement:  Fair  Impulse Control:  Fair  Physical/Medical Issues:  Yes obesity, HTN, pain    Assessment & Plan   Problems Addressed this Visit    None  Visit Diagnoses       Generalized anxiety disorder    -  Primary    Relevant Medications    DULoxetine (CYMBALTA) 60 MG capsule    Attention deficit hyperactivity disorder, combined type        Relevant Medications    DULoxetine (CYMBALTA) 60 MG capsule    Post traumatic stress disorder (PTSD)        Relevant Medications    DULoxetine (CYMBALTA) 60 MG capsule    Nightmares associated with chronic post-traumatic stress disorder        Relevant Medications    DULoxetine (CYMBALTA) 60 MG capsule    prazosin (MINIPRESS) 2 MG capsule    Agoraphobia        Relevant Medications    DULoxetine (CYMBALTA) 60 MG capsule          Diagnoses         Codes Comments    Generalized anxiety disorder    -  Primary ICD-10-CM: F41.1  ICD-9-CM: 300.02     Attention deficit hyperactivity disorder, " combined type     ICD-10-CM: F90.2  ICD-9-CM: 314.01     Post traumatic stress disorder (PTSD)     ICD-10-CM: F43.10  ICD-9-CM: 309.81     Nightmares associated with chronic post-traumatic stress disorder     ICD-10-CM: F51.5, F43.12  ICD-9-CM: 307.47, 309.81     Agoraphobia     ICD-10-CM: F40.00  ICD-9-CM: 300.22         azalia reviewed.  UDS in past reviewed an appropriate.   Functionality: pt having minimal impairment in important areas of daily functioning.  Prognosis: Good dependent on medication/follow up and treatment plan compliance.  UDS performed today and pending  He verbalizes that his current stressors are situational.  He will continue the Cymbalta at current dosing for anxiety and PTSD continue Minipress for nightmares continue Vyvanse for the ADHD.  Appropriate refills submitted.     continuing efforts to promote the therapeutic alliance, address the patient's issues, and strengthen self awareness, insights, and coping skills.   Patient is aware to call 911 or go to the nearest ER should begin having SI/HI. Pt will notify me should he have any negative side effects or any worsening depression.  RTC 12 weeks.  Sooner if needed.

## 2024-12-18 DIAGNOSIS — F90.2 ATTENTION DEFICIT HYPERACTIVITY DISORDER, COMBINED TYPE: ICD-10-CM

## 2024-12-18 RX ORDER — LISDEXAMFETAMINE DIMESYLATE 50 MG/1
50 CAPSULE ORAL EVERY MORNING
Qty: 30 CAPSULE | Refills: 0 | Status: SHIPPED | OUTPATIENT
Start: 2024-12-18

## 2024-12-19 DIAGNOSIS — R42 VERTIGO: ICD-10-CM

## 2024-12-19 RX ORDER — TRIAMTERENE AND HYDROCHLOROTHIAZIDE 37.5; 25 MG/1; MG/1
1 TABLET ORAL DAILY
Qty: 90 TABLET | OUTPATIENT
Start: 2024-12-19

## 2024-12-19 RX ORDER — TRIAMTERENE AND HYDROCHLOROTHIAZIDE 37.5; 25 MG/1; MG/1
1 TABLET ORAL DAILY
Qty: 30 TABLET | Refills: 0 | Status: SHIPPED | OUTPATIENT
Start: 2024-12-19

## 2025-01-15 DIAGNOSIS — F90.2 ATTENTION DEFICIT HYPERACTIVITY DISORDER, COMBINED TYPE: ICD-10-CM

## 2025-01-15 RX ORDER — LISDEXAMFETAMINE DIMESYLATE 50 MG/1
50 CAPSULE ORAL EVERY MORNING
Qty: 30 CAPSULE | Refills: 0 | Status: SHIPPED | OUTPATIENT
Start: 2025-01-15

## 2025-02-13 DIAGNOSIS — F90.2 ATTENTION DEFICIT HYPERACTIVITY DISORDER, COMBINED TYPE: ICD-10-CM

## 2025-02-13 RX ORDER — LISDEXAMFETAMINE DIMESYLATE 50 MG/1
50 CAPSULE ORAL EVERY MORNING
Qty: 30 CAPSULE | Refills: 0 | Status: SHIPPED | OUTPATIENT
Start: 2025-02-13

## 2025-03-11 ENCOUNTER — OFFICE VISIT (OUTPATIENT)
Dept: PSYCHIATRY | Facility: CLINIC | Age: 35
End: 2025-03-11
Payer: COMMERCIAL

## 2025-03-11 VITALS
DIASTOLIC BLOOD PRESSURE: 95 MMHG | HEART RATE: 97 BPM | SYSTOLIC BLOOD PRESSURE: 150 MMHG | BODY MASS INDEX: 46.65 KG/M2 | HEIGHT: 69 IN | WEIGHT: 315 LBS | OXYGEN SATURATION: 97 %

## 2025-03-11 DIAGNOSIS — F90.2 ATTENTION DEFICIT HYPERACTIVITY DISORDER, COMBINED TYPE: Primary | ICD-10-CM

## 2025-03-11 DIAGNOSIS — F51.5 NIGHTMARES ASSOCIATED WITH CHRONIC POST-TRAUMATIC STRESS DISORDER: ICD-10-CM

## 2025-03-11 DIAGNOSIS — F42.2 MIXED OBSESSIONAL THOUGHTS AND ACTS: ICD-10-CM

## 2025-03-11 DIAGNOSIS — F43.10 POST TRAUMATIC STRESS DISORDER (PTSD): ICD-10-CM

## 2025-03-11 DIAGNOSIS — F41.1 GENERALIZED ANXIETY DISORDER: ICD-10-CM

## 2025-03-11 DIAGNOSIS — F43.12 NIGHTMARES ASSOCIATED WITH CHRONIC POST-TRAUMATIC STRESS DISORDER: ICD-10-CM

## 2025-03-11 PROCEDURE — 1159F MED LIST DOCD IN RCRD: CPT | Performed by: NURSE PRACTITIONER

## 2025-03-11 PROCEDURE — 3077F SYST BP >= 140 MM HG: CPT | Performed by: NURSE PRACTITIONER

## 2025-03-11 PROCEDURE — 3080F DIAST BP >= 90 MM HG: CPT | Performed by: NURSE PRACTITIONER

## 2025-03-11 PROCEDURE — 99214 OFFICE O/P EST MOD 30 MIN: CPT | Performed by: NURSE PRACTITIONER

## 2025-03-11 PROCEDURE — 1160F RVW MEDS BY RX/DR IN RCRD: CPT | Performed by: NURSE PRACTITIONER

## 2025-03-11 RX ORDER — DULOXETIN HYDROCHLORIDE 60 MG/1
60 CAPSULE, DELAYED RELEASE ORAL DAILY
Qty: 30 CAPSULE | Refills: 2 | Status: SHIPPED | OUTPATIENT
Start: 2025-03-11 | End: 2026-03-11

## 2025-03-11 RX ORDER — LISDEXAMFETAMINE DIMESYLATE 50 MG/1
50 CAPSULE ORAL EVERY MORNING
Qty: 30 CAPSULE | Refills: 0 | Status: SHIPPED | OUTPATIENT
Start: 2025-03-11

## 2025-03-11 RX ORDER — PRAZOSIN HYDROCHLORIDE 2 MG/1
4 CAPSULE ORAL NIGHTLY
Qty: 60 CAPSULE | Refills: 2 | Status: SHIPPED | OUTPATIENT
Start: 2025-03-11

## 2025-03-11 NOTE — PROGRESS NOTES
Subjective   Andrew Narayan is a 34 y.o. male is here today for medication management follow-up.presents with his wife with whom he gives permission to speak in front of.    Presents Chief Complaint:  Recheck on anxiety and hallucinations and ADHD    History of Present Illness:   History of Present Illness  The patient is a 34-year-old male who presents for evaluation of binge eating, depression, and nightmares.    He reports experiencing severe binge eating episodes, which he attributes to stress. These episodes occur several times a week, depending on his stress levels. He has not tried any injectable treatments for this condition. He has previously undergone lap band surgery, which was unsuccessful. He is not diabetic but was informed of being borderline diabetic years ago. He has attempted to manage his symptoms with caffeine pills but found them intolerable. The vyvanse does help with the binge eating.  E is not taking the med everyday and notes a substantial increase in hunger throughout the day without the med.  He notes that when the effects of Vyvanse subside, his hunger intensifies, necessitating a break from the medication to recalibrate. The stimulant continues to help with focus and concentration.  Body mass index is 50.33 kg/m². Weight gain 7 lbs since last office visit.  No negative side effects to meds.  No acute medical stressors.      His depression remains stable, with no auditory hallucinations or visual hallucinations reported. He rates his depression at an average of 6 out of 10. He does not endorse any suicidal ideation or feelings of hopelessness over the past two weeks. He reports difficulty concentrating on days when he does not take his medication or in the evenings when the effects wear off. He believes his current dose of Cymbalta is adequate. He continues to spend most of his time indoors. He finds Vyvanse beneficial for his mental health, noting a significant difference on  days when he does not take it.    He experiences nightmares 3 to 4 nights a week, often having 7 or 8 different dreams in one night. He reports that his blood pressure is usually normal, although it occasionally spikes in the morning but returns to normal after taking his medication. He has a blood pressure cuff at home for monitoring. He is currently taking prazosin, which has been effective in reducing the frequency of his nightmares.  He is only taking one of the 2 mg prazosin at bedtime.  He was unaware that he could take 2 of them    MEDICATIONS  Current: Vyvanse, gabapentin, prazosin, Cymbalta  Discontinued: Ativan                 The following portions of the patient's history were reviewed and updated as appropriate: allergies, current medications, past family history, past medical history, past social history, past surgical history and problem list.    Review of Systems   Constitutional:  Negative for activity change, appetite change and fatigue.   HENT: Negative.     Eyes:  Negative for visual disturbance.   Respiratory: Negative.     Cardiovascular: Negative.    Gastrointestinal:  Negative for nausea.   Endocrine: Negative.    Genitourinary: Negative.    Musculoskeletal:  Positive for arthralgias.   Skin: Negative.    Allergic/Immunologic: Negative.    Neurological:  Negative for dizziness, seizures and headaches.   Hematological: Negative.    Psychiatric/Behavioral:  Negative for agitation, behavioral problems, confusion, decreased concentration, dysphoric mood, hallucinations, self-injury, sleep disturbance and suicidal ideas. The patient is not nervous/anxious and is not hyperactive.         Nightmares     Reviewed copied data and there are no changes    Objective   Physical Exam   Constitutional: He is oriented to person, place, and time. He appears well-developed.   HENT:   Head: Normocephalic.   Neurological: He is alert and oriented to person, place, and time.   Psychiatric: His speech is normal  "and behavior is normal. Mood and thought content normal. His mood appears not anxious. He expresses impulsivity.   Engaging in conversation   Vitals reviewed.    Blood pressure 150/95, pulse 97, height 175.3 cm (69.02\"), weight (!) 155 kg (341 lb), SpO2 97%.    Medication List:   Current Outpatient Medications   Medication Sig Dispense Refill    DULoxetine (CYMBALTA) 60 MG capsule Take 1 capsule by mouth Daily. 30 capsule 2    lisdexamfetamine (Vyvanse) 50 MG capsule Take 1 capsule by mouth Every Morning 30 capsule 0    prazosin (MINIPRESS) 2 MG capsule Take 2 capsules by mouth Every Night. 60 capsule 2    gabapentin (NEURONTIN) 600 MG tablet Take 1 tablet by mouth 6 (Six) Times a Day.      levothyroxine (SYNTHROID, LEVOTHROID) 25 MCG tablet TAKE 1 TABLET BY MOUTH DAILY 90 tablet 3    meclizine (ANTIVERT) 25 MG tablet Take 1 tablet by mouth 3 (Three) Times a Day As Needed for Dizziness. 90 tablet 0    metoprolol succinate XL (TOPROL-XL) 50 MG 24 hr tablet TAKE 1 TABLET BY MOUTH DAILY 90 tablet 3    ondansetron (Zofran) 4 MG tablet Take 1 tablet by mouth Every 8 (Eight) Hours As Needed for Nausea or Vomiting. 30 tablet 1    rosuvastatin (CRESTOR) 20 MG tablet TAKE 1 TABLET BY MOUTH DAILY 90 tablet 3    Syringe 25G X 5/8\" 3 ML misc Use as directed 2 x weekly 24 each 3    Testosterone Cypionate (Depo-Testosterone) 200 MG/ML injection Inject 1/2 cc subcutaneously every Monday and Thursday 10 mL 2    triamcinolone (KENALOG) 0.5 % cream Apply 1 application topically to the appropriate area as directed 3 (Three) Times a Day. 30 g 0    triamterene-hydrochlorothiazide (MAXZIDE-25) 37.5-25 MG per tablet TAKE 1 TABLET BY MOUTH DAILY 30 tablet 0     No current facility-administered medications for this visit.     Reviewed copied data and there are no changes      Mental Status Exam:   Hygiene:   good  Cooperation:  Cooperative  Eye Contact:  Good  Psychomotor Behavior:  Appropriate  Affect:  Full range  Hopelessness: " Denies  Speech:  Normal  Thought Process:  Goal directed  Thought Content:  Normal  Suicidal:  None  Homicidal:  None  Hallucinations:  None  Delusion:  None  Memory:  Intact  Orientation:  Person, Place, Time and Situation  Reliability:  fair  Insight:  Fair  Judgement:  Fair  Impulse Control:  Fair  Physical/Medical Issues:  Yes obesity, HTN, pain    Assessment & Plan   Problems Addressed this Visit    None  Visit Diagnoses         Attention deficit hyperactivity disorder, combined type    -  Primary    Relevant Medications    DULoxetine (CYMBALTA) 60 MG capsule    lisdexamfetamine (Vyvanse) 50 MG capsule      Post traumatic stress disorder (PTSD)        Relevant Medications    DULoxetine (CYMBALTA) 60 MG capsule    lisdexamfetamine (Vyvanse) 50 MG capsule      Generalized anxiety disorder        Relevant Medications    DULoxetine (CYMBALTA) 60 MG capsule    lisdexamfetamine (Vyvanse) 50 MG capsule      Mixed obsessional thoughts and acts          Nightmares associated with chronic post-traumatic stress disorder        Relevant Medications    DULoxetine (CYMBALTA) 60 MG capsule    lisdexamfetamine (Vyvanse) 50 MG capsule    prazosin (MINIPRESS) 2 MG capsule          Diagnoses         Codes Comments      Attention deficit hyperactivity disorder, combined type    -  Primary ICD-10-CM: F90.2  ICD-9-CM: 314.01       Post traumatic stress disorder (PTSD)     ICD-10-CM: F43.10  ICD-9-CM: 309.81       Generalized anxiety disorder     ICD-10-CM: F41.1  ICD-9-CM: 300.02       Mixed obsessional thoughts and acts     ICD-10-CM: F42.2  ICD-9-CM: 300.3       Nightmares associated with chronic post-traumatic stress disorder     ICD-10-CM: F51.5, F43.12  ICD-9-CM: 307.47, 309.81         azalia reviewed.  UDS in past reviewed an appropriate.   Functionality: pt having minimal impairment in important areas of daily functioning.  Prognosis: Good dependent on medication/follow up and treatment plan compliance.  Patient or patient  representative verbalized consent for the use of Ambient Listening during the visit with  APOLINAR Gay for chart documentation. 3/11/2025  08:01 EDT   He verbalizes that his current stressors are situational.  He will continue the Cymbalta at current dosing for anxiety and PTSD .  Increase the prazosin for ongoing nightmares.  He has been instructed to monitor blood pressure with this change.   continue Vyvanse for the ADHD.  Appropriate refills submitted.     continuing efforts to promote the therapeutic alliance, address the patient's issues, and strengthen self awareness, insights, and coping skills.   Patient is aware to call 911 or go to the nearest ER should begin having SI/HI. Pt will notify me should he have any negative side effects or any worsening depression.  RTC 12 weeks.  Sooner if needed.

## 2025-03-13 ENCOUNTER — OFFICE VISIT (OUTPATIENT)
Dept: FAMILY MEDICINE CLINIC | Facility: CLINIC | Age: 35
End: 2025-03-13
Payer: COMMERCIAL

## 2025-03-13 VITALS
SYSTOLIC BLOOD PRESSURE: 132 MMHG | TEMPERATURE: 98.1 F | OXYGEN SATURATION: 98 % | RESPIRATION RATE: 20 BRPM | WEIGHT: 315 LBS | DIASTOLIC BLOOD PRESSURE: 90 MMHG | HEART RATE: 87 BPM | HEIGHT: 69 IN | BODY MASS INDEX: 46.65 KG/M2

## 2025-03-13 DIAGNOSIS — F32.A ANXIETY AND DEPRESSION: Chronic | ICD-10-CM

## 2025-03-13 DIAGNOSIS — G47.33 OSA ON CPAP: Chronic | ICD-10-CM

## 2025-03-13 DIAGNOSIS — F90.0 ATTENTION DEFICIT HYPERACTIVITY DISORDER (ADHD), PREDOMINANTLY INATTENTIVE TYPE: Chronic | ICD-10-CM

## 2025-03-13 DIAGNOSIS — F41.9 ANXIETY AND DEPRESSION: Chronic | ICD-10-CM

## 2025-03-13 DIAGNOSIS — E66.01 MORBID (SEVERE) OBESITY DUE TO EXCESS CALORIES: Primary | Chronic | ICD-10-CM

## 2025-03-13 DIAGNOSIS — K12.2: ICD-10-CM

## 2025-03-13 DIAGNOSIS — R73.03 PREDIABETES: Chronic | ICD-10-CM

## 2025-03-13 DIAGNOSIS — E03.9 ACQUIRED HYPOTHYROIDISM: Chronic | ICD-10-CM

## 2025-03-13 NOTE — PATIENT INSTRUCTIONS
Carbohydrate Counting for Diabetes Mellitus, Adult  Carbohydrate counting is a method of keeping track of how many carbohydrates you eat. Eating carbohydrates increases the amount of sugar (glucose) in the blood. Counting how many carbohydrates you eat improves how well you manage your blood glucose. This, in turn, helps you manage your diabetes.  Carbohydrates are measured in grams (g) per serving. It is important to know how many carbohydrates (in grams or by serving size) you can have in each meal. This is different for every person. A dietitian can help you make a meal plan and calculate how many carbohydrates you should have at each meal and snack.  What foods contain carbohydrates?  Carbohydrates are found in the following foods:  Grains, such as breads and cereals.  Dried beans and soy products.  Starchy vegetables, such as potatoes, peas, and corn.  Fruit and fruit juices.  Milk and yogurt.  Sweets and snack foods, such as cake, cookies, candy, chips, and soft drinks.  How do I count carbohydrates in foods?  There are two ways to count carbohydrates in food. You can read food labels or learn standard serving sizes of foods. You can use either of these methods or a combination of both.  Using the Nutrition Facts label  The Nutrition Facts list is included on the labels of almost all packaged foods and beverages in the [place]. It includes:  The serving size.  Information about nutrients in each serving, including the grams of carbohydrate per serving.  To use the Nutrition Facts, decide how many servings you will have. Then, multiply the number of servings by the number of carbohydrates per serving. The resulting number is the total grams of carbohydrates that you will be having.  Learning the standard serving sizes of foods  When you eat carbohydrate foods that are not packaged or do not include Nutrition Facts on the label, you need to measure the servings in order to count the grams of  carbohydrates.  Measure the foods that you will eat with a food scale or measuring cup, if needed.  Decide how many standard-size servings you will eat.  Multiply the number of servings by 15. For foods that contain carbohydrates, one serving equals 15 g of carbohydrates.  For example, if you eat 2 cups or 10 oz (300 g) of strawberries, you will have eaten 2 servings and 30 g of carbohydrates (2 servings x 15 g = 30 g).  For foods that have more than one food mixed, such as soups and casseroles, you must count the carbohydrates in each food that is included.  The following list contains standard serving sizes of common carbohydrate-rich foods. Each of these servings has about 15 g of carbohydrates:  1 slice of bread.  1 six-inch (15 cm) tortilla.  ? cup or 2 oz (53 g) cooked rice or pasta.  ½ cup or 3 oz (85 g) cooked or canned, drained and rinsed beans or lentils.  ½ cup or 3 oz (85 g) starchy vegetable, such as peas, corn, or squash.  ½ cup or 4 oz (120 g) hot cereal.  ½ cup or 3 oz (85 g) boiled or mashed potatoes, or ¼ or 3 oz (85 g) of a large baked potato.  ½ cup or 4 fl oz (118 mL) fruit juice.  1 cup or 8 fl oz (237 mL) milk.  1 small or 4 oz (106 g) apple.  ½ or 2 oz (63 g) of a medium banana.  1 cup or 5 oz (150 g) strawberries.  3 cups or 1 oz (28.3 g) popped popcorn.  What is an example of carbohydrate counting?  To calculate the grams of carbohydrates in this sample meal, follow the steps shown below.  Sample meal  3 oz (85 g) chicken breast.  ? cup or 4 oz (106 g) brown rice.  ½ cup or 3 oz (85 g) corn.  1 cup or 8 fl oz (237 mL) milk.  1 cup or 5 oz (150 g) strawberries with sugar-free whipped topping.  Carbohydrate calculation  Identify the foods that contain carbohydrates:  Rice.  Corn.  Milk.  Strawberries.  Calculate how many servings you have of each food:  2 servings rice.  1 serving corn.  1 serving milk.  1 serving strawberries.  Multiply each number of servings by 15  servings rice x 15  g = 30 g.  1 serving corn x 15 g = 15 g.  1 serving milk x 15 g = 15 g.  1 serving strawberries x 15 g = 15 g.  Add together all of the amounts to find the total grams of carbohydrates eaten:  30 g + 15 g + 15 g + 15 g = 75 g of carbohydrates total.  What are tips for following this plan?  Shopping  Develop a meal plan and then make a shopping list.  Buy fresh and frozen vegetables, fresh and frozen fruit, dairy, eggs, beans, lentils, and whole grains.  Look at food labels. Choose foods that have more fiber and less sugar.  Avoid processed foods and foods with added sugars.  Meal planning  Aim to have the same number of grams of carbohydrates at each meal and for each snack time.  Plan to have regular, balanced meals and snacks.  Where to find more information  American Diabetes Association: diabetes.org  Centers for Disease Control and Prevention: cdc.gov  Academy of Nutrition and Dietetics: eatright.org  Association of Diabetes Care & Education Specialists: diabeteseducator.org  Summary  Carbohydrate counting is a method of keeping track of how many carbohydrates you eat.  Eating carbohydrates increases the amount of sugar (glucose) in your blood.  Counting how many carbohydrates you eat improves how well you manage your blood glucose. This helps you manage your diabetes.  A dietitian can help you make a meal plan and calculate how many carbohydrates you should have at each meal and snack.  This information is not intended to replace advice given to you by your health care provider. Make sure you discuss any questions you have with your health care provider.  Document Revised: 07/20/2021 Document Reviewed: 07/21/2021  Elsevier Patient Education © 2024 [employer]

## 2025-03-13 NOTE — PROGRESS NOTES
"Chief Complaint -obesity    History of Present Illness -     Andrew Narayan is a 34 y.o. male.     Obesity-  Patient complains of obesity and binge eating.  He states that he tends to overeat/binge eat in the evenings after his Vyvanse wears off.  Current BMI is 49.  Patient does not have a regular exercise routine.    Attention deficit hyperactivity disorder-  Not at goal as patient states that his Vyvanse tends to wear off and approximately 4 hours.  Patient reports symptoms are controlled when on Vyvanse.  He reports increased appetite and binge eating when Vyvanse wears off in the evenings.    Anxiety and depression-  Stable with duloxetine.  He denies any hallucinations, SI or HI    Prediabetes-  We discussed his prediabetic status with A1c of 5.7.    Obstructive sleep apnea-  Stable with CPAP therapy.  He does have comorbid conditions including prediabetes and obesity    Hypothyroidism-  Currently stable without medication      The following portions of the patient's history were reviewed and updated as appropriate: allergies, current medications, past family history, past medical history, past social history, past surgical history and problem list.        Objective  Vital signs:  /90 (BP Location: Right arm, Patient Position: Sitting, Cuff Size: Adult)   Pulse 87   Temp 98.1 °F (36.7 °C) (Temporal)   Resp 20   Ht 175.3 cm (69.02\")   Wt (!) 151 kg (333 lb 6.4 oz)   SpO2 98%   BMI 49.21 kg/m²     Physical Exam  Vitals and nursing note reviewed.   Constitutional:       Appearance: Normal appearance. He is well-developed. He is obese.   Eyes:      Extraocular Movements: Extraocular movements intact.      Conjunctiva/sclera: Conjunctivae normal.   Cardiovascular:      Rate and Rhythm: Normal rate and regular rhythm.      Heart sounds: Normal heart sounds. No murmur heard.  Pulmonary:      Effort: Pulmonary effort is normal. No respiratory distress.      Breath sounds: Normal breath sounds. No " wheezing.   Musculoskeletal:         General: No tenderness.   Skin:     General: Skin is warm and dry.      Findings: No rash.   Neurological:      Mental Status: He is alert and oriented to person, place, and time.   Psychiatric:         Mood and Affect: Mood normal.         Behavior: Behavior normal.         Thought Content: Thought content normal.         The following data was reviewed by Ashley Rojas PA-C:         Lab Results   Component Value Date    BUN 11 10/22/2024    CREATININE 1.20 10/23/2024    EGFR 84.2 10/22/2024    ALT 28 10/22/2024    AST 18 10/22/2024    WBC 11.21 (H) 10/22/2024    HGB 16.2 10/22/2024    HCT 49.2 10/22/2024     10/22/2024    CHOL 261 (H) 10/22/2024    TRIG 224 (H) 10/22/2024    HDL 36 (L) 10/22/2024     (H) 10/22/2024    TSH 1.760 10/22/2024    HGBA1C 5.70 (H) 10/22/2024           Assessment & Plan     Diagnoses and all orders for this visit:    1. Morbid (severe) obesity due to excess calories (Primary)  Comments:  Advised intermittent fasting from 7 PM to 7 AM  Advised 30 minutes CV activity daily as tolerated  Advised low carbohydrate diet    2. Attention deficit hyperactivity disorder (ADHD), predominantly inattentive type  Comments:  Ordered genetic testing to see if patient is a rapid metabolizer of stimulants (MTHFR genetic mutation suspected)  Orders:  -     PHARMACOGENOMICS PROFILE, [facility] - Swab,; Future    3. Anxiety and depression  Comments:  Continue duloxetine  Advised to continue care with psychiatric nurse practitioner  Orders:  -     PHARMACOGENOMICS PROFILE, [facility] - Swab,; Future    4. Cellulitis of canine space of mouth  -     amoxicillin-clavulanate (AUGMENTIN) 875-125 MG per tablet; Take 1 tablet by mouth 2 (Two) Times a Day.  Dispense: 20 tablet; Refill: 0    5. Prediabetes  Comments:  Advised a low carbohydrate diabetic diet    6. GRUPO on CPAP  Comments:  Continue use of CPAP  Encourage patient to lose weight    7. Acquired  hypothyroidism  Comments:  Continue to monitor as patient is currently stable without medication                   Patient was given instructions and counseling regarding his condition or for health maintenance advice. Please see specific information pulled into the AVS if appropriate      This document has been electronically signed by:  Ashley Rojas PA-C

## 2025-04-11 DIAGNOSIS — F90.2 ATTENTION DEFICIT HYPERACTIVITY DISORDER, COMBINED TYPE: ICD-10-CM

## 2025-04-11 RX ORDER — LISDEXAMFETAMINE DIMESYLATE 50 MG/1
50 CAPSULE ORAL EVERY MORNING
Qty: 30 CAPSULE | Refills: 0 | Status: SHIPPED | OUTPATIENT
Start: 2025-04-11

## 2025-05-12 DIAGNOSIS — F90.2 ATTENTION DEFICIT HYPERACTIVITY DISORDER, COMBINED TYPE: ICD-10-CM

## 2025-05-12 RX ORDER — LISDEXAMFETAMINE DIMESYLATE 50 MG/1
50 CAPSULE ORAL EVERY MORNING
Qty: 30 CAPSULE | Refills: 0 | Status: SHIPPED | OUTPATIENT
Start: 2025-05-12

## 2025-06-12 DIAGNOSIS — F90.2 ATTENTION DEFICIT HYPERACTIVITY DISORDER, COMBINED TYPE: ICD-10-CM

## 2025-06-12 RX ORDER — LISDEXAMFETAMINE DIMESYLATE 50 MG/1
50 CAPSULE ORAL EVERY MORNING
Qty: 30 CAPSULE | Refills: 0 | Status: SHIPPED | OUTPATIENT
Start: 2025-06-13

## 2025-06-27 DIAGNOSIS — I10 ESSENTIAL HYPERTENSION: Chronic | ICD-10-CM

## 2025-06-27 DIAGNOSIS — E78.2 MIXED HYPERLIPIDEMIA: Chronic | ICD-10-CM

## 2025-06-30 RX ORDER — METOPROLOL SUCCINATE 50 MG/1
50 TABLET, EXTENDED RELEASE ORAL DAILY
Qty: 90 TABLET | Refills: 0 | Status: SHIPPED | OUTPATIENT
Start: 2025-06-30

## 2025-06-30 RX ORDER — ROSUVASTATIN CALCIUM 20 MG/1
20 TABLET, COATED ORAL DAILY
Qty: 90 TABLET | Refills: 0 | Status: SHIPPED | OUTPATIENT
Start: 2025-06-30

## 2025-07-14 DIAGNOSIS — F90.2 ATTENTION DEFICIT HYPERACTIVITY DISORDER, COMBINED TYPE: ICD-10-CM

## 2025-07-14 RX ORDER — LISDEXAMFETAMINE DIMESYLATE 50 MG/1
50 CAPSULE ORAL EVERY MORNING
Qty: 30 CAPSULE | Refills: 0 | Status: SHIPPED | OUTPATIENT
Start: 2025-07-14

## 2025-07-17 ENCOUNTER — OFFICE VISIT (OUTPATIENT)
Dept: PSYCHIATRY | Facility: CLINIC | Age: 35
End: 2025-07-17
Payer: COMMERCIAL

## 2025-07-17 VITALS
DIASTOLIC BLOOD PRESSURE: 91 MMHG | BODY MASS INDEX: 46.65 KG/M2 | WEIGHT: 315 LBS | HEIGHT: 69 IN | HEART RATE: 97 BPM | OXYGEN SATURATION: 95 % | SYSTOLIC BLOOD PRESSURE: 171 MMHG

## 2025-07-17 DIAGNOSIS — F43.12 NIGHTMARES ASSOCIATED WITH CHRONIC POST-TRAUMATIC STRESS DISORDER: ICD-10-CM

## 2025-07-17 DIAGNOSIS — F90.2 ATTENTION DEFICIT HYPERACTIVITY DISORDER, COMBINED TYPE: Primary | ICD-10-CM

## 2025-07-17 DIAGNOSIS — F51.5 NIGHTMARES ASSOCIATED WITH CHRONIC POST-TRAUMATIC STRESS DISORDER: ICD-10-CM

## 2025-07-17 DIAGNOSIS — F43.10 POST TRAUMATIC STRESS DISORDER (PTSD): ICD-10-CM

## 2025-07-17 DIAGNOSIS — F42.2 MIXED OBSESSIONAL THOUGHTS AND ACTS: ICD-10-CM

## 2025-07-17 DIAGNOSIS — F40.00 AGORAPHOBIA: ICD-10-CM

## 2025-07-17 DIAGNOSIS — F41.1 GENERALIZED ANXIETY DISORDER: ICD-10-CM

## 2025-07-17 PROCEDURE — 3080F DIAST BP >= 90 MM HG: CPT | Performed by: NURSE PRACTITIONER

## 2025-07-17 PROCEDURE — 1159F MED LIST DOCD IN RCRD: CPT | Performed by: NURSE PRACTITIONER

## 2025-07-17 PROCEDURE — 3077F SYST BP >= 140 MM HG: CPT | Performed by: NURSE PRACTITIONER

## 2025-07-17 PROCEDURE — 99214 OFFICE O/P EST MOD 30 MIN: CPT | Performed by: NURSE PRACTITIONER

## 2025-07-17 PROCEDURE — 1160F RVW MEDS BY RX/DR IN RCRD: CPT | Performed by: NURSE PRACTITIONER

## 2025-07-17 RX ORDER — PRAZOSIN HYDROCHLORIDE 5 MG/1
5 CAPSULE ORAL NIGHTLY
Qty: 30 CAPSULE | Refills: 2 | Status: SHIPPED | OUTPATIENT
Start: 2025-07-17

## 2025-07-17 RX ORDER — DULOXETIN HYDROCHLORIDE 60 MG/1
60 CAPSULE, DELAYED RELEASE ORAL DAILY
Qty: 30 CAPSULE | Refills: 2 | Status: SHIPPED | OUTPATIENT
Start: 2025-07-17 | End: 2026-07-17

## 2025-07-17 RX ORDER — DULOXETIN HYDROCHLORIDE 30 MG/1
30 CAPSULE, DELAYED RELEASE ORAL DAILY
Qty: 30 CAPSULE | Refills: 2 | Status: SHIPPED | OUTPATIENT
Start: 2025-07-17 | End: 2026-07-17

## 2025-07-17 NOTE — PROGRESS NOTES
Subjective   Andrew Narayan is a 34 y.o. male is here today for medication management follow-up.presents with his wife with whom he gives permission to speak in front of.    Presents Chief Complaint:  Recheck on anxiety and hallucinations and ADHD    History of Present Illness:   History of Present Illness        The patient is a 34-year-old male who presents for depression, anxiety, and blood pressure management.    He reports that his depression has been particularly challenging over the past few weeks, with situational anxiety contributing to his distress. He does not believe any changes to his current treatment regimen are necessary at this time. He is not experiencing hallucinations and reports that prazosin has been effective in reducing the frequency of his nightmares, which occur 2 to 3 times per week. However, he notes that his sleep quality is poor. Despite an increase in his prazosin dosage during his last visit, he has not observed any significant improvement. He continues to take Cymbalta 60 mg, which he finds beneficial, but he still experiences obsessive thinking, particularly about future events. Says he worries all the time about things that have not even happened such as things going wrong with his car.  He also takes Vyvanse, which he finds helpful, although it does cause some fatigue. His home blood pressure readings occasionally reach the 130s or 140s, Body mass index is 49.45 kg/m². No significant changes in weight.  Recently had all teeth pulled which he states has caused more anxiety vs anything else.  He is due to get dentures in aprox a month.      Interim History: He reports that prazosin has been effective in reducing the frequency of his nightmares, which occur 2 to 3 times per week. Despite an increase in his prazosin dosage during his last visit, he has not observed any significant improvement. He continues to take Cymbalta 60 mg, which he finds beneficial, but he still  "experiences obsessive thinking, particularly about future events. He also takes Vyvanse, which he finds helpful.  Says without the vyvanse slowing the mind some his anxiety would be even worse.  Denies depression.      Social History:  - Reports hanging out and participating in markets during the summer      Pertinent Negatives: He is not experiencing hallucinations.                 The following portions of the patient's history were reviewed and updated as appropriate: allergies, current medications, past family history, past medical history, past social history, past surgical history and problem list.    Review of Systems   Constitutional:  Negative for activity change, appetite change and fatigue.   HENT: Negative.     Eyes:  Negative for visual disturbance.   Respiratory: Negative.     Cardiovascular: Negative.    Gastrointestinal:  Negative for nausea.   Endocrine: Negative.    Genitourinary: Negative.    Musculoskeletal:  Positive for arthralgias.   Skin: Negative.    Allergic/Immunologic: Negative.    Neurological:  Negative for dizziness, seizures and headaches.   Hematological: Negative.    Psychiatric/Behavioral:  Negative for agitation, behavioral problems, confusion, decreased concentration, dysphoric mood, hallucinations, self-injury, sleep disturbance and suicidal ideas. The patient is not nervous/anxious and is not hyperactive.         Nightmares     Reviewed copied data and there are no changes    Objective   Physical Exam   Constitutional: He is oriented to person, place, and time. He appears well-developed.   HENT:   Head: Normocephalic.   Neurological: He is alert and oriented to person, place, and time.   Psychiatric: His speech is normal and behavior is normal. Mood and thought content normal. His mood appears not anxious. He expresses impulsivity.   Engaging in conversation   Vitals reviewed.    Blood pressure 171/91, pulse 97, height 175.3 cm (69.02\"), weight (!) 152 kg (335 lb), SpO2 " 95%.    Medication List:   Current Outpatient Medications   Medication Sig Dispense Refill    DULoxetine (CYMBALTA) 60 MG capsule Take 1 capsule by mouth Daily. Take along with the 30 mg for a total of 90 mg 30 capsule 2    prazosin (MINIPRESS) 5 MG capsule Take 1 capsule by mouth Every Night. 30 capsule 2    amoxicillin-clavulanate (AUGMENTIN) 875-125 MG per tablet Take 1 tablet by mouth 2 (Two) Times a Day. 20 tablet 0    DULoxetine (Cymbalta) 30 MG capsule Take 1 capsule by mouth Daily. Take Along with the 60 mg for a total of 90 mg 30 capsule 2    gabapentin (NEURONTIN) 600 MG tablet Take 1 tablet by mouth 6 (Six) Times a Day.      lisdexamfetamine (Vyvanse) 50 MG capsule Take 1 capsule by mouth Every Morning 30 capsule 0    meclizine (ANTIVERT) 25 MG tablet Take 1 tablet by mouth 3 (Three) Times a Day As Needed for Dizziness. 90 tablet 0    metoprolol succinate XL (TOPROL-XL) 50 MG 24 hr tablet TAKE 1 TABLET BY MOUTH DAILY 90 tablet 0    rosuvastatin (CRESTOR) 20 MG tablet TAKE 1 TABLET BY MOUTH DAILY 90 tablet 0    triamterene-hydrochlorothiazide (MAXZIDE-25) 37.5-25 MG per tablet TAKE 1 TABLET BY MOUTH DAILY 30 tablet 0     No current facility-administered medications for this visit.     Reviewed copied data and there are no changes      Mental Status Exam:   Hygiene:   good  Cooperation:  Cooperative  Eye Contact:  Good  Psychomotor Behavior:  Appropriate  Affect:  Full range  Hopelessness: Denies  Speech:  Normal  Thought Process:  Goal directed  Thought Content:  Normal  Suicidal:  None  Homicidal:  None  Hallucinations:  None  Delusion:  None  Memory:  Intact  Orientation:  Person, Place, Time and Situation  Reliability:  fair  Insight:  Fair  Judgement:  Fair  Impulse Control:  Fair  Physical/Medical Issues:  Yes obesity, HTN, pain    Assessment & Plan   Problems Addressed this Visit    None  Visit Diagnoses         Attention deficit hyperactivity disorder, combined type    -  Primary    Relevant  Medications    DULoxetine (CYMBALTA) 60 MG capsule    DULoxetine (Cymbalta) 30 MG capsule      Post traumatic stress disorder (PTSD)        Relevant Medications    DULoxetine (CYMBALTA) 60 MG capsule    DULoxetine (Cymbalta) 30 MG capsule      Generalized anxiety disorder        Relevant Medications    DULoxetine (CYMBALTA) 60 MG capsule    DULoxetine (Cymbalta) 30 MG capsule      Mixed obsessional thoughts and acts        Relevant Medications    DULoxetine (CYMBALTA) 60 MG capsule    DULoxetine (Cymbalta) 30 MG capsule      Agoraphobia        Relevant Medications    DULoxetine (CYMBALTA) 60 MG capsule    DULoxetine (Cymbalta) 30 MG capsule      Nightmares associated with chronic post-traumatic stress disorder        Relevant Medications    DULoxetine (CYMBALTA) 60 MG capsule    prazosin (MINIPRESS) 5 MG capsule    DULoxetine (Cymbalta) 30 MG capsule          Diagnoses         Codes Comments      Attention deficit hyperactivity disorder, combined type    -  Primary ICD-10-CM: F90.2  ICD-9-CM: 314.01       Post traumatic stress disorder (PTSD)     ICD-10-CM: F43.10  ICD-9-CM: 309.81       Generalized anxiety disorder     ICD-10-CM: F41.1  ICD-9-CM: 300.02       Mixed obsessional thoughts and acts     ICD-10-CM: F42.2  ICD-9-CM: 300.3       Agoraphobia     ICD-10-CM: F40.00  ICD-9-CM: 300.22       Nightmares associated with chronic post-traumatic stress disorder     ICD-10-CM: F51.5, F43.12  ICD-9-CM: 307.47, 309.81         azalia reviewed.  UDS in past reviewed an appropriate. Uds performed today and pending.    Functionality: pt having minimal impairment in important areas of daily functioning.  Prognosis: Good dependent on medication/follow up and treatment plan compliance.  Patient or patient representative verbalized consent for the use of Ambient Listening during the visit with  APOLINAR Gay for chart documentation. 7/17/2025  08:01 EDT   Assessment & Plan  Problems:  - Depression  - Anxiety  - Blood  pressure management    Content of Therapy:  During the session, the patient discussed the challenges faced over the past few weeks, including situational anxiety and obsessive thinking about future events. The patient reported that prazosin helps with nightmares, though not completely, and that the current dosage of Cymbalta may not be sufficient. The patient also mentioned occasional high blood pressure readings.    Clinical Impression:  The patient's depression has been exacerbated by situational anxiety over the past few weeks. There are no reports of hallucinations, and sleep is adequate with the help of prazosin, although nightmares persist occasionally. The patient continues to experience obsessive thoughts about future events, which interfere with daily activities. Blood pressure readings at home occasionally reach the 130s or 140s, with a high reading of 170 reported.    Therapeutic Intervention:  - Increase Cymbalta dosage to 90 mg daily (60 mg + 30 mg).  - Start prazosin 5 mg to help with nightmares.  - Encourage regular monitoring of blood pressure.    Plan:  - Increase Cymbalta to 90 mg daily (60 mg + 30 mg).  - Start prazosin 5 mg.  - Monitor blood pressure regularly.  - Contact the office if any issues with the increased dosage or blood pressure arise.    Follow-up:  - Follow-up appointment scheduled in 3 months.    Notes & Risk Factors:  - No hallucinations reported.  - Occasional high blood pressure readings.  - Situational anxiety and obsessive thinking about future events.      continuing efforts to promote the therapeutic alliance, address the patient's issues, and strengthen self awareness, insights, and coping skills.   Patient is aware to call 911 or go to the nearest ER should begin having SI/HI. Pt will notify me should he have any negative side effects or any worsening depression.  RTC 12 weeks.  Sooner if needed.

## 2025-08-11 DIAGNOSIS — F90.2 ATTENTION DEFICIT HYPERACTIVITY DISORDER, COMBINED TYPE: ICD-10-CM

## 2025-08-11 RX ORDER — LISDEXAMFETAMINE DIMESYLATE 50 MG/1
50 CAPSULE ORAL EVERY MORNING
Qty: 30 CAPSULE | Refills: 0 | Status: SHIPPED | OUTPATIENT
Start: 2025-08-11

## 2025-08-18 ENCOUNTER — TELEPHONE (OUTPATIENT)
Dept: FAMILY MEDICINE CLINIC | Facility: CLINIC | Age: 35
End: 2025-08-18
Payer: COMMERCIAL

## (undated) DEVICE — FIBR LASR HOLMIUM SLIMLINE SIS EZ 365U

## (undated) DEVICE — CVR HNDL LIGHT RIGID

## (undated) DEVICE — FRCP BX RADJAW4 NDL 2.8 240CM LG OG BX40

## (undated) DEVICE — URETERAL ACCESS SHEATH SET: Brand: NAVIGATOR

## (undated) DEVICE — ENCORE® LATEX MICRO SIZE 8, STERILE LATEX POWDER-FREE SURGICAL GLOVE: Brand: ENCORE

## (undated) DEVICE — Device: Brand: DEFENDO AIR/WATER/SUCTION AND BIOPSY VALVE

## (undated) DEVICE — SKIN AFFIX SURG ADHESIVE 72/CS 0.55ML: Brand: MEDLINE

## (undated) DEVICE — PENCL ROCKRSWCH MEGADYNE W/HOLSTR 10FT SS

## (undated) DEVICE — GLV SURG PREMIERPRO MIC LTX PF SZ8 BRN

## (undated) DEVICE — GW SUP AMPLATZ ULTR/STFF/STR PTFE SS 0.35IN 145CM

## (undated) DEVICE — COR CYSTO: Brand: MEDLINE INDUSTRIES, INC.

## (undated) DEVICE — WATER 50W MAX / AIR 8W MAX: Brand: FLEXIVA TRACTIP

## (undated) DEVICE — ELECTRD BLD EZ CLN STD 2.5IN

## (undated) DEVICE — ENDOPATH XCEL BLADELESS TROCARS WITH STABILITY SLEEVES: Brand: ENDOPATH XCEL

## (undated) DEVICE — SYS IRR PUMP SGL ACTN VAC SYR 10CC

## (undated) DEVICE — NITINOL STONE RETRIEVAL BASKET: Brand: ZERO TIP

## (undated) DEVICE — Device

## (undated) DEVICE — SNAP KOVER: Brand: UNBRANDED

## (undated) DEVICE — THE BITE BLOCK MAXI, LATEX FREE STRAP IS USED TO PROTECT THE ENDOSCOPE INSERTION TUBE FROM BEING BITTEN BY THE PATIENT.

## (undated) DEVICE — SYR CONTRL LUERLOK 10CC

## (undated) DEVICE — GLV SURG SENSICARE PI MIC PF SZ8.5 LF STRL

## (undated) DEVICE — ADAPT UROLOK

## (undated) DEVICE — GLW STD STR 3CM .035X150CM

## (undated) DEVICE — NDL HYPO ECLPS SFTY 22G 1 1/2IN

## (undated) DEVICE — GLV SURG SENSICARE PI MIC PF SZ9 LF STRL

## (undated) DEVICE — GOWN,NON-REINFORCED,SIRUS,SET IN SLV,XXL: Brand: MEDLINE

## (undated) DEVICE — PK BARIATRIC 10